# Patient Record
Sex: FEMALE | Race: WHITE | NOT HISPANIC OR LATINO | Employment: OTHER | URBAN - METROPOLITAN AREA
[De-identification: names, ages, dates, MRNs, and addresses within clinical notes are randomized per-mention and may not be internally consistent; named-entity substitution may affect disease eponyms.]

---

## 2013-06-05 LAB — CRC RECOMMENDATION EXT: NORMAL

## 2017-04-28 ENCOUNTER — HISTORICAL (OUTPATIENT)
Dept: ADMINISTRATIVE | Facility: HOSPITAL | Age: 62
End: 2017-04-28

## 2017-06-14 ENCOUNTER — HISTORICAL (OUTPATIENT)
Dept: RADIOLOGY | Facility: HOSPITAL | Age: 62
End: 2017-06-14

## 2017-07-26 ENCOUNTER — HISTORICAL (OUTPATIENT)
Dept: ADMINISTRATIVE | Facility: HOSPITAL | Age: 62
End: 2017-07-26

## 2017-08-10 ENCOUNTER — HISTORICAL (OUTPATIENT)
Dept: HEMATOLOGY/ONCOLOGY | Facility: CLINIC | Age: 62
End: 2017-08-10

## 2017-08-10 LAB
ABS NEUT (OLG): 4.77 X10(3)/MCL (ref 2.1–9.2)
BASOPHILS # BLD AUTO: 0 X10(3)/MCL (ref 0–0.2)
BASOPHILS NFR BLD AUTO: 0.4 %
CEA SERPL-MCNC: 1 NG/ML (ref 0–3)
EOSINOPHIL # BLD AUTO: 0.1 X10(3)/MCL (ref 0–0.9)
EOSINOPHIL NFR BLD AUTO: 1.9 %
ERYTHROCYTE [DISTWIDTH] IN BLOOD BY AUTOMATED COUNT: 13.4 % (ref 11.5–17)
HCT VFR BLD AUTO: 36.4 % (ref 37–47)
HGB BLD-MCNC: 11.7 GM/DL (ref 12–16)
LYMPHOCYTES # BLD AUTO: 1.7 X10(3)/MCL (ref 0.6–4.6)
LYMPHOCYTES NFR BLD AUTO: 23.5 %
MCH RBC QN AUTO: 30.4 PG (ref 27–31)
MCHC RBC AUTO-ENTMCNC: 32.1 GM/DL (ref 33–36)
MCV RBC AUTO: 94.5 FL (ref 80–94)
MONOCYTES # BLD AUTO: 0.7 X10(3)/MCL (ref 0.1–1.3)
MONOCYTES NFR BLD AUTO: 9.3 %
NEUTROPHILS # BLD AUTO: 4.8 X10(3)/MCL (ref 2.1–9.2)
NEUTROPHILS NFR BLD AUTO: 64.9 %
PLATELET # BLD AUTO: 269 X10(3)/MCL (ref 130–400)
PMV BLD AUTO: 9.5 FL (ref 9.4–12.4)
RBC # BLD AUTO: 3.85 X10(6)/MCL (ref 4.2–5.4)
WBC # SPEC AUTO: 7.4 X10(3)/MCL (ref 4.5–11.5)

## 2017-08-14 ENCOUNTER — HISTORICAL (OUTPATIENT)
Dept: ADMINISTRATIVE | Facility: HOSPITAL | Age: 62
End: 2017-08-14

## 2017-08-14 LAB — DEPRECATED CALCIDIOL+CALCIFEROL SERPL-MC: 72.2 NG/ML (ref 30–80)

## 2018-08-10 ENCOUNTER — HISTORICAL (OUTPATIENT)
Dept: HEMATOLOGY/ONCOLOGY | Facility: CLINIC | Age: 63
End: 2018-08-10

## 2018-08-10 LAB
ABS NEUT (OLG): 3.38 X10(3)/MCL (ref 2.1–9.2)
ALBUMIN SERPL-MCNC: 4.1 GM/DL (ref 3.4–5)
ALBUMIN/GLOB SERPL: 1.2 {RATIO}
ALP SERPL-CCNC: 55 UNIT/L (ref 38–126)
ALT SERPL-CCNC: 25 UNIT/L (ref 12–78)
AST SERPL-CCNC: 26 UNIT/L (ref 15–37)
BASOPHILS # BLD AUTO: 0 X10(3)/MCL (ref 0–0.2)
BASOPHILS NFR BLD AUTO: 0.5 %
BILIRUB SERPL-MCNC: 0.5 MG/DL (ref 0.2–1)
BILIRUBIN DIRECT+TOT PNL SERPL-MCNC: 0.1 MG/DL (ref 0–0.2)
BILIRUBIN DIRECT+TOT PNL SERPL-MCNC: 0.4 MG/DL (ref 0–0.8)
BUN SERPL-MCNC: 17 MG/DL (ref 7–18)
CALCIUM SERPL-MCNC: 9.3 MG/DL (ref 8.5–10.1)
CEA SERPL-MCNC: 0.9 NG/ML (ref 0–3)
CHLORIDE SERPL-SCNC: 103 MMOL/L (ref 98–107)
CO2 SERPL-SCNC: 29 MMOL/L (ref 21–32)
CREAT SERPL-MCNC: 0.7 MG/DL (ref 0.55–1.02)
DEPRECATED CALCIDIOL+CALCIFEROL SERPL-MC: 47 NG/ML (ref 30–80)
EOSINOPHIL # BLD AUTO: 0.1 X10(3)/MCL (ref 0–0.9)
EOSINOPHIL NFR BLD AUTO: 2.1 %
ERYTHROCYTE [DISTWIDTH] IN BLOOD BY AUTOMATED COUNT: 13.3 % (ref 11.5–17)
GLOBULIN SER-MCNC: 3.4 GM/DL (ref 2.4–3.5)
GLUCOSE SERPL-MCNC: 84 MG/DL (ref 74–106)
HCT VFR BLD AUTO: 38.8 % (ref 37–47)
HGB BLD-MCNC: 12.5 GM/DL (ref 12–16)
LYMPHOCYTES # BLD AUTO: 1.5 X10(3)/MCL (ref 0.6–4.6)
LYMPHOCYTES NFR BLD AUTO: 25.9 %
MCH RBC QN AUTO: 30.7 PG (ref 27–31)
MCHC RBC AUTO-ENTMCNC: 32.2 GM/DL (ref 33–36)
MCV RBC AUTO: 95.3 FL (ref 80–94)
MONOCYTES # BLD AUTO: 0.7 X10(3)/MCL (ref 0.1–1.3)
MONOCYTES NFR BLD AUTO: 12.4 %
NEUTROPHILS # BLD AUTO: 3.4 X10(3)/MCL (ref 2.1–9.2)
NEUTROPHILS NFR BLD AUTO: 59.1 %
PLATELET # BLD AUTO: 268 X10(3)/MCL (ref 130–400)
PMV BLD AUTO: 9.7 FL (ref 9.4–12.4)
POTASSIUM SERPL-SCNC: 4.2 MMOL/L (ref 3.5–5.1)
PROT SERPL-MCNC: 7.5 GM/DL (ref 6.4–8.2)
RBC # BLD AUTO: 4.07 X10(6)/MCL (ref 4.2–5.4)
SODIUM SERPL-SCNC: 139 MMOL/L (ref 136–145)
WBC # SPEC AUTO: 5.7 X10(3)/MCL (ref 4.5–11.5)

## 2018-10-08 ENCOUNTER — HISTORICAL (OUTPATIENT)
Dept: ADMINISTRATIVE | Facility: HOSPITAL | Age: 63
End: 2018-10-08

## 2018-10-08 LAB
ALBUMIN SERPL-MCNC: 4.4 GM/DL (ref 3.4–5)
ALBUMIN/GLOB SERPL: 1.69 {RATIO} (ref 1.5–2.5)
ALP SERPL-CCNC: 39 UNIT/L (ref 38–126)
ALT SERPL-CCNC: 15 UNIT/L (ref 7–52)
AST SERPL-CCNC: 24 UNIT/L (ref 15–37)
BILIRUB SERPL-MCNC: 0.4 MG/DL (ref 0.2–1)
BILIRUBIN DIRECT+TOT PNL SERPL-MCNC: 0 MG/DL (ref 0–0.5)
BILIRUBIN DIRECT+TOT PNL SERPL-MCNC: 0.4 MG/DL
BUN SERPL-MCNC: 16 MG/DL (ref 7–18)
CALCIUM SERPL-MCNC: 9.3 MG/DL (ref 8.5–10)
CHLORIDE SERPL-SCNC: 105 MMOL/L (ref 98–107)
CHOLEST SERPL-MCNC: 267 MG/DL (ref 0–200)
CHOLEST/HDLC SERPL: 3.9 {RATIO}
CO2 SERPL-SCNC: 30 MMOL/L (ref 21–32)
CREAT SERPL-MCNC: 0.71 MG/DL (ref 0.6–1.3)
GLOBULIN SER-MCNC: 2.6 GM/DL (ref 1.2–3)
GLUCOSE SERPL-MCNC: 94 MG/DL (ref 74–106)
HDLC SERPL-MCNC: 69 MG/DL (ref 35–60)
LDLC SERPL CALC-MCNC: 180 MG/DL (ref 0–129)
POTASSIUM SERPL-SCNC: 4.3 MMOL/L (ref 3.5–5.1)
PROT SERPL-MCNC: 7 GM/DL (ref 6.4–8.2)
SODIUM SERPL-SCNC: 141 MMOL/L (ref 136–145)
T3FREE SERPL-MCNC: 1.76 PG/ML (ref 1.45–3.48)
T4 FREE SERPL-MCNC: 0.86 NG/DL (ref 0.76–1.46)
TRIGL SERPL-MCNC: 60 MG/DL (ref 30–150)
TSH SERPL-ACNC: 1.11 MIU/ML (ref 0.35–4.94)
VLDLC SERPL CALC-MCNC: 12 MG/DL

## 2019-04-10 ENCOUNTER — HISTORICAL (OUTPATIENT)
Dept: ADMINISTRATIVE | Facility: HOSPITAL | Age: 64
End: 2019-04-10

## 2019-04-10 LAB
ABS NEUT (OLG): 3 X10(3)/MCL (ref 2.1–9.2)
ALBUMIN SERPL-MCNC: 4.4 GM/DL (ref 3.4–5)
ALBUMIN/GLOB SERPL: 1.76 {RATIO} (ref 1.5–2.5)
ALP SERPL-CCNC: 45 UNIT/L (ref 38–126)
ALT SERPL-CCNC: 18 UNIT/L (ref 7–52)
APPEARANCE, UA: CLEAR
AST SERPL-CCNC: 24 UNIT/L (ref 15–37)
BACTERIA #/AREA URNS AUTO: ABNORMAL /HPF
BILIRUB SERPL-MCNC: 0.4 MG/DL (ref 0.2–1)
BILIRUB UR QL STRIP: NEGATIVE MG/DL
BILIRUBIN DIRECT+TOT PNL SERPL-MCNC: 0.1 MG/DL (ref 0–0.5)
BILIRUBIN DIRECT+TOT PNL SERPL-MCNC: 0.3 MG/DL
BUN SERPL-MCNC: 11 MG/DL (ref 7–18)
CALCIUM SERPL-MCNC: 9.4 MG/DL (ref 8.5–10)
CHLORIDE SERPL-SCNC: 104 MMOL/L (ref 98–107)
CHOLEST SERPL-MCNC: 248 MG/DL (ref 0–200)
CHOLEST/HDLC SERPL: 3.6 {RATIO}
CO2 SERPL-SCNC: 31 MMOL/L (ref 21–32)
COLOR UR: YELLOW
CREAT SERPL-MCNC: 0.65 MG/DL (ref 0.6–1.3)
ERYTHROCYTE [DISTWIDTH] IN BLOOD BY AUTOMATED COUNT: 12.4 % (ref 11.5–17)
GLOBULIN SER-MCNC: 2.5 GM/DL (ref 1.2–3)
GLUCOSE (UA): NEGATIVE MG/DL
GLUCOSE SERPL-MCNC: 91 MG/DL (ref 74–106)
HCT VFR BLD AUTO: 37.4 % (ref 37–47)
HDLC SERPL-MCNC: 68 MG/DL (ref 35–60)
HGB BLD-MCNC: 12.5 GM/DL (ref 12–16)
HGB UR QL STRIP: ABNORMAL UNIT/L
KETONES UR QL STRIP: NEGATIVE MG/DL
LDLC SERPL CALC-MCNC: 149 MG/DL (ref 0–129)
LEUKOCYTE ESTERASE UR QL STRIP: NEGATIVE UNIT/L
LYMPHOCYTES # BLD AUTO: 1.5 X10(3)/MCL (ref 0.6–3.4)
LYMPHOCYTES NFR BLD AUTO: 27.5 % (ref 13–40)
MCH RBC QN AUTO: 30.8 PG (ref 27–31.2)
MCHC RBC AUTO-ENTMCNC: 33 GM/DL (ref 32–36)
MCV RBC AUTO: 92 FL (ref 80–94)
MONOCYTES # BLD AUTO: 0.9 X10(3)/MCL (ref 0.1–1.3)
MONOCYTES NFR BLD AUTO: 16 % (ref 0.1–24)
NEUTROPHILS NFR BLD AUTO: 56.5 % (ref 47–80)
NITRITE UR QL STRIP.AUTO: NEGATIVE
PH UR STRIP: 7 [PH]
PLATELET # BLD AUTO: 249 X10(3)/MCL (ref 130–400)
PMV BLD AUTO: 9.4 FL (ref 9.4–12.4)
POTASSIUM SERPL-SCNC: 4.3 MMOL/L (ref 3.5–5.1)
PROT SERPL-MCNC: 6.9 GM/DL (ref 6.4–8.2)
PROT UR QL STRIP: NEGATIVE MG/DL
RBC # BLD AUTO: 4.06 X10(6)/MCL (ref 4.2–5.4)
RBC #/AREA URNS HPF: ABNORMAL /HPF
SODIUM SERPL-SCNC: 140 MMOL/L (ref 136–145)
SP GR UR STRIP: 1.01
SQUAMOUS EPITHELIAL, UA: ABNORMAL /LPF
T3FREE SERPL-MCNC: 2.13 PG/ML (ref 1.45–3.48)
T4 FREE SERPL-MCNC: 0.83 NG/DL (ref 0.76–1.46)
TRIGL SERPL-MCNC: 96 MG/DL (ref 30–150)
TSH SERPL-ACNC: 0.48 MIU/ML (ref 0.35–4.94)
UROBILINOGEN UR STRIP-ACNC: 0.2 MG/DL
VLDLC SERPL CALC-MCNC: 19.2 MG/DL
WBC # SPEC AUTO: 5.4 X10(3)/MCL (ref 4.5–11.5)
WBC #/AREA URNS AUTO: ABNORMAL /[HPF]

## 2019-06-14 ENCOUNTER — HISTORICAL (OUTPATIENT)
Dept: ADMINISTRATIVE | Facility: HOSPITAL | Age: 64
End: 2019-06-14

## 2019-06-14 LAB
ABS NEUT (OLG): 2.8 X10(3)/MCL (ref 2.1–9.2)
ALBUMIN SERPL-MCNC: 4.3 GM/DL (ref 3.4–5)
ALBUMIN/GLOB SERPL: 1.72 {RATIO} (ref 1.5–2.5)
ALP SERPL-CCNC: 40 UNIT/L (ref 38–126)
ALT SERPL-CCNC: 15 UNIT/L (ref 7–52)
APPEARANCE, UA: ABNORMAL
AST SERPL-CCNC: 24 UNIT/L (ref 15–37)
BACTERIA #/AREA URNS AUTO: ABNORMAL /HPF
BILIRUB SERPL-MCNC: 0.3 MG/DL (ref 0.2–1)
BILIRUB UR QL STRIP: NEGATIVE MG/DL
BILIRUBIN DIRECT+TOT PNL SERPL-MCNC: 0.1 MG/DL (ref 0–0.5)
BILIRUBIN DIRECT+TOT PNL SERPL-MCNC: 0.2 MG/DL
BUN SERPL-MCNC: 17 MG/DL (ref 7–18)
CALCIUM SERPL-MCNC: 9.3 MG/DL (ref 8.5–10)
CHLORIDE SERPL-SCNC: 101 MMOL/L (ref 98–107)
CO2 SERPL-SCNC: 28 MMOL/L (ref 21–32)
COLOR UR: YELLOW
CREAT SERPL-MCNC: 0.74 MG/DL (ref 0.6–1.3)
ERYTHROCYTE [DISTWIDTH] IN BLOOD BY AUTOMATED COUNT: 12.5 % (ref 11.5–17)
GLOBULIN SER-MCNC: 2.5 GM/DL (ref 1.2–3)
GLUCOSE (UA): NEGATIVE MG/DL
GLUCOSE SERPL-MCNC: 98 MG/DL (ref 74–106)
HCT VFR BLD AUTO: 37.2 % (ref 37–47)
HGB BLD-MCNC: 12.4 GM/DL (ref 12–16)
HGB UR QL STRIP: ABNORMAL UNIT/L
KETONES UR QL STRIP: NEGATIVE MG/DL
LEUKOCYTE ESTERASE UR QL STRIP: ABNORMAL UNIT/L
LYMPHOCYTES # BLD AUTO: 2 X10(3)/MCL (ref 0.6–3.4)
LYMPHOCYTES NFR BLD AUTO: 40.4 % (ref 13–40)
MCH RBC QN AUTO: 30.9 PG (ref 27–31.2)
MCHC RBC AUTO-ENTMCNC: 33 GM/DL (ref 32–36)
MCV RBC AUTO: 93 FL (ref 80–94)
MONOCYTES # BLD AUTO: 0.2 X10(3)/MCL (ref 0.1–1.3)
MONOCYTES NFR BLD AUTO: 4.5 % (ref 0.1–24)
NEUTROPHILS NFR BLD AUTO: 55.1 % (ref 47–80)
NITRITE UR QL STRIP.AUTO: NEGATIVE
PH UR STRIP: 7 [PH]
PLATELET # BLD AUTO: 267 X10(3)/MCL (ref 130–400)
PMV BLD AUTO: 8.8 FL (ref 9.4–12.4)
POTASSIUM SERPL-SCNC: 4.2 MMOL/L (ref 3.5–5.1)
PROT SERPL-MCNC: 6.8 GM/DL (ref 6.4–8.2)
PROT UR QL STRIP: NEGATIVE MG/DL
RBC # BLD AUTO: 4.01 X10(6)/MCL (ref 4.2–5.4)
RBC #/AREA URNS HPF: ABNORMAL /HPF
SODIUM SERPL-SCNC: 139 MMOL/L (ref 136–145)
SP GR UR STRIP: 1.01
SQUAMOUS EPITHELIAL, UA: ABNORMAL /LPF
T3FREE SERPL-MCNC: 3.06 PG/ML (ref 1.45–3.48)
T4 FREE SERPL-MCNC: 0.85 NG/DL (ref 0.76–1.46)
TSH SERPL-ACNC: 0.51 MIU/ML (ref 0.35–4.94)
UROBILINOGEN UR STRIP-ACNC: 0.2 MG/DL
WBC # SPEC AUTO: 5 X10(3)/MCL (ref 4.5–11.5)
WBC #/AREA URNS AUTO: ABNORMAL /[HPF]

## 2019-08-09 ENCOUNTER — HISTORICAL (OUTPATIENT)
Dept: HEMATOLOGY/ONCOLOGY | Facility: CLINIC | Age: 64
End: 2019-08-09

## 2019-08-09 LAB
ABS NEUT (OLG): 2.46 X10(3)/MCL (ref 2.1–9.2)
ALBUMIN SERPL-MCNC: 4 GM/DL (ref 3.4–5)
ALBUMIN/GLOB SERPL: 1.3 RATIO (ref 1.1–2)
ALP SERPL-CCNC: 52 UNIT/L (ref 38–126)
ALT SERPL-CCNC: 23 UNIT/L (ref 12–78)
AST SERPL-CCNC: 22 UNIT/L (ref 15–37)
BASOPHILS # BLD AUTO: 0 X10(3)/MCL (ref 0–0.2)
BASOPHILS NFR BLD AUTO: 0.6 %
BASOPHILS NFR BLD MANUAL: 1 % (ref 0–2)
BILIRUB SERPL-MCNC: 0.2 MG/DL (ref 0.2–1)
BILIRUBIN DIRECT+TOT PNL SERPL-MCNC: 0.1 MG/DL (ref 0–0.5)
BILIRUBIN DIRECT+TOT PNL SERPL-MCNC: 0.1 MG/DL (ref 0–0.8)
BUN SERPL-MCNC: 14 MG/DL (ref 7–18)
CALCIUM SERPL-MCNC: 9.8 MG/DL (ref 8.5–10.1)
CEA SERPL-MCNC: 0.8 NG/ML (ref 0–3)
CHLORIDE SERPL-SCNC: 105 MMOL/L (ref 98–107)
CO2 SERPL-SCNC: 32 MMOL/L (ref 21–32)
CREAT SERPL-MCNC: 0.71 MG/DL (ref 0.55–1.02)
DEPRECATED CALCIDIOL+CALCIFEROL SERPL-MC: 84.21 NG/ML (ref 30–80)
EOSINOPHIL # BLD AUTO: 0.1 X10(3)/MCL (ref 0–0.9)
EOSINOPHIL NFR BLD AUTO: 2.1 %
EOSINOPHIL NFR BLD MANUAL: 1 % (ref 0–8)
ERYTHROCYTE [DISTWIDTH] IN BLOOD BY AUTOMATED COUNT: 12.5 % (ref 11.5–17)
GLOBULIN SER-MCNC: 3 GM/DL (ref 2.4–3.5)
GLUCOSE SERPL-MCNC: 89 MG/DL (ref 74–106)
HCT VFR BLD AUTO: 38.3 % (ref 37–47)
HGB BLD-MCNC: 12.3 GM/DL (ref 12–16)
LYMPHOCYTES # BLD AUTO: 1.8 X10(3)/MCL (ref 0.6–4.6)
LYMPHOCYTES NFR BLD AUTO: 34.6 %
LYMPHOCYTES NFR BLD MANUAL: 30 % (ref 13–40)
MCH RBC QN AUTO: 30.5 PG (ref 27–31)
MCHC RBC AUTO-ENTMCNC: 32.1 GM/DL (ref 33–36)
MCV RBC AUTO: 95 FL (ref 80–94)
MONOCYTES # BLD AUTO: 0.8 X10(3)/MCL (ref 0.1–1.3)
MONOCYTES NFR BLD AUTO: 14.6 %
MONOCYTES NFR BLD MANUAL: 10 % (ref 2–11)
NEUTROPHILS # BLD AUTO: 2.5 X10(3)/MCL (ref 2.1–9.2)
NEUTROPHILS NFR BLD AUTO: 47.7 %
NEUTROPHILS NFR BLD MANUAL: 59 % (ref 47–80)
PLATELET # BLD AUTO: 281 X10(3)/MCL (ref 130–400)
PLATELET # BLD EST: NORMAL 10*3/UL
PMV BLD AUTO: 8.8 FL (ref 9.4–12.4)
POTASSIUM SERPL-SCNC: 4.1 MMOL/L (ref 3.5–5.1)
PROT SERPL-MCNC: 7 GM/DL (ref 6.4–8.2)
RBC # BLD AUTO: 4.03 X10(6)/MCL (ref 4.2–5.4)
SODIUM SERPL-SCNC: 144 MMOL/L (ref 136–145)
WBC # SPEC AUTO: 5.2 X10(3)/MCL (ref 4.5–11.5)

## 2019-10-09 ENCOUNTER — HISTORICAL (OUTPATIENT)
Dept: ADMINISTRATIVE | Facility: HOSPITAL | Age: 64
End: 2019-10-09

## 2019-10-09 LAB
T3FREE SERPL-MCNC: 3.71 PG/ML (ref 1.45–3.48)
T4 FREE SERPL-MCNC: 0.77 NG/DL (ref 0.76–1.46)
TSH SERPL-ACNC: 0.3 MIU/ML (ref 0.35–4.94)

## 2019-10-21 LAB — RAPID GROUP A STREP (OHS): NEGATIVE

## 2019-10-30 ENCOUNTER — HISTORICAL (OUTPATIENT)
Dept: ADMINISTRATIVE | Facility: HOSPITAL | Age: 64
End: 2019-10-30

## 2020-05-05 ENCOUNTER — HISTORICAL (OUTPATIENT)
Dept: ADMINISTRATIVE | Facility: HOSPITAL | Age: 65
End: 2020-05-05

## 2020-05-05 LAB
ABS NEUT (OLG): 3 X10(3)/MCL (ref 2.1–9.2)
ALBUMIN SERPL-MCNC: 4.2 GM/DL (ref 3.4–4.8)
ALBUMIN/GLOB SERPL: 1.5 RATIO (ref 1.1–2)
ALP SERPL-CCNC: 52 UNIT/L (ref 40–150)
ALT SERPL-CCNC: 17 UNIT/L (ref 0–55)
AST SERPL-CCNC: 27 UNIT/L (ref 5–34)
BILIRUB SERPL-MCNC: 0.3 MG/DL
BILIRUBIN DIRECT+TOT PNL SERPL-MCNC: 0.1 MG/DL (ref 0–0.5)
BILIRUBIN DIRECT+TOT PNL SERPL-MCNC: 0.2 MG/DL (ref 0–0.8)
BUN SERPL-MCNC: 20 MG/DL (ref 9.8–20.1)
CALCIUM SERPL-MCNC: 9.4 MG/DL (ref 8.4–10.2)
CHLORIDE SERPL-SCNC: 103 MMOL/L (ref 98–107)
CO2 SERPL-SCNC: 30 MMOL/L (ref 23–31)
CREAT SERPL-MCNC: 0.78 MG/DL (ref 0.55–1.02)
DEPRECATED CALCIDIOL+CALCIFEROL SERPL-MC: 62.8 NG/ML (ref 30–80)
ERYTHROCYTE [DISTWIDTH] IN BLOOD BY AUTOMATED COUNT: 12.6 % (ref 11.5–17)
GLOBULIN SER-MCNC: 2.8 GM/DL (ref 2.4–3.5)
GLUCOSE SERPL-MCNC: 73 MG/DL (ref 82–115)
HCT VFR BLD AUTO: 34.5 % (ref 37–47)
HGB BLD-MCNC: 11.2 GM/DL (ref 12–16)
LYMPHOCYTES # BLD AUTO: 2.3 X10(3)/MCL (ref 0.6–3.4)
LYMPHOCYTES NFR BLD AUTO: 40.9 % (ref 13–40)
MCH RBC QN AUTO: 30.4 PG (ref 27–31.2)
MCHC RBC AUTO-ENTMCNC: 32 GM/DL (ref 32–36)
MCV RBC AUTO: 94 FL (ref 80–94)
MONOCYTES # BLD AUTO: 0.3 X10(3)/MCL (ref 0.1–1.3)
MONOCYTES NFR BLD AUTO: 4.5 % (ref 0.1–24)
NEUTROPHILS NFR BLD AUTO: 54.6 % (ref 47–80)
PLATELET # BLD AUTO: 269 X10(3)/MCL (ref 130–400)
PMV BLD AUTO: 9 FL (ref 9.4–12.4)
POTASSIUM SERPL-SCNC: 3.9 MMOL/L (ref 3.5–5.1)
PROT SERPL-MCNC: 7 GM/DL (ref 5.8–7.6)
RBC # BLD AUTO: 3.68 X10(6)/MCL (ref 4.2–5.4)
SODIUM SERPL-SCNC: 141 MMOL/L (ref 136–145)
T3FREE SERPL-MCNC: 2.44 PG/ML (ref 1.45–3.48)
T4 FREE SERPL-MCNC: 0.93 NG/DL (ref 0.76–1.46)
TSH SERPL-ACNC: 0.35 MIU/ML (ref 0.35–4.94)
WBC # SPEC AUTO: 5.6 X10(3)/MCL (ref 4.5–11.5)

## 2020-08-10 ENCOUNTER — HISTORICAL (OUTPATIENT)
Dept: HEMATOLOGY/ONCOLOGY | Facility: CLINIC | Age: 65
End: 2020-08-10

## 2020-08-10 LAB
ABS NEUT (OLG): 2.78 X10(3)/MCL (ref 2.1–9.2)
ALBUMIN SERPL-MCNC: 3.9 GM/DL (ref 3.4–5)
ALBUMIN/GLOB SERPL: 1.3 RATIO (ref 1.1–2)
ALP SERPL-CCNC: 57 UNIT/L (ref 40–150)
ALT SERPL-CCNC: 18 UNIT/L (ref 0–55)
AST SERPL-CCNC: 27 UNIT/L (ref 5–34)
BASOPHILS # BLD AUTO: 0 X10(3)/MCL (ref 0–0.2)
BASOPHILS NFR BLD AUTO: 0.6 %
BILIRUB SERPL-MCNC: 0.4 MG/DL
BILIRUBIN DIRECT+TOT PNL SERPL-MCNC: 0.2 MG/DL (ref 0–0.5)
BILIRUBIN DIRECT+TOT PNL SERPL-MCNC: 0.2 MG/DL (ref 0–0.8)
BUN SERPL-MCNC: 14.7 MG/DL (ref 9.8–20.1)
CALCIUM SERPL-MCNC: 9.2 MG/DL (ref 8.4–10.2)
CEA SERPL-MCNC: <1.73 MG/ML (ref 0–3)
CHLORIDE SERPL-SCNC: 105 MMOL/L (ref 98–107)
CO2 SERPL-SCNC: 31 MMOL/L (ref 23–31)
CREAT SERPL-MCNC: 0.74 MG/DL (ref 0.55–1.02)
EOSINOPHIL # BLD AUTO: 0.1 X10(3)/MCL (ref 0–0.9)
EOSINOPHIL NFR BLD AUTO: 2.5 %
ERYTHROCYTE [DISTWIDTH] IN BLOOD BY AUTOMATED COUNT: 12.6 % (ref 11.5–17)
GLOBULIN SER-MCNC: 2.9 GM/DL (ref 2.4–3.5)
GLUCOSE SERPL-MCNC: 95 MG/DL (ref 82–115)
HCT VFR BLD AUTO: 37.4 % (ref 37–47)
HGB BLD-MCNC: 11.8 GM/DL (ref 12–16)
LYMPHOCYTES # BLD AUTO: 1.6 X10(3)/MCL (ref 0.6–4.6)
LYMPHOCYTES NFR BLD AUTO: 31.5 %
MCH RBC QN AUTO: 30.3 PG (ref 27–31)
MCHC RBC AUTO-ENTMCNC: 31.6 GM/DL (ref 33–36)
MCV RBC AUTO: 95.9 FL (ref 80–94)
MONOCYTES # BLD AUTO: 0.6 X10(3)/MCL (ref 0.1–1.3)
MONOCYTES NFR BLD AUTO: 11.1 %
NEUTROPHILS # BLD AUTO: 2.8 X10(3)/MCL (ref 2.1–9.2)
NEUTROPHILS NFR BLD AUTO: 54.1 %
PLATELET # BLD AUTO: 270 X10(3)/MCL (ref 130–400)
PMV BLD AUTO: 9.4 FL (ref 9.4–12.4)
POTASSIUM SERPL-SCNC: 4.1 MMOL/L (ref 3.5–5.1)
PROT SERPL-MCNC: 6.8 GM/DL (ref 5.8–7.6)
RBC # BLD AUTO: 3.9 X10(6)/MCL (ref 4.2–5.4)
SODIUM SERPL-SCNC: 141 MMOL/L (ref 136–145)
WBC # SPEC AUTO: 5.1 X10(3)/MCL (ref 4.5–11.5)

## 2020-10-20 ENCOUNTER — HISTORICAL (OUTPATIENT)
Dept: ADMINISTRATIVE | Facility: HOSPITAL | Age: 65
End: 2020-10-20

## 2021-01-20 ENCOUNTER — HISTORICAL (OUTPATIENT)
Dept: ADMINISTRATIVE | Facility: HOSPITAL | Age: 66
End: 2021-01-20

## 2021-01-20 LAB
T3FREE SERPL-MCNC: 2.35 PG/ML (ref 1.45–3.48)
T4 FREE SERPL-MCNC: 0.85 NG/DL (ref 0.76–1.46)
TSH SERPL-ACNC: 0.53 MIU/ML (ref 0.35–4.94)

## 2021-05-11 ENCOUNTER — HISTORICAL (OUTPATIENT)
Dept: ADMINISTRATIVE | Facility: HOSPITAL | Age: 66
End: 2021-05-11

## 2021-05-11 LAB
ABS NEUT (OLG): 3.6 X10(3)/MCL (ref 2.1–9.2)
ALBUMIN SERPL-MCNC: 4.3 GM/DL (ref 3.4–5)
ALBUMIN/GLOB SERPL: 1.87 {RATIO} (ref 1.5–2.5)
ALP SERPL-CCNC: 45 UNIT/L (ref 38–126)
ALT SERPL-CCNC: 17 UNIT/L (ref 7–52)
APPEARANCE, UA: CLEAR
AST SERPL-CCNC: 27 UNIT/L (ref 15–37)
BACTERIA #/AREA URNS AUTO: ABNORMAL /HPF
BILIRUB SERPL-MCNC: 0.4 MG/DL (ref 0.2–1)
BILIRUB UR QL STRIP: NEGATIVE MG/DL
BILIRUBIN DIRECT+TOT PNL SERPL-MCNC: 0.1 MG/DL (ref 0–0.5)
BILIRUBIN DIRECT+TOT PNL SERPL-MCNC: 0.3 MG/DL
BUN SERPL-MCNC: 14 MG/DL (ref 7–18)
CALCIUM SERPL-MCNC: 9.8 MG/DL (ref 8.5–10)
CHLORIDE SERPL-SCNC: 101 MMOL/L (ref 98–107)
CHOLEST SERPL-MCNC: 237 MG/DL (ref 0–200)
CHOLEST/HDLC SERPL: 3 {RATIO}
CO2 SERPL-SCNC: 31 MMOL/L (ref 21–32)
COLOR UR: YELLOW
CREAT SERPL-MCNC: 0.69 MG/DL (ref 0.6–1.3)
DEPRECATED CALCIDIOL+CALCIFEROL SERPL-MC: 63.9 NG/ML (ref 30–80)
ERYTHROCYTE [DISTWIDTH] IN BLOOD BY AUTOMATED COUNT: 12.7 % (ref 11.5–17)
GLOBULIN SER-MCNC: 2.3 GM/DL (ref 1.2–3)
GLUCOSE (UA): NEGATIVE MG/DL
GLUCOSE SERPL-MCNC: 83 MG/DL (ref 74–106)
HCT VFR BLD AUTO: 36.7 % (ref 37–47)
HDLC SERPL-MCNC: 79 MG/DL (ref 35–60)
HGB BLD-MCNC: 12.1 GM/DL (ref 12–16)
HGB UR QL STRIP: ABNORMAL UNIT/L
KETONES UR QL STRIP: NEGATIVE MG/DL
LDLC SERPL CALC-MCNC: 152 MG/DL (ref 0–129)
LEUKOCYTE ESTERASE UR QL STRIP: NEGATIVE UNIT/L
LYMPHOCYTES # BLD AUTO: 1.7 X10(3)/MCL (ref 0.6–3.4)
LYMPHOCYTES NFR BLD AUTO: 28.1 % (ref 13–40)
MCH RBC QN AUTO: 30.9 PG (ref 27–31.2)
MCHC RBC AUTO-ENTMCNC: 33 GM/DL (ref 32–36)
MCV RBC AUTO: 94 FL (ref 80–94)
MONOCYTES # BLD AUTO: 0.9 X10(3)/MCL (ref 0.1–1.3)
MONOCYTES NFR BLD AUTO: 14 % (ref 0.1–24)
NEUTROPHILS NFR BLD AUTO: 57.9 % (ref 47–80)
NITRITE UR QL STRIP.AUTO: NEGATIVE
PH UR STRIP: 7 [PH]
PLATELET # BLD AUTO: 288 X10(3)/MCL (ref 130–400)
PMV BLD AUTO: 10.1 FL (ref 9.4–12.4)
POTASSIUM SERPL-SCNC: 4.1 MMOL/L (ref 3.5–5.1)
PROT SERPL-MCNC: 6.6 GM/DL (ref 6.4–8.2)
PROT UR QL STRIP: NEGATIVE MG/DL
RBC # BLD AUTO: 3.92 X10(6)/MCL (ref 4.2–5.4)
RBC #/AREA URNS HPF: ABNORMAL /HPF
SODIUM SERPL-SCNC: 139 MMOL/L (ref 136–145)
SP GR UR STRIP: 1.01
SQUAMOUS EPITHELIAL, UA: ABNORMAL /LPF
T3FREE SERPL-MCNC: 2.27 PG/ML (ref 1.45–3.48)
T4 FREE SERPL-MCNC: 0.82 NG/DL (ref 0.76–1.46)
TRIGL SERPL-MCNC: 99 MG/DL (ref 30–150)
TSH SERPL-ACNC: 0.94 MIU/ML (ref 0.35–4.94)
UROBILINOGEN UR STRIP-ACNC: 0.2 MG/DL
VLDLC SERPL CALC-MCNC: 19.8 MG/DL
WBC # SPEC AUTO: 6.2 X10(3)/MCL (ref 4.5–11.5)
WBC #/AREA URNS AUTO: ABNORMAL /[HPF]

## 2021-05-19 ENCOUNTER — HISTORICAL (OUTPATIENT)
Dept: ADMINISTRATIVE | Facility: HOSPITAL | Age: 66
End: 2021-05-19

## 2021-05-19 LAB — H PYLORI AB SER IA-ACNC: NEGATIVE

## 2021-08-13 ENCOUNTER — HISTORICAL (OUTPATIENT)
Dept: HEMATOLOGY/ONCOLOGY | Facility: CLINIC | Age: 66
End: 2021-08-13

## 2021-08-13 LAB
ABS NEUT (OLG): 2.86 X10(3)/MCL (ref 2.1–9.2)
ALBUMIN SERPL-MCNC: 4.2 GM/DL (ref 3.4–4.8)
ALBUMIN/GLOB SERPL: 1.6 RATIO (ref 1.1–2)
ALP SERPL-CCNC: 49 UNIT/L (ref 40–150)
ALT SERPL-CCNC: 14 UNIT/L (ref 0–55)
AST SERPL-CCNC: 27 UNIT/L (ref 5–34)
BASOPHILS # BLD AUTO: 0 X10(3)/MCL (ref 0–0.2)
BASOPHILS NFR BLD AUTO: 0.8 %
BILIRUB SERPL-MCNC: 0.4 MG/DL
BILIRUBIN DIRECT+TOT PNL SERPL-MCNC: 0.2 MG/DL (ref 0–0.5)
BILIRUBIN DIRECT+TOT PNL SERPL-MCNC: 0.2 MG/DL (ref 0–0.8)
BUN SERPL-MCNC: 16.8 MG/DL (ref 9.8–20.1)
CALCIUM SERPL-MCNC: 9.7 MG/DL (ref 8.4–10.2)
CEA SERPL-MCNC: 1.76 NG/ML (ref 0–3)
CHLORIDE SERPL-SCNC: 103 MMOL/L (ref 98–107)
CO2 SERPL-SCNC: 29 MMOL/L (ref 23–31)
CREAT SERPL-MCNC: 0.8 MG/DL (ref 0.55–1.02)
EOSINOPHIL # BLD AUTO: 0.1 X10(3)/MCL (ref 0–0.9)
EOSINOPHIL NFR BLD AUTO: 1.9 %
ERYTHROCYTE [DISTWIDTH] IN BLOOD BY AUTOMATED COUNT: 12.8 % (ref 11.5–17)
GLOBULIN SER-MCNC: 2.7 GM/DL (ref 2.4–3.5)
GLUCOSE SERPL-MCNC: 85 MG/DL (ref 82–115)
HCT VFR BLD AUTO: 37 % (ref 37–47)
HGB BLD-MCNC: 12 GM/DL (ref 12–16)
LYMPHOCYTES # BLD AUTO: 1.6 X10(3)/MCL (ref 0.6–4.6)
LYMPHOCYTES NFR BLD AUTO: 29.8 %
MCH RBC QN AUTO: 30.8 PG (ref 27–31)
MCHC RBC AUTO-ENTMCNC: 32.4 GM/DL (ref 33–36)
MCV RBC AUTO: 95.1 FL (ref 80–94)
MONOCYTES # BLD AUTO: 0.7 X10(3)/MCL (ref 0.1–1.3)
MONOCYTES NFR BLD AUTO: 13.4 %
NEUTROPHILS # BLD AUTO: 2.9 X10(3)/MCL (ref 2.1–9.2)
NEUTROPHILS NFR BLD AUTO: 53.9 %
PLATELET # BLD AUTO: 250 X10(3)/MCL (ref 130–400)
PMV BLD AUTO: 9.2 FL (ref 9.4–12.4)
POTASSIUM SERPL-SCNC: 4.3 MMOL/L (ref 3.5–5.1)
PROT SERPL-MCNC: 6.9 GM/DL (ref 5.8–7.6)
RBC # BLD AUTO: 3.89 X10(6)/MCL (ref 4.2–5.4)
SODIUM SERPL-SCNC: 141 MMOL/L (ref 136–145)
WBC # SPEC AUTO: 5.3 X10(3)/MCL (ref 4.5–11.5)

## 2021-09-07 ENCOUNTER — HISTORICAL (OUTPATIENT)
Dept: RADIOLOGY | Facility: HOSPITAL | Age: 66
End: 2021-09-07

## 2021-11-22 ENCOUNTER — HISTORICAL (OUTPATIENT)
Dept: ADMINISTRATIVE | Facility: HOSPITAL | Age: 66
End: 2021-11-22

## 2021-11-22 LAB
DEPRECATED CALCIDIOL+CALCIFEROL SERPL-MC: 73.7 NG/ML (ref 30–80)
T3FREE SERPL-MCNC: 3.46 PG/ML (ref 1.45–3.48)
T4 FREE SERPL-MCNC: 0.92 NG/DL (ref 0.76–1.46)
TSH SERPL-ACNC: 0.41 MIU/ML (ref 0.35–4.94)

## 2022-04-09 ENCOUNTER — HISTORICAL (OUTPATIENT)
Dept: ADMINISTRATIVE | Facility: HOSPITAL | Age: 67
End: 2022-04-09
Payer: MEDICARE

## 2022-04-29 VITALS
WEIGHT: 119 LBS | BODY MASS INDEX: 19.07 KG/M2 | SYSTOLIC BLOOD PRESSURE: 108 MMHG | SYSTOLIC BLOOD PRESSURE: 100 MMHG | HEIGHT: 62 IN | OXYGEN SATURATION: 100 % | WEIGHT: 105.81 LBS | WEIGHT: 101.63 LBS | SYSTOLIC BLOOD PRESSURE: 120 MMHG | HEIGHT: 62 IN | BODY MASS INDEX: 18.7 KG/M2 | SYSTOLIC BLOOD PRESSURE: 106 MMHG | WEIGHT: 103.63 LBS | HEIGHT: 62 IN | DIASTOLIC BLOOD PRESSURE: 74 MMHG | BODY MASS INDEX: 19.47 KG/M2 | BODY MASS INDEX: 21.9 KG/M2 | HEIGHT: 62 IN | BODY MASS INDEX: 19.6 KG/M2 | DIASTOLIC BLOOD PRESSURE: 48 MMHG | HEIGHT: 62 IN | DIASTOLIC BLOOD PRESSURE: 60 MMHG | DIASTOLIC BLOOD PRESSURE: 56 MMHG | WEIGHT: 106.5 LBS | SYSTOLIC BLOOD PRESSURE: 130 MMHG | DIASTOLIC BLOOD PRESSURE: 59 MMHG

## 2022-04-30 NOTE — PROGRESS NOTES
Patient:   Kimberlyn Selby            MRN: [id number]            FIN: [id number]               Age:   61 years     Sex:  Female     :  1955   Associated Diagnoses:   None   Author:   Riley MARTINEZ, Beronica TREVIÑO      Visit Information   Diagnosis: Stage I R breast cancer  (T1b N0 M0), 0.7 cm, grade 1, ER+/RI-, HER-2 negative        Stage IIA L breast cancer  (T1c N1 M0), 1.6 cm, grade III, 1 + node, ER/RI -, HER-2 negative        Stage IIIA R breast cancer  (T2 N2a M0), 1.6 cm, grade II, 4+ nodes, ER/RI +, HER-2 negative        Post menopausal s/p hysterectomy        Declined adjuvant XRT & hormonal therapy with initial breast cancer    Current Therapy: Observation  Previous Therapy: Adjuvant TAC x6 completed ; adjuvant TC x4 completed  --> XRT L breast    Clinical History: WF status post right lumpectomy  for invasive breast cancer with the above characteristics. She completed 6 cycles of adjuvant TAC but declined radiation. She was placed on Arimidex but discontinued treatment due to significant myalgias. She had similar side effects with Femara and also complained of bone aching due to Tamoxifen and stopped therapy . Due to her hormone receptor status, she was placed on Evista but again was unable to tolerate therapy. She had an abnormal surveillance mammogram  showing clustered microcalcifications in the upper outer quadrant of the left breast with an associated 1 cm mass by ultrasound. 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 Patient underwent left lumpectomy and axillary dissection 1/15/09. Final pathology as outlined above. Postop course was complicated by cellulitis. She completed 4 cycles of adjuvant TC . She eventually consented to adjuvant radiation therapy to the left breast. Surveillance CT PET scan  showed no abnormal hypermetabolic activity. Mammogram 11 showed a new suspicious finding in the right breast at  "the 4:00 position. Ultrasound was suspicious for malignancy. Ultrasound-guided core biopsies revealed invasive ductal carcinoma strongly ER positive at 85%, AL negative and HER-2 negative. CT PET scan 5/11 showed no abnormal hypermetabolic activity to indicate recurrent or metastatic disease. Although patient did have radiation therapy post lumpectomy on the left side, she never had radiation therapy to the right breast. She elected to undergo bilateral mastectomies and prophylactic right oophorectomy 8/16/11. Final pathology showed a 0.7 cm grade 1 infiltrating ductal carcinoma the right breast. Lamar dissection was not done to her previous surgery. She declined BRCA testing. Adjuvant hormonal therapy was recommended with Aromasin but she declined. She underwent a screening colonoscopy 6/5/13 which showed diverticula but no other abnormal findings; 10 year followup was recommended.       Chief Complaint   "Hair thinning, recently switched thyroid medication"      Interval History   Patient presents today by herself for an annual breast cancer surveillance appointment.  She is now over 12 years out from her initial right breast cancer, over 8 years out from her left breast cancer, and 6 years out from her 2nd right breast cancer.  She has done well with no evidence to suggest recurrence or new problems.  She denies any changes to her chest wall examination.  Colonoscopy is up-to-date.  She had full laboratory drawn with her family physician, Dr. Beyer, in April.  At that time she was mildly anemic, but iron level was normal.  Due to complaints of feeling fatigued and having hair thinning, her Eclectic Thyroid was changed to Nature-throid.  She is feeling somewhat better.  CEA level drawn for today is normal.  She has not had a vitamin D level checked recently.  She is currently being followed by orthopedics for arthritis involving the right knee and a Baker's cyst.      Review of Systems   Constitutional:  Fatigue, + " hair thinning, No fever, No sweats, No weakness.    Eye:  No icterus, No blurring, No double vision, No visual disturbances.    Ear/Nose/Mouth/Throat:  No decreased hearing, No nasal congestion, No sore throat.    Respiratory:  No shortness of breath, No cough, No sputum production, No hemoptysis, No wheezing.    Cardiovascular:  No chest pain, No palpitations, No tachycardia.    Breast:  Bilateral mastectomies - no changes on self examination.    Gastrointestinal:  No nausea, No vomiting, No diarrhea, No constipation, No abdominal pain.    Genitourinary:  No dysuria, No hematuria, No change in urine stream.    Hematology/Lymphatics:  No bruising tendency, No bleeding tendency, No swollen lymph glands.    Musculoskeletal:  Chronic neck and lower back pain - improved, Right knee pain, No joint pain.    Integumentary:  No rash, No pruritus, No petechiae.    Neurologic:  Alert and oriented X4, No abnormal balance, No headache.    Psychiatric:  No anxiety, No depression.       Histories   Past Medical History:    Resolved  Stage I right breast cancer ([id number]):  Resolved.  Comments:  2016 CDT 20:11 CDT - Drea Simpson  Declined adjuvant radiation and hormonal therapy with initial breast cancer  Stage IIA left breast cancer ([id number]):  Resolved.  Stage IIIA right breast cancer ([id number]):  Resolved.  Post menopausal S/P hysterectomy ([id number]):  Resolved.  Arthritis ([id number]):  Resolved.   Family History:    Renal cancer....  Father  Hypertension.  Father  Diabetes mellitus type 1.  Father  Liver cancer                                                                                                                                                                                                                            09-AUG-2016 15:28:46<$>  Grandfather (Maternal, )     Procedure history:    Mastectomy ([id number]) in the month of 2011 at 55 Years.  Comments:  2016 09:44 - Elle Carr  J  BILATERAL  JONI - Total abdominal hysterectomy ([id number]) in the month of 8/2011 at 55 Years.  Uterine suspension in 1990 at 35 Years.  Hysterectomy age 39.  Right breast lumpectomy 1/2005.  Left breast lumpectomy 1/2009.  Mediport catheter placement.  Bilateral mastectomies and prophylactic oophrectomy 8/16/2011.   Social History        Social & Psychosocial Habits    Alcohol  01/21/2014 Risk Assessment: Low Risk    06/02/2016  Use: Never    Employment/School  06/02/2016  Status: Unemployed    Exercise  06/02/2016  Times per week: 3-4 times/week    Exercise type: Walking, Yoga    Home/Environment  06/02/2016  Lives with: Spouse    Nutrition/Health  06/02/2016  Type of diet: LOW SODIUM    Substance Abuse  04/01/2015 Risk Assessment: Denies Substance Abuse    06/02/2016  Use: Never    Tobacco  12/07/2016  Use: Former smoker    Type: Cigarettes    Comment: QUIT AGE 31 - 12/07/2016 09:45 - Elle Carr        Physical Examination   Vital Signs   8/14/2017 13:27 CDT      Temperature Oral          36.7 DegC                             Peripheral Pulse Rate     66 bpm                             Systolic Blood Pressure   121 mmHg                             Diastolic Blood Pressure  73 mmHg     Measurements from flowsheet : Measurements   8/14/2017 13:27 CDT      Weight Dosing             54.7 kg                             Weight Measured           54.7 kg                             Weight Measured and Calculated in Lbs     120.59 lb                             Height/Length Dosing      158 cm                             Height/Length Measured    158 cm                             BSA Measured              1.55 m2                             Body Mass Index Measured  21.91 kg/m2     General:  Alert and oriented, Thin white female in no acute distress.    Eye:  Pupils are equal, round and reactive to light, Extraocular movements are intact, Normal conjunctiva, Sclera nonicteric.    HENT:  Normocephalic,  Oral mucosa is moist, No pharyngeal erythema, No oral lesions.    Neck:  Supple, Non-tender, No jugular venous distention, No lymphadenopathy, No thyromegaly.    Respiratory:  Lungs are clear to auscultation, Respirations are non-labored, Breath sounds are equal.    Cardiovascular:  Normal rate, Regular rhythm, No murmur.    Breast:  Bilateral well-healed mastectomy incisions. No masses or chest wall lesions. No axillary nodes bilaterally.    Gastrointestinal:  Soft, Non-tender, Non-distended, Normal bowel sounds, No organomegaly.    Lymphatics:  No lymphadenopathy neck, axilla, groin.    Musculoskeletal:  Normal range of motion, No tenderness, No lower extremity edema, Trace left upper extremity soft lymphedema.    Integumentary:  Warm, Dry, No rash.    Neurologic:  Alert, Oriented, Normal sensory, Normal motor function, No focal deficits, Cranial Nerves II-XII are grossly intact.    ECOG Performance Scale: 0 - Fully active; no performance restrictions.      Review / Management   Laboratory Results   Last 10 Days Lab Results : PowerNote Discrete Results   8/10/2017 13:09 CDT      WBC                       7.4 x10(3)/mcL                             RBC                       3.85 x10(6)/mcL  LOW                             Hgb                       11.7 gm/dL  LOW                             Hct                       36.4 %  LOW                             Platelet                  269 x10(3)/mcL                             MCV                       94.5 fL  HI                             MCH                       30.4 pg                             MCHC                      32.1 gm/dL  LOW                             RDW                       13.4 %                             MPV                       9.5 fL                             Abs Neut                  4.77 x10(3)/mcL                             NEUT%                     64.9 %  NA                             NEUT#                     4.8 x10(3)/mcL                              LYMPH%                    23.5 %  NA                             LYMPH#                    1.7 x10(3)/mcL                             MONO%                     9.3 %  NA                             MONO#                     0.7 x10(3)/mcL                             EOS%                      1.9 %  NA                             EOS#                      0.1 x10(3)/mcL                             BASO%                     0.4 %  NA                             BASO#                     0.0 x10(3)/mcL                             CEA                       1 ng/mL           Impression and Plan   IMPRESSION:  Stage I right breast cancer 8/11 - NEVAEH   Stage IIA left breast cancer 1/09 - NEVAEH   Stage IIIA right breast cancer 1/05 - NEVAEH    PLAN:  Patient is doing well with no evidence to suggest disease recurrence or new problems.  Additional laboratory will be collected today for a vitamin D level.  I will notify her by phone of the results and any recommendations.  Otherwise, she will return to clinic in 1 year for ongoing breast cancer surveillance.    JON DIXON, FNP-C    Other Physicians  Dr Darleen Mccurdy

## 2022-05-02 NOTE — HISTORICAL [facility] CERNER
This is a historical note converted from [facility]. Formatting and pictures may have been removed.  Please reference Domonique for original formatting and attached multimedia. Chief Complaint  6 mth  History of Present Illness  Patient here for six-month follow-up.?Nonfasting.? Denies any side effects to current medications.? Tolerating current medications and?feels that they are effective.? Denies change in social or family history.?  ?   Anxiety: She feels her anxiety is uncontrolled and is experiencing some depression.? She continues to have problems with her 34yo son who has mental health and addition problems.? He is currently living in her backyard.? He has not followed up with his appendiceal cancer as he should. She is also currently going through a major renovation of her master bath.? She is only occasionally using Valium at night. She would like to change or increase her sertraline.? She was previously in counseling but is not going at this time. She denies any suicidal or harmful ideations.  ?  Specialists:  GI: [facility], colonoscopy 2013, repeat in 10 years  ONC :?Atkins, breast CA  ORTHO : Etier - for knees and right hand  ENT: NAINA Rosenthal  ?   Vaccinations:  Tetanus?- declines  Influenza - declines  COVID - [facility] & Bruno 5/20/2021  Shingles - declines  Pneumonia?- declines  Review of Systems  General:?? Denies weight change.??Denies fever,chills, night sweats, or weakness.  Cardiovascular:?? Denies chest pain, palpitations, dyspnea on exertion, orthopnea.  Respiratory:?? No cough, wheezing, shortness of breath, or sputum.  GI:?? Denies nausea, emesis, constipation, diarrhea, melena, hematochezia or abdominal pain  Psych:?? Denies suicidal or homicidal ideations, or irritability  ?  Physical Exam  Vitals & Measurements  HR:?68(Peripheral)? BP:?130/74?  HT:?158.00?cm? HT:?158?cm? WT:?46.100?kg? WT:?46.1?kg? BMI:?18.47?  General:?? Well-developed and??nourished, no apparent distress, alert and  oriented??4.  Neck:?? Supple, no lymphadenopathy, no thyromegaly, no bruits, no jugular venous distention.  Cardiovascular:?? Regular rhythm and rate, no murmurs, radial and dorsal pedal pulses 2+ bilaterally.  Respiratory:?? Lungs clear to auscultation bilaterally, no wheezes, no crackles, no rhonchi.??Good air movement.  Abdomen:?? NABS, soft, nontender, no hepatosplenomegaly, no masses, no guarding or rebound.?  MS:?? No CCE.?_  Neuro:?? CN II-XII intact_  Psych: Normal affect, patient calm, good historian, patient answers questions?appropriately. Good hygiene.  Assessment/Plan  1.?Anxiety?F41.9  ?She was encouraged to look through [facility] for counseling services.  Increase sertraline to 100mg 1.5 tabs daily. She does not need a refill at this time. Side effects discussed which included the possibility of developing suicidal or harmful ideations. ?Patient?verbalized understanding and will?stop the medication and?seek ER care if?sheexperiences these problems.  Ordered:  Clinic Follow up, *Est. 05/22/22 3:00:00 CDT, CPX in 6 months, CPX in 6 months, Order for future visit, Anxiety  Hypothyroid  Vitamin D deficiency  Hyperlipidemia  Anemia  Depression, ink AFP  Clinic Follow up, *Est. 12/22/21 3:00:00 CST, Ok for Telemed OV, Order for future visit, Anxiety  Depression, HLink AFP  Office/Outpatient Visit Level 4 Established 44325 PC, Anxiety  Hypothyroid  Vitamin D deficiency  Hyperlipidemia  Anemia  Depression, [facility]LINK AMB - AFP, 11/22/21 14:37:00 CST  ?  2.?Depression?F32.A  ?See #1.  Ordered:  Clinic Follow up, *Est. 05/22/22 3:00:00 CDT, CPX in 6 months, CPX in 6 months, Order for future visit, Anxiety  Hypothyroid  Vitamin D deficiency  Hyperlipidemia  Anemia  Depression, HLink AFP  Clinic Follow up, *Est. 12/22/21 3:00:00 CST, Ok for Telemed OV, Order for future visit, Anxiety  Depression, HLink AFP  Office/Outpatient Visit Level 4 Established 72299 PC, Anxiety  Hypothyroid  Vitamin  D deficiency  Hyperlipidemia  Anemia  Depression, [facility]LINK AMB - AFP, 11/22/21 14:37:00 CST  ?  3.?Hypothyroid?E03.9  ?Lab today: TSH, FT3, FT4  Ordered:  Clinic Follow up, *Est. 05/22/22 3:00:00 CDT, CPX in 6 months, CPX in 6 months, Order for future visit, Anxiety  Hypothyroid  Vitamin D deficiency  Hyperlipidemia  Anemia  Depression, HLink AFP  Free T4, Routine collect, *Est. 11/22/21 3:00:00 CST, Blood, Order for future visit, *Est. Stop date 11/22/21 3:00:00 CST, Lab Collect, Hypothyroid, 11/22/21 14:10:00 CST  Free T4, Routine collect, 11/22/21 14:43:00 CST, Blood, Stop date 11/22/21 14:43:00 CST, Lab Collect, Hypothyroid, 11/22/21 14:43:00 CST  Office/Outpatient Visit Level 4 Established 25696 PC, Anxiety  Hypothyroid  Vitamin D deficiency  Hyperlipidemia  Anemia  Depression, [facility]LINK AMB - AFP, 11/22/21 14:37:00 CST  T3 Free, Routine collect, *Est. 11/22/21 3:00:00 CST, Blood, Order for future visit, *Est. Stop date 11/22/21 3:00:00 CST, Lab Collect, Hypothyroid, 11/22/21 14:10:00 CST  T3 Free, Routine collect, 11/22/21 14:43:00 CST, Blood, Stop date 11/22/21 14:43:00 CST, Lab Collect, Hypothyroid, 11/22/21 14:43:00 CST  Thyroid Stimulating Hormone, Routine collect, *Est. 11/22/21 3:00:00 CST, Blood, Order for future visit, *Est. Stop date 11/22/21 3:00:00 CST, Lab Collect, Hypothyroid, 11/22/21 14:10:00 CST  Thyroid Stimulating Hormone, Routine collect, 11/22/21 14:43:00 CST, Blood, Stop date 11/22/21 14:43:00 CST, Lab Collect, Hypothyroid, 11/22/21 14:43:00 CST  ?  4.?Vitamin D deficiency?E55.9  ?Lab today: Vit D  Ordered:  Clinic Follow up, *Est. 05/22/22 3:00:00 CDT, CPX in 6 months, CPX in 6 months, Order for future visit, Anxiety  Hypothyroid  Vitamin D deficiency  Hyperlipidemia  Anemia  Depression, HLink AFP  Office/Outpatient Visit Level 4 Established 70635 PC, Anxiety  Hypothyroid  Vitamin D deficiency  Hyperlipidemia  Anemia  Depression, [facility]LINK AMB - AFP, 11/22/21 14:37:00  CST  Vitamin D, 25-Hydroxy Level, Routine collect, *Est. 11/22/21 3:00:00 CST, Blood, Order for future visit, *Est. Stop date 11/22/21 3:00:00 CST, Lab Collect, Vitamin D deficiency, 11/22/21 14:10:00 CST  Vitamin D, 25-Hydroxy Level, Routine collect, 11/22/21 14:43:00 CST, Blood, Stop date 11/22/21 14:43:00 CST, Lab Collect, Vitamin D deficiency, 11/22/21 14:43:00 CST  ?  5.?Hyperlipidemia?E78.5  Patient declines lipid?panel today.  Ordered:  Clinic Follow up, *Est. 05/22/22 3:00:00 CDT, CPX in 6 months, CPX in 6 months, Order for future visit, Anxiety  Hypothyroid  Vitamin D deficiency  Hyperlipidemia  Anemia  Depression, HLink AFP  Lipid Panel, Routine collect, *Est. 11/22/21 3:00:00 CST, Blood, Order for future visit, *Est. Stop date 11/22/21 3:00:00 CST, Lab Collect, Hyperlipidemia, 11/22/21 14:10:00 CST  Office/Outpatient Visit Level 4 Established [id number] PC, Anxiety  Hypothyroid  Vitamin D deficiency  Hyperlipidemia  Anemia  Depression, HLINK AMB - AFP, 11/22/21 14:37:00 CST  ?  6.?Anemia?D64.9  ?Recent CBC in August stable. Patient asymptomatic.  Ordered:  Clinic Follow up, *Est. 05/22/22 3:00:00 CDT, CPX in 6 months, CPX in 6 months, Order for future visit, Anxiety  Hypothyroid  Vitamin D deficiency  Hyperlipidemia  Anemia  Depression, HLink AFP  Office/Outpatient Visit Level 4 Established [id number] PC, Anxiety  Hypothyroid  Vitamin D deficiency  Hyperlipidemia  Anemia  Depression, HLINK AMB - AFP, 11/22/21 14:37:00 CST  ?  Orders:  sertraline, 150 mg = 1.5 tab(s), Oral, Daily, # 135 tab(s), 0 Refill(s), other reason (Rx)  Referrals  Clinic Follow up, *Est. 12/22/21 3:00:00 CST, Ok for Telemed OV, Order for future visit, Anxiety  Depression, HLink AFP  Clinic Follow up, *Est. 05/22/22 3:00:00 CDT, CPX in 6 months, CPX in 6 months, Order for future visit, Anxiety  Hypothyroid  Vitamin D deficiency  Hyperlipidemia  Anemia  Depression, HLink AFP  Clinic Follow up, Order for future visit    Problem List/Past Medical History  Ongoing  Anemia  Anxiety  AR (allergic rhinitis)  Arthritis of carpometacarpal (CMC) joint of right thumb  Bakers cyst  Carcinoma of breast - upper, outer quadrant  Depression  Dyslipidemia(  Confirmed  )  Dysuria  Fatigue  H/O iron deficiency  Hyperlipidemia  Hypothyroid  Left knee pain  Multiple thyroid nodules(  Confirmed  )  Pes anserinus bursitis of right knee  Right knee pain  Vertigo  Vitamin D deficiency  Wellness examination  Historical  Arthritis  Post menopausal S/P hysterectomy  Stage I right breast cancer  Stage IIA left breast cancer  Stage IIIA right breast cancer  Procedure/Surgical History  Colonoscopy (06/05/2013)  Mastectomy (08.2011)  JONI - Total abdominal hysterectomy (08.2011)  Uterine suspension (1990)  Bilateral mastectomies and prophylactic oophrectomy 8/16/2011  Hysterectomy age 39  Left breast lumpectomy 1/2009  Mediport catheter placement  Right breast lumpectomy 1/2005   Medications  Acidophilus Probiotic Blend oral capsule  [place] Thyroid 60 mg oral tablet, See Instructions  B-Complex  Calcium and Magnesium oral tablet  DIAZepam 5 mg oral tablet, See Instructions, PRN  Flonase 50 mcg/inh nasal spray, 1 spray(s), Nasal, BID  meclizine 25 mg oral tablet, 12.5 mg= 0.5 tab(s), Oral, BID  Melatonin  Omega-3 oral capsule  omeprazole 40 mg oral DR capsule, 40 mg= 1 cap(s), Oral, Daily, 1 refills  Pennsaid 2% topical solution, 2 pump, TOP, BID  sertraline 100 mg oral tablet, 150 mg= 1.5 tab(s), Oral, Daily  Vitamin C 250 mg oral tablet  Vitamin D, 2000 IntUnit, Oral  Zinc-220 oral capsule  Allergies  No Known Medication Allergies  Social History  Abuse/Neglect  No, 11/22/2021  No, 08/24/2021  No, 05/19/2021  Alcohol - Low Risk, 01/21/2014  Never, 06/02/2016  Employment/School  Unemployed, 06/02/2016  Exercise  Exercise frequency: 3-4 times/week. Exercise type: Walking, Yoga., 06/02/2016  Home/Environment  Lives with Spouse.,  06/02/2016  Nutrition/Health  LOW SODIUM, 06/02/2016  Substance Use - Denies Substance Abuse, 04/01/2015  Never, 06/02/2016  Tobacco  Never (less than 100 in lifetime), No, 11/22/2021  Never (less than 100 in lifetime), No, 08/24/2021  Never (less than 100 in lifetime), No, 05/19/2021  Family History  Appendiceal cancer: Son.  Dementia: Mother (Dx at 63).  Diabetes mellitus type 2: Father (Dx at 80).  Hypertension.: Father.  Liver cancer.......: Grandfather.  Renal cancer....: Father (Dx at 55).  Tobacco use: Mother.  Brother: History is negative  Brother: History is negative  Immunizations  Vaccine Date Status   COVID-19, vector nr, [id number] - [facility] 05/20/2021 Given   Health Maintenance  Health Maintenance  ???Pending?(in the next year)  ??? ??OverDue  ??? ? ? ?Tetanus Vaccine due??06/07/18??and every 10??year(s)  ??? ? ? ?Alcohol Misuse Screening due??01/02/21??and every 1??year(s)  ??? ? ? ?Cognitive Screening due??01/02/21??and every 1??year(s)  ??? ? ? ?Functional Assessment due??01/02/21??and every 1??year(s)  ??? ? ? ?ADL Screening due??08/17/21??and every 1??year(s)  ??? ??Due?  ??? ? ? ?Aspirin Therapy for CVD Prevention due??11/22/21??and every 1??year(s)  ??? ? ? ?Pneumococcal Vaccine due??11/22/21??Unknown Frequency  ??? ? ? ?Zoster Vaccine due??11/22/21??Unknown Frequency  ??? ??Due In Future?  ??? ? ? ?Obesity Screening not due until??01/01/22??and every 1??year(s)  ??? ? ? ?Advance Directive not due until??01/02/22??and every 1??year(s)  ??? ? ? ?Fall Risk Assessment not due until??01/02/22??and every 1??year(s)  ??? ? ? ?Medicare Annual Wellness Exam not due until??05/19/22??and every 1??year(s)  ??? ? ? ?Depression Screening not due until??08/24/22??and every 1??year(s)  ???Satisfied?(in the past 1 year)  ??? ??Satisfied?  ??? ? ? ?Advance Directive on??08/24/21.??Satisfied by Franchesca Avina  ??? ? ? ?Blood Pressure Screening on??11/22/21.??Satisfied by Ivis Ray  ??? ? ? ?Body Mass Index Check  on??11/22/21.??Satisfied by Ivis Ray  ??? ? ? ?Bone Density Screening on??09/08/21.??Satisfied by Mendy Fields  ??? ? ? ?Depression Screening on??08/24/21.??Satisfied by Franchesca Avina  ??? ? ? ?Diabetes Screening on??08/13/21.??Satisfied by Michelle Mary  ??? ? ? ?Fall Risk Assessment on??08/24/21.??Satisfied by Franchesca Avina  ??? ? ? ?Influenza Vaccine on??11/22/21.??Satisfied by Ivis Ray  ??? ? ? ?Lipid Screening on??05/11/21.??Satisfied by Bridger Alberto  ??? ? ? ?Medicare Annual Wellness Exam on??05/19/21.??Satisfied by Renny MARTINEZ, Mary GARCIA  ??? ? ? ?Obesity Screening on??11/22/21.??Satisfied by Ivis Ray  ??? ??Refused?  ??? ? ? ?Colorectal Screening on??08/24/21.??Recorded by Franchesca Avina  ?

## 2022-05-02 NOTE — HISTORICAL [facility] CERNER
This is a historical note converted from Domonique. Formatting and pictures may have been removed.  Please reference Cermattie for original formatting and attached multimedia. Chief Complaint  CPX FASTING  History of Present Illness  Patient is here for ?her?annual wellness -labs not done, fasting. Denies any side effects to current medications.? Tolerating current meds and?feels that they are effective.? Denies change in social or family history.?  ?   Weight loss of 11#s over the past year. Patient unsure as to why she has lost weight. She stopped eating sugar except in fruit approximately 6 months ago. She also eats a limited amount of carbs. She remains active and has an exercise routine. She denies any fever, chills, or night sweats.  ?   Social Hx: Water:? 5 bottles/day,? Caff: 2-3c/day,? ETOH: none, ?Tob: none,? Exercise: Pilates 2x/week, walking, ?Occupation: homemaker, ?Marital Status:?, ?Children: 2 adopted sons doing well  ?   Family Hx:  Updated.  ?   Surgical Hx:  Updated.  ?   Specialists:  Last Colonoscopy: Dr. Pugh - 2013?, next in 10 years  Onc: Dr. Atkins, hx of breast cancer  Last DEXA: 10/2018  ?  Review of Systems  General:?? Patient reports energy level is ?good. Denies weight change.??Denies fever,chills, night sweats, or weakness. ?Denies fatigue.  Integument:?? Denies any nevus changes, rashes, urticaria,??or sores.??Also denies itching or areas of numbness.  HEENT:?? Denies vision changes or eye pain.??No sore throat, ear pain, sinus pressure or discharge.  Cardiovascular:?? Denies chest pain, palpitations, dyspnea on exertion, orthopnea.  Respiratory:?? No cough, wheezing, shortness of breath, or sputum.  GI:?? Denies nausea, emesis, constipation, diarrhea, melena, hematochezia or abdominal pain  :?? No frequency, urgency, hematuria, discharge, or incontinence  MS:?? Denies myalgias, arthralgias, joint effusion, edema, or weakness  Neuro:?? No headaches, numbness in extremities,  tingling, dizziness, or weakness  Psych:?? Denies anxiety, depression, suicidal or homicidal ideations, or irritability  Endo:?? Denies polyuria, polydipsia, polyphagia  Heme:?? No abnormal bleeding or bruising. No lymph node enlargement or pain.  ?  Physical Exam  Vitals & Measurements  HR:?68(Peripheral)? BP:?124/62?  HT:?158?cm? WT:?49.1?kg? BMI:?19.67?  General:?? Well-developed and??nourished, no apparent distress, alert and oriented??4.  Integument:?? Skin is intact with no erythema.??No pustules or vesicles.??No rash or scale. No Lymphadenopathy.??No urticaria.??No abnormal nevi.  HEENT:?? PERRLA, EOMI ; TMs and EACs clear, normal turbinates with no erythema, normal mucosa, no sinus tenderness; no erythema or exudate of mouth or pharynx.  Neck:?? Supple, no lymphadenopathy, no thyromegaly, no bruits, no jugular venous distention.  Cardiovascular:?? Regular rhythm and rate, no murmurs, radial and dorsal pedal pulses 2+ bilaterally.  Respiratory:?? Lungs clear to auscultation bilaterally, no wheezes, no crackles, no rhonchi.??Good air movement.  Abdomen:?? NABS, soft, nontender, no hepatosplenomegaly, no masses, no guarding or rebound.?  MS:?? No muscle tenderness, joints WN L, FROM, negative SLR, no?CCE.  Neuro:?? CN II-XII intact, reflexes 2+ throughout, no motor sensory deficits, negative cerebellar??tests.  Psych: Normal affect, patient calm, good historian, patient answers questions?appropriately. Good hygiene.  Heme:? No bruising or petechiae.  ?  Assessment/Plan  1.?Wellness examination  ?Age appropriate wellness topics discussed.  Ordered:  [facility] Care New 40-64 years [id number] , Wellness examination  Adult onset hypothyroidism  Hyperlipidemia  Malignant neoplasm of upper-outer quadrant of right female breast  Weight loss, [facility], 04/10/19 10:16:00 CDT  ?  2.?Adult onset hypothyroidism  TSH, FT3, FT4 today  Ordered:  Clinic Follow up, *Est. 10/09/19 9:15:00 CDT, Order for future  visit, Weight loss, [facility] New 40-64 years [id number] PC, Wellness examination  Adult onset hypothyroidism  Hyperlipidemia  Malignant neoplasm of upper-outer quadrant of right female breast  Weight loss, [facility], 04/10/19 10:16:00 CDT  ?  3.?Hyperlipidemia  Lipid panel  Ordered:  Clinic Follow up, *Est. 10/09/19 9:15:00 CDT, Order for future visit, Weight loss, [facility] New 40-64 years [id number] PC, Wellness examination  Adult onset hypothyroidism  Hyperlipidemia  Malignant neoplasm of upper-outer quadrant of right female breast  Weight loss, [facility], 04/10/19 10:16:00 CDT  ?  4.?Malignant neoplasm of upper-outer quadrant of right female breast  Continue to follow up with Dr. Atkins.  Ordered:  [facility] New 40-64 years [id number] PC, Wellness examination  Adult onset hypothyroidism  Hyperlipidemia  Malignant neoplasm of upper-outer quadrant of right female breast  Weight loss, [facility], 04/10/19 10:16:00 CDT  ?  5.?Weight loss  Most likely due to?healthier eating. She was encouraged to?make sure?she has a good source of protein with each meal as well as good fats. Will continue to monitor.  Ordered:  Clinic Follow up, *Est. 10/09/19 9:15:00 CDT, Order for future visit, Weight loss, [facility] New 40-64 years [id number] PC, Wellness examination  Adult onset hypothyroidism  Hyperlipidemia  Malignant neoplasm of upper-outer quadrant of right female breast  Weight loss, [facility], 04/10/19 10:16:00 CDT  ?   Problem List/Past Medical History  Ongoing  Adult onset hypothyroidism  Anxiety  AR (allergic rhinitis)  Bakers cyst  Carcinoma of breast - upper, outer quadrant  Dyslipidemia(  Confirmed  )  Fatigue  H/O iron deficiency  Hyperlipidemia  Hypovitaminosis D(  Confirmed  )  Multiple thyroid nodules(  Confirmed  )  Pes anserinus bursitis of right knee  Vertigo  Historical  Arthritis  Post  menopausal S/P hysterectomy  Stage I right breast cancer  Stage IIA left breast cancer  Stage IIIA right breast cancer  Procedure/Surgical History  Mastectomy (08.2011)  JONI - Total abdominal hysterectomy (08.2011)  Uterine suspension (1990)  Bilateral mastectomies and prophylactic oophrectomy 8/16/2011  Hysterectomy age 39  Left breast lumpectomy 1/2009  Mediport catheter placement  Right breast lumpectomy 1/2005   Medications  Acidophilus Probiotic Blend oral capsule  [place] Thyroid 60 mg oral tablet, 60 mg= 1 tab(s), Oral, Daily, 3 refills  B-Complex  Calcium and Magnesium oral tablet  Melatonin  Omega-3 oral capsule  Pennsaid 2% topical solution, 2 pump, TOP, BID, 1 refills  sertraline 50 mg oral tablet, 75 mg= 1.5 tab(s), Oral, Daily, 1 refills  Vitamin C 250 mg oral tablet  Vitamin D, 2000 IntUnit, Oral  Zinc-220 oral capsule  Allergies  No Known Medication Allergies  Social History  Alcohol - Low Risk, 01/21/2014  Never, 06/02/2016  Employment/School  Unemployed, 06/02/2016  Exercise  Exercise frequency: 3-4 times/week. Exercise type: Walking, Yoga., 06/02/2016  Home/Environment  Lives with Spouse., 06/02/2016  Nutrition/Health  LOW SODIUM, 06/02/2016  Substance Abuse - Denies Substance Abuse, 04/01/2015  Never, 06/02/2016  Tobacco  Former smoker, quit more than 30 days ago, N/A, 04/10/2019  Family History  Dementia: Mother (Dx at 63).  Diabetes mellitus type 2: Father (Dx at 80).  Hypertension.: Father.  Liver cancer 09-AUG-2016 15:28:46<$>: Grandfather.  Renal cancer....: Father (Dx at 55).  Tobacco use: Mother.  Brother: History is negative  Brother: History is negative  Health Maintenance  Health Maintenance  ???Pending?(in the next year)  ??? ??OverDue  ??? ? ? ?Diabetes Screening due??and every?  ??? ? ? ?Tetanus Vaccine due??06/07/18??and every 10??year(s)  ??? ? ? ?Zoster Vaccine due??06/07/18??and every 100??year(s)  ??? ??Due?  ??? ? ? ?ADL Screening due??04/10/19??and every 1??year(s)  ??? ? ?  ?Aspirin Therapy for CVD Prevention due??04/10/19??and every 1??year(s)  ??? ? ? ?Colorectal Screening due??04/10/19??and every?  ??? ??Due In Future?  ??? ? ? ?Depression Screening not due until??08/15/19??and every 1??year(s)  ??? ? ? ?Blood Pressure Screening not due until??04/09/20??and every 1??year(s)  ??? ? ? ?Body Mass Index Check not due until??04/09/20??and every 1??year(s)  ???Satisfied?(in the past 1 year)  ??? ??Satisfied?  ??? ? ? ?Blood Pressure Screening on??04/10/19.??Satisfied by Shelbi Saeed LPN  ??? ? ? ?Body Mass Index Check on??04/10/19.??Satisfied by Shelbi Saeed LPN  ??? ? ? ?Depression Screening on??08/15/18.??Satisfied by Stephy Lynn LPN.  ??? ? ? ?Diabetes Screening on??04/10/19.??Satisfied by Bridger Alberto  ??? ? ? ?Influenza Vaccine on??03/14/19.??Satisfied by Shelbi Saeed LPN  ??? ? ? ?Lipid Screening on??04/10/19.??Satisfied by Bridger Alberto  ??? ? ? ?Obesity Screening on??04/10/19.??Satisfied by Shelbi Saeed LPN  ?  ?

## 2022-05-02 NOTE — HISTORICAL [facility] CERNER
This is a historical note converted from Domonique. Formatting and pictures may have been removed.  Please reference Domonique for original formatting and attached multimedia. Chief Complaint  right knee pain-bakers cyst  History of Present Illness  This 61-year-old is referred by Dr. Darleen Beyer for right knee pain.? The patient has had problems with her knees periodically.? She developed a Bakers cyst and had calf pain. ?She did have an ultrasound.? The patient has just finished a major house remodel and moved furniture back into position.? She states that?this may have contributed to her symptoms.? However she is generally an avid walker.? She states that she has not been able to walk secondary to pain.  Review of Systems  Constitutional: No fever, weakness, or fatigue.  Ear/Nose/Mouth/Throat: No nasal congestion or sore throat.  Respiratory: No shortness of breath or cough.  Cardiovascular: No chest pain, palpitations, or peripheral edema.  Gastrointestinal: No nausea, vomiting, or abdominal pain.  Genitourinary: No dysuria.  Musculoskeletal: See current complaints; lymphedema left arm secondary to cancer  Integumentary: Negative.  Physical Exam  Vitals & Measurements  HR:?64?(Peripheral)? BP:?100/48? HT:?158?cm? HT:?158?cm? WT:?53.97?kg? WT:?53.97?kg? BMI:?21.62?  Physical examination shows a pleasant lady who looks younger than her stated age.? She walks without an antalgic gait. ?She uses no assistive device.? Examination of her right knee shows that she has a small Bakers cyst. ?It is moderately tender.? She has pain with medial Aleksandra testing but no true pop is elicited. ?She does have some mild tenderness on the pes anserine bursa.? Range of motion is 0-120?. ?She has good patella tracking and no evidence of ligamentous laxity.  ?  Standing AP, lateral, and sunrise view of the right knee shows that the patient has mild medial?and patellofemoral arthritic changes.  Assessment/Plan  1.?Pain in right  knee  The findings were reviewed with the patient and she expressed understanding.? She will be placed on a short course of oral corticosteroids.? I believe she primarily has a semimembranosus?bursitis with subsequent?pes anserine bursitis.? This is led to the development of a Bakers cyst.? I will see her back in 1 week for recheck.? The patient is instructed to call if she has any problems prior to her next appointment.? The side effects of prednisone were discussed with the patient in detail.  Ordered:  predniSONE, 10 mg = 1 tab(s), Oral, Daily, X 7 day(s), # 7 tab(s), 0 Refill(s), Pharmacy: [id number]  Clinic Follow up, *Est. 08/02/17 3:00:00 CDT, Order for future visit, Pain in right knee  Synovial cyst of popliteal space [Wylie], right knee, [facility] [place]  Office/Outpatient Visit Level 3 New [id number] PC, Pain in right knee  Synovial cyst of popliteal space [Wylie], right knee, [facility] [place], 07/26/17 15:10:00 CDT  ?  2.?Synovial cyst of popliteal space [Wylie], right knee  Ordered:  predniSONE, 10 mg = 1 tab(s), Oral, Daily, X 7 day(s), # 7 tab(s), 0 Refill(s), Pharmacy: [id number]  Clinic Follow up, *Est. 08/02/17 3:00:00 CDT, Order for future visit, Pain in right knee  Synovial cyst of popliteal space [Wylie], right knee, [facility] [place]  Office/Outpatient Visit Level 3 New [id number] PC, Pain in right knee  Synovial cyst of popliteal space [Wylie], right knee, [facility] [place], 07/26/17 15:10:00 CDT  ?   Problem List/Past Medical History  Adult onset hypothyroidism  Bakers cyst  Carcinoma of breast - upper, outer quadrant  Dyslipidemia(  Confirmed  )  Fatigue  H/O iron deficiency  Hypovitaminosis D(  Confirmed  )  Multiple thyroid nodules(  Confirmed  )  Historical  Arthritis  Post menopausal S/P hysterectomy  Stage I right breast cancer  Stage IIA left breast cancer  Stage IIIA right breast cancer  Procedure/Surgical History  Mastectomy (08/2011), JONI - Total abdominal  hysterectomy (08/2011), Uterine suspension (1990), Bilateral mastectomies and prophylactic oophrectomy 8/16/2011, Hysterectomy age 39, Left breast lumpectomy 1/2009, Mediport catheter placement, Right breast lumpectomy 1/2005.  Medications  Acidophilus Probiotic Blend oral capsule  B-Complex  Bactroban 2% Ointment (22.5 gmTube), 1 mariana, TOP, BID  Calcium and Magnesium oral tablet  meclizine 25 mg oral tablet, See Instructions  Melatonin  Misc Prescription  Nature-Throid 65 mg oral tablet, 65 mg, 1 tab(s), Oral, Daily, 3 refills  Olive leaf extract  prednisONE 10 mg oral tablet, 10 mg, 1 tab(s), Oral, Daily  sertraline 50 mg oral tablet, See Instructions, 5 refills  Vital-D oral tablet, 1 tab(s), Oral, Daily  Vitamin C 250 mg oral tablet  Zinc-220 oral capsule  Zyrtec 10 mg oral tablet, 10 mg, 1 tab(s), Oral, Daily  Allergies  No Known Medication Allergies  Social History  Alcohol - Low Risk  Never  Employment/School  Unemployed  Exercise  Exercise frequency: 3-4 times/week. Exercise type: Walking, Yoga.  Home/Environment  Lives with Spouse.  Nutrition/Health  LOW SODIUM  Substance Abuse - Denies Substance Abuse  Never  Tobacco  Former smoker, Cigarettes  Family History  Diabetes mellitus type 1.: Father.  Hypertension.: Father.  Liver cancer 09-AUG-2016 15:28:46<$>: Grandfather.  Renal cancer....: Father.

## 2022-05-02 NOTE — HISTORICAL [facility] CERNER
This is a historical note converted from Domonique. Formatting and pictures may have been removed.  Please reference Domonique for original formatting and attached multimedia. Chief Complaint  6M RE CHECK  History of Present Illness  Patient here for six-month follow-up.? Denies any side effects to current medications.? Tolerating current meds and?feels that they are effective.? Denies change in social or family history.? No new complaints.  ?   Fatigue has improved with the increase of her Carpenter Thyroid to 75 mg.  ?   She is aware that her weight does continue to decrease.? She is eating?very well?and avoids?foods considered unhealthy.? She makes an effort to avoid sugar.? However, she does have?her times where she will enjoy sweets.  ?   Anxiety controlled.  Review of Systems  General:?? Patient reports energy level is ?good. Denies weight change.??Denies fever,chills, night sweats, or weakness.?  Integument:?? Denies any nevus changes, rashes, urticaria,??or sores.??Also denies itching or areas of numbness.  HEENT:?? Denies vision changes or eye pain.??No sore throat, ear pain, sinus pressure or discharge.  Cardiovascular:?? Denies chest pain, palpitations, dyspnea on exertion, orthopnea.  Respiratory:?? No cough, wheezing, shortness of breath, or sputum.  GI:?? Denies nausea, emesis, constipation, diarrhea, melena, hematochezia or abdominal pain  :?? No frequency, urgency, hematuria, discharge, or incontinence  MS:?? Denies myalgias, arthralgias, joint effusion, edema, or weakness  Neuro:?? No headaches, numbness in extremities, tingling, dizziness, or weakness  Psych:?? Denies anxiety, depression, suicidal or homicidal ideations, or irritability  Endo:?? Denies polyuria, polydipsia, polyphagia  Heme:?? No abnormal bleeding or bruising. No lymph node enlargement or pain.  ?  Physical Exam  Vitals & Measurements  HR:?62(Peripheral)? BP:?120/72?  HT:?158?cm? WT:?47.7?kg? BMI:?19.11?  General:?? Well-developed  and??nourished, no apparent distress, alert and oriented??4.  Neck:?? Supple, no lymphadenopathy, no thyromegaly, no bruits, no jugular venous distention.  Cardiovascular:?? Regular rhythm and rate, no murmurs, radial and dorsal pedal pulses 2+ bilaterally.  Respiratory:?? Lungs clear to auscultation bilaterally, no wheezes, no crackles, no rhonchi.??Good air movement.  Abdomen:?? NABS, soft, nontender, no hepatosplenomegaly, no masses, no guarding or rebound.?  Psych: Normal affect, patient calm, good historian, patient answers questions?appropriately. Good hygiene.  Heme:? No bruising or petechiae.  ?  Assessment/Plan  1.?Hypothyroid?E03.9  ?Labs today: TSH, free T3, free T4  Ordered:  Clinic Follow up, *Est. 04/13/20 9:15:00 CDT, Order for future visit, Weight loss, [facility]  Free T4, Routine collect, 10/09/19 9:52:00 CDT, Blood, Stop date 10/09/19 9:52:00 CDT, Lab Collect, Hypothyroid, 10/09/19 9:52:00 CDT  Office/Outpatient Visit Level 3 Established [id number] PC, Hypothyroid  Hyperlipidemia  AR (allergic rhinitis)  Weight loss, [facility], 10/09/19 9:50:00 CDT  T3 Free, Routine collect, 10/09/19 9:52:00 CDT, Blood, Stop date 10/09/19 9:52:00 CDT, Lab Collect, Hypothyroid, 10/09/19 9:52:00 CDT  Thyroid Stimulating Hormone, Routine collect, 10/09/19 9:52:00 CDT, Blood, Stop date 10/09/19 9:52:00 CDT, Lab Collect, Hypothyroid, 10/09/19 9:52:00 CDT  ?  2.?Weight loss?R63.4  ?We discussed in detail her diet. ?She will increase her calorie intake as well as proteins?in her diet.? She will monitor her weight at home. ?If it continues to drop, she will notify us.  Ordered:  Clinic Follow up, *Est. 04/13/20 9:15:00 CDT, Order for future visit, Weight loss, [facility]  Office/Outpatient Visit Level 3 Established [id number] PC, Hypothyroid  Hyperlipidemia  AR (allergic rhinitis)  Weight loss, [facility], 10/09/19 9:50:00 CDT  ?  3.?Hyperlipidemia?E78.5  ?Patient wishes to hold on lipid panel  today.  Ordered:  Clinic Follow up, *Est. 04/13/20 9:15:00 CDT, Order for future visit, Weight loss, [facility]  Office/Outpatient Visit Level 3 Established [id number] PC, Hypothyroid  Hyperlipidemia  AR (allergic rhinitis)  Weight loss, [facility], 10/09/19 9:50:00 CDT  ?  4.?AR (allergic rhinitis)?J30.9  ?Patient will continue to see her ENT, Dr. Rosenthal as needed.  Ordered:  Clinic Follow up, *Est. 04/13/20 9:15:00 CDT, Order for future visit, Weight loss, [facility]  Office/Outpatient Visit Level 3 Established [id number] PC, Hypothyroid  Hyperlipidemia  AR (allergic rhinitis)  Weight loss, [facility], 10/09/19 9:50:00 CDT  ?  Orders:  Lab Collection Request, 10/09/19 9:58:00 CDT, [facility], 10/09/19 9:58:00 CDT  Referrals  Clinic Follow up, *Est. 04/13/20 9:15:00 CDT, Order for future visit, Weight loss, [facility]   Problem List/Past Medical History  Ongoing  Anxiety  AR (allergic rhinitis)  Bakers cyst  Carcinoma of breast - upper, outer quadrant  Dyslipidemia(  Confirmed  )  Fatigue  H/O iron deficiency  Hyperlipidemia  Hypothyroid  Hypovitaminosis D(  Confirmed  )  Multiple thyroid nodules(  Confirmed  )  Pes anserinus bursitis of right knee  Vertigo  Historical  Arthritis  Post menopausal S/P hysterectomy  Stage I right breast cancer  Stage IIA left breast cancer  Stage IIIA right breast cancer  Procedure/Surgical History  Mastectomy (08.2011)  JONI - Total abdominal hysterectomy (08.2011)  Uterine suspension (1990)  Bilateral mastectomies and prophylactic oophrectomy 8/16/2011  Hysterectomy age 39  Left breast lumpectomy 1/2009  Mediport catheter placement  Right breast lumpectomy 1/2005   Medications  Acidophilus Probiotic Blend oral capsule  [place] Thyroid 15 mg oral tablet, 15 mg= 1 tab(s), Oral, Daily, 1 refills  Almo Thyroid 60 mg oral tablet, 60 mg= 1 tab(s), Oral, Daily, 1 refills  B-Complex  Calcium and Magnesium oral tablet  Flonase 50 mcg/inh nasal spray, 1 spray(s), Nasal,  BID  meclizine 25 mg oral tablet, 12.5 mg= 0.5 tab(s), Oral, BID  Melatonin  Omega-3 oral capsule  Pennsaid 2% topical solution, 2 pump, TOP, BID, 1 refills  sertraline 50 mg oral tablet, 75 mg= 1.5 tab(s), Oral, Daily, 1 refills  Vitamin C 250 mg oral tablet  Vitamin D, 2000 IntUnit, Oral  Zinc-220 oral capsule  Allergies  No Known Medication Allergies  Social History  Abuse/Neglect  No, 10/09/2019  No, 06/14/2019  Alcohol - Low Risk, 01/21/2014  Never, 06/02/2016  Employment/School  Unemployed, 06/02/2016  Exercise  Exercise frequency: 3-4 times/week. Exercise type: Walking, Yoga., 06/02/2016  Home/Environment  Lives with Spouse., 06/02/2016  Nutrition/Health  LOW SODIUM, 06/02/2016  Substance Use - Denies Substance Abuse, 04/01/2015  Never, 06/02/2016  Tobacco  Never (less than 100 in lifetime), N/A, 10/09/2019  Former smoker, quit more than 30 days ago, N/A, 04/10/2019  Family History  Dementia: Mother (Dx at 63).  Diabetes mellitus type 2: Father (Dx at 80).  Hypertension.: Father.  Liver cancer 09-AUG-2016 15:28:46<$>: Grandfather.  Renal cancer....: Father (Dx at 55).  Tobacco use: Mother.  Brother: History is negative  Brother: History is negative  Health Maintenance  Health Maintenance  ???Pending?(in the next year)  ??? ??OverDue  ??? ? ? ?Diabetes Screening due??and every?  ??? ? ? ?Tetanus Vaccine due??06/07/18??and every 10??year(s)  ??? ? ? ?Zoster Vaccine due??06/07/18??and every 100??year(s)  ??? ? ? ?Alcohol Misuse Screening due??01/01/19??and every 1??year(s)  ??? ??Due?  ??? ? ? ?Depression Screening due??08/15/19??and every 1??year(s)  ??? ? ? ?ADL Screening due??10/09/19??and every 1??year(s)  ??? ? ? ?Aspirin Therapy for CVD Prevention due??10/09/19??and every 1??year(s)  ??? ? ? ?Colorectal Screening due??10/09/19??and every?  ??? ? ? ?Influenza Vaccine due??10/09/19??and every?  ??? ??Due In Future?  ??? ? ? ?Obesity Screening not due until??01/01/20??and every 1??year(s)  ??? ? ? ?Blood  Pressure Screening not due until??10/08/20??and every 1??year(s)  ??? ? ? ?Body Mass Index Check not due until??10/08/20??and every 1??year(s)  ???Satisfied?(in the past 1 year)  ??? ??Satisfied?  ??? ? ? ?Blood Pressure Screening on??10/09/19.??Satisfied by Gaviota Lackey LPN  ??? ? ? ?Body Mass Index Check on??10/09/19.??Satisfied by Gaviota Lackey LPN  ??? ? ? ?Diabetes Screening on??08/09/19.??Satisfied by Aury Bonner  ??? ? ? ?Influenza Vaccine on??10/09/19.??Satisfied by Gaviota Lackey LPN  ??? ? ? ?Lipid Screening on??04/10/19.??Satisfied by Bridger Alberto  ??? ? ? ?Obesity Screening on??10/09/19.??Satisfied by Gaviota Lackey LPN  ?      Patient condition discussed?in detail with nurse practitioner.??Agree with plan of care?and follow-up.  ?

## 2022-05-02 NOTE — HISTORICAL [facility] CERNER
This is a historical note converted from Domonique. Formatting and pictures may have been removed.  Please reference [place] for original formatting and attached multimedia. Chief Complaint  PT HERE TODAY FOR B/L KNEE PAIN , PT ALSO NEEDS A REFILL ON HER [facility] TOPICAL....X-RAY TODAY...CV  History of Present Illness  This 63-year-old comes in for her knees. ?She is complaining of bilateral knee pain right greater than left.? She states that it is not severe.? Is still quite active. ?She has lost weight because of the thyroid condition?but still?works out?at least 3 times weekly.? The patient states that her right knee is worse than her left knee and it is in the same spot?as before?when I saw her in 2017.? She denies inciting event.  Review of Systems  Constitutional: No fever, weakness, or fatigue.  Ear/Nose/Mouth/Throat: No nasal congestion or sore throat.  Respiratory: No shortness of breath or cough.  Cardiovascular: No chest pain, palpitations, or peripheral edema.  Gastrointestinal: No nausea, vomiting, or abdominal pain.  Genitourinary: No dysuria.  Musculoskeletal: See current complaints  Integumentary: Negative.  Physical Exam  Vitals & Measurements  T:?36.6? ?C (Oral)? HR:?64(Peripheral)? BP:?106/59?  HT:?158?cm? WT:?48?kg? BMI:?19.23?  Physical examination shows that the patient walks without an antalgic gait. ?She uses no assistive device.? She has no true knee effusions. ?The patient is noted to have?has anserine bursitis on the right.? On the left-hand side this is not present. ?She has very mild tenderness on the medial joint line bilaterally. ?She has no evidence of ligamentous or meniscal pathology.? Range of motion is 0-130 degrees bilaterally. ?She has good patella tracking.? She is motor and sensory intact.  ?  Standing AP, lateral, and sunrise views with tunnel views of both knees shows that the patient has?medial sclerosis?on the tibial plateau bilaterally. ?She has very mild narrowing of the  medial joint space. ?Otherwise her x-rays are normal.  Assessment/Plan  1.?Pes anserinus bursitis of right knee?M70.51  ?The findings were reviewed with the patient and she expressed understanding.? The patient would like another prescription for topical anti-inflammatory.? Otherwise she states that she is doing well and will continue with self-directed?exercising. ?The patient would like to come back as needed.? Instructed to call if she has any problems.  Ordered:  Office/Outpatient Visit Level 3 Established [id number] PC, Pes anserinus bursitis of right knee, [facility], 10/30/19 10:33:00 CDT  ?  Orders:  Clinic Follow-up PRN, 10/30/19 10:33:00 CDT, Future Order, [facility]  Referrals  Clinic Follow-up PRN, 10/30/19 10:33:00 CDT, Future Order, [facility]   Problem List/Past Medical History  Ongoing  Anxiety  AR (allergic rhinitis)  Bakers cyst  Carcinoma of breast - upper, outer quadrant  Dyslipidemia(  Confirmed  )  Fatigue  H/O iron deficiency  Hyperlipidemia  Hypothyroid  Hypovitaminosis D(  Confirmed  )  Left knee pain  Multiple thyroid nodules(  Confirmed  )  Pes anserinus bursitis of right knee  Right knee pain  Vertigo  Historical  Arthritis  Post menopausal S/P hysterectomy  Stage I right breast cancer  Stage IIA left breast cancer  Stage IIIA right breast cancer  Procedure/Surgical History  Mastectomy (08.2011)  JONI - Total abdominal hysterectomy (08.2011)  Uterine suspension (1990)  Bilateral mastectomies and prophylactic oophrectomy 8/16/2011  Hysterectomy age 39  Left breast lumpectomy 1/2009  Mediport catheter placement  Right breast lumpectomy 1/2005   Medications  Acidophilus Probiotic Blend oral capsule  [place] Thyroid 15 mg oral tablet, 15 mg= 1 tab(s), Oral, Daily, 1 refills  [place] Thyroid 60 mg oral tablet, 60 mg= 1 tab(s), Oral, Daily, 1 refills  B-Complex  Calcium and Magnesium oral tablet  Flonase 50 mcg/inh nasal spray, 1 spray(s), Nasal, BID  meclizine 25 mg oral  tablet, 12.5 mg= 0.5 tab(s), Oral, BID  Melatonin  Omega-3 oral capsule  Pennsaid 2% topical solution, 2 pump, TOP, BID, 1 refills  sertraline 50 mg oral tablet, 75 mg= 1.5 tab(s), Oral, Daily, 1 refills  Vitamin C 250 mg oral tablet  Vitamin D, 2000 IntUnit, Oral  Zinc-220 oral capsule  Allergies  No Known Medication Allergies  Social History  Abuse/Neglect  No, 10/30/2019  Alcohol - Low Risk, 01/21/2014  Never, 06/02/2016  Employment/School  Unemployed, 06/02/2016  Exercise  Exercise frequency: 3-4 times/week. Exercise type: Walking, Yoga., 06/02/2016  Home/Environment  Lives with Spouse., 06/02/2016  Nutrition/Health  LOW SODIUM, 06/02/2016  Substance Use - Denies Substance Abuse, 04/01/2015  Never, 06/02/2016  Tobacco  Never (less than 100 in lifetime), N/A, 10/30/2019  Family History  Dementia: Mother (Dx at 63).  Diabetes mellitus type 2: Father (Dx at 80).  Hypertension.: Father.  Liver cancer 09-AUG-2016 15:28:46<$>: Grandfather.  Renal cancer....: Father (Dx at 55).  Tobacco use: Mother.  Brother: History is negative  Brother: History is negative  Health Maintenance  Health Maintenance  ???Pending?(in the next year)  ??? ??OverDue  ??? ? ? ?Diabetes Screening due??and every?  ??? ? ? ?Tetanus Vaccine due??06/07/18??and every 10??year(s)  ??? ? ? ?Zoster Vaccine due??06/07/18??and every 100??year(s)  ??? ? ? ?Depression Screening due??08/15/19??and every 1??year(s)  ??? ??Due?  ??? ? ? ?ADL Screening due??10/30/19??and every 1??year(s)  ??? ? ? ?Aspirin Therapy for CVD Prevention due??10/30/19??and every 1??year(s)  ??? ? ? ?Colorectal Screening due??10/30/19??and every?  ??? ? ? ?Influenza Vaccine due??10/30/19??and every?  ??? ??Due In Future?  ??? ? ? ?Alcohol Misuse Screening not due until??01/01/20??and every 1??year(s)  ??? ? ? ?Obesity Screening not due until??01/01/20??and every 1??year(s)  ??? ? ? ?Blood Pressure Screening not due until??10/29/20??and every 1??year(s)  ??? ? ? ?Body Mass Index Check  not due until??10/29/20??and every 1??year(s)  ???Satisfied?(in the past 1 year)  ??? ??Satisfied?  ??? ? ? ?Alcohol Misuse Screening on??10/30/19.??Satisfied by Annie Alston LPN  ??? ? ? ?Blood Pressure Screening on??10/30/19.??Satisfied by Annie Alston LPN  ??? ? ? ?Body Mass Index Check on??10/30/19.??Satisfied by Annie Alston LPN  ??? ? ? ?Diabetes Screening on??08/09/19.??Satisfied by Aury Bonner  ??? ? ? ?Influenza Vaccine on??10/21/19.??Satisfied by Atiya Smalls  ??? ? ? ?Lipid Screening on??04/10/19.??Satisfied by Bridger Alberto  ??? ? ? ?Obesity Screening on??10/30/19.??Satisfied by Annie Alston LPN  ?

## 2022-05-02 NOTE — HISTORICAL [facility] CERN[facility]
This is a historical note converted from Domonique. Formatting and pictures may have been removed.  Please reference Domonique for original formatting and attached multimedia. Chief Complaint  Thryroid issues  History of Present Illness  Patient here because she has been extremely exhausted last 2 weeks. Says she is not depressed. Decreased appetite. Takes MV and B complex.  Chronic sinus congestion.  Saw Dr Rosenthal in January, never went through with an allergy panel.  Review of Systems  General:??????????????? See HPI  HEENT:?????????????????? Denies vision changes or eye pain.??See HPI  Cardiovascular:??? Denies chest pain, palpitations, dyspnea on exertion, orthopnea.  Respiratory:???????? No cough, wheezing, shortness of breath, or sputum.  GI:????????????????????????? Denies nausea, emesis, constipation, diarrhea, melena, hematochezia or abdominal pain  :??????????????????????? No frequency, urgency, hematuria, discharge, or incontinence  MS:?????????????????????? Denies myalgias, arthralgias, joint effusion, edema, or weakness  Neuro:????????????????? No headaches, numbness in extremities, tingling, dizziness, or weakness  Psych:?????????????????? Denies anxiety, depression, suicidal or homicidal ideations, or irritability  Endo:??????????????????? Denies polyuria, polydipsia, polyphagia  Heme:????????????????? No abnormal bleeding or bruising. No lymph node enlargement or pain.  Physical Exam  Vitals & Measurements  HR:?68(Peripheral)? BP:?120/78?  HT:?158?cm? WT:?49.4?kg? BMI:?19.79?  General :?????Well-developed and? nourished, no apparent distress, alert and oriented ?4  HEENT:?Normal conjunctiva,?EOMI,?pale edematous turbinates with clear discharge,?OP: No erythema or exudate  TMs and EACs within normal limits bilaterally.  Neck :?????Supple, no lymphadenopathy, no thyromegaly?appreciated, no bruits, no jugular venous distention  Cardiovascular :?????? Regular rhythm and rate, no murmurs, radial and dorsal  pedal pulses 2+ bilaterally  Respiratory :??????Lungs clear to auscultation bilaterally, no wheezes, no crackles, no rhonchi.? Good air movement  Abdomen :?????? ?NABS, soft, nontender, no hepatosplenomegaly, no masses, no guarding or rebound????????????????????????  MS :??????? No muscle tenderness, joints WNL, FROM, negative SLR, no?CCE  Neuro :? ?Grossly intact  Heme :?????? No bruising or petechiae?  Back:??????? no CVAT  Assessment/Plan  1.?Hypothyroid?E03.9  2.?Multiple thyroid nodules(  Confirmed  )?E04.2  ?We will check a TSH, FT3, and FT4.  3.?Fatigue?R53.83  ?We will check a CBC?and CMP.  4.?Hematuria?R31.9  5.?AR (allergic rhinitis)?J30.9  ?Uses Flonase and Neomed. Can take Xyzal daily. Will add Singulair.  6.?Acute UTI?N39.0  Will prescribe 7 days of Macrobid and Pyridium.  Referrals  Clinic Follow-up PRN, 06/14/19 11:22:00 CDT, [facility] - [facility], Future Order   Problem List/Past Medical History  Ongoing  Anxiety  AR (allergic rhinitis)  Bakers cyst  Carcinoma of breast - upper, outer quadrant  Dyslipidemia(  Confirmed  )  Fatigue  H/O iron deficiency  Hyperlipidemia  Hypothyroid  Hypovitaminosis D(  Confirmed  )  Multiple thyroid nodules(  Confirmed  )  Pes anserinus bursitis of right knee  Vertigo  Historical  Arthritis  Post menopausal S/P hysterectomy  Stage I right breast cancer  Stage IIA left breast cancer  Stage IIIA right breast cancer  Procedure/Surgical History  Mastectomy (08.2011)  JONI - Total abdominal hysterectomy (08.2011)  Uterine suspension (1990)  Bilateral mastectomies and prophylactic oophrectomy 8/16/2011  Hysterectomy age 39  Left breast lumpectomy 1/2009  Mediport catheter placement  Right breast lumpectomy 1/2005   Medications  Acidophilus Probiotic Blend oral capsule  [place] Thyroid 60 mg oral tablet, 60 mg= 1 tab(s), Oral, Daily, 3 refills  B-Complex  Calcium and Magnesium oral tablet  Macrobid 100 mg oral capsule, 100 mg= 1 cap(s), Oral, BID  Melatonin  montelukast 10 mg  oral TABLET, 10 mg= 1 tab(s), Oral, Daily, 5 refills  Omega-3 oral capsule  Pennsaid 2% topical solution, 2 pump, TOP, BID, 1 refills  phenazopyridine 200 mg oral tablet, 200 mg= 1 tab(s), Oral, TID  sertraline 50 mg oral tablet, 75 mg= 1.5 tab(s), Oral, Daily, 1 refills  Vitamin C 250 mg oral tablet  Vitamin D, 2000 IntUnit, Oral  Zinc-220 oral capsule  Allergies  No Known Medication Allergies  Social History  Abuse/Neglect  No, 06/14/2019  Alcohol - Low Risk, 01/21/2014  Never, 06/02/2016  Employment/School  Unemployed, 06/02/2016  Exercise  Exercise frequency: 3-4 times/week. Exercise type: Walking, Yoga., 06/02/2016  Home/Environment  Lives with Spouse., 06/02/2016  Nutrition/Health  LOW SODIUM, 06/02/2016  Substance Use - Denies Substance Abuse, 04/01/2015  Never, 06/02/2016  Tobacco  Former smoker, quit more than 30 days ago, N/A, 06/14/2019  Former smoker, quit more than 30 days ago, N/A, 04/10/2019  Family History  Dementia: Mother (Dx at 63).  Diabetes mellitus type 2: Father (Dx at 80).  Hypertension.: Father.  Liver cancer 09-AUG-2016 15:28:46<$>: Grandfather.  Renal cancer....: Father (Dx at 55).  Tobacco use: Mother.  Brother: History is negative  Brother: History is negative  Health Maintenance  Health Maintenance  ???Pending?(in the next year)  ??? ??OverDue  ??? ? ? ?Diabetes Screening due??and every?  ??? ? ? ?Tetanus Vaccine due??06/07/18??and every 10??year(s)  ??? ? ? ?Zoster Vaccine due??06/07/18??and every 100??year(s)  ??? ? ? ?Alcohol Misuse Screening due??01/01/19??and every 1??year(s)  ??? ??Due?  ??? ? ? ?ADL Screening due??06/21/19??and every 1??year(s)  ??? ? ? ?Aspirin Therapy for CVD Prevention due??06/21/19??and every 1??year(s)  ??? ? ? ?Colorectal Screening due??06/21/19??and every?  ??? ??Due In Future?  ??? ? ? ?Depression Screening not due until??08/15/19??and every 1??year(s)  ??? ? ? ?Obesity Screening not due until??01/01/20??and every 1??year(s)  ??? ? ? ?Blood Pressure  Screening not due until??06/13/20??and every 1??year(s)  ??? ? ? ?Body Mass Index Check not due until??06/13/20??and every 1??year(s)  ???Satisfied?(in the past 1 year)  ??? ??Satisfied?  ??? ? ? ?Blood Pressure Screening on??06/14/19.??Satisfied by Carmen Lance CMA  ??? ? ? ?Body Mass Index Check on??06/14/19.??Satisfied by Carmen Lance CMA  ??? ? ? ?Depression Screening on??08/15/18.??Satisfied by Stephy Lynn LPN.  ??? ? ? ?Diabetes Screening on??06/14/19.??Satisfied by Stephy Lawson  ??? ? ? ?Influenza Vaccine on??03/14/19.??Satisfied by Shelbi Saeed LPN  ??? ? ? ?Lipid Screening on??04/10/19.??Satisfied by Bridger Alberto  ??? ? ? ?Obesity Screening on??06/14/19.??Satisfied by Carmen Lance CMA  ?  ?  Lab Results  Test Name Test Result Date/Time   WBC 5.0 x10(3)/mcL 06/14/2019 10:50 CDT   Hgb 12.4 gm/dL 06/14/2019 10:50 CDT   Hct 37.2 % 06/14/2019 10:50 CDT   MCV 93 fL 06/14/2019 10:50 CDT   UA Appear Hazy 06/14/2019 10:50 CDT   UA Blood 2+ (Abnormal) 06/14/2019 10:50 CDT   UA Leuk Est Trace UA 06/14/2019 10:50 CDT   UA WBC cnt 0-2 06/14/2019 10:50 CDT   UA RBC 4-5 (Abnormal) 06/14/2019 10:50 CDT   UA Bact None Seen 06/14/2019 10:50 CDT   UA Squam Epithelial Rare 06/14/2019 10:50 CDT

## 2022-05-02 NOTE — HISTORICAL [facility]
This is a historical note converted from Domonique. Formatting and pictures may have been removed.  Please reference Domonique for original formatting and attached multimedia. Chief Complaint  CPX  History of Present Illness  Patient presents for a CPX.? Last one done here was 4/10/2019 with Mary.  Non-fasting. Elevated Cholesterol in past but she did not want it treated.?  ?  , 2 adopted sons.??Homemaker.? No nicotine?or EtOH.? Exercise: walking and bike  ?  Father 87?: HTN, DM at 80, Renal CA  Mother  at 63 : Alzheimers?Dementia  2 brothers 61,58 : both healthy  ?  GI: [facility], colonoscopy   ONC :?Atkins, breast CA  Mastectomy?2011,?JONI?2011  ORTHO : [facility]  Review of Systems  General:??????????????? Patient reports energy level is good. Denies weight change. ?Denies fever,chills, night sweats, fatigue or weakness.  Integument:???????? Denies any nevus changes, rashes, urticaria, ?or sores.? Also denies itching or areas of numbness.  HEENT:?????????????????? Denies vision changes or eye pain.? No sore throat, ear pain, sinus pressure or discharge.  Cardiovascular:??? Denies chest pain, palpitations, dyspnea on exertion, orthopnea.  Respiratory:???????? No cough, wheezing, shortness of breath, or sputum.  GI:????????????????????????? Denies nausea, emesis, constipation, diarrhea, melena, hematochezia or abdominal pain  :??????????????????????? No frequency, urgency, hematuria, discharge, or incontinence  MS:?????????????????????? Denies myalgias, arthralgias, joint effusion, edema, or weakness  Neuro:???????????????Vertigo when legustrums are blooming, responds to OTC meclizine. ?? No headaches, numbness in extremities, tingling, dizziness, or weakness  Psych:?????????????????? Denies anxiety, depression, suicidal or homicidal ideations, or irritability  Endo:??????????????????? Denies polyuria, polydipsia, polyphagia  Heme:????????????????? No abnormal bleeding or bruising. No lymph  node enlargement or pain.  Physical Exam  Vitals & Measurements  HR:?72(Peripheral)? BP:?108/60?  HT:?158?cm? WT:?48.3?kg? BMI:?19.35?  General :?????Well-developed and? nourished, no apparent distress, alert and oriented ?4  Integument :????? Skin is intact with no erythema.? No pustules or vesicles.? No rash or scale. No Lymphadenopathy.? No urticaria.? No abnormal nevi  HEENT :?????AMANDEEP, EOMI ; TMs and EACs clear, normal turbinates with no erythema, normal mucosa, no sinus tenderness; no erythema or exudate of mouth or pharynx  Neck :?????Supple, no lymphadenopathy, no thyromegaly, no bruits, no jugular venous distention  Cardiovascular :?????? Regular rhythm and rate, no murmurs, radial and dorsal pedal pulses 2+ bilaterally  Respiratory :??????Lungs clear to auscultation bilaterally, no wheezes, no crackles, no rhonchi.? Good air movement  Abdomen :?????? ?NABS, soft, nontender, no hepatosplenomegaly, no masses, no guarding or rebound??????????????????????????  MS :??????? No muscle tenderness, joints WNL, FROM, negative SLR, no?CCE  Neuro :? ?????? CN II-XII intact, reflexes 2+ throughout, no motor sensory deficits, negative?cerebellar? tests  Psych :??????Normal affect, patient calm, good historian, patient answers questions??? appropriately.????Good hygiene? ??  Heme :?????? No bruising or petechiae?  Assessment/Plan  1.?Wellness examination?Z00.00  ?Age-appropriate wellness topics discussed.? Labs to be done today, will exclude lipids?due to her choice.? Check a TSH and CMP.  2.?Carcinoma of breast - upper, outer quadrant?C50.411  ?Treated by Dr. Atkins. ?No recurrence.  3.?Hypothyroid?E03.9  ?[place] has worked better than levothyroxine for her. She understands insurance doesnt cover and she will pay out of pocket.? Takes 60mg, she will call when she runs out.? Will check TSH, FT3, and FT4.  4.?Hypovitaminosis D(  Confirmed  )?E55.9  ?We will check vitamin D level today.  5.?Anxiety?F41.9  ?Stable.? She  does not feel that she needs sertraline?at this time. ?She will contact us?if she changes her mind.   Problem List/Past Medical History  Ongoing  Anxiety  AR (allergic rhinitis)  Bakers cyst  Carcinoma of breast - upper, outer quadrant  Dyslipidemia(  Confirmed  )  Dysuria  Fatigue  H/O iron deficiency  Hyperlipidemia  Hypothyroid  Hypovitaminosis D(  Confirmed  )  Left knee pain  Multiple thyroid nodules(  Confirmed  )  Pes anserinus bursitis of right knee  Right knee pain  Vertigo  Historical  Arthritis  Post menopausal S/P hysterectomy  Stage I right breast cancer  Stage IIA left breast cancer  Stage IIIA right breast cancer  Procedure/Surgical History  Mastectomy (08.2011)  JONI - Total abdominal hysterectomy (08.2011)  Uterine suspension (1990)  Bilateral mastectomies and prophylactic oophrectomy 8/16/2011  Hysterectomy age 39  Left breast lumpectomy 1/2009  Mediport catheter placement  Right breast lumpectomy 1/2005   Medications  Acidophilus Probiotic Blend oral capsule  [place] Thyroid 60 mg oral tablet, See Instructions  B-Complex  Calcium and Magnesium oral tablet  Flonase 50 mcg/inh nasal spray, 1 spray(s), Nasal, BID  meclizine 25 mg oral tablet, 12.5 mg= 0.5 tab(s), Oral, BID  Melatonin  Omega-3 oral capsule  Pennsaid 2% topical solution, 2 pump, TOP, BID, 1 refills  Pennsaid 2% topical solution, 2 pump, TOP, BID  sertraline 50 mg oral tablet, See Instructions  Vitamin C 250 mg oral tablet  Vitamin D, 2000 IntUnit, Oral  Zinc-220 oral capsule  Allergies  No Known Medication Allergies  Social History  Abuse/Neglect  No, 05/05/2020  No, 10/30/2019  Alcohol - Low Risk, 01/21/2014  Never, 06/02/2016  Employment/School  Unemployed, 06/02/2016  Exercise  Exercise frequency: 3-4 times/week. Exercise type: Walking, Yoga., 06/02/2016  Home/Environment  Lives with Spouse., 06/02/2016  Nutrition/Health  LOW SODIUM, 06/02/2016  Substance Use - Denies Substance Abuse, 04/01/2015  Never,  06/02/2016  Tobacco  Never (less than 100 in lifetime), No, 05/05/2020  Never (less than 100 in lifetime), N/A, 10/30/2019  Family History  Dementia: Mother (Dx at 63).  Diabetes mellitus type 2: Father (Dx at 80).  Hypertension.: Father.  Liver cancer 09-AUG-2016 15:28:46<$>: Grandfather.  Renal cancer....: Father (Dx at 55).  Tobacco use: Mother.  Brother: History is negative  Brother: History is negative  Health Maintenance  Health Maintenance  ???Pending?(in the next year)  ??? ??OverDue  ??? ? ? ?Bone Density Screening due??12/06/08??and every 2??year(s)  ??? ? ? ?Tetanus Vaccine due??06/07/18??and every 10??year(s)  ??? ? ? ?Zoster Vaccine due??06/07/18??and every 100??year(s)  ??? ? ? ?Advance Directive due??01/01/20??and every 1??year(s)  ??? ? ? ?Alcohol Misuse Screening due??01/01/20??and every 1??year(s)  ??? ? ? ?Geriatric Depression Screening due??01/01/20??and every 1??year(s)  ??? ??Due?  ??? ? ? ?ADL Screening due??05/09/20??and every 1??year(s)  ??? ? ? ?Aspirin Therapy for CVD Prevention due??05/09/20??and every 1??year(s)  ??? ? ? ?Colorectal Screening ([facility]) due??05/09/20??and every?  ??? ? ? ?Pneumococcal Vaccine due??05/09/20??Variable frequency  ??? ? ? ?Pneumococcal Vaccine due??05/09/20??and every?  ??? ??Due In Future?  ??? ? ? ?Cognitive Screening not due until??01/01/21??and every 1??year(s)  ??? ? ? ?Fall Risk Assessment not due until??01/01/21??and every 1??year(s)  ??? ? ? ?Functional Assessment not due until??01/01/21??and every 1??year(s)  ??? ? ? ?Obesity Screening not due until??01/01/21??and every 1??year(s)  ??? ? ? ?Medicare Annual Wellness Exam not due until??05/05/21??and every 1??year(s)  ???Satisfied?(in the past 1 year)  ??? ??Satisfied?  ??? ? ? ?Alcohol Misuse Screening on??10/30/19.??Satisfied by Annie Alston LPN  ??? ? ? ?Blood Pressure Screening on??05/05/20.??Satisfied by Brijesh Humphrey  ??? ? ? ?Body Mass Index Check on??05/05/20.??Satisfied by  Brijesh Humphrey  ??? ? ? ?Diabetes Screening on??05/05/20.??Satisfied by Mohini Vidal MT  ??? ? ? ?Influenza Vaccine on??10/21/19.??Satisfied by Atiya Smalls  ??? ? ? ?Medicare Annual Wellness Exam on??05/05/20.??Satisfied by Billy Joseph ?SKYE HERNANDEZ  ??? ? ? ?Obesity Screening on??05/05/20.??Satisfied by Brijesh Humphrey  ?

## 2022-05-02 NOTE — HISTORICAL [facility]
This is a historical note converted from Domon[facility]. Formatting and pictures may have been removed.  Please reference Domonique for original formatting and attached multimedia. Chief Complaint  patient complains of right hand pain  History of Present Illness  This 64-year-old comes in for her right hand.? She has noted progressive?pain in her right hand with activity.? She is right-hand dominant.? She denies any recent inciting trauma.  Review of Systems  Constitutional: No fever, weakness, or fatigue.  Ear/Nose/Mouth/Throat: No nasal congestion or sore throat.  Respiratory: No shortness of breath or cough.  Cardiovascular: No chest pain, palpitations, or peripheral edema.  Gastrointestinal: No nausea, vomiting, or abdominal pain.  Genitourinary: No dysuria.  Musculoskeletal: See current complaints  Integumentary: Negative.  Physical Exam  Vitals & Measurements  T:?96.7? ?F (Oral)? HR:?60(Peripheral)? BP:?120/56?  HT:?158.00?cm? WT:?47.000?kg? BMI:?18.83?  Physical examination shows that the patient has obvious subluxation of her CMC joint of her thumb.? She appears to be motor and sensory intact. ?She has some thickening of the A1 pulley to the thumb but no true triggering.? There are no other trigger fingers.? She has pain with range of motion of her?CMC joint. ?She has no evidence of tenosynovitis.? She has no evidence of?nerve?entrapment or compression.? She does have some pain along her?radial sensory nerve?with deep compression but otherwise?appears to be neurovascularly intact.  ?  AP lateral and oblique of the?right hand shows that the patient has end-stage CMC arthritis of the right thumb with a cyst in her?metacarpal and subluxation of the joint as well.? She has mild arthritis in the DIP joints of her other fingers.  Assessment/Plan  1.?Osteoarthritis of carpometacarpal joint of right thumb?M18.11  ?The findings were reviewed with the patient and she expressed understanding.? The patient?and I discussed options  for treatment.? She will use an over-the-counter thumb wrap?for daily activities as needed.? However she is interested in seeing Dr. Doug Higgins for CMC arthroplasty.? I will see her back as needed. ?Appointment to see him will be scheduled.? She is instructed to call if she has any problems prior to seeing him.  ?  Components of this note were documented using voice recognition systems and are subject to errors not corrected at proof reading. ?Please contact the author for any clarifications.  Ordered:  Office/Outpatient Visit Level 3 Established [id number] PC, Osteoarthritis of carpometacarpal joint of right thumb, [facility], 10/20/20 9:04:00 CDT  ?  Orders:  Clinic Follow-up PRN, 10/20/20 9:04:00 CDT, Future Order, [facility]  XR Hand Right Minimum 3 Views, Routine, 10/20/20 8:44:00 CDT, None, Ambulatory, Rad Type, Pain, Not Scheduled, 10/20/20 8:44:00 CDT  Referrals  Clinic Follow-up PRN, 10/20/20 9:04:00 CDT, Future Order, [facility]   Problem List/Past Medical History  Ongoing  Anxiety  AR (allergic rhinitis)  Bakers cyst  Carcinoma of breast - upper, outer quadrant  Dyslipidemia(  Confirmed  )  Dysuria  Fatigue  H/O iron deficiency  Hyperlipidemia  Hypothyroid  Hypovitaminosis D(  Confirmed  )  Left knee pain  Multiple thyroid nodules(  Confirmed  )  Osteoarthritis of carpometacarpal joint of right thumb  Pes anserinus bursitis of right knee  Right knee pain  Vertigo  Historical  Arthritis  Post menopausal S/P hysterectomy  Stage I right breast cancer  Stage IIA left breast cancer  Stage IIIA right breast cancer  Procedure/Surgical History  Mastectomy (08.2011)  JONI - Total abdominal hysterectomy (08.2011)  Uterine suspension (1990)  Bilateral mastectomies and prophylactic oophrectomy 8/16/2011  Hysterectomy age 39  Left breast lumpectomy 1/2009  Mediport catheter placement  Right breast lumpectomy 1/2005   Medications  Acidophilus Probiotic Blend oral capsule  Aspen Thyroid 60 mg oral  tablet, See Instructions  B-Complex  Calcium and Magnesium oral tablet  Flonase 50 mcg/inh nasal spray, 1 spray(s), Nasal, BID  meclizine 25 mg oral tablet, 12.5 mg= 0.5 tab(s), Oral, BID  Melatonin  Omega-3 oral capsule  Pennsaid 2% topical solution, 2 pump, TOP, BID  sertraline 50 mg oral tablet, See Instructions  Vitamin C 250 mg oral tablet  Vitamin D, 2000 IntUnit, Oral  Zinc-220 oral capsule  Allergies  No Known Medication Allergies  Social History  Abuse/Neglect  No, 10/20/2020  No, 08/17/2020  Alcohol - Low Risk, 01/21/2014  Never, 06/02/2016  Employment/School  Unemployed, 06/02/2016  Exercise  Exercise frequency: 3-4 times/week. Exercise type: Walking, Yoga., 06/02/2016  Home/Environment  Lives with Spouse., 06/02/2016  Nutrition/Health  LOW SODIUM, 06/02/2016  Substance Use - Denies Substance Abuse, 04/01/2015  Never, 06/02/2016  Tobacco  Never (less than 100 in lifetime), N/A, 10/20/2020  Never (less than 100 in lifetime), No, 08/17/2020  Family History  Appendiceal cancer: Son.  Dementia: Mother (Dx at 63).  Diabetes mellitus type 2: Father (Dx at 80).  Hypertension.: Father.  Liver cancer.......: Grandfather.  Renal cancer....: Father (Dx at 55).  Tobacco use: Mother.  Brother: History is negative  Brother: History is negative  Health Maintenance  Health Maintenance  ???Pending?(in the next year)  ??? ??OverDue  ??? ? ? ?Tetanus Vaccine due??06/07/18??and every 10??year(s)  ??? ? ? ?Influenza Vaccine due??10/01/19??and every 1??day(s)  ??? ? ? ?Alcohol Misuse Screening due??01/02/20??and every 1??year(s)  ??? ? ? ?Cognitive Screening due??01/02/20??and every 1??year(s)  ??? ? ? ?Functional Assessment due??01/02/20??and every 1??year(s)  ??? ??Due?  ??? ? ? ?Aspirin Therapy for CVD Prevention due??10/20/20??and every 1??year(s)  ??? ? ? ?IPPE due??10/20/20??One-time only  ??? ? ? ?Pneumococcal Vaccine due??10/20/20??and every?  ??? ? ? ?Zoster Vaccine due??10/20/20??and every?  ??? ??Due In Future?  ???  ? ? ?Obesity Screening not due until??01/01/21??and every 1??year(s)  ??? ? ? ?Advance Directive not due until??01/02/21??and every 1??year(s)  ??? ? ? ?Fall Risk Assessment not due until??01/02/21??and every 1??year(s)  ??? ? ? ?Medicare Annual Wellness Exam not due until??05/05/21??and every 1??year(s)  ??? ? ? ?Blood Pressure Screening not due until??08/17/21??and every 1??year(s)  ??? ? ? ?Body Mass Index Check not due until??08/17/21??and every 1??year(s)  ??? ? ? ?Depression Screening not due until??08/17/21??and every 1??year(s)  ??? ? ? ?ADL Screening not due until??08/17/21??and every 1??year(s)  ???Satisfied?(in the past 1 year)  ??? ??Satisfied?  ??? ? ? ?ADL Screening on??08/17/20.??Satisfied by Stephy Lynn LPN.  ??? ? ? ?Advance Directive on??08/17/20.??Satisfied by Stephy Lynn LPN.  ??? ? ? ?Alcohol Misuse Screening on??10/30/19.??Satisfied by Annie Alston LPN  ??? ? ? ?Blood Pressure Screening on??10/20/20.??Satisfied by Debbie Zamudio LPN  ??? ? ? ?Body Mass Index Check on??10/20/20.??Satisfied by Debbie Zamudio LPN  ??? ? ? ?Depression Screening on??08/17/20.??Satisfied by Murali Hamilton  ??? ? ? ?Diabetes Screening on??08/10/20.??Satisfied by Mohini Vidal MT  ??? ? ? ?Fall Risk Assessment on??08/17/20.??Satisfied by Murali Hamilton  ??? ? ? ?Medicare Annual Wellness Exam on??05/05/20.??Satisfied by Billy Joseph ?SKYE HERNANDEZ  ??? ? ? ?Obesity Screening on??10/20/20.??Satisfied by Debbie Zamudio LPN  ??? ??Refused?  ??? ? ? ?Colorectal Screening on??08/17/20.??Recorded by Stephy Lynn LPN??Reason: Patient Refuses  ?

## 2022-05-23 PROBLEM — R42 VERTIGO: Status: ACTIVE | Noted: 2022-05-23

## 2022-05-23 PROBLEM — Z86.39 HISTORY OF IRON DEFICIENCY: Status: ACTIVE | Noted: 2022-05-23

## 2022-05-23 PROBLEM — M18.11 ARTHRITIS OF CARPOMETACARPAL (CMC) JOINT OF RIGHT THUMB: Status: ACTIVE | Noted: 2022-05-23

## 2022-05-23 PROBLEM — M25.569 KNEE PAIN: Status: ACTIVE | Noted: 2022-05-23

## 2022-05-23 PROBLEM — E55.9 VITAMIN D DEFICIENCY: Status: ACTIVE | Noted: 2022-05-23

## 2022-05-23 PROBLEM — M70.50 PES ANSERINUS BURSITIS: Status: ACTIVE | Noted: 2022-05-23

## 2022-05-23 PROBLEM — J30.9 ALLERGIC RHINITIS: Status: ACTIVE | Noted: 2022-05-23

## 2022-05-23 PROBLEM — F32.A DEPRESSIVE DISORDER: Status: ACTIVE | Noted: 2022-05-23

## 2022-05-23 PROBLEM — E04.2 MULTINODULAR GOITER: Status: ACTIVE | Noted: 2022-05-23

## 2022-05-23 PROBLEM — D64.9 ANEMIA: Status: ACTIVE | Noted: 2022-05-23

## 2022-05-23 PROBLEM — F41.9 ANXIETY: Status: ACTIVE | Noted: 2022-05-23

## 2022-05-23 PROBLEM — M71.20 SYNOVIAL CYST OF POPLITEAL SPACE: Status: ACTIVE | Noted: 2022-05-23

## 2022-05-23 PROBLEM — E03.9 HYPOTHYROIDISM: Status: ACTIVE | Noted: 2022-05-23

## 2022-05-23 PROBLEM — C50.419 MALIGNANT NEOPLASM OF UPPER-OUTER QUADRANT OF FEMALE BREAST: Status: ACTIVE | Noted: 2022-05-23

## 2022-05-23 PROBLEM — R53.83 FATIGUE: Status: ACTIVE | Noted: 2022-05-23

## 2022-05-23 PROBLEM — E78.5 HYPERLIPIDEMIA: Status: ACTIVE | Noted: 2022-05-23

## 2022-05-23 PROBLEM — E78.5 DYSLIPIDEMIA: Status: ACTIVE | Noted: 2022-05-23

## 2022-07-18 ENCOUNTER — OFFICE VISIT (OUTPATIENT)
Dept: ORTHOPEDICS | Facility: CLINIC | Age: 67
End: 2022-07-18
Payer: MEDICARE

## 2022-07-18 ENCOUNTER — HOSPITAL ENCOUNTER (OUTPATIENT)
Dept: RADIOLOGY | Facility: CLINIC | Age: 67
Discharge: HOME | End: 2022-07-18
Attending: ORTHOPAEDIC SURGERY
Payer: MEDICARE

## 2022-07-18 VITALS
HEIGHT: 62 IN | HEART RATE: 76 BPM | SYSTOLIC BLOOD PRESSURE: 117 MMHG | DIASTOLIC BLOOD PRESSURE: 76 MMHG | BODY MASS INDEX: 18.58 KG/M2 | WEIGHT: 101 LBS

## 2022-07-18 DIAGNOSIS — S89.92XA LEFT KNEE INJURY, INITIAL ENCOUNTER: Primary | ICD-10-CM

## 2022-07-18 DIAGNOSIS — M25.562 ACUTE PAIN OF LEFT KNEE: ICD-10-CM

## 2022-07-18 PROCEDURE — 99213 PR OFFICE/OUTPT VISIT, EST, LEVL III, 20-29 MIN: ICD-10-PCS | Mod: ,,, | Performed by: ORTHOPAEDIC SURGERY

## 2022-07-18 PROCEDURE — 73564 XR KNEE COMP 4 OR MORE VIEWS LEFT: ICD-10-PCS | Mod: LT,,, | Performed by: ORTHOPAEDIC SURGERY

## 2022-07-18 PROCEDURE — 99213 OFFICE O/P EST LOW 20 MIN: CPT | Mod: ,,, | Performed by: ORTHOPAEDIC SURGERY

## 2022-07-18 PROCEDURE — 73564 X-RAY EXAM KNEE 4 OR MORE: CPT | Mod: LT,,, | Performed by: ORTHOPAEDIC SURGERY

## 2022-07-18 RX ORDER — MELOXICAM 7.5 MG/1
7.5 TABLET ORAL 2 TIMES DAILY PRN
Qty: 40 TABLET | Refills: 0 | Status: SHIPPED | OUTPATIENT
Start: 2022-07-18 | End: 2023-05-24

## 2022-07-18 NOTE — PROGRESS NOTES
[facility]edic Clinic  Orthopedic Clinic Note      Chief Complaint:   Chief Complaint   Patient presents with    Left Knee - Pain    Pain     Patient thinks she might have bumped her knee a few months ago but her pain is mostly the outside of her knee but nothing specific sees a chiropractor a few times a week does some PT from home seems to be helping just here to check     Referring Physician: No ref. provider found      History of Present Illness:    This is a 66 y.o. year old female presenting with complaints of left knee pain.  She reports that a few months ago she may have bumped her knee, and since that time she has noticed some pain over the lateral aspect of her left knee.  Pain is localized to the lateral aspect of the left knee and under her kneecap.  She is still able to perform her normal exercise activities without difficulty.  She is treated by a chiropractor and does perform a home exercise program.  She reports improvement in her symptoms since initially noticing them, but just wanted to make sure she was not ignoring something.      History reviewed. No pertinent past medical history.    Past Surgical History:   Procedure Laterality Date    BREAST LUMPECTOMY Left 01/2009    BREAST LUMPECTOMY Right 01/2005    LAPAROSCOPIC TOTAL HYSTERECTOMY  08/2011    MASTECTOMY Bilateral 08/2011    MEDIPORT INSERTION, SINGLE      MEDIPORT REMOVAL      UTERINE SUSPENSION  1990       Current Outpatient Medications   Medication Sig    busPIRone (BUSPAR) 5 MG Tab Take 1 tablet (5 mg total) by mouth 3 (three) times daily as needed (anxiety).    sertraline (ZOLOFT) 100 MG tablet Take 1 tablet (100 mg total) by mouth once daily.    thyroid, pork, (ARMOUR THYROID) 15 mg Tab Take 1 tablet (15 mg total) by mouth before breakfast. To be taken with [place] 30mg for a total of 45mg qAM    thyroid, pork, (ARMOUR THYROID) 30 mg Tab Take 1 tablet (30 mg total) by mouth before breakfast. To be taken with [place] 15mg  "for a total of 45mg qAM    ALPRAZolam (XANAX) 0.25 MG tablet Take 1 tablet (0.25 mg total) by mouth 2 (two) times daily as needed for Anxiety (**use sparingly**).    meloxicam (MOBIC) 7.5 MG tablet Take 1 tablet (7.5 mg total) by mouth 2 (two) times daily as needed for Pain. Take with food     No current facility-administered medications for this visit.       Review of patient's allergies indicates:  No Known Allergies    Family History   Problem Relation Age of Onset    Alzheimer's disease Mother     Hypertension Father     Diabetes Father 80    Kidney cancer Father     Other Father         Placed in NH    Anxiety disorder Brother     No Known Problems Brother        Social History     Socioeconomic History    Marital status:     Number of children: 2   Occupational History    Occupation: Homemaker   Tobacco Use    Smoking status: Never Smoker    Smokeless tobacco: Never Used   Substance and Sexual Activity    Alcohol use: Not Currently   Social History Narrative    2 adopted sons.           Review of Systems:  All review of systems negative except for those stated in the HPI.    Examination:    Vital Signs:    Vitals:    07/18/22 1340   BP: 117/76   Pulse: 76   Weight: 45.8 kg (101 lb)   Height: 5' 2" (1.575 m)       Body mass index is 18.47 kg/m².    Physical Examination:  General: Well-developed, well-nourished.  Neuro: Alert and oriented x 3.  Psych: Normal mood and affect.  Left Knee Exam:  No obvious deformity. Range of motion from 0-130 degrees. Negative patella grind and equal subluxation of knee cap medial and lateral < 1cm. Negative patella tendon tenderness. Negative Lachman and anterior drawer test. Negative posterior drawer test. Negative varus and valgus stress test. Negative medial joint line tenderness. Negative lateral joint line tenderness. 5/5 strength and normal skin appearance. Sensibility normal.      Imaging: X-rays ordered and images interpreted today personally by me " of four views of the left knee demonstrate early degenerative changes with joint space narrowing of the medial aspect of the tibial femoral compartment, as well as the patellofemoral compartment.      Assessment: Left knee injury, initial encounter  -     meloxicam (MOBIC) 7.5 MG tablet; Take 1 tablet (7.5 mg total) by mouth 2 (two) times daily as needed for Pain. Take with food  Dispense: 40 tablet; Refill: 0    Acute pain of left knee  -     X-Ray Knee Complete 4 or More Views Left; Future; Expected date: 07/18/2022        Plan:  X-rays were reviewed with the patient.  Given that she has had some improvement in her symptoms, I do think it is reasonable to continue to monitor her left knee for now.  She will continue her home exercise program.  Prescription routed for meloxicam to be taken twice daily as needed for left knee pain.  She will return to clinic as needed for any additional issues or concerns, or if her symptoms should worsen or fail to continue to improve.  She verbalized understanding plan of care with no further questions.         Follow up if symptoms worsen or fail to improve.      DISCLAIMER: This note may have been dictated using voice recognition software and may contain grammatical errors.     NOTE: Consult report sent to referring provider via [employer].

## 2022-07-28 ENCOUNTER — OFFICE VISIT (OUTPATIENT)
Dept: ORTHOPEDICS | Facility: CLINIC | Age: 67
End: 2022-07-28
Payer: MEDICARE

## 2022-07-28 VITALS
WEIGHT: 101 LBS | HEIGHT: 62 IN | BODY MASS INDEX: 18.58 KG/M2 | SYSTOLIC BLOOD PRESSURE: 117 MMHG | HEART RATE: 76 BPM | DIASTOLIC BLOOD PRESSURE: 76 MMHG

## 2022-07-28 DIAGNOSIS — S52.552A OTHER CLOSED EXTRA-ARTICULAR FRACTURE OF DISTAL END OF LEFT RADIUS, INITIAL ENCOUNTER: Primary | ICD-10-CM

## 2022-07-28 PROCEDURE — 99214 PR OFFICE/OUTPT VISIT, EST, LEVL IV, 30-39 MIN: ICD-10-PCS | Mod: ,,, | Performed by: ORTHOPAEDIC SURGERY

## 2022-07-28 PROCEDURE — 99214 OFFICE O/P EST MOD 30 MIN: CPT | Mod: ,,, | Performed by: ORTHOPAEDIC SURGERY

## 2022-07-28 NOTE — PROGRESS NOTES
History of present illness:    This is a 66 y.o. year old female that presents today after a fall onto her outstretched left hand.  She sustained a left buckle fracture of the distal radius.  This happened approximately 2 days ago.  She was placed in a splint.  She states her pain is worsened by movement of the wrist.    History reviewed. No pertinent past medical history.    Past Surgical History:   Procedure Laterality Date    BREAST LUMPECTOMY Left 01/2009    BREAST LUMPECTOMY Right 01/2005    LAPAROSCOPIC TOTAL HYSTERECTOMY  08/2011    MASTECTOMY Bilateral 08/2011    MEDIPORT INSERTION, SINGLE      MEDIPORT REMOVAL      UTERINE SUSPENSION  1990       Current Outpatient Medications   Medication Sig    busPIRone (BUSPAR) 5 MG Tab Take 1 tablet (5 mg total) by mouth 3 (three) times daily as needed (anxiety).    meloxicam (MOBIC) 7.5 MG tablet Take 1 tablet (7.5 mg total) by mouth 2 (two) times daily as needed for Pain. Take with food    sertraline (ZOLOFT) 100 MG tablet Take 1 tablet (100 mg total) by mouth once daily.    thyroid, pork, (ARMOUR THYROID) 15 mg Tab Take 1 tablet (15 mg total) by mouth before breakfast. To be taken with [place] 30mg for a total of 45mg qAM    thyroid, pork, (ARMOUR THYROID) 30 mg Tab Take 1 tablet (30 mg total) by mouth before breakfast. To be taken with [place] 15mg for a total of 45mg qAM    ALPRAZolam (XANAX) 0.25 MG tablet Take 1 tablet (0.25 mg total) by mouth 2 (two) times daily as needed for Anxiety (**use sparingly**).     No current facility-administered medications for this visit.       Review of patient's allergies indicates:  No Known Allergies    Family History   Problem Relation Age of Onset    Alzheimer's disease Mother     Hypertension Father     Diabetes Father 80    Kidney cancer Father     Other Father         Placed in NH    Anxiety disorder Brother     No Known Problems Brother        Social History     Socioeconomic History    Marital status:  "    Number of children: 2   Occupational History    Occupation: Homemaker   Tobacco Use    Smoking status: Never Smoker    Smokeless tobacco: Never Used   Substance and Sexual Activity    Alcohol use: Not Currently   Social History Narrative    2 adopted sons.       Chief Complaint:   Chief Complaint   Patient presents with    Left Wrist - Pain, Injury    Wrist Injury     DOI: 7/26/22, patient went to go get the mail was walking around her house with some socks on and fell with her hands down went into a walk in clinic had XR done        Consulting Physician: No ref. provider found      Review of Systems:    All review of systems negative except for those stated in the HPI    Examination:    Vital Signs:    Vitals:    07/28/22 1113   BP: 117/76   Pulse: 76   Weight: 45.8 kg (101 lb)   Height: 5' 2" (1.575 m)       Body mass index is 18.47 kg/m².    Physical Exam:   General: Well-developed, well-nourished.  Neuro: Alert and oriented x 3.  Psych: Normal mood and affect.  Wrist Exam:  No obvious deformity.  Positive tenderness over distal radius.  Cannot perform range of motion of provocative exams secondary to pain.  2/5 strength, normal skin appearance and palpable pulses and CR<2.    Imaging: X-rays ordered and images interpreted today personally by me of wrist from an outside facility demonstrate a buckle fracture of the distal radius and also some severe carpometacarpal arthrosis.         Assessment: Other closed extra-articular fracture of distal end of left radius, initial encounter        Plan:    This patient will be placed in a fracture brace today.  I will see her back in 3 weeks with repeat x-rays.  She is nonweightbearing to that left upper extremity.            DISCLAIMER: This note may have been dictated using voice recognition software and may contain grammatical errors.     NOTE: Consult report sent to referring provider via [employer] EMR.  "

## 2022-08-18 ENCOUNTER — OFFICE VISIT (OUTPATIENT)
Dept: ORTHOPEDICS | Facility: CLINIC | Age: 67
End: 2022-08-18
Payer: MEDICARE

## 2022-08-18 ENCOUNTER — HOSPITAL ENCOUNTER (OUTPATIENT)
Dept: RADIOLOGY | Facility: CLINIC | Age: 67
Discharge: HOME | End: 2022-08-18
Attending: ORTHOPAEDIC SURGERY
Payer: MEDICARE

## 2022-08-18 VITALS
WEIGHT: 101 LBS | DIASTOLIC BLOOD PRESSURE: 76 MMHG | HEART RATE: 76 BPM | BODY MASS INDEX: 18.58 KG/M2 | HEIGHT: 62 IN | SYSTOLIC BLOOD PRESSURE: 117 MMHG

## 2022-08-18 DIAGNOSIS — S89.92XA LEFT KNEE INJURY, INITIAL ENCOUNTER: ICD-10-CM

## 2022-08-18 DIAGNOSIS — S52.552D OTHER CLOSED EXTRA-ARTICULAR FRACTURE OF DISTAL END OF LEFT RADIUS WITH ROUTINE HEALING, SUBSEQUENT ENCOUNTER: Primary | ICD-10-CM

## 2022-08-18 DIAGNOSIS — M25.532 LEFT WRIST PAIN: ICD-10-CM

## 2022-08-18 PROCEDURE — 73110 X-RAY EXAM OF WRIST: CPT | Mod: LT,,, | Performed by: ORTHOPAEDIC SURGERY

## 2022-08-18 PROCEDURE — 73110 XR WRIST COMPLETE 3 VIEWS LEFT: ICD-10-PCS | Mod: LT,,, | Performed by: ORTHOPAEDIC SURGERY

## 2022-08-18 PROCEDURE — 99213 PR OFFICE/OUTPT VISIT, EST, LEVL III, 20-29 MIN: ICD-10-PCS | Mod: ,,, | Performed by: ORTHOPAEDIC SURGERY

## 2022-08-18 PROCEDURE — 99213 OFFICE O/P EST LOW 20 MIN: CPT | Mod: ,,, | Performed by: ORTHOPAEDIC SURGERY

## 2022-08-18 NOTE — PROGRESS NOTES
Or[facility]opaedic Clinic  Orthopedic Clinic Note      Chief Complaint:   Chief Complaint   Patient presents with    Left Wrist - Pain    Wrist Pain     3wk F/U extra articular fx of distal end of Left radius DOI: 7/26/22, patient states she feels fine would like to not wear the brace because it stinks, appears to be swollen still     Referring Physician: No ref. provider found      History of present illness:    This is a 66 y.o. year old female that presents today after a fall onto her outstretched left hand.  She sustained a left buckle fracture of the distal radius.  This happened approximately 2 days ago.  She was placed in a splint.  She states her pain is worsened by movement of the wrist.  08/18/2022 patient presents for follow-up on left distal radius fracture.  She reports great improvement in her symptoms since her prior visit.  She has been compliant with her EXOS brace.    History reviewed. No pertinent past medical history.    Past Surgical History:   Procedure Laterality Date    BREAST LUMPECTOMY Left 01/2009    BREAST LUMPECTOMY Right 01/2005    LAPAROSCOPIC TOTAL HYSTERECTOMY  08/2011    MASTECTOMY Bilateral 08/2011    MEDIPORT INSERTION, SINGLE      MEDIPORT REMOVAL      UTERINE SUSPENSION  1990       Current Outpatient Medications   Medication Sig    busPIRone (BUSPAR) 5 MG Tab Take 1 tablet (5 mg total) by mouth 3 (three) times daily as needed (anxiety).    meloxicam (MOBIC) 7.5 MG tablet Take 1 tablet (7.5 mg total) by mouth 2 (two) times daily as needed for Pain. Take with food    sertraline (ZOLOFT) 100 MG tablet Take 1 tablet (100 mg total) by mouth once daily.    thyroid, pork, (ARMOUR THYROID) 15 mg Tab Take 1 tablet (15 mg total) by mouth before breakfast. To be taken with [place] 30mg for a total of 45mg qAM    thyroid, pork, (ARMOUR THYROID) 30 mg Tab Take 1 tablet (30 mg total) by mouth before breakfast. To be taken with [place] 15mg for a total of 45mg qAM    ALPRAZolam  "(XANAX) 0.25 MG tablet Take 1 tablet (0.25 mg total) by mouth 2 (two) times daily as needed for Anxiety (**use sparingly**).     No current facility-administered medications for this visit.       Review of patient's allergies indicates:  No Known Allergies    Family History   Problem Relation Age of Onset    Alzheimer's disease Mother     Hypertension Father     Diabetes Father 80    Kidney cancer Father     Other Father         Placed in NH    Anxiety disorder Brother     No Known Problems Brother        Social History     Socioeconomic History    Marital status:     Number of children: 2   Occupational History    Occupation: Homemaker   Tobacco Use    Smoking status: Never Smoker    Smokeless tobacco: Never Used   Substance and Sexual Activity    Alcohol use: Not Currently   Social History Narrative    2 adopted sons.       Chief Complaint:   Chief Complaint   Patient presents with    Left Wrist - Pain    Wrist Pain     3wk F/U extra articular fx of distal end of Left radius DOI: 7/26/22, patient states she feels fine would like to not wear the brace because it stinks, appears to be swollen still       Consulting Physician: No ref. provider found      Review of Systems:    All review of systems negative except for those stated in the HPI    Examination:    Vital Signs:    Vitals:    08/18/22 1013   BP: 117/76   Pulse: 76   Weight: 45.8 kg (101 lb)   Height: 5' 2" (1.575 m)       Body mass index is 18.47 kg/m².    Physical Exam:   General: Well-developed, well-nourished.  Neuro: Alert and oriented x 3.  Psych: Normal mood and affect.  Left Wrist Exam:  No obvious deformity. Negative tenderness over distal radius.  Functional range of motion intact with minimal stiffness. Negative flexion-compression test and Phanel´s test. Negative Finkelstein´s test. 4/5 strength, normal skin appearance and palpable pulses and CR<2.      Imaging: X-rays ordered and images interpreted today personally by me of " 3 views of the left wrist demonstrate a healing buckle fracture of the distal radius and also some severe carpometacarpal arthrosis.  Maintained alignment when compared to prior imaging.       Assessment: Other closed extra-articular fracture of distal end of left radius with routine healing, subsequent encounter    Left knee injury, initial encounter    Left wrist pain  -     X-Ray Wrist Complete Left; Future; Expected date: 08/18/2022        Plan:  X-rays were reviewed with the patient.  She can discontinue the use of her brace today.  She can return to her normal activities as able.  She will refrain from any strenuous activities for the next month.  She will return to clinic as needed for any additional issues or concerns, for symptoms should worsen or fail to continue to improve.  She verbalized understanding the plan of care with no further questions.    Follow up if symptoms worsen or fail to improve.          DISCLAIMER: This note may have been dictated using voice recognition software and may contain grammatical errors.     NOTE: Consult report sent to referring provider via [employer].

## 2022-08-23 ENCOUNTER — PATIENT OUTREACH (OUTPATIENT)
Dept: ADMINISTRATIVE | Facility: HOSPITAL | Age: 67
End: 2022-08-23
Payer: MEDICARE

## 2022-08-23 NOTE — PROGRESS NOTES
[facility] CMS chart audits COLON CANCER SCREENING. Chart review completed for  test overdue (mammograms, Colonoscopies, pap smears, DM labs, and/or EYE EXAMs)      Care Everywhere and media, updates requested and reviewed.    Requested colonoscopy report.

## 2022-08-23 NOTE — LETTER
AUTHORIZATION FOR RELEASE OF   CONFIDENTIAL INFORMATION    Dear Murali Causey MD,     We are seeing Kimberlyn Selby, date of birth 1955, in the clinic at [facility]. SKYE Cervantes Jr, MD is the patient's PCP. Kimberlyn Selby has an outstanding lab/procedure at the time we reviewed her chart. In order to help keep her health information updated, she has authorized us to request the following medical record(s):        (  )  MAMMOGRAM                                      ( X)  COLONOSCOPY      (  )  PAP SMEAR                                          (  )  OUTSIDE LAB RESULTS     (  )  DEXA SCAN                                          (  )  EYE EXAM            (  )  FOOT EXAM                                          (  )  ENTIRE RECORD     (  )  OUTSIDE IMMUNIZATIONS                 (  )  _______________         Please fax records to [facility], N Paul Landreneau Jr, MD, [phone].     If you have any questions, please contact:    Victorina Michelle LPN Care Coordinator  [facility]  Phone: [phone]  FAX: [phone]          Patient Name: Kimberlyn Selby  : 1955  Patient Phone #: [phone]      <<-----Click here for Discharge Medication Review

## 2022-09-16 ENCOUNTER — HISTORICAL (OUTPATIENT)
Dept: ADMINISTRATIVE | Facility: HOSPITAL | Age: 67
End: 2022-09-16
Payer: MEDICARE

## 2022-11-30 ENCOUNTER — HOSPITAL ENCOUNTER (OUTPATIENT)
Dept: RADIOLOGY | Facility: CLINIC | Age: 67
Discharge: HOME | End: 2022-11-30
Attending: ORTHOPAEDIC SURGERY
Payer: MEDICARE

## 2022-11-30 ENCOUNTER — OFFICE VISIT (OUTPATIENT)
Dept: ORTHOPEDICS | Facility: CLINIC | Age: 67
End: 2022-11-30
Payer: MEDICARE

## 2022-11-30 VITALS — HEIGHT: 62 IN | BODY MASS INDEX: 18.4 KG/M2 | WEIGHT: 100 LBS

## 2022-11-30 DIAGNOSIS — S52.552D OTHER CLOSED EXTRA-ARTICULAR FRACTURE OF DISTAL END OF LEFT RADIUS WITH ROUTINE HEALING, SUBSEQUENT ENCOUNTER: ICD-10-CM

## 2022-11-30 DIAGNOSIS — M25.532 LEFT WRIST PAIN: ICD-10-CM

## 2022-11-30 DIAGNOSIS — M65.20 CALCIFIC TENDINITIS: Primary | ICD-10-CM

## 2022-11-30 DIAGNOSIS — M25.511 ACUTE PAIN OF RIGHT SHOULDER: ICD-10-CM

## 2022-11-30 PROCEDURE — 99213 PR OFFICE/OUTPT VISIT, EST, LEVL III, 20-29 MIN: ICD-10-PCS | Mod: ,,, | Performed by: ORTHOPAEDIC SURGERY

## 2022-11-30 PROCEDURE — 73030 X-RAY EXAM OF SHOULDER: CPT | Mod: RT,,, | Performed by: ORTHOPAEDIC SURGERY

## 2022-11-30 PROCEDURE — 73030 XR SHOULDER COMPLETE 2 OR MORE VIEWS RIGHT: ICD-10-PCS | Mod: RT,,, | Performed by: ORTHOPAEDIC SURGERY

## 2022-11-30 PROCEDURE — 73110 XR WRIST COMPLETE 3 VIEWS LEFT: ICD-10-PCS | Mod: LT,,, | Performed by: ORTHOPAEDIC SURGERY

## 2022-11-30 PROCEDURE — 99213 OFFICE O/P EST LOW 20 MIN: CPT | Mod: ,,, | Performed by: ORTHOPAEDIC SURGERY

## 2022-11-30 PROCEDURE — 73110 X-RAY EXAM OF WRIST: CPT | Mod: LT,,, | Performed by: ORTHOPAEDIC SURGERY

## 2022-11-30 NOTE — PROGRESS NOTES
Chief Complaint:   Chief Complaint   Patient presents with    Left Wrist - Pain    Right Shoulder - Pain    Shoulder Pain     R bicep pain, radiating to shoulder, stabbing/soreness, no PT/meds/ice/brace, occasionally heat    Wrist Pain     L wrist pain, just wants to make sure it's healed       Consulting Physician: No ref. provider found    History of present illness:    She is a pleasant 67-year-old whose had right shoulder pain since February 2022.  The pains located laterally on the shoulder.  She notes it worse with activity and somewhat better with rest.  She thinks she may have aggravated while working out.  She is tried some anti-inflammatory medicines and a home exercise program for greater than 6 weeks without relief.  She denies any numbness or tingling.  Tried an injection April of 2022 which did help.  The pain is not terribly bothersome for but it does bother her with activities and working out.    She is also had a history of left distal radius fracture.  It underwent healing uneventfully but she is noticed some weakness when lifting weights.    History reviewed. No pertinent past medical history.    Past Surgical History:   Procedure Laterality Date    BREAST LUMPECTOMY Left 01/2009    BREAST LUMPECTOMY Right 01/2005    COLONOSCOPY  06/05/2013    Murali Causey MD    LAPAROSCOPIC TOTAL HYSTERECTOMY  08/2011    MASTECTOMY Bilateral 08/2011    MEDIPORT INSERTION, SINGLE      MEDIPORT REMOVAL      UTERINE SUSPENSION  1990       Current Outpatient Medications   Medication Sig    sertraline (ZOLOFT) 100 MG tablet Take 1 tablet (100 mg total) by mouth once daily.    thyroid, pork, (ARMOUR THYROID) 15 mg Tab Take 1 tablet (15 mg total) by mouth before breakfast. Will also take a 30 mg for 45 mg total daily dose.    thyroid, pork, (ARMOUR THYROID) 30 mg Tab Take 1 tablet (30 mg total) by mouth before breakfast. Will also take a 15 mg for 45 mg total daily dose.    ALPRAZolam (XANAX) 0.25 MG tablet Take 1  "tablet (0.25 mg total) by mouth 2 (two) times daily as needed for Anxiety (**use sparingly**).    meloxicam (MOBIC) 7.5 MG tablet Take 1 tablet (7.5 mg total) by mouth 2 (two) times daily as needed for Pain. Take with food    thyroid, pork, (ARMOUR THYROID) 15 mg Tab Take 3 tablets (45 mg total) by mouth before breakfast.     No current facility-administered medications for this visit.       Review of patient's allergies indicates:  No Known Allergies    Family History   Problem Relation Age of Onset    Alzheimer's disease Mother     Hypertension Father     Diabetes Father 80    Kidney cancer Father     Other Father         Placed in NH    Anxiety disorder Brother     No Known Problems Brother        Social History     Socioeconomic History    Marital status:     Number of children: 2   Occupational History    Occupation: Homemaker   Tobacco Use    Smoking status: Never    Smokeless tobacco: Never   Substance and Sexual Activity    Alcohol use: Never    Drug use: Never    Sexual activity: Yes     Partners: Male   Social History Narrative    2 adopted sons.       Review of Systems:    Constitution:   Denies chills, fever, and sweats.  HENT:   Denies headaches or blurry vision.  Cardiovascular:  Denies chest pain or irregular heart beat.  Respiratory:   Denies cough or shortness of breath.  Gastrointestinal:  Denies abdominal pain, nausea, or vomiting.  Musculoskeletal:   Denies muscle cramps.  Neurological:   Denies dizziness or focal weakness.  Psychiatric/Behavior: Normal mental status.  Hematology/Lymph:  Denies bleeding problem or easy bruising/bleeding.  Skin:    Denies rash or suspicious lesions.    Examination:    Vital Signs:    Vitals:    11/30/22 1403 11/30/22 1404   Weight: 45.4 kg (100 lb)    Height: 5' 2" (1.575 m)    PainSc:    6       Body mass index is 18.29 kg/m².    Constitution:   Well-developed, well nourished patient in no acute distress.  Neurological:   Alert and oriented x 3 and " cooperative to examination.     Psychiatric/Behavior: Normal mental status.  Respiratory:   No shortness of breath.  Eyes:    Extraoccular muscles intact  Skin:    No scars, rash or suspicious lesions.    MSK:   Shoulder Exam:                   Right        Left  Skin:                                   Normal     Normal  AC joint tenderness:           None         None  Forward Flexion:                170            180  Abduction:                          100           180  External Rotation:               80              80  Internal Rotation:                80             80  Supraspinatus stress test:    +        Neg  Hawkin's Impingement:        +           Neg  Neer Impingement:            Neg           Neg  Apprehension:                   Neg           Neg  Fayette's:                             +           Neg  Speed's test:                     Neg            Neg  Strength:  External Rotation:           5/5                5/5  Lift Off/belly press:          5/5                5/5    N-V status:                   Intact             Intact    C-spine: Normal ROM, NT      Imaging: X-rays ordered and images interpreted today personally by me of four views of the right shoulder show persistent calcific bursitis/tendinitis and three views of the left wrist show healed fracture.       Assessment: Calcific tendinitis  -     X-ray Shoulder 2 or More Views Right; Future; Expected date: 11/30/2022    Other closed extra-articular fracture of distal end of left radius with routine healing, subsequent encounter  -     X-Ray Wrist Complete Left; Future; Expected date: 11/30/2022        Plan:  We discussed treatment options including advanced imaging with MRI and repeat injections.  She would like to continue home exercise program.  I will see her back if she has any issues

## 2023-02-22 ENCOUNTER — OFFICE VISIT (OUTPATIENT)
Dept: ORTHOPEDICS | Facility: CLINIC | Age: 68
End: 2023-02-22
Payer: MEDICARE

## 2023-02-22 VITALS
HEART RATE: 58 BPM | DIASTOLIC BLOOD PRESSURE: 71 MMHG | BODY MASS INDEX: 18.4 KG/M2 | HEIGHT: 62 IN | SYSTOLIC BLOOD PRESSURE: 119 MMHG | WEIGHT: 100 LBS

## 2023-02-22 DIAGNOSIS — G89.29 CHRONIC RIGHT SHOULDER PAIN: Primary | ICD-10-CM

## 2023-02-22 DIAGNOSIS — M25.511 CHRONIC RIGHT SHOULDER PAIN: Primary | ICD-10-CM

## 2023-02-22 DIAGNOSIS — M65.20 CALCIFIC TENDINITIS: ICD-10-CM

## 2023-02-22 PROCEDURE — 99213 PR OFFICE/OUTPT VISIT, EST, LEVL III, 20-29 MIN: ICD-10-PCS | Mod: ,,, | Performed by: ORTHOPAEDIC SURGERY

## 2023-02-22 PROCEDURE — 99213 OFFICE O/P EST LOW 20 MIN: CPT | Mod: ,,, | Performed by: ORTHOPAEDIC SURGERY

## 2023-02-22 NOTE — PROGRESS NOTES
Chief Complaint:   Chief Complaint   Patient presents with    Left Shoulder - Pain    Right Wrist - Pain     F/U for left shoulder and wrist. Shoulder is getting worse and hurts all the time. Pt wants to discuss what to do next.     Wrist is doing fine and not having any issues with it.        Consulting Physician: No ref. provider found    History of present illness:    She is a pleasant 67-year-old whose had right shoulder pain since February 2022.  The pains located laterally on the shoulder.  She notes it worse with activity and somewhat better with rest.  She thinks she may have aggravated while working out.  She is tried some anti-inflammatory medicines and a home exercise program for greater than 6 weeks without relief.  She denies any numbness or tingling.  Tried an injection April of 2022 which did help.  The pain is not terribly bothersome for but it does bother her with activities and working out.    She is also had a history of left distal radius fracture.  It underwent healing uneventfully but she is noticed some weakness when lifting weights.    2/22/23: She returns today. She has continued pain in her right shoulder that has worsened. Her pain is lateral in the shoulder and radiates down the arm. It is worse with any activity. It is somewhat better with rest. She has tried a home exercise program for greater than 6 weeks without relief.     History reviewed. No pertinent past medical history.    Past Surgical History:   Procedure Laterality Date    BREAST LUMPECTOMY Left 01/2009    BREAST LUMPECTOMY Right 01/2005    COLONOSCOPY  06/05/2013    Murali Cauesy MD    LAPAROSCOPIC TOTAL HYSTERECTOMY  08/2011    MASTECTOMY Bilateral 08/2011    MEDIPORT INSERTION, SINGLE      MEDIPORT REMOVAL      UTERINE SUSPENSION  1990       Current Outpatient Medications   Medication Sig    ALPRAZolam (XANAX) 0.25 MG tablet Take 1 tablet (0.25 mg total) by mouth 2 (two) times daily as needed for Anxiety (**use  sparingly**).    meloxicam (MOBIC) 7.5 MG tablet Take 1 tablet (7.5 mg total) by mouth 2 (two) times daily as needed for Pain. Take with food    sertraline (ZOLOFT) 100 MG tablet Take 1 tablet (100 mg total) by mouth once daily.    thyroid, pork, (ARMOUR THYROID) 15 mg Tab Take 3 tablets (45 mg total) by mouth before breakfast.    thyroid, pork, (ARMOUR THYROID) 15 mg Tab Take 1 tablet (15 mg total) by mouth before breakfast. Will also take a 30 mg for 45 mg total daily dose.    thyroid, pork, (ARMOUR THYROID) 30 mg Tab Take 1 tablet (30 mg total) by mouth before breakfast. Will also take a 15 mg for 45 mg total daily dose.     No current facility-administered medications for this visit.       Review of patient's allergies indicates:  No Known Allergies    Family History   Problem Relation Age of Onset    Alzheimer's disease Mother     Hypertension Father     Diabetes Father 80    Kidney cancer Father     Other Father         Placed in NH    Anxiety disorder Brother     No Known Problems Brother        Social History     Socioeconomic History    Marital status:     Number of children: 2   Occupational History    Occupation: Homemaker   Tobacco Use    Smoking status: Never    Smokeless tobacco: Never   Substance and Sexual Activity    Alcohol use: Never    Drug use: Never    Sexual activity: Yes     Partners: Male   Social History Narrative    2 adopted sons.       Review of Systems:    Constitution:   Denies chills, fever, and sweats.  HENT:   Denies headaches or blurry vision.  Cardiovascular:  Denies chest pain or irregular heart beat.  Respiratory:   Denies cough or shortness of breath.  Gastrointestinal:  Denies abdominal pain, nausea, or vomiting.  Musculoskeletal:   Denies muscle cramps.  Neurological:   Denies dizziness or focal weakness.  Psychiatric/Behavior: Normal mental status.  Hematology/Lymph:  Denies bleeding problem or easy bruising/bleeding.  Skin:    Denies rash or suspicious  "lesions.    Examination:    Vital Signs:    Vitals:    02/22/23 1011   BP: 119/71   Pulse: (!) 58   Weight: 45.4 kg (100 lb)   Height: 5' 2" (1.575 m)       Body mass index is 18.29 kg/m².    Constitution:   Well-developed, well nourished patient in no acute distress.  Neurological:   Alert and oriented x 3 and cooperative to examination.     Psychiatric/Behavior: Normal mental status.  Respiratory:   No shortness of breath.  Eyes:    Extraoccular muscles intact  Skin:    No scars, rash or suspicious lesions.    MSK:   Shoulder Exam:                   Right        Left  Skin:                                   Normal     Normal  AC joint tenderness:           None         None  Forward Flexion:                170            180  Abduction:                          100           180  External Rotation:               80              80  Internal Rotation:                80             80  Supraspinatus stress test:    +        Neg  Hawkin's Impingement:        +           Neg  Neer Impingement:            Neg           Neg  Apprehension:                   Neg           Neg  Alleghany's:                             +           Neg  Speed's test:                     Neg            Neg  Strength:  External Rotation:           5/5                5/5  Lift Off/belly press:          5/5                5/5    N-V status:                   Intact             Intact    C-spine: Normal ROM, NT         Assessment: Chronic right shoulder pain  -     MRI Shoulder Without Contrast Right; Future; Expected date: 02/22/2023    Calcific tendinitis        Plan: She has failed conservative measures. We will get an MRI to evaluate and see her back after for results.     I have seen the patient, reviewed the NP who acted as a scribe. I have personally interviewed and examined the patient and agree with the plan as documented in the note.                   "

## 2023-02-27 ENCOUNTER — OFFICE VISIT (OUTPATIENT)
Dept: ORTHOPEDICS | Facility: CLINIC | Age: 68
End: 2023-02-27
Payer: MEDICARE

## 2023-02-27 VITALS
HEART RATE: 61 BPM | BODY MASS INDEX: 18.4 KG/M2 | DIASTOLIC BLOOD PRESSURE: 64 MMHG | WEIGHT: 100 LBS | SYSTOLIC BLOOD PRESSURE: 116 MMHG | HEIGHT: 62 IN

## 2023-02-27 DIAGNOSIS — M75.30 CALCIFIC BURSITIS OF SHOULDER: Primary | ICD-10-CM

## 2023-02-27 PROCEDURE — 99213 OFFICE O/P EST LOW 20 MIN: CPT | Mod: ,,, | Performed by: ORTHOPAEDIC SURGERY

## 2023-02-27 PROCEDURE — 99213 PR OFFICE/OUTPT VISIT, EST, LEVL III, 20-29 MIN: ICD-10-PCS | Mod: ,,, | Performed by: ORTHOPAEDIC SURGERY

## 2023-02-27 RX ORDER — SODIUM CHLORIDE 9 MG/ML
INJECTION, SOLUTION INTRAVENOUS CONTINUOUS
Status: CANCELLED | OUTPATIENT
Start: 2023-02-27

## 2023-02-27 NOTE — PROGRESS NOTES
Chief Complaint:   Chief Complaint   Patient presents with    Right Shoulder - Results     MRI results for right shoulder.       Consulting Physician: No ref. provider found    History of present illness:    She is a pleasant 67-year-old whose had right shoulder pain since February 2022.  The pains located laterally on the shoulder.  She notes it worse with activity and somewhat better with rest.  She thinks she may have aggravated while working out.  She is tried some anti-inflammatory medicines and a home exercise program for greater than 6 weeks without relief.  She denies any numbness or tingling.  Tried an injection April of 2022 which did help.  The pain is not terribly bothersome for but it does bother her with activities and working out.    She is also had a history of left distal radius fracture.  It underwent healing uneventfully but she is noticed some weakness when lifting weights.    She returns today. She has continued pain in her right shoulder that has worsened. Her pain is lateral in the shoulder and radiates down the arm. It is worse with any activity. It is somewhat better with rest. She has tried a home exercise program for greater than 8 weeks without relief.  She is status post MRI    History reviewed. No pertinent past medical history.    Past Surgical History:   Procedure Laterality Date    BREAST LUMPECTOMY Left 01/2009    BREAST LUMPECTOMY Right 01/2005    COLONOSCOPY  06/05/2013    Murali Causey MD    LAPAROSCOPIC TOTAL HYSTERECTOMY  08/2011    MASTECTOMY Bilateral 08/2011    MEDIPORT INSERTION, SINGLE      MEDIPORT REMOVAL      UTERINE SUSPENSION  1990       Current Outpatient Medications   Medication Sig    ALPRAZolam (XANAX) 0.25 MG tablet Take 1 tablet (0.25 mg total) by mouth 2 (two) times daily as needed for Anxiety (**use sparingly**).    meloxicam (MOBIC) 7.5 MG tablet Take 1 tablet (7.5 mg total) by mouth 2 (two) times daily as needed for Pain. Take with food    sertraline  "(ZOLOFT) 100 MG tablet Take 1 tablet (100 mg total) by mouth once daily.    thyroid, pork, (ARMOUR THYROID) 15 mg Tab Take 3 tablets (45 mg total) by mouth before breakfast.    thyroid, pork, (ARMOUR THYROID) 15 mg Tab Take 1 tablet (15 mg total) by mouth before breakfast. Will also take a 30 mg for 45 mg total daily dose.    thyroid, pork, (ARMOUR THYROID) 30 mg Tab Take 1 tablet (30 mg total) by mouth before breakfast. Will also take a 15 mg for 45 mg total daily dose.     No current facility-administered medications for this visit.       Review of patient's allergies indicates:  No Known Allergies    Family History   Problem Relation Age of Onset    Alzheimer's disease Mother     Hypertension Father     Diabetes Father 80    Kidney cancer Father     Other Father         Placed in NH    Anxiety disorder Brother     No Known Problems Brother        Social History     Socioeconomic History    Marital status:     Number of children: 2   Occupational History    Occupation: Homemaker   Tobacco Use    Smoking status: Never    Smokeless tobacco: Never   Substance and Sexual Activity    Alcohol use: Never    Drug use: Never    Sexual activity: Yes     Partners: Male   Social History Narrative    2 adopted sons.       Review of Systems:    Constitution:   Denies chills, fever, and sweats.  HENT:   Denies headaches or blurry vision.  Cardiovascular:  Denies chest pain or irregular heart beat.  Respiratory:   Denies cough or shortness of breath.  Gastrointestinal:  Denies abdominal pain, nausea, or vomiting.  Musculoskeletal:   Denies muscle cramps.  Neurological:   Denies dizziness or focal weakness.  Psychiatric/Behavior: Normal mental status.  Hematology/Lymph:  Denies bleeding problem or easy bruising/bleeding.  Skin:    Denies rash or suspicious lesions.    Examination:    Vital Signs:    Vitals:    02/27/23 0932   BP: 116/64   Pulse: 61   Weight: 45.4 kg (100 lb)   Height: 5' 2" (1.575 m)       Body mass index " is 18.29 kg/m².    Constitution:   Well-developed, well nourished patient in no acute distress.  Neurological:   Alert and oriented x 3 and cooperative to examination.     Psychiatric/Behavior: Normal mental status.  Respiratory:   No shortness of breath.  Eyes:    Extraoccular muscles intact  Skin:    No scars, rash or suspicious lesions.    MSK:   Shoulder Exam:                   Right        Left  Skin:                                   Normal     Normal  AC joint tenderness:           None         None  Forward Flexion:                170            180  Abduction:                          100           180  External Rotation:               80              80  Internal Rotation:                80             80  Supraspinatus stress test:    +        Neg  Hawkin's Impingement:        +           Neg  Neer Impingement:            Neg           Neg  Apprehension:                   Neg           Neg  Ridge's:                             +           Neg  Speed's test:                     Neg            Neg  Strength:  External Rotation:           5/5                5/5  Lift Off/belly press:          5/5                5/5    N-V status:                   Intact             Intact    C-spine: Normal ROM, NT         Assessment: Calcific bursitis of shoulder  -     Full code; Standing  -     Place in Outpatient; Standing  -     Case Request Operating Room: DEBRIDEMENT, SHOULDER, ARTHROSCOPIC  -     Vital signs; Standing  -     Cleanse with Chlorhexidine (CHG); Standing  -     Diet NPO; Standing  -     Chlorohexidine Gluconate Bath; Standing  -     EKG 12-lead; Standing    Other orders  -     0.9%  NaCl infusion  -     IP VTE LOW RISK PATIENT; Standing  -     ceFAZolin (ANCEF) 2 g in dextrose 5 % (D5W) 50 mL IVPB        Plan:  I recommend surgical intervention:  Right shoulder arthroscopic calcific bursitis/tendinitis debridement, possible rotator cuff repair.  We discussed the details of the procedure and expected  postoperative course.  We discussed the benefits of surgery which be to decrease her pain and increase her function.  We discussed the risks of surgery which is small but could be significant she has continued pain, stiffness, or infection.  After discussion she would like to proceed.  Plans for surgery March 16

## 2023-02-28 ENCOUNTER — PATIENT MESSAGE (OUTPATIENT)
Dept: SURGERY | Facility: HOSPITAL | Age: 68
End: 2023-02-28
Payer: MEDICARE

## 2023-03-10 ENCOUNTER — OFFICE VISIT (OUTPATIENT)
Dept: ORTHOPEDICS | Facility: CLINIC | Age: 68
End: 2023-03-10
Payer: MEDICARE

## 2023-03-10 VITALS
HEIGHT: 62 IN | DIASTOLIC BLOOD PRESSURE: 67 MMHG | TEMPERATURE: 98 F | WEIGHT: 100.38 LBS | BODY MASS INDEX: 18.47 KG/M2 | HEART RATE: 62 BPM | SYSTOLIC BLOOD PRESSURE: 114 MMHG | OXYGEN SATURATION: 96 %

## 2023-03-10 DIAGNOSIS — M75.30 CALCIFIC BURSITIS OF SHOULDER: Primary | ICD-10-CM

## 2023-03-10 PROCEDURE — 20610 LARGE JOINT ASPIRATION/INJECTION: R KNEE: ICD-10-PCS | Mod: RT,,, | Performed by: NURSE PRACTITIONER

## 2023-03-10 PROCEDURE — 20610 DRAIN/INJ JOINT/BURSA W/O US: CPT | Mod: RT,,, | Performed by: NURSE PRACTITIONER

## 2023-03-10 PROCEDURE — 99213 OFFICE O/P EST LOW 20 MIN: CPT | Mod: 25,,, | Performed by: NURSE PRACTITIONER

## 2023-03-10 PROCEDURE — 99213 PR OFFICE/OUTPT VISIT, EST, LEVL III, 20-29 MIN: ICD-10-PCS | Mod: 25,,, | Performed by: NURSE PRACTITIONER

## 2023-03-10 RX ORDER — BETAMETHASONE SODIUM PHOSPHATE AND BETAMETHASONE ACETATE 3; 3 MG/ML; MG/ML
6 INJECTION, SUSPENSION INTRA-ARTICULAR; INTRALESIONAL; INTRAMUSCULAR; SOFT TISSUE
Status: DISCONTINUED | OUTPATIENT
Start: 2023-03-10 | End: 2023-03-10 | Stop reason: HOSPADM

## 2023-03-10 RX ORDER — LIDOCAINE HYDROCHLORIDE 20 MG/ML
3 INJECTION, SOLUTION EPIDURAL; INFILTRATION; INTRACAUDAL; PERINEURAL
Status: DISCONTINUED | OUTPATIENT
Start: 2023-03-10 | End: 2023-03-10 | Stop reason: HOSPADM

## 2023-03-10 RX ADMIN — LIDOCAINE HYDROCHLORIDE 3 ML: 20 INJECTION, SOLUTION EPIDURAL; INFILTRATION; INTRACAUDAL; PERINEURAL at 10:03

## 2023-03-10 RX ADMIN — BETAMETHASONE SODIUM PHOSPHATE AND BETAMETHASONE ACETATE 6 MG: 3; 3 INJECTION, SUSPENSION INTRA-ARTICULAR; INTRALESIONAL; INTRAMUSCULAR; SOFT TISSUE at 10:03

## 2023-03-10 NOTE — PROCEDURES
Large Joint Aspiration/Injection: R knee    Date/Time: 3/10/2023 10:00 AM  Performed by: NATHALIE Jj  Authorized by: NATHALIE Jj     Consent Done?:  Yes (Verbal)  Indications:  Pain  Site marked: the procedure site was marked    Timeout: prior to procedure the correct patient, procedure, and site was verified    Prep: patient was prepped and draped in usual sterile fashion      Local anesthesia used?: Yes    Local anesthetic:  Topical anesthetic    Details:  Needle Size:  21 G  Ultrasonic Guidance for needle placement?: No    Approach:  Lateral  Location:  Knee  Site:  R knee  Medications:  3 mL LIDOcaine (PF) 20 mg/mL (2%) 20 mg/mL (2 %); 6 mg betamethasone acetate-betamethasone sodium phosphate 6 mg/mL  Patient tolerance:  Patient tolerated the procedure well with no immediate complications

## 2023-03-10 NOTE — PROGRESS NOTES
Chief Complaint:   Chief Complaint   Patient presents with    Right Shoulder - Pain    Follow-up     states she would like to discuss PT and injections to the left shoulder. pain is a 8/10.        Consulting Physician: No ref. provider found    History of present illness:    She is a pleasant 67-year-old whose had right shoulder pain since February 2022.  The pains located laterally on the shoulder.  She notes it worse with activity and somewhat better with rest.  She thinks she may have aggravated while working out.  She is tried some anti-inflammatory medicines and a home exercise program for greater than 6 weeks without relief.  She denies any numbness or tingling.  Tried an injection April of 2022 which did help.  The pain is not terribly bothersome for but it does bother her with activities and working out.    She is also had a history of left distal radius fracture.  It underwent healing uneventfully but she is noticed some weakness when lifting weights.    She returns today. She has continued pain in her right shoulder that has worsened. Her pain is lateral in the shoulder and radiates down the arm. It is worse with any activity. It is somewhat better with rest. She has tried a home exercise program for greater than 8 weeks without relief.  She is status post MRI.    She returns today. She cancelled her surgery for now and would like to try an injection and formal therapy.     History reviewed. No pertinent past medical history.    Past Surgical History:   Procedure Laterality Date    BREAST LUMPECTOMY Left 01/2009    BREAST LUMPECTOMY Right 01/2005    COLONOSCOPY  06/05/2013    Murali Causey MD    LAPAROSCOPIC TOTAL HYSTERECTOMY  08/2011    MASTECTOMY Bilateral 08/2011    MEDIPORT INSERTION, SINGLE      MEDIPORT REMOVAL      UTERINE SUSPENSION  1990       Current Outpatient Medications   Medication Sig    meloxicam (MOBIC) 7.5 MG tablet Take 1 tablet (7.5 mg total) by mouth 2 (two) times daily as needed  for Pain. Take with food    sertraline (ZOLOFT) 100 MG tablet Take 1 tablet (100 mg total) by mouth once daily.    thyroid, pork, (ARMOUR THYROID) 15 mg Tab Take 3 tablets (45 mg total) by mouth before breakfast.    thyroid, pork, (ARMOUR THYROID) 15 mg Tab Take 1 tablet (15 mg total) by mouth before breakfast. Will also take a 30 mg for 45 mg total daily dose.    thyroid, pork, (ARMOUR THYROID) 30 mg Tab Take 1 tablet (30 mg total) by mouth before breakfast. Will also take a 15 mg for 45 mg total daily dose.    ALPRAZolam (XANAX) 0.25 MG tablet Take 1 tablet (0.25 mg total) by mouth 2 (two) times daily as needed for Anxiety (**use sparingly**).     No current facility-administered medications for this visit.       Review of patient's allergies indicates:  No Known Allergies    Family History   Problem Relation Age of Onset    Alzheimer's disease Mother     Hypertension Father     Diabetes Father 80    Kidney cancer Father     Other Father         Placed in NH    Anxiety disorder Brother     No Known Problems Brother        Social History     Socioeconomic History    Marital status:     Number of children: 2   Occupational History    Occupation: Homemaker   Tobacco Use    Smoking status: Never    Smokeless tobacco: Never   Substance and Sexual Activity    Alcohol use: Never    Drug use: Never    Sexual activity: Yes     Partners: Male   Social History Narrative    2 adopted sons.       Review of Systems:    Constitution:   Denies chills, fever, and sweats.  HENT:   Denies headaches or blurry vision.  Cardiovascular:  Denies chest pain or irregular heart beat.  Respiratory:   Denies cough or shortness of breath.  Gastrointestinal:  Denies abdominal pain, nausea, or vomiting.  Musculoskeletal:   Denies muscle cramps.  Neurological:   Denies dizziness or focal weakness.  Psychiatric/Behavior: Normal mental status.  Hematology/Lymph:  Denies bleeding problem or easy bruising/bleeding.  Skin:    Denies rash or  "suspicious lesions.    Examination:    Vital Signs:    Vitals:    03/10/23 1015 03/10/23 1021   BP: 114/67    Pulse: 62    Temp: 97.6 °F (36.4 °C)    SpO2: 96%    Weight: 45.5 kg (100 lb 6.4 oz)    Height: 5' 2" (1.575 m)    PainSc:    8       Body mass index is 18.36 kg/m².    Constitution:   Well-developed, well nourished patient in no acute distress.  Neurological:   Alert and oriented x 3 and cooperative to examination.     Psychiatric/Behavior: Normal mental status.  Respiratory:   No shortness of breath.  Eyes:    Extraoccular muscles intact  Skin:    No scars, rash or suspicious lesions.    MSK:   Shoulder Exam:                   Right        Left  Skin:                                   Normal     Normal  AC joint tenderness:           None         None  Forward Flexion:                170            180  Abduction:                          100           180  External Rotation:               80              80  Internal Rotation:                80             80  Supraspinatus stress test:    +        Neg  Hawkin's Impingement:        +           Neg  Neer Impingement:            Neg           Neg  Apprehension:                   Neg           Neg  Swift's:                             +           Neg  Speed's test:                     Neg            Neg  Strength:  External Rotation:           5/5                5/5  Lift Off/belly press:          5/5                5/5    N-V status:                   Intact             Intact    C-spine: Normal ROM, NT         Assessment: Calcific bursitis of shoulder          Plan:  We will try an injection today and send her to formal therapy. She can follow up as needed.             "

## 2023-04-19 ENCOUNTER — OFFICE VISIT (OUTPATIENT)
Dept: ORTHOPEDICS | Facility: CLINIC | Age: 68
End: 2023-04-19
Payer: MEDICARE

## 2023-04-19 ENCOUNTER — HOSPITAL ENCOUNTER (OUTPATIENT)
Dept: RADIOLOGY | Facility: CLINIC | Age: 68
Discharge: HOME | End: 2023-04-19
Attending: STUDENT IN AN ORGANIZED HEALTH CARE EDUCATION/TRAINING PROGRAM
Payer: MEDICARE

## 2023-04-19 VITALS
HEART RATE: 61 BPM | BODY MASS INDEX: 18.4 KG/M2 | SYSTOLIC BLOOD PRESSURE: 113 MMHG | DIASTOLIC BLOOD PRESSURE: 71 MMHG | HEIGHT: 62 IN | WEIGHT: 100 LBS

## 2023-04-19 DIAGNOSIS — M79.642 LEFT HAND PAIN: ICD-10-CM

## 2023-04-19 DIAGNOSIS — M79.642 LEFT HAND PAIN: Primary | ICD-10-CM

## 2023-04-19 PROCEDURE — 73130 X-RAY EXAM OF HAND: CPT | Mod: LT,,, | Performed by: STUDENT IN AN ORGANIZED HEALTH CARE EDUCATION/TRAINING PROGRAM

## 2023-04-19 PROCEDURE — 99203 OFFICE O/P NEW LOW 30 MIN: CPT | Mod: ,,, | Performed by: STUDENT IN AN ORGANIZED HEALTH CARE EDUCATION/TRAINING PROGRAM

## 2023-04-19 PROCEDURE — 99203 PR OFFICE/OUTPT VISIT, NEW, LEVL III, 30-44 MIN: ICD-10-PCS | Mod: ,,, | Performed by: STUDENT IN AN ORGANIZED HEALTH CARE EDUCATION/TRAINING PROGRAM

## 2023-04-19 PROCEDURE — 73130 XR HAND COMPLETE 3 VIEW LEFT: ICD-10-PCS | Mod: LT,,, | Performed by: STUDENT IN AN ORGANIZED HEALTH CARE EDUCATION/TRAINING PROGRAM

## 2023-04-25 NOTE — PROGRESS NOTES
Chief Complaint:  Left wrist pain    Consulting Physician: No ref. provider found    History of present illness:    This is a 67-year-old female who presents for initial evaluation of her left wrist pain.  She fractured her left wrist July treated non operatively.  The overall healed uneventfully.  She still has some mild achy pain her wrist.  She is right-hand dominant.  He also has left thumb CMC osteoarthritis she overall does not bother her much.  No numbness or tingling into the fingertips.    Past Medical History:   Diagnosis Date    Other calcification of muscle, right shoulder        Past Surgical History:   Procedure Laterality Date    BREAST LUMPECTOMY Left 01/2009    BREAST LUMPECTOMY Right 01/2005    COLONOSCOPY  06/05/2013    Murali Causey MD    LAPAROSCOPIC TOTAL HYSTERECTOMY  08/2011    MASTECTOMY Bilateral 08/2011    MEDIPORT INSERTION, SINGLE      MEDIPORT REMOVAL      UTERINE SUSPENSION  1990       Current Outpatient Medications   Medication Sig    meloxicam (MOBIC) 7.5 MG tablet Take 1 tablet (7.5 mg total) by mouth 2 (two) times daily as needed for Pain. Take with food    sertraline (ZOLOFT) 100 MG tablet Take 1 tablet (100 mg total) by mouth once daily.    thyroid, pork, (ARMOUR THYROID) 15 mg Tab Take 3 tablets (45 mg total) by mouth before breakfast.    thyroid, pork, (ARMOUR THYROID) 15 mg Tab Take 1 tablet (15 mg total) by mouth before breakfast. Will also take a 30 mg for 45 mg total daily dose.    thyroid, pork, (ARMOUR THYROID) 30 mg Tab Take 1 tablet (30 mg total) by mouth before breakfast. Will also take a 15 mg for 45 mg total daily dose.    ALPRAZolam (XANAX) 0.25 MG tablet Take 1 tablet (0.25 mg total) by mouth 2 (two) times daily as needed for Anxiety (**use sparingly**).     No current facility-administered medications for this visit.       Review of patient's allergies indicates:  No Known Allergies    Family History   Problem Relation Age of Onset    Alzheimer's disease Mother   "   Hypertension Father     Diabetes Father 80    Kidney cancer Father     Other Father         Placed in NH    Anxiety disorder Brother     No Known Problems Brother        Social History     Socioeconomic History    Marital status:     Number of children: 2   Occupational History    Occupation: Homemaker   Tobacco Use    Smoking status: Never    Smokeless tobacco: Never   Substance and Sexual Activity    Alcohol use: Never    Drug use: Never    Sexual activity: Yes     Partners: Male   Social History Narrative    2 adopted sons.       Review of Systems:    Constitution:   Denies chills, fever, and sweats.  HENT:   Denies headaches or blurry vision.  Cardiovascular:  Denies chest pain or irregular heart beat.  Respiratory:   Denies cough or shortness of breath.  Gastrointestinal:  Denies abdominal pain, nausea, or vomiting.  Musculoskeletal:   Denies muscle cramps.  Neurological:   Denies dizziness or focal weakness.  Psychiatric/Behavior: Normal mental status.  Hematology/Lymph:  Denies bleeding problem or easy bruising/bleeding.  Skin:    Denies rash or suspicious lesions.    Examination:    Vital Signs:    Vitals:    04/19/23 1415 04/19/23 1435   BP: 113/71    Pulse: 61    Weight: 45.4 kg (100 lb)    Height: 5' 2" (1.575 m)    PainSc:   3 0-No pain       Body mass index is 18.29 kg/m².    Constitution:   Well-developed, well nourished patient in no acute distress.  Neurological:   Alert and oriented x 3 and cooperative to examination.     Psychiatric/Behavior: Normal mental status.  Respiratory:   No shortness of breath.  Eyes:    Extraoccular muscles intact  Skin:    No scars, rash or suspicious lesions.    MSK:   Left wrist: No open wounds or rashes.  No thenar hypothenar atrophy.  She can make a fist and extend her digits fully.  Wrist motion is 65 degrees of extension, 65 degrees of flexion full pronation supination.  There is minimal tenderness around the wrist.  Extension and ulnar deviation does not " cause any pain.  She has no pain on the ulnar side of the wrist or distal radioulnar joint.  Distal radioulnar joint is stable.  Sensation light touch intact in median ulnar radial distribution.  There is no tenderness to palpation at the thumb CMC joint.  Grind test is positive.  The thumb is abducted and the thumb MP joint is lax and hyperextends.  Radial pulse 2 +hand is warm well perfused    Imaging:   X-ray of the Left wrist shows previous distal radius fracture that is healed.  Thumb CMC osteoarthritis.  No evidence of ulnocarpal abutment radiographically     Assessment:  Left wrist pain, Left CMC osteoarthritis    Plan:  Think both these can continue to be treated conservatively.  She can wear a Velcro wrist brace as needed when she has flare-ups otherwise she may continue all activities as tolerated.  For the right CMC osteoarthritis she may also wear brace as needed.  Despite these severe arthritis radiographically this does not affect her clinically and so she may continue activities as tolerated.  If she has issues in the future I am a re-evaluate this and she could be a good candidate for an operation.    Follow Up:  As needed  Xray at next visit: none

## 2023-06-05 ENCOUNTER — APPOINTMENT (OUTPATIENT)
Dept: LAB | Facility: HOSPITAL | Age: 68
End: 2023-06-05
Attending: PHYSICIAN ASSISTANT
Payer: MEDICARE

## 2023-06-05 DIAGNOSIS — R93.5 ABNORMAL ABDOMINAL ULTRASOUND: ICD-10-CM

## 2023-06-05 DIAGNOSIS — R19.00 ABDOMINAL MASS, UNSPECIFIED ABDOMINAL LOCATION: Primary | ICD-10-CM

## 2023-06-05 LAB
BASOPHILS # BLD AUTO: 0.06 X10(3)/MCL
BASOPHILS NFR BLD AUTO: 0.8 %
EOSINOPHIL # BLD AUTO: 0.11 X10(3)/MCL (ref 0–0.9)
EOSINOPHIL NFR BLD AUTO: 1.5 %
ERYTHROCYTE [DISTWIDTH] IN BLOOD BY AUTOMATED COUNT: 13.3 % (ref 11.5–17)
HCT VFR BLD AUTO: 34.9 % (ref 37–47)
HEMATOLOGIST REVIEW: NORMAL
HGB BLD-MCNC: 11.3 G/DL (ref 12–16)
IMM GRANULOCYTES # BLD AUTO: 0.02 X10(3)/MCL (ref 0–0.04)
IMM GRANULOCYTES NFR BLD AUTO: 0.3 %
LYMPHOCYTES # BLD AUTO: 1.21 X10(3)/MCL (ref 0.6–4.6)
LYMPHOCYTES NFR BLD AUTO: 17 %
MCH RBC QN AUTO: 31 PG (ref 27–31)
MCHC RBC AUTO-ENTMCNC: 32.4 G/DL (ref 33–36)
MCV RBC AUTO: 95.6 FL (ref 80–94)
MONOCYTES # BLD AUTO: 0.71 X10(3)/MCL (ref 0.1–1.3)
MONOCYTES NFR BLD AUTO: 10 %
NEUTROPHILS # BLD AUTO: 5.02 X10(3)/MCL (ref 2.1–9.2)
NEUTROPHILS NFR BLD AUTO: 70.4 %
NRBC BLD AUTO-RTO: 0 %
PLATELET # BLD AUTO: 245 X10(3)/MCL (ref 130–400)
PMV BLD AUTO: 10.2 FL (ref 7.4–10.4)
RBC # BLD AUTO: 3.65 X10(6)/MCL (ref 4.2–5.4)
WBC # SPEC AUTO: 7.13 X10(3)/MCL (ref 4.5–11.5)

## 2023-06-05 PROCEDURE — 36415 COLL VENOUS BLD VENIPUNCTURE: CPT

## 2023-06-05 PROCEDURE — 85060 BLOOD SMEAR INTERPRETATION: CPT

## 2023-06-05 PROCEDURE — 85025 COMPLETE CBC W/AUTO DIFF WBC: CPT

## 2023-06-06 ENCOUNTER — LAB VISIT (OUTPATIENT)
Dept: LAB | Facility: HOSPITAL | Age: 68
End: 2023-06-06
Attending: PHYSICIAN ASSISTANT
Payer: MEDICARE

## 2023-06-06 DIAGNOSIS — R19.00 ABDOMINAL MASS, UNSPECIFIED ABDOMINAL LOCATION: ICD-10-CM

## 2023-06-06 DIAGNOSIS — R93.5 ABNORMAL ABDOMINAL ULTRASOUND: Primary | ICD-10-CM

## 2023-06-06 LAB
ALBUMIN SERPL-MCNC: 4.2 G/DL (ref 3.4–4.8)
ALBUMIN/GLOB SERPL: 1.6 RATIO (ref 1.1–2)
ALP SERPL-CCNC: 46 UNIT/L (ref 40–150)
ALT SERPL-CCNC: 15 UNIT/L (ref 0–55)
AMYLASE SERPL-CCNC: 105 UNIT/L (ref 25–125)
AST SERPL-CCNC: 27 UNIT/L (ref 5–34)
BILIRUBIN DIRECT+TOT PNL SERPL-MCNC: 0.4 MG/DL
BUN SERPL-MCNC: 16 MG/DL (ref 9.8–20.1)
CALCIUM SERPL-MCNC: 10 MG/DL (ref 8.4–10.2)
CHLORIDE SERPL-SCNC: 105 MMOL/L (ref 98–107)
CO2 SERPL-SCNC: 30 MMOL/L (ref 23–31)
CREAT SERPL-MCNC: 0.83 MG/DL (ref 0.55–1.02)
CRP SERPL HS-MCNC: 0.33 MG/L
ERYTHROCYTE [SEDIMENTATION RATE] IN BLOOD: 2 MM/HR (ref 0–20)
GFR SERPLBLD CREATININE-BSD FMLA CKD-EPI: >60 MLS/MIN/1.73/M2
GLOBULIN SER-MCNC: 2.7 GM/DL (ref 2.4–3.5)
GLUCOSE SERPL-MCNC: 98 MG/DL (ref 82–115)
LIPASE SERPL-CCNC: 31 U/L
POTASSIUM SERPL-SCNC: 4.4 MMOL/L (ref 3.5–5.1)
PROT SERPL-MCNC: 6.9 GM/DL (ref 5.8–7.6)
SODIUM SERPL-SCNC: 142 MMOL/L (ref 136–145)
T3FREE SERPL-MCNC: 3.07 PG/ML (ref 1.57–3.91)
T4 SERPL-MCNC: 5.74 UG/DL (ref 4.87–11.72)
TSH SERPL-ACNC: 0.77 UIU/ML (ref 0.35–4.94)

## 2023-06-06 PROCEDURE — 84436 ASSAY OF TOTAL THYROXINE: CPT

## 2023-06-06 PROCEDURE — 85652 RBC SED RATE AUTOMATED: CPT

## 2023-06-06 PROCEDURE — 84481 FREE ASSAY (FT-3): CPT

## 2023-06-06 PROCEDURE — 86141 C-REACTIVE PROTEIN HS: CPT

## 2023-06-06 PROCEDURE — 84443 ASSAY THYROID STIM HORMONE: CPT

## 2023-06-06 PROCEDURE — 83690 ASSAY OF LIPASE: CPT

## 2023-06-06 PROCEDURE — 36415 COLL VENOUS BLD VENIPUNCTURE: CPT

## 2023-06-06 PROCEDURE — 80053 COMPREHEN METABOLIC PANEL: CPT

## 2023-06-06 PROCEDURE — 82150 ASSAY OF AMYLASE: CPT

## 2023-06-09 DIAGNOSIS — R19.00 ABDOMINAL MASS: Primary | ICD-10-CM

## 2023-06-15 NOTE — PROGRESS NOTES
Subjective:       Patient ID: Kimberlyn Selby is a 67 y.o. female.    Chief Complaint:  Abdominal mass    Diagnosis:  Stage I R breast cancer 8/11 (T1b N0 M0), 0.7 cm, GI, ER+/WY-, HER-2 negative                     Stage IIA L breast cancer 1/09 (T1c N1 M0), 1.6 cm, GIII, 1+ LN, ER/[place] -, HER-2 negative                     Stage IIIA R breast cancer 1/05 (T2 N2a M0), 1.6 cm, GII, 4+ LN's, ER/[place] +, HER-2 negative                     Post menopausal s/p hysterectomy                     Declined adjuvant XRT & hormonal therapy with initial breast cancer                     + COVID vaccinated     Treatment History  Adjuvant TAC x 6 completed 6/05  Adjuvant TC x 4 completed 5/09 --> XRT L breast    Current Therapy:  Lost to follow-up     Clinical History: WF status post right lumpectomy 1/05 for invasive breast cancer with the above characteristics. She completed 6 cycles of adjuvant TAC but declined radiation. She was placed on Arimidex but discontinued treatment due to significant myalgias. She had similar side effects with Femara and also complained of bone aching due to Tamoxifen and stopped therapy 9/05. Due to her hormone receptor status, she was placed on Evista but again was unable to tolerate therapy. She had an abnormal surveillance mammogram 11/08 showing clustered microcalcifications in the upper outer quadrant of the left breast with an associated 1 cm mass by ultrasound. 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 Patient underwent left lumpectomy and axillary dissection 1/15/09. Final pathology as outlined above. Postop course was complicated by cellulitis. She completed 4 cycles of adjuvant TC 5/09. She eventually consented to adjuvant radiation therapy to the left breast. Surveillance CT PET scan 12/09 showed no abnormal hypermetabolic activity. Mammogram 6/22/11 showed a new suspicious finding in the right breast at the 4:00 position. Ultrasound was suspicious for  malignancy. Ultrasound-guided core biopsies revealed invasive ductal carcinoma strongly ER positive at 85%, LA negative and HER-2 negative. CT PET scan 5/11 showed no abnormal hypermetabolic activity to indicate recurrent or metastatic disease. Although patient did have radiation therapy post lumpectomy on the left side, she never had radiation therapy to the right breast. She elected to undergo bilateral mastectomies and prophylactic right oophorectomy 8/16/11. Final pathology showed a 0.7 cm grade 1 infiltrating ductal carcinoma the right breast. Lamar dissection was not done to her previous surgery. She declined BRCA testing. Adjuvant hormonal therapy was recommended with Aromasin but she declined. She underwent a screening colonoscopy 6/5/13 which showed diverticula but no other abnormal findings; 10 year followup was recommended.      She develops symptoms of GE reflux and constipation that she treated with OTC medications without significant improvement.  She denies significant weight loss.  She was referred for a CT of the abdomen and pelvis by GI 6/2/23 ([facility]) that showed a diffuse abnormal enhancing soft tissue density in the retroperitoneum and mesenteric root with nodular intense enhancement.  Findings suspicious for an infiltrative lymphoproliferative disorder.  Mass encased the aorta without stenosis.  IVC appeared mildly narrow without focal internal filling defect.  There were no additional abnormal findings.  Spleen was unremarkable.  She underwent colonoscopy 6/13/23 showing a single polyp by her report which was removed.  EGD showed mild chronic reflux changes and gastritis.    Interval History  She presents today for a medical oncology evaluation accompanied by her .  She was last seen in my office 8/24/21 for ongoing breast cancer surveillance.  She did not return for her annual visit in 2022.  She denies any fever, chills, night sweats or significant weight loss.  She has  "vague abdominal discomfort slightly to the right of midline and chronic constipation.  She has intermittent GE reflux.    Review of Systems   Constitutional:  Positive for unexpected weight change (Mild weight loss). Negative for appetite change, fatigue and fever.   HENT:  Negative for mouth sores, sore throat and trouble swallowing.    Eyes: Negative.  Negative for visual disturbance.   Respiratory:  Negative for cough and shortness of breath.    Cardiovascular:  Negative for chest pain, palpitations and leg swelling.   Gastrointestinal:  Positive for abdominal pain (mild mid abdominal discomfort) and constipation. Negative for abdominal distention, blood in stool, diarrhea, nausea and vomiting.   Genitourinary:  Negative for dysuria, frequency and urgency.   Musculoskeletal:  Negative for arthralgias and back pain.   Integumentary:  Negative for pallor and rash.   Neurological:  Negative for dizziness, weakness, numbness and headaches.   Hematological:  Negative for adenopathy. Does not bruise/bleed easily.   Psychiatric/Behavioral: Negative.         PMHx:  Bilateral breast cancer, arthritis, hypothyroidism, GERD  PSHx:  Bilateral breast lumpectomies, bilateral mastectomies, hysterectomy/BSO, MediPort insertion and removal, uterine suspension  SH:  Lifetime nonsmoker, no significant alcohol use.  Lives in [place] with her .  FH:  Her father had kidney cancer, son had appendiceal cancer and MGF had liver cancer.    Objective:        /64   Pulse 64   Temp 98.3 °F (36.8 °C)   Resp 15   Ht 5' 2" (1.575 m)   Wt 44.7 kg (98 lb 8 oz)   SpO2 98%   BMI 18.02 kg/m²    Physical Exam  Constitutional:       Comments: Thin white female in NAD   HENT:      Head: Normocephalic.      Mouth/Throat:      Mouth: Mucous membranes are moist.      Pharynx: Oropharynx is clear. No posterior oropharyngeal erythema.   Eyes:      Extraocular Movements: Extraocular movements intact.      Conjunctiva/sclera: " Conjunctivae normal.      Pupils: Pupils are equal, round, and reactive to light.   Cardiovascular:      Rate and Rhythm: Normal rate and regular rhythm.      Heart sounds: No murmur heard.  Pulmonary:      Comments: Lungs clear to auscultation throughout  Chest:      Comments: Well-healed bilateral mastectomy incisions.  No suspicious masses, skin changes or axillary nodes bilaterally.  Abdominal:      General: Bowel sounds are normal. There is no distension.      Palpations: Abdomen is soft.      Tenderness: There is no abdominal tenderness.      Comments: Palpable fullness mid abdomen without definable mass   Musculoskeletal:         General: No swelling or tenderness. Normal range of motion.      Cervical back: Neck supple. No tenderness.   Lymphadenopathy:      Comments: 1 cm posterior cervical node on the left.  No other palpable peripheral lymphadenopathy.   Skin:     General: Skin is warm and dry.      Findings: No rash.   Neurological:      General: No focal deficit present.      Mental Status: She is alert and oriented to person, place, and time.      Cranial Nerves: No cranial nerve deficit.      Motor: No weakness.     ECOG SCORE    0 - Fully active-able to carry on all pre-disease performance without restriction          LABORATORY  Recent Results (from the past 168 hour(s))    COLONOSCOPY    Collection Time: 06/15/23  3:53 PM   Result Value Ref Range    CRC Recommendation External Repeat colonoscopy in 1 year             Assessment:   Soft tissue retroperitoneal mass suspicious for lymphoma  History of bilateral breast cancer s/p bilateral mastectomies      Plan:   CT images were reviewed with the patient and her .  Imaging suspicious for lymphoma versus less likely soft tissue sarcoma or other malignancy.  Consult Surgical Oncology for a diagnostic laparoscopy and biopsy.  Baseline laboratory drawn today including CMP, CBC, LDH and hepatitis panel.  If lymphoma confirmed by biopsy, she will  be referred for a staging CT-PET scan.  She will return for a follow-up visit and treatment planning once a definitive diagnosis has been established.  Treatment plan was reviewed and discussed with the patient and her .  All questions were answered.      JOEL DAVIDSON MD    Other Physicians  Dr. Darrell Causey

## 2023-06-20 RX ORDER — PANTOPRAZOLE SODIUM 40 MG/1
1 TABLET, DELAYED RELEASE ORAL DAILY
COMMUNITY
Start: 2023-06-02 | End: 2023-11-27

## 2023-06-21 ENCOUNTER — OFFICE VISIT (OUTPATIENT)
Dept: HEMATOLOGY/ONCOLOGY | Facility: CLINIC | Age: 68
End: 2023-06-21
Payer: MEDICARE

## 2023-06-21 VITALS
BODY MASS INDEX: 18.13 KG/M2 | TEMPERATURE: 98 F | HEIGHT: 62 IN | SYSTOLIC BLOOD PRESSURE: 111 MMHG | RESPIRATION RATE: 15 BRPM | WEIGHT: 98.5 LBS | HEART RATE: 64 BPM | OXYGEN SATURATION: 98 % | DIASTOLIC BLOOD PRESSURE: 64 MMHG

## 2023-06-21 DIAGNOSIS — Z85.3 PERSONAL HISTORY OF BREAST CANCER: Primary | ICD-10-CM

## 2023-06-21 DIAGNOSIS — R19.00 ABDOMINAL MASS: ICD-10-CM

## 2023-06-21 LAB
ALBUMIN SERPL-MCNC: 4 G/DL (ref 3.4–4.8)
ALBUMIN/GLOB SERPL: 1.5 RATIO (ref 1.1–2)
ALP SERPL-CCNC: 42 UNIT/L (ref 40–150)
ALT SERPL-CCNC: 15 UNIT/L (ref 0–55)
AST SERPL-CCNC: 25 UNIT/L (ref 5–34)
BASOPHILS # BLD AUTO: 0.04 X10(3)/MCL
BASOPHILS NFR BLD AUTO: 0.7 %
BILIRUBIN DIRECT+TOT PNL SERPL-MCNC: 0.3 MG/DL
BUN SERPL-MCNC: 15.2 MG/DL (ref 9.8–20.1)
CALCIUM SERPL-MCNC: 9.9 MG/DL (ref 8.4–10.2)
CHLORIDE SERPL-SCNC: 104 MMOL/L (ref 98–107)
CO2 SERPL-SCNC: 30 MMOL/L (ref 23–31)
CREAT SERPL-MCNC: 0.8 MG/DL (ref 0.55–1.02)
EOSINOPHIL # BLD AUTO: 0.09 X10(3)/MCL (ref 0–0.9)
EOSINOPHIL NFR BLD AUTO: 1.7 %
ERYTHROCYTE [DISTWIDTH] IN BLOOD BY AUTOMATED COUNT: 13 % (ref 11.5–17)
GFR SERPLBLD CREATININE-BSD FMLA CKD-EPI: >60 MLS/MIN/1.73/M2
GLOBULIN SER-MCNC: 2.7 GM/DL (ref 2.4–3.5)
GLUCOSE SERPL-MCNC: 89 MG/DL (ref 82–115)
HCT VFR BLD AUTO: 32 % (ref 37–47)
HGB BLD-MCNC: 10.1 G/DL (ref 12–16)
IMM GRANULOCYTES # BLD AUTO: 0.01 X10(3)/MCL (ref 0–0.04)
IMM GRANULOCYTES NFR BLD AUTO: 0.2 %
LDH SERPL-CCNC: 204 U/L (ref 125–220)
LYMPHOCYTES # BLD AUTO: 1.01 X10(3)/MCL (ref 0.6–4.6)
LYMPHOCYTES NFR BLD AUTO: 18.7 %
MCH RBC QN AUTO: 30.5 PG (ref 27–31)
MCHC RBC AUTO-ENTMCNC: 31.6 G/DL (ref 33–36)
MCV RBC AUTO: 96.7 FL (ref 80–94)
MONOCYTES # BLD AUTO: 0.7 X10(3)/MCL (ref 0.1–1.3)
MONOCYTES NFR BLD AUTO: 13 %
NEUTROPHILS # BLD AUTO: 3.54 X10(3)/MCL (ref 2.1–9.2)
NEUTROPHILS NFR BLD AUTO: 65.7 %
PLATELET # BLD AUTO: 222 X10(3)/MCL (ref 130–400)
PMV BLD AUTO: 8.8 FL (ref 7.4–10.4)
POTASSIUM SERPL-SCNC: 4 MMOL/L (ref 3.5–5.1)
PROT SERPL-MCNC: 6.7 GM/DL (ref 5.8–7.6)
RBC # BLD AUTO: 3.31 X10(6)/MCL (ref 4.2–5.4)
SODIUM SERPL-SCNC: 141 MMOL/L (ref 136–145)
WBC # SPEC AUTO: 5.39 X10(3)/MCL (ref 4.5–11.5)

## 2023-06-21 PROCEDURE — 86704 HEP B CORE ANTIBODY TOTAL: CPT | Performed by: INTERNAL MEDICINE

## 2023-06-21 PROCEDURE — 99215 PR OFFICE/OUTPT VISIT, EST, LEVL V, 40-54 MIN: ICD-10-PCS | Mod: S$PBB,,, | Performed by: INTERNAL MEDICINE

## 2023-06-21 PROCEDURE — 87340 HEPATITIS B SURFACE AG IA: CPT | Performed by: INTERNAL MEDICINE

## 2023-06-21 PROCEDURE — 36415 COLL VENOUS BLD VENIPUNCTURE: CPT | Performed by: INTERNAL MEDICINE

## 2023-06-21 PROCEDURE — 99215 OFFICE O/P EST HI 40 MIN: CPT | Mod: S$PBB,,, | Performed by: INTERNAL MEDICINE

## 2023-06-21 PROCEDURE — 83615 LACTATE (LD) (LDH) ENZYME: CPT | Performed by: INTERNAL MEDICINE

## 2023-06-21 PROCEDURE — 85025 COMPLETE CBC W/AUTO DIFF WBC: CPT | Performed by: INTERNAL MEDICINE

## 2023-06-21 PROCEDURE — 99999 PR PBB SHADOW E&M-EST. PATIENT-LVL V: CPT | Mod: PBBFAC,,, | Performed by: INTERNAL MEDICINE

## 2023-06-21 PROCEDURE — 99215 OFFICE O/P EST HI 40 MIN: CPT | Mod: PBBFAC | Performed by: INTERNAL MEDICINE

## 2023-06-21 PROCEDURE — 99999 PR PBB SHADOW E&M-EST. PATIENT-LVL V: ICD-10-PCS | Mod: PBBFAC,,, | Performed by: INTERNAL MEDICINE

## 2023-06-21 PROCEDURE — 80053 COMPREHEN METABOLIC PANEL: CPT | Performed by: INTERNAL MEDICINE

## 2023-06-21 RX ORDER — ECHINACEA 380 MG
CAPSULE ORAL
COMMUNITY
End: 2024-01-03

## 2023-06-21 RX ORDER — MULTIVITAMIN
1 TABLET ORAL DAILY
COMMUNITY

## 2023-06-21 RX ORDER — AMOXICILLIN 500 MG
CAPSULE ORAL DAILY
COMMUNITY

## 2023-06-21 RX ORDER — NICOTINE POLACRILEX 2 MG
GUM BUCCAL
COMMUNITY

## 2023-06-22 LAB
HBV CORE AB SERPL QL IA: NONREACTIVE
HBV SURFACE AG SERPL QL IA: NONREACTIVE

## 2023-06-26 NOTE — H&P (VIEW-ONLY)
History & Physical    CHIEF COMPLAINT:  RETROPERITONEAL MASS     History of Present Illness:  67 year-old-female referred by Dr. Atkins.  She is followed by him for a history of breast cancer.  Patient presented to Dr. Causey with complaints of periumbilical abdominal pain, unintentional weight loss x 6 months.  CT abd/pel showed diffuse abnormal enhancing soft tissue density within the retroperitoneum and mesenteric root having nodular intense enhancement, diffuse infiltrative lymphoproliferative disorder would be of primary differential consideration; there is encasement of the aorta without stenosis; IVC appears mildly narrowed.  The patient underwent colonoscopy which revealed a polypoid lesion with final pathology revealing poorly differentiated carcinoma favoring breast carcinoma.  I discussed the case with her medical oncologist and we agree that additional tissue sampling is indicated given the unusual nature of the imaging in her case.      Review of patient's allergies indicates:  No Known Allergies    Current Outpatient Medications   Medication Sig Dispense Refill    aloe vera 25 mg Cap Take by mouth.      ALPRAZolam (XANAX) 0.25 MG tablet Take 1 tablet (0.25 mg total) by mouth 2 (two) times daily as needed for Anxiety (**use sparingly**). 30 tablet 1    APPLE CIDER VINEGAR ORAL Take by mouth.      ascorbic acid, vitamin C, (VITAMIN C) 100 MG tablet Take 100 mg by mouth once daily.      biotin 1 mg Cap Take by mouth.      enzymes,digestive (DIGESTIVE ENZYMES ORAL) Take by mouth.      ergocalciferol, vitamin D2, (VITAMIN D ORAL) Take by mouth.      GINGER OIL ORAL Take by mouth.      Lactobac no.41/Bifidobact no.7 (PROBIOTIC-10 ORAL) Take by mouth.      multivitamin (THERAGRAN) per tablet Take 1 tablet by mouth once daily.      omega-3 fatty acids/fish oil (FISH OIL-OMEGA-3 FATTY ACIDS) 300-1,000 mg capsule Take by mouth once daily.      oxybutynin (DITROPAN-XL) 5 MG TR24 Take 1 tablet (5 mg total) by  mouth once daily. 30 tablet 5    pantoprazole (PROTONIX) 40 MG tablet Take 1 tablet by mouth once daily.      sertraline (ZOLOFT) 100 MG tablet Take 1 tablet (100 mg total) by mouth once daily. 90 tablet 2    thyroid, pork, (ARMOUR THYROID) 30 mg Tab Take 1 tablet (30 mg total) by mouth before breakfast. Will also take a 15 mg for 45 mg total daily dose. 90 tablet 2     No current facility-administered medications for this visit.       Past Medical History:   Diagnosis Date    Breast cancer     Other calcification of muscle, right shoulder     Thyroid disease      Past Surgical History:   Procedure Laterality Date    BREAST LUMPECTOMY Left 01/2009    BREAST LUMPECTOMY Right 01/2005    COLONOSCOPY  06/05/2013    Murali Causey MD    HYSTERECTOMY  6/1996    LAPAROSCOPIC TOTAL HYSTERECTOMY  08/2011    MASTECTOMY Bilateral 08/2011    MEDIPORT INSERTION, SINGLE      MEDIPORT REMOVAL      UTERINE SUSPENSION  1990     Family History   Problem Relation Age of Onset    Alzheimer's disease Mother     Hypertension Father     Diabetes Father     Kidney cancer Father     Other Father         Placed in NH    Cancer Father         Kidney cancer    Anxiety disorder Brother     No Known Problems Brother     Cancer Son         Appendix     Social History     Tobacco Use    Smoking status: Never    Smokeless tobacco: Never   Substance Use Topics    Alcohol use: Never    Drug use: Never        Review of Systems:  Review of Systems   Constitutional:  Negative for appetite change, chills, diaphoresis and fever.   HENT:  Negative for congestion, drooling, ear discharge, ear pain and hearing loss.    Eyes:  Negative for discharge.   Respiratory:  Negative for apnea, cough, choking, chest tightness, shortness of breath and stridor.    Cardiovascular:  Negative for chest pain, palpitations and leg swelling.   Endocrine: Negative for cold intolerance and heat intolerance.   Genitourinary:  Negative for difficulty urinating, dyspareunia,  dysuria and hematuria.   Musculoskeletal:  Negative for arthralgias, gait problem and joint swelling.   Skin:  Negative for color change and rash.   Neurological:  Negative for dizziness, tremors, seizures, syncope, facial asymmetry, speech difficulty, light-headedness, numbness and headaches.   Psychiatric/Behavioral:  Negative for agitation and confusion.      OBJECTIVE:     Vital Signs (Most Recent)              Physical Exam:  Physical Exam  Constitutional:       General: She is not in acute distress.     Appearance: Normal appearance. She is not toxic-appearing.   HENT:      Head: Normocephalic and atraumatic.      Right Ear: External ear normal.      Left Ear: External ear normal.      Mouth/Throat:      Mouth: Mucous membranes are moist.   Eyes:      General: No scleral icterus.     Conjunctiva/sclera: Conjunctivae normal.      Pupils: Pupils are equal, round, and reactive to light.   Cardiovascular:      Rate and Rhythm: Normal rate and regular rhythm.      Pulses: Normal pulses.   Pulmonary:      Effort: Pulmonary effort is normal. No respiratory distress.      Breath sounds: Normal breath sounds. No stridor. No wheezing or rhonchi.   Abdominal:      General: Abdomen is flat. There is no distension.      Palpations: Abdomen is soft. There is no mass.      Tenderness: There is no abdominal tenderness. There is no guarding or rebound.      Hernia: No hernia is present.   Musculoskeletal:         General: No swelling or tenderness. Normal range of motion.      Cervical back: Normal range of motion and neck supple.      Right lower leg: No edema.      Left lower leg: No edema.   Skin:     General: Skin is warm.      Capillary Refill: Capillary refill takes less than 2 seconds.      Coloration: Skin is not jaundiced or pale.      Findings: No erythema or rash.   Neurological:      General: No focal deficit present.      Mental Status: She is alert and oriented to person, place, and time.      Gait: Gait normal.    Psychiatric:         Mood and Affect: Mood normal.         Behavior: Behavior normal.         Judgment: Judgment normal.         ASSESSMENT/PLAN:     Retroperitoneal mass/lymphadenopathy suspicious for lymphoproliferative process.  Recent colonoscopy with biopsy of polypoid lesion returning poorly differentiated carcinoma.  Due to diagnostic uncertainty we recommend further tissue sampling.    Diagnostic laparoscopy with biopsy of retroperitoneal mass    The risks and benefits of the procedure were explained in detail using layman terms, including the pros and cons of any implant that may be used, all questions were addressed, the patient gives consent to proceed

## 2023-06-28 ENCOUNTER — OFFICE VISIT (OUTPATIENT)
Dept: SURGICAL ONCOLOGY | Facility: CLINIC | Age: 68
End: 2023-06-28
Payer: MEDICARE

## 2023-06-28 VITALS
HEIGHT: 62 IN | SYSTOLIC BLOOD PRESSURE: 117 MMHG | HEART RATE: 63 BPM | WEIGHT: 98.19 LBS | DIASTOLIC BLOOD PRESSURE: 63 MMHG | BODY MASS INDEX: 18.07 KG/M2

## 2023-06-28 DIAGNOSIS — R19.00 ABDOMINAL MASS: ICD-10-CM

## 2023-06-28 PROCEDURE — 99214 OFFICE O/P EST MOD 30 MIN: CPT | Mod: PBBFAC | Performed by: SURGERY

## 2023-06-28 PROCEDURE — 99999 PR PBB SHADOW E&M-EST. PATIENT-LVL IV: CPT | Mod: PBBFAC,,, | Performed by: SURGERY

## 2023-06-28 PROCEDURE — 99999 PR PBB SHADOW E&M-EST. PATIENT-LVL IV: ICD-10-PCS | Mod: PBBFAC,,, | Performed by: SURGERY

## 2023-06-28 PROCEDURE — 99203 OFFICE O/P NEW LOW 30 MIN: CPT | Mod: S$PBB,,, | Performed by: SURGERY

## 2023-06-28 PROCEDURE — 99203 PR OFFICE/OUTPT VISIT, NEW, LEVL III, 30-44 MIN: ICD-10-PCS | Mod: S$PBB,,, | Performed by: SURGERY

## 2023-06-29 DIAGNOSIS — Z01.818 PREOP TESTING: ICD-10-CM

## 2023-06-29 DIAGNOSIS — R19.00 RETROPERITONEAL MASS: Primary | ICD-10-CM

## 2023-06-29 DIAGNOSIS — R59.1 LYMPHADENOPATHY: ICD-10-CM

## 2023-06-29 DIAGNOSIS — R19.00 ABDOMINAL MASS, UNSPECIFIED ABDOMINAL LOCATION: Primary | ICD-10-CM

## 2023-06-29 RX ORDER — ENOXAPARIN SODIUM 300 MG/3ML
30 INJECTION INTRAVENOUS; SUBCUTANEOUS EVERY 24 HOURS
Status: CANCELLED | OUTPATIENT
Start: 2023-06-29

## 2023-07-05 NOTE — DISCHARGE INSTRUCTIONS
Patient Education    Laparoscopy     What care is needed at home?   Some medications may cause you to have a hard time going to the bathroom. Drink 6 to 8 glasses of water each day. Eat food with a lot of fiber to help keep your stools soft.  Be sure to wash your hands before and after touching your wound or dressing.  You can change your dressings in 24 hours.  You may take a shower in 24 hours.  Be careful lifting anything over 10 pounds.  Ask your doctor when you may go back to your normal activities like work, driving, or sex  Take daily walks unless your doctor tells you not to. This will help you feel better, get rid of gas, and prevent bad side effects from surgery.  You may have pain in your shoulder if you had gas put into your belly area. This should go away in a day or two. Heat on your shoulder may help with the pain. Put a heating pad on your shoulder for no more than 20 minutes at a time. Never go to sleep with a heating pad on as this can cause burns. Tell your doctor if the pain is very bad.  Your belly area may feel bloated. This should go away in a few days.  What follow-up care is needed?   Your doctor may ask you to make visits to the office to check on your progress. Be sure to keep these visits.  If you have stitches or staples, you will need to have them taken out. Your doctor will often want to do this in 1 to 2 weeks.  What lifestyle changes are needed?   You may need to rest for a while. You should not do physical activity that makes your health problem worse. If you run, work out, or play sports, you may not be able to do those things until your health problem gets better.  What problems could happen?   Bleeding  Infection  Damage or injury to other organs, tissues  Doctor has to finish procedure by making a large cut  When do I need to call the doctor?   Signs of infection. These include a fever of 100.4°F (38°C) or higher, chills, cough, more sputum or change in color of sputum, pain  with passing urine, wound that will not heal.  Signs of wound infection. These include swelling, redness, warmth around the wound; too much pain when touched; yellowish, greenish, or bloody discharge; foul smell coming from the cut site; cut site opens up.  Pain:  That does not go away with the drugs you are taking  When having a bowel movement or blood in your stool  In your belly or very bad shoulder pain  In your lower legs  A large amount or worsening of swelling in your belly  A new bulge from the site of a cut  Trouble breathing or coughing up blood  Dizziness

## 2023-07-07 ENCOUNTER — HOSPITAL ENCOUNTER (OUTPATIENT)
Dept: RADIOLOGY | Facility: HOSPITAL | Age: 68
Discharge: HOME | End: 2023-07-07
Attending: SURGERY
Payer: MEDICARE

## 2023-07-07 DIAGNOSIS — Z01.818 PREOP TESTING: ICD-10-CM

## 2023-07-07 DIAGNOSIS — R19.00 RETROPERITONEAL MASS: ICD-10-CM

## 2023-07-07 PROCEDURE — 71045 X-RAY EXAM CHEST 1 VIEW: CPT | Mod: TC

## 2023-07-11 ENCOUNTER — ANESTHESIA EVENT (OUTPATIENT)
Dept: SURGERY | Facility: HOSPITAL | Age: 68
End: 2023-07-11
Payer: MEDICARE

## 2023-07-11 RX ORDER — GABAPENTIN 300 MG/1
600 CAPSULE ORAL
Status: CANCELLED | OUTPATIENT
Start: 2023-07-11 | End: 2023-07-11

## 2023-07-11 RX ORDER — ACETAMINOPHEN 500 MG
1000 TABLET ORAL
Status: CANCELLED | OUTPATIENT
Start: 2023-07-11 | End: 2023-07-11

## 2023-07-11 NOTE — ANESTHESIA PREPROCEDURE EVALUATION
07/11/2023  Kimberlyn Selby is a 67 y.o., female , who presents for the following:    Procedures:        LAPAROSCOPY, DIAGNOSTIC       BIOPSY, RETROPERITONEAL LYMPH NODE, LAPAROSCOPIC (Abdomen)   Anesthesia type: General   Diagnosis:        Retroperitoneal mass [R19.00]       Lymphadenopathy [R59.1]   Pre-op diagnosis:        Retroperitoneal mass [R19.00]       Lymphadenopathy [R59.1]   Location: [facility] OR  / [facility] OR   Surgeons: Get Swan MD     LAB:   06/05 1432 06/06 1052 06/21 1405   HGB 11.3 Low      --     10.1 Low        WBC 7.13     --     5.39       Platelets 245     --     222       NA --     142     141       K+ --     4.4     4.0       Creatinine --     0.83     0.80       Glucose --     98     89          **    Retroperitoneal mass/lymphadenopathy suspicious for lymphoproliferative process.  Recent colonoscopy with biopsy of polypoid lesion returning poorly differentiated carcinoma.  Due to diagnostic uncertainty we recommend further tissue sampling.     Diagnostic laparoscopy with biopsy of retroperitoneal mass    Pre-op Assessment    I have reviewed the Patient Summary Reports.     I have reviewed the Nursing Notes. I have reviewed the NPO Status.   I have reviewed the Medications.     Review of Systems  Anesthesia Hx:  No problems with previous Anesthesia  Denies Family Hx of Anesthesia complications.   Denies Personal Hx of Anesthesia complications.   Social:  Non-Smoker    Hematology/Oncology:        Oncology Comments: Breast CA   Pulmonary:  Pulmonary Normal    Hepatic/GI:   GERD    Endocrine:   Hypothyroidism    Psych:   depression          Physical Exam  General: Alert and Oriented    Airway:  Mallampati: I   Mouth Opening: Normal  TM Distance: Normal  Tongue: Normal  Neck ROM: Normal ROM    Dental:  Intact    Chest/Lungs:  Normal Respiratory Rate    Heart:  Rate: Normal  Rhythm:  Regular Rhythm        Anesthesia Plan  Type of Anesthesia, risks & benefits discussed:    Anesthesia Type: Gen ETT  Intra-op Monitoring Plan: Standard ASA Monitors  Post Op Pain Control Plan: IV/PO Opioids PRN and multimodal analgesia  Induction:  IV  Airway Plan: Direct, Post-Induction  Informed Consent: Informed consent signed with the Patient and all parties understand the risks and agree with anesthesia plan.  All questions answered. Patient consented to blood products? Yes  ASA Score: 2  Day of Surgery Review of History & Physical: H&P Update referred to the surgeon/provider.  Anesthesia Plan Notes: ERAS / Multi-antiemetics    Ready For Surgery From Anesthesia Perspective.     .

## 2023-07-12 ENCOUNTER — ANESTHESIA (OUTPATIENT)
Dept: SURGERY | Facility: HOSPITAL | Age: 68
End: 2023-07-12
Payer: MEDICARE

## 2023-07-12 ENCOUNTER — HOSPITAL ENCOUNTER (OUTPATIENT)
Facility: HOSPITAL | Age: 68
Discharge: HOME | End: 2023-07-12
Attending: SURGERY | Admitting: SURGERY
Payer: MEDICARE

## 2023-07-12 DIAGNOSIS — R19.00 RETROPERITONEAL MASS: ICD-10-CM

## 2023-07-12 DIAGNOSIS — R59.1 LYMPHADENOPATHY: ICD-10-CM

## 2023-07-12 PROCEDURE — D9220A PRA ANESTHESIA: ICD-10-PCS | Mod: ANES,,, | Performed by: ANESTHESIOLOGY

## 2023-07-12 PROCEDURE — 71000033 HC RECOVERY, INTIAL HOUR: Performed by: SURGERY

## 2023-07-12 PROCEDURE — D9220A PRA ANESTHESIA: Mod: CRNA,,, | Performed by: NURSE ANESTHETIST, CERTIFIED REGISTERED

## 2023-07-12 PROCEDURE — 63600175 PHARM REV CODE 636 W HCPCS: Performed by: NURSE ANESTHETIST, CERTIFIED REGISTERED

## 2023-07-12 PROCEDURE — 63600175 PHARM REV CODE 636 W HCPCS

## 2023-07-12 PROCEDURE — 27201423 OPTIME MED/SURG SUP & DEVICES STERILE SUPPLY: Performed by: SURGERY

## 2023-07-12 PROCEDURE — 25000003 PHARM REV CODE 250: Performed by: ANESTHESIOLOGY

## 2023-07-12 PROCEDURE — 36000709 HC OR TIME LEV III EA ADD 15 MIN: Performed by: SURGERY

## 2023-07-12 PROCEDURE — 88361 TUMOR IMMUNOHISTOCHEM/COMPUT: CPT | Mod: 59

## 2023-07-12 PROCEDURE — 36000708 HC OR TIME LEV III 1ST 15 MIN: Performed by: SURGERY

## 2023-07-12 PROCEDURE — 63600175 PHARM REV CODE 636 W HCPCS: Performed by: SURGERY

## 2023-07-12 PROCEDURE — 88342 IMHCHEM/IMCYTCHM 1ST ANTB: CPT

## 2023-07-12 PROCEDURE — 88381 MICRODISSECTION MANUAL: CPT

## 2023-07-12 PROCEDURE — D9220A PRA ANESTHESIA: ICD-10-PCS | Mod: CRNA,,, | Performed by: NURSE ANESTHETIST, CERTIFIED REGISTERED

## 2023-07-12 PROCEDURE — 37000008 HC ANESTHESIA 1ST 15 MINUTES: Performed by: SURGERY

## 2023-07-12 PROCEDURE — 37000009 HC ANESTHESIA EA ADD 15 MINS: Performed by: SURGERY

## 2023-07-12 PROCEDURE — 81455 SO/HL 51/>GSAP DNA/DNA&RNA: CPT

## 2023-07-12 PROCEDURE — 49321 PR LAP,DX SURGICAL ABD W/BIOPSY: ICD-10-PCS | Mod: AS,,, | Performed by: NURSE PRACTITIONER

## 2023-07-12 PROCEDURE — 71000015 HC POSTOP RECOV 1ST HR: Performed by: SURGERY

## 2023-07-12 PROCEDURE — 25000003 PHARM REV CODE 250: Performed by: NURSE ANESTHETIST, CERTIFIED REGISTERED

## 2023-07-12 PROCEDURE — D9220A PRA ANESTHESIA: Mod: ANES,,, | Performed by: ANESTHESIOLOGY

## 2023-07-12 PROCEDURE — 88341 IMHCHEM/IMCYTCHM EA ADD ANTB: CPT | Mod: 59

## 2023-07-12 PROCEDURE — 63600175 PHARM REV CODE 636 W HCPCS: Performed by: ANESTHESIOLOGY

## 2023-07-12 PROCEDURE — 71000016 HC POSTOP RECOV ADDL HR: Performed by: SURGERY

## 2023-07-12 PROCEDURE — 88305 TISSUE EXAM BY PATHOLOGIST: CPT | Performed by: SURGERY

## 2023-07-12 PROCEDURE — 49321 LAPAROSCOPY BIOPSY: CPT | Mod: ,,, | Performed by: SURGERY

## 2023-07-12 PROCEDURE — 88360 TUMOR IMMUNOHISTOCHEM/MANUAL: CPT | Mod: 59

## 2023-07-12 PROCEDURE — 49321 PR LAP,DX SURGICAL ABD W/BIOPSY: ICD-10-PCS | Mod: ,,, | Performed by: SURGERY

## 2023-07-12 PROCEDURE — 49321 LAPAROSCOPY BIOPSY: CPT | Mod: AS,,, | Performed by: NURSE PRACTITIONER

## 2023-07-12 PROCEDURE — 25000003 PHARM REV CODE 250

## 2023-07-12 RX ORDER — MIDAZOLAM HYDROCHLORIDE 1 MG/ML
2 INJECTION INTRAMUSCULAR; INTRAVENOUS ONCE AS NEEDED
Status: COMPLETED | OUTPATIENT
Start: 2023-07-12 | End: 2023-07-12

## 2023-07-12 RX ORDER — SODIUM CHLORIDE, SODIUM LACTATE, POTASSIUM CHLORIDE, CALCIUM CHLORIDE 600; 310; 30; 20 MG/100ML; MG/100ML; MG/100ML; MG/100ML
INJECTION, SOLUTION INTRAVENOUS CONTINUOUS
Status: DISCONTINUED | OUTPATIENT
Start: 2023-07-12 | End: 2023-07-12 | Stop reason: HOSPADM

## 2023-07-12 RX ORDER — LIDOCAINE HYDROCHLORIDE 10 MG/ML
1 INJECTION, SOLUTION EPIDURAL; INFILTRATION; INTRACAUDAL; PERINEURAL ONCE
Status: DISCONTINUED | OUTPATIENT
Start: 2023-07-12 | End: 2023-07-12 | Stop reason: HOSPADM

## 2023-07-12 RX ORDER — LIDOCAINE HYDROCHLORIDE 10 MG/ML
INJECTION, SOLUTION EPIDURAL; INFILTRATION; INTRACAUDAL; PERINEURAL
Status: DISCONTINUED | OUTPATIENT
Start: 2023-07-12 | End: 2023-07-12

## 2023-07-12 RX ORDER — CEFAZOLIN SODIUM 1 G/3ML
1 INJECTION, POWDER, FOR SOLUTION INTRAMUSCULAR; INTRAVENOUS
Status: DISCONTINUED | OUTPATIENT
Start: 2023-07-12 | End: 2023-07-12 | Stop reason: HOSPADM

## 2023-07-12 RX ORDER — CELECOXIB 200 MG/1
200 CAPSULE ORAL
Status: COMPLETED | OUTPATIENT
Start: 2023-07-12 | End: 2023-07-12

## 2023-07-12 RX ORDER — METHOCARBAMOL 100 MG/ML
INJECTION, SOLUTION INTRAMUSCULAR; INTRAVENOUS
Status: DISCONTINUED
Start: 2023-07-12 | End: 2023-07-12 | Stop reason: HOSPADM

## 2023-07-12 RX ORDER — ACETAMINOPHEN 500 MG
TABLET ORAL
Status: COMPLETED
Start: 2023-07-12 | End: 2023-07-12

## 2023-07-12 RX ORDER — ENOXAPARIN SODIUM 100 MG/ML
30 INJECTION SUBCUTANEOUS EVERY 24 HOURS
Status: DISCONTINUED | OUTPATIENT
Start: 2023-07-12 | End: 2023-07-12 | Stop reason: HOSPADM

## 2023-07-12 RX ORDER — GABAPENTIN 300 MG/1
CAPSULE ORAL
Status: COMPLETED
Start: 2023-07-12 | End: 2023-07-12

## 2023-07-12 RX ORDER — CEFAZOLIN SODIUM 1 G/3ML
INJECTION, POWDER, FOR SOLUTION INTRAMUSCULAR; INTRAVENOUS
Status: DISCONTINUED
Start: 2023-07-12 | End: 2023-07-12 | Stop reason: HOSPADM

## 2023-07-12 RX ORDER — ACETAMINOPHEN 325 MG/1
TABLET ORAL
Status: COMPLETED
Start: 2023-07-12 | End: 2023-07-12

## 2023-07-12 RX ORDER — GABAPENTIN 300 MG/1
300 CAPSULE ORAL ONCE
Status: DISCONTINUED | OUTPATIENT
Start: 2023-07-12 | End: 2023-07-12 | Stop reason: HOSPADM

## 2023-07-12 RX ORDER — EPHEDRINE SULFATE 50 MG/ML
INJECTION, SOLUTION INTRAVENOUS
Status: DISCONTINUED | OUTPATIENT
Start: 2023-07-12 | End: 2023-07-12

## 2023-07-12 RX ORDER — BUPIVACAINE HYDROCHLORIDE 2.5 MG/ML
INJECTION, SOLUTION EPIDURAL; INFILTRATION; INTRACAUDAL
Status: DISCONTINUED
Start: 2023-07-12 | End: 2023-07-12 | Stop reason: HOSPADM

## 2023-07-12 RX ORDER — ONDANSETRON 2 MG/ML
4 INJECTION INTRAMUSCULAR; INTRAVENOUS ONCE AS NEEDED
Status: DISCONTINUED | OUTPATIENT
Start: 2023-07-12 | End: 2023-07-12 | Stop reason: HOSPADM

## 2023-07-12 RX ORDER — ONDANSETRON 4 MG/1
4 TABLET, ORALLY DISINTEGRATING ORAL ONCE
COMMUNITY
End: 2024-01-03

## 2023-07-12 RX ORDER — FENTANYL CITRATE 50 UG/ML
INJECTION, SOLUTION INTRAMUSCULAR; INTRAVENOUS
Status: DISCONTINUED | OUTPATIENT
Start: 2023-07-12 | End: 2023-07-12

## 2023-07-12 RX ORDER — METHOCARBAMOL 100 MG/ML
750 INJECTION, SOLUTION INTRAMUSCULAR; INTRAVENOUS ONCE
Status: COMPLETED | OUTPATIENT
Start: 2023-07-12 | End: 2023-07-12

## 2023-07-12 RX ORDER — HYDROMORPHONE HYDROCHLORIDE 2 MG/ML
0.4 INJECTION, SOLUTION INTRAMUSCULAR; INTRAVENOUS; SUBCUTANEOUS EVERY 5 MIN PRN
Status: DISCONTINUED | OUTPATIENT
Start: 2023-07-12 | End: 2023-07-12 | Stop reason: HOSPADM

## 2023-07-12 RX ORDER — ONDANSETRON 2 MG/ML
4 INJECTION INTRAMUSCULAR; INTRAVENOUS
Status: COMPLETED | OUTPATIENT
Start: 2023-07-12 | End: 2023-07-12

## 2023-07-12 RX ORDER — ACETAMINOPHEN 500 MG
500 TABLET ORAL ONCE
Status: DISCONTINUED | OUTPATIENT
Start: 2023-07-12 | End: 2023-07-12 | Stop reason: HOSPADM

## 2023-07-12 RX ORDER — ENOXAPARIN SODIUM 100 MG/ML
INJECTION SUBCUTANEOUS
Status: COMPLETED
Start: 2023-07-12 | End: 2023-07-12

## 2023-07-12 RX ORDER — ACETAMINOPHEN 325 MG/1
325 TABLET ORAL ONCE
Status: COMPLETED | OUTPATIENT
Start: 2023-07-12 | End: 2023-07-12

## 2023-07-12 RX ORDER — BUPIVACAINE HYDROCHLORIDE 2.5 MG/ML
INJECTION, SOLUTION EPIDURAL; INFILTRATION; INTRACAUDAL
Status: DISCONTINUED | OUTPATIENT
Start: 2023-07-12 | End: 2023-07-12 | Stop reason: HOSPADM

## 2023-07-12 RX ORDER — PROPOFOL 10 MG/ML
VIAL (ML) INTRAVENOUS
Status: DISCONTINUED | OUTPATIENT
Start: 2023-07-12 | End: 2023-07-12

## 2023-07-12 RX ORDER — ROCURONIUM BROMIDE 10 MG/ML
INJECTION, SOLUTION INTRAVENOUS
Status: DISCONTINUED | OUTPATIENT
Start: 2023-07-12 | End: 2023-07-12

## 2023-07-12 RX ADMIN — ACETAMINOPHEN 500 MG: 500 TABLET, FILM COATED ORAL at 06:07

## 2023-07-12 RX ADMIN — SODIUM CHLORIDE, POTASSIUM CHLORIDE, SODIUM LACTATE AND CALCIUM CHLORIDE: 600; 310; 30; 20 INJECTION, SOLUTION INTRAVENOUS at 08:07

## 2023-07-12 RX ADMIN — ACETAMINOPHEN 325 MG: 325 TABLET ORAL at 06:07

## 2023-07-12 RX ADMIN — MIDAZOLAM HYDROCHLORIDE 2 MG: 1 INJECTION, SOLUTION INTRAMUSCULAR; INTRAVENOUS at 06:07

## 2023-07-12 RX ADMIN — EPHEDRINE SULFATE 10 MG: 50 INJECTION INTRAVENOUS at 07:07

## 2023-07-12 RX ADMIN — CELECOXIB 200 MG: 200 CAPSULE ORAL at 06:07

## 2023-07-12 RX ADMIN — METHOCARBAMOL 750 MG: 100 INJECTION INTRAMUSCULAR; INTRAVENOUS at 08:07

## 2023-07-12 RX ADMIN — ACETAMINOPHEN 325MG 325 MG: 325 TABLET ORAL at 06:07

## 2023-07-12 RX ADMIN — PROPOFOL 90 MG: 10 INJECTION, EMULSION INTRAVENOUS at 07:07

## 2023-07-12 RX ADMIN — FENTANYL CITRATE 50 MCG: 50 INJECTION, SOLUTION INTRAMUSCULAR; INTRAVENOUS at 07:07

## 2023-07-12 RX ADMIN — SODIUM CHLORIDE, POTASSIUM CHLORIDE, SODIUM LACTATE AND CALCIUM CHLORIDE: 600; 310; 30; 20 INJECTION, SOLUTION INTRAVENOUS at 06:07

## 2023-07-12 RX ADMIN — GABAPENTIN 300 MG: 300 CAPSULE ORAL at 06:07

## 2023-07-12 RX ADMIN — ENOXAPARIN SODIUM 30 MG: 100 INJECTION SUBCUTANEOUS at 06:07

## 2023-07-12 RX ADMIN — ONDANSETRON 4 MG: 2 INJECTION INTRAMUSCULAR; INTRAVENOUS at 06:07

## 2023-07-12 RX ADMIN — ROCURONIUM BROMIDE 40 MG: 50 INJECTION INTRAVENOUS at 07:07

## 2023-07-12 RX ADMIN — SUGAMMADEX 180 MG: 100 INJECTION, SOLUTION INTRAVENOUS at 07:07

## 2023-07-12 RX ADMIN — LIDOCAINE HYDROCHLORIDE 50 MG: 10 INJECTION, SOLUTION EPIDURAL; INFILTRATION; INTRACAUDAL; PERINEURAL at 07:07

## 2023-07-12 RX ADMIN — ENOXAPARIN SODIUM 30 MG: 30 INJECTION SUBCUTANEOUS at 06:07

## 2023-07-12 NOTE — CARE UPDATE
Patient awakened,gale,rejected oral airway, oriented/reassured her, she denied c/o, exchanging well.

## 2023-07-12 NOTE — ANESTHESIA POSTPROCEDURE EVALUATION
Anesthesia Post Evaluation    Patient: Kimberlyn Selby    Procedure(s) Performed: Procedure(s) (LRB):  LAPAROSCOPY, DIAGNOSTIC (N/A)  BIOPSY, RETROPERITONEAL LYMPH NODE, LAPAROSCOPIC (N/A)    Final Anesthesia Type: general      Patient location during evaluation: OPS  Patient participation: Yes- Able to Participate  Level of consciousness: awake and oriented  Post-procedure vital signs: reviewed and stable  Pain management: adequate  Airway patency: patent    PONV status at discharge: nausea (controlled) and vomiting (controlled)  Anesthetic complications: no      Cardiovascular status: stable and hemodynamically stable  Respiratory status: unassisted and spontaneous ventilation  Hydration status: euvolemic  Follow-up not needed.          Vitals Value Taken Time   /64 07/12/23 0940   Temp 36.6 °C (97.9 °F) 07/12/23 0835   Pulse 57 07/12/23 0940   Resp 18 07/12/23 0835   SpO2 97 % 07/12/23 0940         Event Time   Out of Recovery 08:38:00         Pain/Ashley Score: Pain Rating Prior to Med Admin: 0 (7/12/2023  6:33 AM)  Ashley Score: 10 (7/12/2023  8:40 AM)

## 2023-07-12 NOTE — TRANSFER OF CARE
"Anesthesia Transfer of Care Note    Patient: Kimberlyn Selby    Procedure(s) Performed: Procedure(s) (LRB):  LAPAROSCOPY, DIAGNOSTIC (N/A)  BIOPSY, RETROPERITONEAL LYMPH NODE, LAPAROSCOPIC (N/A)    Patient location: PACU    Anesthesia Type: general    Transport from OR: Transported from OR on room air with adequate spontaneous ventilation    Post pain: adequate analgesia    Post assessment: no apparent anesthetic complications    Post vital signs: stable    Level of consciousness: responds to stimulation    Nausea/Vomiting: no nausea/vomiting    Complications: none    Transfer of care protocol was followed      Last vitals:   Visit Vitals  /61 (BP Location: Right arm, Patient Position: Lying)   Pulse (!) 57   Temp 36.6 °C (97.9 °F) (Oral)   Resp 18   Ht 5' 2" (1.575 m)   Wt 44.1 kg (97 lb 3.6 oz)   SpO2 96%   Breastfeeding No   BMI 17.78 kg/m²     "

## 2023-07-12 NOTE — CARE UPDATE
Received patient from the OR, she is asleep, oral airway in place-chin lift maintained, respirations full-regular-deep-clear,hob up 30 degrees.

## 2023-07-12 NOTE — CARE UPDATE
Dr. Rosa rounding and updated on her status, v/s, med given. No new orders given, approved her transfer to post-op room at any time.

## 2023-07-12 NOTE — ANESTHESIA PROCEDURE NOTES
Intubation    Date/Time: 7/12/2023 7:08 AM  Performed by: Jose Manuel Mcgee CRNA  Authorized by: Jesus Chandra MD     Intubation:     Induction:  Intravenous    Intubated:  Postinduction    Mask Ventilation:  Easy mask    Attempts:  1    Attempted By:  CRNA    Method of Intubation:  Direct    Blade:  Victor M 3    Laryngeal View Grade: Grade I - full view of cords      Difficult Airway Encountered?: No      Complications:  None    Airway Device:  Oral endotracheal tube    Airway Device Size:  7.0    Style/Cuff Inflation:  Cuffed (inflated to minimal occlusive pressure)    Tube secured:  22    Secured at:  The lips    Placement Verified By:  Capnometry    Complicating Factors:  None    Findings Post-Intubation:  BS equal bilateral and atraumatic/condition of teeth unchanged

## 2023-07-12 NOTE — OP NOTE
Date of Surgery:  7/12/23    Surgeon:  Get Swan MD    Assistant:  NATHALIE Tilley                                        Pre-op Diagnosis:  History of breast cancer, retroperitoneal mass    Post-op Diagnosis:  Carcinomatosis    Procedure:  Diagnostic laparoscopy with peritoneal biopsy    Anesthesia:  GETA    EBL:  20cc    Specimens:  Peritoneal biopsy    Findings:  Small amount of cloudy ascites and numerous small peritoneal nodules consistent with peritoneal carcinomatosis, representative biopsies obtained.     Procedure in detail:  After informed consent the patient was brought to the operating room and placed in the supine position. General anesthesia was administered. The abdomen was prepped and draped in sterile fashion. After infiltration with local anesthesia a small incision was made in the right abdomen and an optical trocar was used to enter the peritoneal cavity under direct vision. Additional port placed under direct vision. The abdomen was explored and cloudy ascites was identified along with numerous peritoneal nodules consistent with carcinomatosis. Multiple representative biopsies were obtained. Meticulous hemostasis was achieved. Incisions were closed with absorbable suture and sterile dressings were applied. The patient tolerated the procedure well.     Significant surgical tasks conducted by the assistant:  The skilled assistance of the nurse practitioner NATHALIE Tilley was necessary for the successful completion of this case.  She was essential for the proper positioning of the patient, manipulation of instruments, proper exposure, manipulation of tissue, and wound closure        Get Swan MD  Surgical Oncology  Complex General, Gastrointestinal and Hepatobiliary Surgery

## 2023-07-13 VITALS
HEIGHT: 62 IN | HEART RATE: 57 BPM | RESPIRATION RATE: 18 BRPM | OXYGEN SATURATION: 97 % | TEMPERATURE: 98 F | DIASTOLIC BLOOD PRESSURE: 64 MMHG | WEIGHT: 97.25 LBS | BODY MASS INDEX: 17.9 KG/M2 | SYSTOLIC BLOOD PRESSURE: 129 MMHG

## 2023-07-17 ENCOUNTER — TELEPHONE (OUTPATIENT)
Dept: HEMATOLOGY/ONCOLOGY | Facility: CLINIC | Age: 68
End: 2023-07-17
Payer: MEDICARE

## 2023-07-17 NOTE — TELEPHONE ENCOUNTER
Patient called asking what are the next steps after her surgery with Dr. Swan. I advised Dr. Atkins who look over patient path report from the surgery and was advised to schedule patient this thurs AM with him to go over results. Patient advised and scheduled.

## 2023-07-18 ENCOUNTER — OFFICE VISIT (OUTPATIENT)
Dept: SURGICAL ONCOLOGY | Facility: CLINIC | Age: 68
End: 2023-07-18
Payer: MEDICARE

## 2023-07-18 DIAGNOSIS — C50.919 MALIGNANT NEOPLASM OF FEMALE BREAST, UNSPECIFIED ESTROGEN RECEPTOR STATUS, UNSPECIFIED LATERALITY, UNSPECIFIED SITE OF BREAST: Primary | ICD-10-CM

## 2023-07-18 PROCEDURE — 99024 POSTOP FOLLOW-UP VISIT: CPT | Mod: POP,,, | Performed by: NURSE PRACTITIONER

## 2023-07-18 PROCEDURE — 99024 PR POST-OP FOLLOW-UP VISIT: ICD-10-PCS | Mod: POP,,, | Performed by: NURSE PRACTITIONER

## 2023-07-18 NOTE — PROGRESS NOTES
POST OP DIAG LAPAROSCOPY, PERITONEAL BX    Hx BREAST CANCER    PT APPEARS TO BE DOING WELL OVERALL  PATH RETURNED METASTATIC BREAST CA, DR AKHTAR CALLED AND SPOKE WITH PT THIS AM  PT REPORTS FEELS BLOATED, HASN'T HAD A BM IN OVER ONE WEEK  SHE REPORTS TAKING MILD LAXATIVES    ABD SOFT  NO GROSS DISTENTION  INCISIONS HEALING WELL    PT HAS APPT WITH [facility] DR DAVIDSON THIS WEEK    REC TO PT TO USE SUPP AND FLEETS AS NEEDED  SHE WILL CALL WITH ANY PROBLEMS      RTC AS NEEDED

## 2023-07-18 NOTE — PROGRESS NOTES
Subjective:       Patient ID: Kimberlyn Selby is a 67 y.o. female.    Chief Complaint:  Intermittent abdominal bloating and constipation    Diagnosis:  Recurrent metastatic breast cancer with peritoneal carcinomatosis                     Stage IA R breast cancer 8/11 (T1b N0 M0), 0.7 cm, GI, ER+/[place]-, HER-2 negative                     Stage IIA L breast cancer 1/09 (T1c N1 M0), 1.6 cm, GIII, 1+ LN, ER/[place] -, HER-2 negative                     Stage IIIA R breast cancer 1/05 (T2 N2a M0), 1.6 cm, GII, 4+ LN's, ER/[place] +, HER-2 negative                     S/p bilateral mastectomies                     Post menopausal s/p hysterectomy                     Declined adjuvant XRT & hormonal therapy with initial breast cancer                     + COVID vaccinated     Treatment History  Adjuvant TAC x 6 completed 6/05  Adjuvant TC x 4 completed 5/09 --> XRT L breast    Current Therapy:  Treatment planning     Clinical History: WF status post right lumpectomy 1/05 for invasive breast cancer with the above characteristics. She completed 6 cycles of adjuvant TAC but declined radiation. She was placed on Arimidex but discontinued treatment due to significant myalgias. She had similar side effects with Femara and also complained of bone aching due to Tamoxifen and stopped therapy 9/05. Due to her hormone receptor status, she was placed on Evista but again was unable to tolerate therapy. She had an abnormal surveillance mammogram 11/08 showing clustered microcalcifications in the upper outer quadrant of the left breast with an associated 1 cm mass by ultrasound. 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 Patient underwent left lumpectomy and axillary dissection 1/15/09. Final pathology as outlined above. Postop course was complicated by cellulitis. She completed 4 cycles of adjuvant TC 5/09. She eventually consented to adjuvant radiation therapy to the left breast. Surveillance CT PET scan 12/09  showed no abnormal hypermetabolic activity. Mammogram 6/22/11 showed a new suspicious finding in the right breast at the 4:00 position. Ultrasound was suspicious for malignancy. Ultrasound-guided core biopsies revealed invasive ductal carcinoma strongly ER positive at 85%, [place] negative and HER-2 negative. CT PET scan 5/11 showed no abnormal hypermetabolic activity to indicate recurrent or metastatic disease. Although patient did have radiation therapy post lumpectomy on the left side, she never had radiation therapy to the right breast. She elected to undergo bilateral mastectomies and prophylactic right oophorectomy 8/16/11. Final pathology showed a 0.7 cm grade 1 infiltrating ductal carcinoma the right breast. Lamar dissection was not done to her previous surgery. She declined BRCA testing. Adjuvant hormonal therapy was recommended with Aromasin but she declined. She underwent a screening colonoscopy 6/5/13 which showed diverticula but no other abnormal findings; 10 year followup was recommended.      She was seen for a surveillance appointment 8/24/21 and did not return for her annual visit in 2022.    She developed symptoms of GE reflux and constipation that she treated with OTC medications without significant improvement.  She denied significant weight loss.  She was referred for a CT of the abdomen and pelvis by GI 6/2/23 ([facility]) that showed a diffuse abnormal enhancing soft tissue density in the retroperitoneum and mesenteric root with nodular intense enhancement.  Findings suspicious for an infiltrative lymphoproliferative disorder.  Mass encased the aorta without stenosis.  IVC appeared mildly narrowed without focal internal filling defect.  There were no additional abnormal findings.  Spleen was unremarkable.   EGD 6/13/23 showed mild chronic reflux changes and gastritis.  Colonoscopy showed a single polyp which was removed.  Pathology was consistent with metastatic breast cancer.  She underwent  a diagnostic laparoscopy 7/12/23 revealing peritoneal carcinomatosis.  Biopsies were positive for metastatic poorly differentiated adenocarcinoma with signet ring morphology consistent with breast primary (LIZ-3 and CK7 positive; CK20 and CDX2 negative).  ER/[place] and HER2 pending.      Interval History  She returns to the office today for a follow-up visit accompanied by her .  She did well following the diagnostic laparoscopy.  Pathologic findings were reviewed and discussed.  She continues to have intermittent abdominal bloating and constipation.  She did not have a good result with MiraLax and stool softeners.  She is maintaining her weight.       Review of Systems   Constitutional:  Negative for appetite change, fatigue, fever and unexpected weight change.   HENT:  Negative for mouth sores, sore throat and trouble swallowing.    Eyes: Negative.    Respiratory:  Negative for cough and shortness of breath.    Cardiovascular:  Negative for chest pain, palpitations and leg swelling.   Gastrointestinal:  Positive for abdominal pain (mild mid abdominal discomfort) and constipation. Negative for abdominal distention, blood in stool, diarrhea, nausea and vomiting.   Genitourinary:  Negative for dysuria, frequency and urgency.   Musculoskeletal:  Negative for arthralgias and back pain.   Integumentary:  Negative for pallor and rash.   Neurological:  Negative for dizziness, weakness, numbness and headaches.   Hematological:  Negative for adenopathy. Does not bruise/bleed easily.   Psychiatric/Behavioral: Negative.         PMHx:  Bilateral breast cancer, arthritis, hypothyroidism, GERD  PSHx:  Bilateral breast lumpectomies, bilateral mastectomies, hysterectomy/BSO, MediPort insertion and removal, uterine suspension, diagnostic laparoscopy  SH:  Lifetime nonsmoker, no significant alcohol use.  Lives in [place] with her .  FH:  Her father had kidney cancer, son had appendiceal cancer and MGF had liver  "cancer.    Objective:        /64   Pulse 60   Temp 98.1 °F (36.7 °C)   Resp 16   Ht 5' 2" (1.575 m)   Wt 45.7 kg (100 lb 12.8 oz)   SpO2 98%   BMI 18.44 kg/m²    Physical Exam  Constitutional:       Comments: Thin white female in NAD   HENT:      Head: Normocephalic.      Mouth/Throat:      Mouth: Mucous membranes are moist.      Pharynx: Oropharynx is clear. No posterior oropharyngeal erythema.   Eyes:      Extraocular Movements: Extraocular movements intact.      Conjunctiva/sclera: Conjunctivae normal.      Pupils: Pupils are equal, round, and reactive to light.   Cardiovascular:      Rate and Rhythm: Normal rate and regular rhythm.      Heart sounds: No murmur heard.  Pulmonary:      Comments: Lungs clear to auscultation  Chest:      Comments: Well-healed bilateral mastectomy incisions.  No suspicious masses, skin changes or axillary nodes bilaterally.  Abdominal:      General: Bowel sounds are normal. There is no distension.      Palpations: Abdomen is soft.      Tenderness: There is no abdominal tenderness.      Comments: Palpable fullness mid abdomen without definable mass.  Healing laparoscopic incisions.   Musculoskeletal:         General: No swelling or tenderness. Normal range of motion.      Cervical back: Neck supple. No tenderness.   Lymphadenopathy:      Comments: 1 cm posterior cervical node on the left.  No other palpable peripheral lymphadenopathy.   Skin:     General: Skin is warm and dry.      Findings: No rash.   Neurological:      General: No focal deficit present.      Mental Status: She is alert and oriented to person, place, and time.      Cranial Nerves: No cranial nerve deficit.      Motor: No weakness.     ECOG SCORE    1 - Restricted in strenuous activity-ambulatory and able to carry out work of a light nature          LABORATORY  Recent Results (from the past 168 hour(s))   CEA    Collection Time: 07/20/23  9:46 AM   Result Value Ref Range    Carcinoembryonic Antigen 58.87 " (H) 0.00 - 3.00 ng/mL   Creatinine, serum    Collection Time: 07/20/23  9:46 AM   Result Value Ref Range    Creatinine 0.86 0.55 - 1.02 mg/dL    eGFR >60 mls/min/1.73/m2        Assessment:   Recurrent metastatic breast cancer with peritoneal carcinomatosis - ER/[place], HER2 pending  History of bilateral breast cancer s/p bilateral mastectomies      Plan:   Pathology from diagnostic laparoscopy confirmed metastatic breast cancer.  ER/[place] and HER2 status pending to help guide treatment recommendations.  She will be referred for a CT-PET scan and MRI of the brain for complete staging.  CEA level is elevated.  CA 27-29 level pending.  RTC in 2 weeks for follow-up and additional treatment planning.      JOEL DAVIDSON MD    Other Physicians  Dr. Darrell Swan

## 2023-07-19 NOTE — DISCHARGE SUMMARY
[facility] Surgical - Periop Services  Discharge Note  Short Stay    Procedure(s) (LRB):  LAPAROSCOPY, DIAGNOSTIC (N/A)  BIOPSY, RETROPERITONEAL LYMPH NODE, LAPAROSCOPIC (N/A)      OUTCOME: Patient tolerated treatment/procedure well without complication and is now ready for discharge.    DISPOSITION: Home or Self Care    FINAL DIAGNOSIS:  <principal problem not specified>    FOLLOWUP: In clinic    DISCHARGE INSTRUCTIONS:  No discharge procedures on file.     TIME SPENT ON DISCHARGE:  minutes

## 2023-07-20 ENCOUNTER — OFFICE VISIT (OUTPATIENT)
Dept: HEMATOLOGY/ONCOLOGY | Facility: CLINIC | Age: 68
End: 2023-07-20
Payer: MEDICARE

## 2023-07-20 VITALS
BODY MASS INDEX: 18.55 KG/M2 | OXYGEN SATURATION: 98 % | WEIGHT: 100.81 LBS | RESPIRATION RATE: 16 BRPM | DIASTOLIC BLOOD PRESSURE: 64 MMHG | SYSTOLIC BLOOD PRESSURE: 104 MMHG | HEIGHT: 62 IN | HEART RATE: 60 BPM | TEMPERATURE: 98 F

## 2023-07-20 DIAGNOSIS — C78.6 SECONDARY MALIGNANT NEOPLASM OF PERITONEUM: ICD-10-CM

## 2023-07-20 DIAGNOSIS — K59.00 CONSTIPATION, UNSPECIFIED CONSTIPATION TYPE: ICD-10-CM

## 2023-07-20 DIAGNOSIS — Z17.0 MALIGNANT NEOPLASM OF LOWER-INNER QUADRANT OF RIGHT BREAST OF FEMALE, ESTROGEN RECEPTOR POSITIVE: Primary | ICD-10-CM

## 2023-07-20 DIAGNOSIS — C50.311 MALIGNANT NEOPLASM OF LOWER-INNER QUADRANT OF RIGHT BREAST OF FEMALE, ESTROGEN RECEPTOR POSITIVE: Primary | ICD-10-CM

## 2023-07-20 LAB
CEA SERPL-MCNC: 58.87 NG/ML (ref 0–3)
CREAT SERPL-MCNC: 0.86 MG/DL (ref 0.55–1.02)
GFR SERPLBLD CREATININE-BSD FMLA CKD-EPI: >60 MLS/MIN/1.73/M2

## 2023-07-20 PROCEDURE — 99999 PR PBB SHADOW E&M-EST. PATIENT-LVL III: CPT | Mod: PBBFAC,,, | Performed by: INTERNAL MEDICINE

## 2023-07-20 PROCEDURE — 99215 PR OFFICE/OUTPT VISIT, EST, LEVL V, 40-54 MIN: ICD-10-PCS | Mod: S$PBB,,, | Performed by: INTERNAL MEDICINE

## 2023-07-20 PROCEDURE — 99213 OFFICE O/P EST LOW 20 MIN: CPT | Mod: PBBFAC | Performed by: INTERNAL MEDICINE

## 2023-07-20 PROCEDURE — 99999 PR PBB SHADOW E&M-EST. PATIENT-LVL III: ICD-10-PCS | Mod: PBBFAC,,, | Performed by: INTERNAL MEDICINE

## 2023-07-20 PROCEDURE — 82565 ASSAY OF CREATININE: CPT | Performed by: INTERNAL MEDICINE

## 2023-07-20 PROCEDURE — 82378 CARCINOEMBRYONIC ANTIGEN: CPT | Performed by: INTERNAL MEDICINE

## 2023-07-20 PROCEDURE — 86300 IMMUNOASSAY TUMOR CA 15-3: CPT | Performed by: INTERNAL MEDICINE

## 2023-07-20 PROCEDURE — 36415 COLL VENOUS BLD VENIPUNCTURE: CPT | Performed by: INTERNAL MEDICINE

## 2023-07-20 PROCEDURE — 99215 OFFICE O/P EST HI 40 MIN: CPT | Mod: S$PBB,,, | Performed by: INTERNAL MEDICINE

## 2023-07-20 RX ORDER — LACTULOSE 10 G/15ML
20 SOLUTION ORAL DAILY
Qty: 900 ML | Refills: 0 | Status: SHIPPED | OUTPATIENT
Start: 2023-07-20 | End: 2023-08-19

## 2023-07-25 LAB — CANCER AG27-29 SERPL-ACNC: 60.3 U/ML

## 2023-07-28 ENCOUNTER — HOSPITAL ENCOUNTER (OUTPATIENT)
Dept: RADIOLOGY | Facility: HOSPITAL | Age: 68
Discharge: HOME | End: 2023-07-28
Attending: INTERNAL MEDICINE
Payer: MEDICARE

## 2023-07-28 DIAGNOSIS — C78.6 SECONDARY MALIGNANT NEOPLASM OF PERITONEUM: ICD-10-CM

## 2023-07-28 DIAGNOSIS — Z17.0 MALIGNANT NEOPLASM OF LOWER-INNER QUADRANT OF RIGHT BREAST OF FEMALE, ESTROGEN RECEPTOR POSITIVE: ICD-10-CM

## 2023-07-28 DIAGNOSIS — C50.311 MALIGNANT NEOPLASM OF LOWER-INNER QUADRANT OF RIGHT BREAST OF FEMALE, ESTROGEN RECEPTOR POSITIVE: ICD-10-CM

## 2023-07-28 PROCEDURE — A9552 F18 FDG: HCPCS

## 2023-08-02 ENCOUNTER — APPOINTMENT (OUTPATIENT)
Dept: RADIOLOGY | Facility: HOSPITAL | Age: 68
End: 2023-08-02
Attending: INTERNAL MEDICINE
Payer: MEDICARE

## 2023-08-02 DIAGNOSIS — C50.311 MALIGNANT NEOPLASM OF LOWER-INNER QUADRANT OF RIGHT BREAST OF FEMALE, ESTROGEN RECEPTOR POSITIVE: ICD-10-CM

## 2023-08-02 DIAGNOSIS — Z17.0 MALIGNANT NEOPLASM OF LOWER-INNER QUADRANT OF RIGHT BREAST OF FEMALE, ESTROGEN RECEPTOR POSITIVE: ICD-10-CM

## 2023-08-02 DIAGNOSIS — C78.6 SECONDARY MALIGNANT NEOPLASM OF PERITONEUM: ICD-10-CM

## 2023-08-02 PROCEDURE — 25500020 PHARM REV CODE 255: Performed by: INTERNAL MEDICINE

## 2023-08-02 PROCEDURE — A9577 INJ MULTIHANCE: HCPCS | Performed by: INTERNAL MEDICINE

## 2023-08-02 PROCEDURE — 70553 MRI BRAIN STEM W/O & W/DYE: CPT | Mod: TC

## 2023-08-02 RX ADMIN — GADOBENATE DIMEGLUMINE 10 ML: 529 INJECTION, SOLUTION INTRAVENOUS at 04:08

## 2023-08-02 NOTE — PROGRESS NOTES
Subjective:       Patient ID: Kimberlyn Selby is a 67 y.o. female.    Chief Complaint:  Constipation    Diagnosis:  Recurrent metastatic breast cancer with peritoneal carcinomatosis -ER+ 4%, CT 0, HER-2 neg (IHC 0)                     Stage IA R breast cancer 8/11 (T1b N0 M0), 0.7 cm, GI, ER+/[place]-, HER-2 negative                     Stage IIA L breast cancer 1/09 (T1c N1 M0), 1.6 cm, GIII, 1+ LN, ER/CT -, HER-2 negative                     Stage IIIA R breast cancer 1/05 (T2 N2a M0), 1.6 cm, GII, 4+ LN's, ER/CT +, HER-2 negative                     S/p bilateral mastectomies                     Post menopausal s/p hysterectomy                     Declined adjuvant XRT & hormonal therapy with initial breast cancer                     + COVID vaccinated     Treatment History  Adjuvant TAC x 6 completed 6/05  Adjuvant TC x 4 completed 5/09 --> XRT L breast    Current Therapy:  Treatment planning     Clinical History: WF status post right lumpectomy 1/05 for invasive breast cancer with the above characteristics. She completed 6 cycles of adjuvant TAC but declined radiation. She was placed on Arimidex but discontinued treatment due to significant myalgias. She had similar side effects with Femara and also complained of bone aching due to Tamoxifen and stopped therapy 9/05. Due to her hormone receptor status, she was placed on Evista but again was unable to tolerate therapy. She had an abnormal surveillance mammogram 11/08 showing clustered microcalcifications in the upper outer quadrant of the left breast with an associated 1 cm mass by ultrasound. 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 Patient underwent left lumpectomy and axillary dissection 1/15/09. Final pathology as outlined above. Postop course was complicated by cellulitis. She completed 4 cycles of adjuvant TC 5/09. She eventually consented to adjuvant radiation therapy to the left breast. Surveillance CT PET scan 12/09  showed no abnormal hypermetabolic activity. Mammogram 6/22/11 showed a new suspicious finding in the right breast at the 4:00 position. Ultrasound was suspicious for malignancy. Ultrasound-guided core biopsies revealed invasive ductal carcinoma strongly ER positive at 85%, [place] negative and HER-2 negative. CT PET scan 5/11 showed no abnormal hypermetabolic activity to indicate recurrent or metastatic disease. Although patient did have radiation therapy post lumpectomy on the left side, she never had radiation therapy to the right breast. She elected to undergo bilateral mastectomies and prophylactic right oophorectomy 8/16/11. Final pathology showed a 0.7 cm grade 1 infiltrating ductal carcinoma the right breast. Lamar dissection was not done to her previous surgery. She declined BRCA testing. Adjuvant hormonal therapy was recommended with Aromasin but she declined. She underwent a screening colonoscopy 6/5/13 which showed diverticula but no other abnormal findings; 10 year followup was recommended.      She was seen for a surveillance appointment 8/24/21 and did not return for her annual visit in 2022.    She developed symptoms of GE reflux and constipation that she treated with OTC medications without significant improvement.  She denied significant weight loss.  She was referred for a CT of the abdomen and pelvis by GI 6/2/23 ([facility]) that showed a diffuse abnormal enhancing soft tissue density in the retroperitoneum and mesenteric root with nodular intense enhancement.  Findings suspicious for an infiltrative lymphoproliferative disorder.  Mass encased the aorta without stenosis.  IVC appeared mildly narrowed without focal internal filling defect.  There were no additional abnormal findings.  Spleen was unremarkable.   EGD 6/13/23 showed mild chronic reflux changes and gastritis.  Colonoscopy showed a single polyp which was removed.  Pathology was consistent with metastatic breast cancer.  She underwent  a diagnostic laparoscopy 7/12/23 revealing peritoneal carcinomatosis.  Biopsies were positive for metastatic poorly differentiated adenocarcinoma with signet ring morphology consistent with breast primary (LIZ-3 and CK7 positive; CK20 and CDX2 negative).  ER was low positive 4%, [place] negative and HER2 negative (IHC 0).    CT-PET 7/28/23:  Poorly delineated bulky abdominal lymphadenopathy with low-grade FDG uptake, SUV 2.4.  Small volume ascites.  No definite metastatic disease outside of the abdomen.  MRI Brain 8/2/23:  No evidence of metastatic disease.  Mild chronic microvascular ischemia and atrophy.      Interval History  She returns to clinic today for a follow-up visit accompanied by her .  She has ongoing intermittent abdominal bloating and constipation.  She is taking lactulose and MiraLax every day.  She used an enema on 1 occasion.  She has intermittent nausea and rare episodes of emesis.  She has not had significant weight loss.  Her imaging studies were reviewed and discussed.      Review of Systems   Constitutional:  Negative for appetite change, fatigue, fever and unexpected weight change.   HENT:  Negative for mouth sores, sore throat and trouble swallowing.    Eyes: Negative.    Respiratory:  Negative for cough and shortness of breath.    Cardiovascular:  Negative for chest pain, palpitations and leg swelling.   Gastrointestinal:  Positive for abdominal pain (mild mid abdominal discomfort) and constipation. Negative for abdominal distention, blood in stool, diarrhea, nausea and vomiting.   Genitourinary:  Negative for dysuria, frequency and urgency.   Musculoskeletal:  Negative for arthralgias and back pain.   Integumentary:  Negative for pallor and rash.   Neurological:  Negative for dizziness, weakness, numbness and headaches.   Hematological:  Negative for adenopathy. Does not bruise/bleed easily.   Psychiatric/Behavioral: Negative.         PMHx:  Bilateral breast cancer, arthritis,  "hypothyroidism, GERD  PSHx:  Bilateral breast lumpectomies, bilateral mastectomies, hysterectomy/BSO, MediPort insertion and removal, uterine suspension, diagnostic laparoscopy  SH:  Lifetime nonsmoker, no significant alcohol use.  Lives in [place] with her .  FH:  Her father had kidney cancer, son had appendiceal cancer and MGF had liver cancer.    Objective:        /68   Pulse 62   Temp 99.2 °F (37.3 °C)   Resp 15   Ht 5' 2" (1.575 m)   Wt 45.1 kg (99 lb 8 oz)   SpO2 98%   BMI 18.20 kg/m²    Physical Exam  Constitutional:       Comments: Thin white female in NAD   HENT:      Head: Normocephalic.      Mouth/Throat:      Mouth: Mucous membranes are moist.      Pharynx: Oropharynx is clear. No posterior oropharyngeal erythema.   Eyes:      Extraocular Movements: Extraocular movements intact.      Conjunctiva/sclera: Conjunctivae normal.      Pupils: Pupils are equal, round, and reactive to light.   Cardiovascular:      Rate and Rhythm: Normal rate and regular rhythm.      Heart sounds: No murmur heard.  Pulmonary:      Comments: Lungs clear to auscultation  Chest:      Comments: Well-healed bilateral mastectomy incisions.  No suspicious masses, skin changes or axillary nodes bilaterally.  Abdominal:      General: Bowel sounds are normal. There is no distension.      Palpations: Abdomen is soft.      Tenderness: There is no abdominal tenderness.      Comments: Palpable fullness mid abdomen without definable mass.  Well-healed laparoscopic incisions.   Musculoskeletal:         General: No swelling or tenderness. Normal range of motion.      Cervical back: Neck supple. No tenderness.   Lymphadenopathy:      Comments: 1 cm posterior cervical node on the left.  No other palpable peripheral lymphadenopathy.   Skin:     General: Skin is warm and dry.      Findings: No rash.   Neurological:      General: No focal deficit present.      Mental Status: She is alert and oriented to person, place, and time. "      Cranial Nerves: No cranial nerve deficit.      Motor: No weakness.       ECOG SCORE    1 - Restricted in strenuous activity-ambulatory and able to carry out work of a light nature          LABORATORY  No results found for this or any previous visit (from the past 168 hour(s)).       Assessment:   Recurrent metastatic breast cancer with peritoneal carcinomatosis - ER+ 4%, [place] neg, Her-2 neg (IHC 0)  History of bilateral breast cancer s/p bilateral mastectomies      Plan:   CT-PET scan and MRI of the brain showed no other sites of measurable metastatic disease.  SUV uptake on PET was very low suggesting slow growing, gradually progressive disease.  This is also supported by her extremely long disease-free interval.  ER expression was only 4%.  Therefore, I do not feel she is a good candidate for hormonal therapy with an AI + CDK4,6 inhibitor.  Treatment was recommended with oral capecitabine 1000 mg b.i.d. on days 1 through 14 every 21 days.  Benefits, risks, toxicities and side effects of capecitabine were discussed and reviewed in detail. All questions were answered. Literature regarding the treatment plan and specific drug information was also provided.  She will start treatment 8/8/23.  Her pathology will be submitted for full next generation sequencing and PDL1 analysis to help guide future treatment decisions.  RTC in 3 weeks for a follow-up visit and clinical exam with repeat laboratory.      JOEL DAVIDSON MD    Other Physicians  Dr. Darrell Swan

## 2023-08-07 ENCOUNTER — OFFICE VISIT (OUTPATIENT)
Dept: HEMATOLOGY/ONCOLOGY | Facility: CLINIC | Age: 68
End: 2023-08-07
Payer: MEDICARE

## 2023-08-07 ENCOUNTER — TELEPHONE (OUTPATIENT)
Dept: PHARMACY | Facility: CLINIC | Age: 68
End: 2023-08-07
Payer: MEDICARE

## 2023-08-07 VITALS
DIASTOLIC BLOOD PRESSURE: 68 MMHG | RESPIRATION RATE: 15 BRPM | WEIGHT: 99.5 LBS | SYSTOLIC BLOOD PRESSURE: 120 MMHG | HEIGHT: 62 IN | BODY MASS INDEX: 18.31 KG/M2 | OXYGEN SATURATION: 98 % | HEART RATE: 62 BPM | TEMPERATURE: 99 F

## 2023-08-07 DIAGNOSIS — C50.311 MALIGNANT NEOPLASM OF LOWER-INNER QUADRANT OF RIGHT BREAST OF FEMALE, ESTROGEN RECEPTOR POSITIVE: Primary | ICD-10-CM

## 2023-08-07 DIAGNOSIS — Z17.0 MALIGNANT NEOPLASM OF LOWER-INNER QUADRANT OF RIGHT BREAST OF FEMALE, ESTROGEN RECEPTOR POSITIVE: Primary | ICD-10-CM

## 2023-08-07 DIAGNOSIS — C78.6 SECONDARY MALIGNANT NEOPLASM OF PERITONEUM: ICD-10-CM

## 2023-08-07 PROCEDURE — 99999 PR PBB SHADOW E&M-EST. PATIENT-LVL IV: CPT | Mod: PBBFAC,,, | Performed by: INTERNAL MEDICINE

## 2023-08-07 PROCEDURE — 99215 PR OFFICE/OUTPT VISIT, EST, LEVL V, 40-54 MIN: ICD-10-PCS | Mod: S$PBB,,, | Performed by: INTERNAL MEDICINE

## 2023-08-07 PROCEDURE — 99215 OFFICE O/P EST HI 40 MIN: CPT | Mod: S$PBB,,, | Performed by: INTERNAL MEDICINE

## 2023-08-07 PROCEDURE — 99999 PR PBB SHADOW E&M-EST. PATIENT-LVL IV: ICD-10-PCS | Mod: PBBFAC,,, | Performed by: INTERNAL MEDICINE

## 2023-08-07 PROCEDURE — 99214 OFFICE O/P EST MOD 30 MIN: CPT | Mod: PBBFAC | Performed by: INTERNAL MEDICINE

## 2023-08-07 RX ORDER — CAPECITABINE 500 MG/1
1500 TABLET, FILM COATED ORAL 2 TIMES DAILY
Qty: 84 TABLET | Refills: 5 | Status: ACTIVE | OUTPATIENT
Start: 2023-08-07 | End: 2023-09-14 | Stop reason: SDUPTHER

## 2023-08-07 NOTE — TELEPHONE ENCOUNTER
Cailin, this is Atiya Dong, clinical pharmacist with [facility] that is part of your care team.  We have begun working on your prescription that your doctor has sent us. Our next steps include:     Working with your insurance company to obtain approval for your medication  Working with you to ensure your medication is affordable     We will be calling you along the way with updates on your medication but if you have any concerns or receive information that you would like to discuss please reach us at [phone].    Welcome call outcome: Patient/caregiver reached

## 2023-08-08 DIAGNOSIS — Z17.0 MALIGNANT NEOPLASM OF LOWER-INNER QUADRANT OF RIGHT BREAST OF FEMALE, ESTROGEN RECEPTOR POSITIVE: Primary | ICD-10-CM

## 2023-08-08 DIAGNOSIS — K59.00 CONSTIPATION, UNSPECIFIED CONSTIPATION TYPE: ICD-10-CM

## 2023-08-08 DIAGNOSIS — C50.311 MALIGNANT NEOPLASM OF LOWER-INNER QUADRANT OF RIGHT BREAST OF FEMALE, ESTROGEN RECEPTOR POSITIVE: Primary | ICD-10-CM

## 2023-08-08 DIAGNOSIS — C78.6 SECONDARY MALIGNANT NEOPLASM OF PERITONEUM: ICD-10-CM

## 2023-08-09 ENCOUNTER — SPECIALTY PHARMACY (OUTPATIENT)
Dept: PHARMACY | Facility: CLINIC | Age: 68
End: 2023-08-09
Payer: MEDICARE

## 2023-08-09 DIAGNOSIS — C50.419 MALIGNANT NEOPLASM OF UPPER-OUTER QUADRANT OF FEMALE BREAST, UNSPECIFIED ESTROGEN RECEPTOR STATUS, UNSPECIFIED LATERALITY: Primary | ICD-10-CM

## 2023-08-09 NOTE — TELEPHONE ENCOUNTER
Specialty Pharmacy - Initial Clinical Assessment    Specialty Medication Orders Linked to Encounter      Flowsheet Row Most Recent Value   Medication #1 capecitabine (XELODA) 500 MG Tab (Order#[id number], Rx#[id number])          Patient Diagnosis   C50.419 - Malignant neoplasm of upper-outer quadrant of female breast    Subjective    Kimberlyn Selby is a 67 y.o. female, who is followed by the specialty pharmacy service for management and education.    Recent Encounters       Date Type Provider Description    08/09/2023 Specialty Pharmacy Atiya Dong PharmD Initial Clinical Assessment    08/09/2023 Specialty Pharmacy Atiya Dong, Piotr Referral Authorization            Current Outpatient Medications   Medication Sig    aloe vera 25 mg Cap Take by mouth.    ALPRAZolam (XANAX) 0.25 MG tablet Take 1 tablet (0.25 mg total) by mouth 2 (two) times daily as needed for Anxiety (**use sparingly**).    APPLE CIDER VINEGAR ORAL Take by mouth.    ascorbic acid, vitamin C, (VITAMIN C) 100 MG tablet Take 100 mg by mouth once daily.    biotin 1 mg Cap Take by mouth.    capecitabine (XELODA) 500 MG Tab Take 3 tablets (1,500 mg total) by mouth 2 (two) times daily for 14 days every 21 days. Take within 30 minutes after a meal.    enzymes,digestive (DIGESTIVE ENZYMES ORAL) Take by mouth.    ergocalciferol, vitamin D2, (VITAMIN D ORAL) Take by mouth.    GINGER OIL ORAL Take by mouth.    Lactobac no.41/Bifidobact no.7 (PROBIOTIC-10 ORAL) Take by mouth.    lactulose (CHRONULAC) 20 gram/30 mL Soln Take 30 mLs (20 g total) by mouth once daily.    multivitamin (THERAGRAN) per tablet Take 1 tablet by mouth once daily.    omega-3 fatty acids/fish oil (FISH OIL-OMEGA-3 FATTY ACIDS) 300-1,000 mg capsule Take by mouth once daily.    ondansetron (ZOFRAN-ODT) 4 MG TbDL Take 4 mg by mouth once.    oxybutynin (DITROPAN-XL) 5 MG TR24 Take 1 tablet (5 mg total) by mouth once daily.    pantoprazole (PROTONIX) 40 MG tablet Take 1 tablet by mouth  once daily.    sertraline (ZOLOFT) 100 MG tablet Take 1 tablet (100 mg total) by mouth once daily.    thyroid, pork, (ARMOUR THYROID) 30 mg Tab Take 1 tablet (30 mg total) by mouth before breakfast. Will also take a 15 mg for 45 mg total daily dose.   Last reviewed on 8/9/2023 10:37 AM by Atiya Dong, Piotr    Review of patient's allergies indicates:  No Known AllergiesLast reviewed on  8/9/2023 10:37 AM by Atiya Dong    Drug Interactions    Clinically relevant drug interactions identified: yes   Interactions list: Cat C: capecitabine and pantoprazole - Inhibitors of the Proton Pump (PPIs and PCABs) may diminish the therapeutic effect of Capecitabine     Drug management plan: Cat C: capecitabine and pantoprazole - Consider the need for a proton pump inhibitor (PPI) or potassium-competitive acid blocker (PCAB) in patients receiving capecitabine. If combined, monitor closely for any evidence of reduced capecitabine effectiveness with use of this combination   Provided the patient with educational material regarding drug interactions: yes               Adverse Effects          Appetite change: Pos  Abdominal pain: Pos  Constipation: Pos  Nausea: Pos  Vomiting: Pos  Skin: System reviewed and is negative  Eyes: System reviewed and is negative  Endocrine and Metabolic: System reviewed and is negative  Genitourinary: System reviewed and is negative  Cardiovascular: System reviewed and is negative  Hematologic: System reviewed and is negative  Musculoskeletal: System reviewed and is negative  HENT: System reviewed and is negative  Neurological: System reviewed and is negative  Psychiatric: System reviewed and is negative  Respiratory: System reviewed and is negative       Assessment Questions - Documented Responses      Flowsheet Row Most Recent Value   Assessment    Medication Reconciliation completed for patient Yes   During the past 4 weeks, has patient missed any activities due to condition or medication? No    During the past 4 weeks, did patient have any of the following urgent care visits? None   Goals of Therapy Status Discussed (new start)   Status of the patients ability to self-administer: Is Able   All education points have been covered with patient? Yes, supplemental printed education provided   Welcome packet contents reviewed and discussed with patient? Yes   Assesment completed? Yes   Plan Therapy being initiated   Do you need to open a clinical intervention (i-vent)? No   Do you want to schedule first shipment? Yes   Medication #1 Assessment Info    Patient status New medication, New to [facility]   Is this medication appropriate for the patient? Yes   Is this medication effective? Not yet started          Refill Questions - Documented Responses      Flowsheet Row Most Recent Value   Patient Availability and HIPAA Verification    Does patient want to proceed with activity? Yes   HIPAA/medical authority confirmed? Yes   Relationship to patient of person spoken to? Self   Refill Screening Questions    When does the patient need to receive the medication? 08/10/23   Refill Delivery Questions    How will the patient receive the medication? [facility]   When does the patient need to receive the medication? 08/10/23   Shipping Address Home   Address in [facility] confirmed and updated if neccessary? Yes   Expected Copay ($) 55.75   Is the patient able to afford the medication copay? Yes   Payment Method CC on file   Days supply of Refill 21   Supplies needed? No supplies needed  [Eucerin]   Refill activity completed? Yes   Refill activity plan Refill scheduled   Shipment/Pickup Date: 08/09/23            Objective    She has a past medical history of Breast cancer, Constipation, Gastric reflux, Other calcification of muscle, right shoulder, and Thyroid disease.    Tried/failed medications: Tazotere, Adriamycin, Cyclophosphamide    BP Readings from Last 4 Encounters:   08/07/23 120/68   07/20/23 104/64   07/12/23 129/64  "  06/28/23 117/63     Ht Readings from Last 4 Encounters:   08/07/23 5' 2" (1.575 m)   07/20/23 5' 2" (1.575 m)   07/05/23 5' 2" (1.575 m)   06/28/23 5' 2" (1.575 m)     Wt Readings from Last 4 Encounters:   08/07/23 45.1 kg (99 lb 8 oz)   07/20/23 45.7 kg (100 lb 12.8 oz)   07/12/23 44.1 kg (97 lb 3.6 oz)   06/28/23 44.5 kg (98 lb 3.2 oz)     Recent Labs   Lab Result Units 07/20/23  0946 06/21/23  1405 06/06/23  1052 06/05/23  1432   RBC x10(6)/mcL  --  3.31 L  --  3.65 L   Hgb g/dL  --  10.1 L  --  11.3 L   Hct %  --  32.0 L  --  34.9 L   WBC x10(3)/mcL  --  5.39  --  7.13   Platelet x10(3)/mcL  --  222  --  245   Sodium Level mmol/L  --  141 142  --    Potassium Level mmol/L  --  4.0 4.4  --    Blood Urea Nitrogen mg/dL  --  15.2 16.0  --    Creatinine mg/dL 0.86 0.80 0.83  --    Calcium Level Total mg/dL  --  9.9 10.0  --    Albumin Level g/dL  --  4.0 4.2  --    Bilirubin Total mg/dL  --  0.3 0.4  --    Alkaline Phosphatase unit/L  --  42 46  --    Aspartate Aminotransferase unit/L  --  25 27  --    Alanine Aminotransferase unit/L  --  15 15  --      The goals of cancer treatment include:  Achieving remission of cancer, if possible  Reducing tumor size and spread of cancer, if remission is not possible  Minimizing pain and symptoms of the cancer  Preventing infection and other complications of treatment  Promoting adequate nutrition  Encouraging proper hydration  Improving or maintaining quality of life  Maintaining optimal therapy adherence  Minimizing and managing side effects    Goals of Therapy Status: Discussed (new start)    Assessment/Plan  Patient plans to start therapy on 08/10/23      Indication, dosage, appropriateness, effectiveness, safety and convenience of her specialty medication(s) were reviewed today.     Patient Education   Patient received education on the following:   Expectations and possible outcomes of therapy  Proper use, timely administration, and missed dose management  Duration of " therapy  Side effects, including prevention, minimization, and management  Contraindications and safety precautions  New or changed medications, including prescribe and over the counter medications and supplements  Reviews recommended vaccinations, as appropriate  Storage, safe handling, and disposal    Patient agrees to pay $55.75 copay    Tasks added this encounter   No tasks added.   Tasks due within next 3 months   8/9/2023 - Initial Clinical Assessment/Patient Education (180 Day Reassessment)  8/9/2023 - Set up Initial Fill     Atiya Dong, PharmD  [facility]  [address] [place] [id number]  Phone: [phone]  Fax: [phone]

## 2023-08-23 ENCOUNTER — TELEPHONE (OUTPATIENT)
Dept: HEMATOLOGY/ONCOLOGY | Facility: CLINIC | Age: 68
End: 2023-08-23
Payer: MEDICARE

## 2023-08-23 NOTE — TELEPHONE ENCOUNTER
Patient states she just started week off of xeloda and noticed a blister on the bottom of one of her feet. States she is unable to bear weight on that side. Hands are fine. This was C1 of treatment. She takes 2 po bid. Next office visit is 8/28/23. Any recommendations?

## 2023-08-28 ENCOUNTER — TELEPHONE (OUTPATIENT)
Dept: HEMATOLOGY/ONCOLOGY | Facility: CLINIC | Age: 68
End: 2023-08-28
Payer: MEDICARE

## 2023-08-28 ENCOUNTER — OFFICE VISIT (OUTPATIENT)
Dept: HEMATOLOGY/ONCOLOGY | Facility: CLINIC | Age: 68
End: 2023-08-28
Payer: MEDICARE

## 2023-08-28 ENCOUNTER — PATIENT MESSAGE (OUTPATIENT)
Dept: HEMATOLOGY/ONCOLOGY | Facility: CLINIC | Age: 68
End: 2023-08-28

## 2023-08-28 DIAGNOSIS — C78.6 SECONDARY MALIGNANT NEOPLASM OF PERITONEUM: ICD-10-CM

## 2023-08-28 DIAGNOSIS — C50.311 MALIGNANT NEOPLASM OF LOWER-INNER QUADRANT OF RIGHT BREAST OF FEMALE, ESTROGEN RECEPTOR POSITIVE: ICD-10-CM

## 2023-08-28 DIAGNOSIS — Z79.899 DRUG-INDUCED IMMUNODEFICIENCY: Primary | ICD-10-CM

## 2023-08-28 DIAGNOSIS — Z17.0 MALIGNANT NEOPLASM OF LOWER-INNER QUADRANT OF RIGHT BREAST OF FEMALE, ESTROGEN RECEPTOR POSITIVE: ICD-10-CM

## 2023-08-28 DIAGNOSIS — D84.821 DRUG-INDUCED IMMUNODEFICIENCY: Primary | ICD-10-CM

## 2023-08-28 PROCEDURE — 99214 OFFICE O/P EST MOD 30 MIN: CPT | Mod: 95,,, | Performed by: NURSE PRACTITIONER

## 2023-08-28 PROCEDURE — 99214 PR OFFICE/OUTPT VISIT, EST, LEVL IV, 30-39 MIN: ICD-10-PCS | Mod: 95,,, | Performed by: NURSE PRACTITIONER

## 2023-08-28 NOTE — TELEPHONE ENCOUNTER
Oncology Nutrition Assessment for Medical Nutrition Therapy      Kimberlyn Selby   1955    Oncology Provider:   Daren Atkins MD    Reason for Visit:  Nutrition Screen    Oncology/Hematology Diagnosis:   Recurrent metastatic breast cancer with peritoneal carcinomatosis     Treatment Plan:  Xeloda    Nutrition Recommendations:  1. Continue small frequent meals; avoid foods that trigger acid reflux  2. Protein powder smoothies daily  3. RD to continue monitoring    Nutrition Assessment    8/28/23: This is a 67 y.o.female with a medical diagnosis of stg IV breast CA (peritoneal carcinomatosis). She reports fair appetite and po intake. Notes that she has been dealing with constipation for years as well as GERD. She intentionally lost ~20# a few years ago and was stable at ~100# for quite a while. Recently has lost 2-5# in the past couple months due to worsening in constipation. She was started on Xeloda and tolerated well (no diarrhea) but did have blisters develop on feet, this was addressed with Beronica NP today via telemed since her  has COVID and she could not come in to the office. She does drink a protein smoothie but not daily. She does not take pantoprazole regularly because she states it makes her constipation worse. Her wt today at home was 95#.    Nutrition Factors Affecting Intake  constipation and reflux    PMHx: EDITH breast CA s/p mastectomies, JONI/BSO, GERD    Allergies: Patient has no known allergies.    Current Medications:    Current Outpatient Medications:     aloe vera 25 mg Cap, Take by mouth., Disp: , Rfl:     ALPRAZolam (XANAX) 0.25 MG tablet, Take 1 tablet (0.25 mg total) by mouth 2 (two) times daily as needed for Anxiety (**use sparingly**)., Disp: 30 tablet, Rfl: 1    APPLE CIDER VINEGAR ORAL, Take by mouth., Disp: , Rfl:     ascorbic acid, vitamin C, (VITAMIN C) 100 MG tablet, Take 100 mg by mouth once daily., Disp: , Rfl:     biotin 1 mg Cap, Take by mouth., Disp: , Rfl:      "capecitabine (XELODA) 500 MG Tab, Take 3 tablets (1,500 mg total) by mouth 2 (two) times daily for 14 days every 21 days. Take within 30 minutes after a meal., Disp: 84 tablet, Rfl: 5    enzymes,digestive (DIGESTIVE ENZYMES ORAL), Take by mouth., Disp: , Rfl:     ergocalciferol, vitamin D2, (VITAMIN D ORAL), Take by mouth., Disp: , Rfl:     AMBER OIL ORAL, Take by mouth., Disp: , Rfl:     Lactobac no.41/Bifidobact no.7 (PROBIOTIC-10 ORAL), Take by mouth., Disp: , Rfl:     multivitamin (THERAGRAN) per tablet, Take 1 tablet by mouth once daily., Disp: , Rfl:     omega-3 fatty acids/fish oil (FISH OIL-OMEGA-3 FATTY ACIDS) 300-1,000 mg capsule, Take by mouth once daily., Disp: , Rfl:     ondansetron (ZOFRAN-ODT) 4 MG TbDL, Take 4 mg by mouth once., Disp: , Rfl:     oxybutynin (DITROPAN-XL) 5 MG TR24, Take 1 tablet (5 mg total) by mouth once daily., Disp: 30 tablet, Rfl: 5    pantoprazole (PROTONIX) 40 MG tablet, Take 1 tablet by mouth once daily., Disp: , Rfl:     sertraline (ZOLOFT) 100 MG tablet, Take 1 tablet (100 mg total) by mouth once daily., Disp: 90 tablet, Rfl: 2    thyroid, pork, (ARMOUR THYROID) 30 mg Tab, Take 1 tablet (30 mg total) by mouth before breakfast. Will also take a 15 mg for 45 mg total daily dose., Disp: 90 tablet, Rfl: 2    Labs: no new    Anthropometrics    Height:   Ht Readings from Last 1 Encounters:   08/07/23 5' 2" (1.575 m)      Weight:   Wt Readings from Last 5 Encounters:   08/07/23 45.1 kg (99 lb 8 oz)   07/20/23 45.7 kg (100 lb 12.8 oz)   07/12/23 44.1 kg (97 lb 3.6 oz)   06/28/23 44.5 kg (98 lb 3.2 oz)   06/21/23 44.7 kg (98 lb 8 oz)    Pt reported wt 8/28/23 43.1 kg (95 lb)    Usual Body Weight: 45.7 kg (100 lb)  % Weight Change: -5% past month to lowest wt 95 lb    BMI: 17.3 (underweight)    Ideal Weight: 50 kg (110 lb)  % Ideal Weight: 86%    Malnutrition in the context of acute illness or injury  Degree of Malnutrition: non-severe (moderate) malnutrition  Energy Intake: < 75% of " estimated energy requirement for > 7 days  Interpretation of Weight Loss: 5% In 1 month  Body Fat:unable to obtain  Area of Body Fat Loss: unable to obtain  Muscle Mass Loss: unable to obtain  Area of Muscle Mass Loss: unable to obtain  Fluid Accumulation: does not meet criteria  Edema: no edema present   Reduced  Strength: unable to obtain  A minimum of two characteristics is recommended for diagnosis of either severe or non-severe malnutrition.    Estimated Needs (using CBW 43.1 kg)  1509 kcal/day  [place] St. Jeor x 1.1 activity factor x 1.5 stress factor  65 g protein/day 1.5 g/kg CBW  1509 mL fluid/day 1 mL/kcal    Nutrition Diagnosis    PES: Malnutrition related to chronic illness as evidenced by <75% intake est needs >7 days, 5% wt loss 1 month. (new)     Nutrition Risk  moderate    Nutrition Intervention    Interventions(treatment strategy):  modified composition of meals/snacks, commercial beverage, and collaboration with other providers      Nutrition Monitoring and Evaluation    Monitor: food and beverage intake, weight change, and electrolyte/renal panel    Follow up at next provider visit.        Demetria Jacobs, MS, RD, , LDN

## 2023-08-28 NOTE — PROGRESS NOTES
Subjective:       Patient ID: Kimberlyn Selby is a 67 y.o. female.    Chief Complaint:  Painful blister on foot    Diagnosis:  Recurrent metastatic breast cancer with peritoneal carcinomatosis -ER+ 4%, NE 0, HER-2 neg (IHC 0)                     Stage IA R breast cancer 8/11 (T1b N0 M0), 0.7 cm, GI, ER+/NE-, HER-2 negative                     Stage IIA L breast cancer 1/09 (T1c N1 M0), 1.6 cm, GIII, 1+ LN, ER/NE -, HER-2 negative                     Stage IIIA R breast cancer 1/05 (T2 N2a M0), 1.6 cm, GII, 4+ LN's, ER/NE +, HER-2 negative                     S/p bilateral mastectomies                     Post menopausal s/p hysterectomy                     Declined adjuvant XRT & hormonal therapy with initial breast cancer                     + COVID vaccinated     Treatment History  Adjuvant TAC x 6 completed 6/05  Adjuvant TC x 4 completed 5/09 --> XRT L breast    Current Therapy:  Xeloda 1000 mg BID s/p 1 cycle     Clinical History: WF status post right lumpectomy 1/05 for invasive breast cancer with the above characteristics. She completed 6 cycles of adjuvant TAC but declined radiation. She was placed on Arimidex but discontinued treatment due to significant myalgias. She had similar side effects with Femara and also complained of bone aching due to Tamoxifen and stopped therapy 9/05. Due to her hormone receptor status, she was placed on Evista but again was unable to tolerate therapy. She had an abnormal surveillance mammogram 11/08 showing clustered microcalcifications in the upper outer quadrant of the left breast with an associated 1 cm mass by ultrasound. 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 Patient underwent left lumpectomy and axillary dissection 1/15/09. Final pathology as outlined above. Postop course was complicated by cellulitis. She completed 4 cycles of adjuvant TC 5/09. She eventually consented to adjuvant radiation therapy to the left breast. Surveillance  CT PET scan 12/09 showed no abnormal hypermetabolic activity. Mammogram 6/22/11 showed a new suspicious finding in the right breast at the 4:00 position. Ultrasound was suspicious for malignancy. Ultrasound-guided core biopsies revealed invasive ductal carcinoma strongly ER positive at 85%, WA negative and HER-2 negative. CT PET scan 5/11 showed no abnormal hypermetabolic activity to indicate recurrent or metastatic disease. Although patient did have radiation therapy post lumpectomy on the left side, she never had radiation therapy to the right breast. She elected to undergo bilateral mastectomies and prophylactic right oophorectomy 8/16/11. Final pathology showed a 0.7 cm grade 1 infiltrating ductal carcinoma the right breast. Lamar dissection was not done to her previous surgery. She declined BRCA testing. Adjuvant hormonal therapy was recommended with Aromasin but she declined. She underwent a screening colonoscopy 6/5/13 which showed diverticula but no other abnormal findings; 10 year followup was recommended.      She was seen for a surveillance appointment 8/24/21 and did not return for her annual visit in 2022.    She developed symptoms of GE reflux and constipation that she treated with OTC medications without significant improvement.  She denied significant weight loss.  She was referred for a CT of the abdomen and pelvis by GI 6/2/23 ([facility]) that showed a diffuse abnormal enhancing soft tissue density in the retroperitoneum and mesenteric root with nodular intense enhancement.  Findings suspicious for an infiltrative lymphoproliferative disorder.  Mass encased the aorta without stenosis.  IVC appeared mildly narrowed without focal internal filling defect.  There were no additional abnormal findings.  Spleen was unremarkable.   EGD 6/13/23 showed mild chronic reflux changes and gastritis.  Colonoscopy showed a single polyp which was removed.  Pathology was consistent with metastatic breast  cancer.  She underwent a diagnostic laparoscopy 7/12/23 revealing peritoneal carcinomatosis.  Biopsies were positive for metastatic poorly differentiated adenocarcinoma with signet ring morphology consistent with breast primary (LIZ-3 and CK7 positive; CK20 and CDX2 negative).  ER was low positive 4%, OK negative and HER2 negative (IHC 0).    CT-PET 7/28/23:  Poorly delineated bulky abdominal lymphadenopathy with low-grade FDG uptake, SUV 2.4.  Small volume ascites.  No definite metastatic disease outside of the abdomen.  MRI Brain 8/2/23:  No evidence of metastatic disease.  Mild chronic microvascular ischemia and atrophy.      Interval History  A telemedicine visit was conducted today as Mrs. Selby's three week on treatment visit.  Her  tested positive for COVID yesterday.  She is currently asymptomatic and trying to distance her self.  She completed her first cycle of oral Xeloda on 8/21/23 at a dose of 1000 mg BID.  She developed a painful blister between her first two toes on the right foot on day 15.  She is currently soaking the foot and applying lotion.  Her nausea, abdominal bloating and appetite have improved following her first cycle of treatment.    She has not lost weight.    Review of Systems   Constitutional:  Negative for appetite change, fatigue, fever and unexpected weight change.   HENT:  Negative for mouth sores, sore throat and trouble swallowing.    Eyes: Negative.    Respiratory:  Negative for cough and shortness of breath.    Cardiovascular:  Negative for chest pain, palpitations and leg swelling.   Gastrointestinal:  Positive for abdominal pain (mild mid abdominal discomfort, improved) and constipation. Negative for abdominal distention, blood in stool, diarrhea, nausea and vomiting.   Genitourinary:  Negative for dysuria, frequency and urgency.   Musculoskeletal:  Negative for arthralgias and back pain.   Integumentary:  Positive for wound. Negative for pallor and rash.    Neurological:  Negative for dizziness, weakness, numbness and headaches.   Hematological:  Negative for adenopathy. Does not bruise/bleed easily.   Psychiatric/Behavioral: Negative.  The patient is not nervous/anxious.        PMHx:  Bilateral breast cancer, arthritis, hypothyroidism, GERD  PSHx:  Bilateral breast lumpectomies, bilateral mastectomies, hysterectomy/BSO, MediPort insertion and removal, uterine suspension, diagnostic laparoscopy  SH:  Lifetime nonsmoker, no significant alcohol use.  Lives in [place] with her .  FH:  Her father had kidney cancer, son had appendiceal cancer and MGF had liver cancer.    Objective:        There were no vitals taken for this visit.   Physical Exam  Constitutional:       Appearance: Normal appearance.      Comments: Thin white female in NAD   HENT:      Head: Normocephalic.   Eyes:      Conjunctiva/sclera: Conjunctivae normal.   Pulmonary:      Effort: Pulmonary effort is normal.   Chest:      Comments: Well-healed bilateral mastectomy incisions.  No suspicious masses, skin changes or axillary nodes bilaterally.  Musculoskeletal:         General: Normal range of motion.   Skin:     Findings: No rash (Blister between first two toes, right foot).   Neurological:      General: No focal deficit present.      Mental Status: She is alert and oriented to person, place, and time.       ECOG SCORE    1 - Restricted in strenuous activity-ambulatory and able to carry out work of a light nature          LABORATORY  No results found for this or any previous visit (from the past 168 hour(s)).       Assessment:   Recurrent metastatic breast cancer with peritoneal carcinomatosis - ER+ 4%, SC neg, Her-2 neg (IHC 0)  History of bilateral breast cancer s/p bilateral mastectomies  Grade 2 hand and foot syndrome      Plan:   Patient has treatment induced hand and foot syndrome.  She was instructed to hold her Xeloda until her blister has healed completely.  Then, resume at 1000 mg in  the morning and 500 mg in the evening.  She will report any worsening or recurrent HFS once the course starts.  Her abdominal symptoms do appear to have improved following her course of treatment.  She is encouraged.  She will come in later this week to check her lab as long as she has no s/s of COVID.  Otherwise, RTC 3 weeks for follow-up.  Length of this consultation was 21 minutes and greater than 50% of the encounter was spent counseling/coordinating care.    This is a telehealth note.  Synchronous telemedicine services rendered via real-time interactive audio and video telecommunications system according to [facility] protocols.  Beronica RUIZ, conducted the visit from location identified below.  The patient participated in the visit at a non-[facility] location selected by the patient, identified below.  I am licensed in the state where the patient stated they are located.  The patient stated that they understood and accepted the privacy and security risks to their information at their location.      Patient was located at patient's home.  I, Beronica Lin, was located at [facility].      JON Schroeder, FNP-C     Other Physicians  Dr. Darrell Swan

## 2023-08-31 LAB — PSYCHE PATHOLOGY RESULT: NORMAL

## 2023-09-01 ENCOUNTER — LAB VISIT (OUTPATIENT)
Dept: LAB | Facility: HOSPITAL | Age: 68
End: 2023-09-01
Attending: FAMILY MEDICINE
Payer: MEDICARE

## 2023-09-01 DIAGNOSIS — C50.311 MALIGNANT NEOPLASM OF LOWER-INNER QUADRANT OF RIGHT BREAST OF FEMALE, ESTROGEN RECEPTOR POSITIVE: ICD-10-CM

## 2023-09-01 DIAGNOSIS — C78.6 SECONDARY MALIGNANT NEOPLASM OF PERITONEUM: ICD-10-CM

## 2023-09-01 DIAGNOSIS — Z17.0 MALIGNANT NEOPLASM OF LOWER-INNER QUADRANT OF RIGHT BREAST OF FEMALE, ESTROGEN RECEPTOR POSITIVE: ICD-10-CM

## 2023-09-01 LAB
ALBUMIN SERPL-MCNC: 4.4 G/DL (ref 3.4–4.8)
ALBUMIN/GLOB SERPL: 1.4 RATIO (ref 1.1–2)
ALP SERPL-CCNC: 38 UNIT/L (ref 40–150)
ALT SERPL-CCNC: 15 UNIT/L (ref 0–55)
AST SERPL-CCNC: 27 UNIT/L (ref 5–34)
BASOPHILS # BLD AUTO: 0.06 X10(3)/MCL
BASOPHILS NFR BLD AUTO: 1.4 %
BILIRUB SERPL-MCNC: 0.5 MG/DL
BUN SERPL-MCNC: 22.9 MG/DL (ref 9.8–20.1)
CALCIUM SERPL-MCNC: 9.9 MG/DL (ref 8.4–10.2)
CHLORIDE SERPL-SCNC: 102 MMOL/L (ref 98–107)
CO2 SERPL-SCNC: 29 MMOL/L (ref 23–31)
CREAT SERPL-MCNC: 0.92 MG/DL (ref 0.55–1.02)
EOSINOPHIL # BLD AUTO: 0.22 X10(3)/MCL (ref 0–0.9)
EOSINOPHIL NFR BLD AUTO: 5.1 %
ERYTHROCYTE [DISTWIDTH] IN BLOOD BY AUTOMATED COUNT: 15.1 % (ref 11.5–17)
GFR SERPLBLD CREATININE-BSD FMLA CKD-EPI: >60 MLS/MIN/1.73/M2
GLOBULIN SER-MCNC: 3.1 GM/DL (ref 2.4–3.5)
GLUCOSE SERPL-MCNC: 94 MG/DL (ref 82–115)
HCT VFR BLD AUTO: 36.4 % (ref 37–47)
HGB BLD-MCNC: 11.4 G/DL (ref 12–16)
IMM GRANULOCYTES # BLD AUTO: 0 X10(3)/MCL (ref 0–0.04)
IMM GRANULOCYTES NFR BLD AUTO: 0 %
LYMPHOCYTES # BLD AUTO: 1.18 X10(3)/MCL (ref 0.6–4.6)
LYMPHOCYTES NFR BLD AUTO: 27.3 %
MCH RBC QN AUTO: 30.8 PG (ref 27–31)
MCHC RBC AUTO-ENTMCNC: 31.3 G/DL (ref 33–36)
MCV RBC AUTO: 98.4 FL (ref 80–94)
MONOCYTES # BLD AUTO: 0.55 X10(3)/MCL (ref 0.1–1.3)
MONOCYTES NFR BLD AUTO: 12.7 %
NEUTROPHILS # BLD AUTO: 2.32 X10(3)/MCL (ref 2.1–9.2)
NEUTROPHILS NFR BLD AUTO: 53.5 %
PLATELET # BLD AUTO: 264 X10(3)/MCL (ref 130–400)
PMV BLD AUTO: 8.7 FL (ref 7.4–10.4)
POTASSIUM SERPL-SCNC: 4 MMOL/L (ref 3.5–5.1)
PROT SERPL-MCNC: 7.5 GM/DL (ref 5.8–7.6)
RBC # BLD AUTO: 3.7 X10(6)/MCL (ref 4.2–5.4)
SODIUM SERPL-SCNC: 139 MMOL/L (ref 136–145)
WBC # SPEC AUTO: 4.33 X10(3)/MCL (ref 4.5–11.5)

## 2023-09-01 PROCEDURE — 80053 COMPREHEN METABOLIC PANEL: CPT

## 2023-09-01 PROCEDURE — 85025 COMPLETE CBC W/AUTO DIFF WBC: CPT

## 2023-09-01 PROCEDURE — 36415 COLL VENOUS BLD VENIPUNCTURE: CPT

## 2023-09-14 DIAGNOSIS — C50.311 MALIGNANT NEOPLASM OF LOWER-INNER QUADRANT OF RIGHT BREAST OF FEMALE, ESTROGEN RECEPTOR POSITIVE: Primary | ICD-10-CM

## 2023-09-14 DIAGNOSIS — C78.6 SECONDARY MALIGNANT NEOPLASM OF PERITONEUM: ICD-10-CM

## 2023-09-14 DIAGNOSIS — Z17.0 MALIGNANT NEOPLASM OF LOWER-INNER QUADRANT OF RIGHT BREAST OF FEMALE, ESTROGEN RECEPTOR POSITIVE: Primary | ICD-10-CM

## 2023-09-14 RX ORDER — CAPECITABINE 500 MG/1
TABLET, FILM COATED ORAL
Qty: 42 TABLET | Refills: 3 | Status: ACTIVE | OUTPATIENT
Start: 2023-09-14 | End: 2023-09-22 | Stop reason: SDUPTHER

## 2023-09-19 DIAGNOSIS — C78.6 SECONDARY MALIGNANT NEOPLASM OF PERITONEUM: ICD-10-CM

## 2023-09-19 DIAGNOSIS — C50.311 MALIGNANT NEOPLASM OF LOWER-INNER QUADRANT OF RIGHT BREAST OF FEMALE, ESTROGEN RECEPTOR POSITIVE: Primary | ICD-10-CM

## 2023-09-19 DIAGNOSIS — Z17.0 MALIGNANT NEOPLASM OF LOWER-INNER QUADRANT OF RIGHT BREAST OF FEMALE, ESTROGEN RECEPTOR POSITIVE: Primary | ICD-10-CM

## 2023-09-20 ENCOUNTER — CLINICAL SUPPORT (OUTPATIENT)
Dept: HEMATOLOGY/ONCOLOGY | Facility: CLINIC | Age: 68
End: 2023-09-20
Payer: MEDICARE

## 2023-09-20 DIAGNOSIS — C50.311 MALIGNANT NEOPLASM OF LOWER-INNER QUADRANT OF RIGHT BREAST OF FEMALE, ESTROGEN RECEPTOR POSITIVE: ICD-10-CM

## 2023-09-20 DIAGNOSIS — K59.00 CONSTIPATION, UNSPECIFIED CONSTIPATION TYPE: ICD-10-CM

## 2023-09-20 DIAGNOSIS — C78.6 SECONDARY MALIGNANT NEOPLASM OF PERITONEUM: ICD-10-CM

## 2023-09-20 DIAGNOSIS — Z17.0 MALIGNANT NEOPLASM OF LOWER-INNER QUADRANT OF RIGHT BREAST OF FEMALE, ESTROGEN RECEPTOR POSITIVE: ICD-10-CM

## 2023-09-20 PROCEDURE — 99999 PR PBB SHADOW E&M-EST. PATIENT-LVL I: ICD-10-PCS | Mod: PBBFAC,,,

## 2023-09-20 PROCEDURE — 99999 PR PBB SHADOW E&M-EST. PATIENT-LVL I: CPT | Mod: PBBFAC,,,

## 2023-09-20 PROCEDURE — 99211 OFF/OP EST MAY X REQ PHY/QHP: CPT | Mod: PBBFAC

## 2023-09-20 NOTE — PROGRESS NOTES
"Oncology Nutrition Assessment for Medical Nutrition Therapy      Kimberlyn Selby   1955    Oncology Provider:   Daren Atkins MD     Reason for Visit:  Nutrition f/u    Oncology/Hematology Diagnosis:   Recurrent metastatic breast cancer with peritoneal carcinomatosis     Treatment Plan:  Xeloda     Nutrition Recommendations:  1. Continue small frequent meals; avoid foods that trigger acid reflux  2. Protein powder smoothies daily  3. Consider MCT oil, 1 tbsp daily in smoothie for additional 120 kcal. Can increase to 2-3 Tbsp/day based on tolerance  4. RD to continue monitoring    Nutrition Assessment    8/28/23: This is a 67 y.o.female with a medical diagnosis of stg IV breast CA (peritoneal carcinomatosis). She reports fair appetite and po intake. Notes that she has been dealing with constipation for years as well as GERD. She intentionally lost ~20# a few years ago and was stable at ~100# for quite a while. Recently has lost 2-5# in the past couple months due to worsening in constipation. She was started on Xeloda and tolerated well (no diarrhea) but did have blisters develop on feet, this was addressed with Beroniac MARTINEZ today via telemed since her  has COVID and she could not come in to the office. She does drink a protein smoothie but not daily. She does not take pantoprazole regularly because she states it makes her constipation worse. Her wt today at home was 95#.    9/20/23: Pt here today for lab. She reports continued constipation and reflux/intolerance to certain foods such as tomato products, eggs, several spices, fruits, etc. She has had a few episodes n/v with intake of certain things that didn't "agree" with her. She reports ok appetite in the morning but by evening she has severe early satiety to the point where she cannot eat without fear of vomiting. She continues with a daily protein smoothie that has at least 20 g protein. She adds avocado and fruit to this as well. She notes her wt at " home today was 88#, 7# less than a month ago. We discussed a trial of MCT oil in her smoothies for calorie increase.     Nutrition Factors Affecting Intake  constipation, decreased appetite, early satiety, nausea, and vomiting    PMHx: EDITH breast CA s/p mastectomies, JONI/BSO, GERD    Allergies: Patient has no known allergies.    Current Medications:    Current Outpatient Medications:     aloe vera 25 mg Cap, Take by mouth., Disp: , Rfl:     ALPRAZolam (XANAX) 0.25 MG tablet, Take 1 tablet (0.25 mg total) by mouth 2 (two) times daily as needed for Anxiety (**use sparingly**)., Disp: 30 tablet, Rfl: 1    APPLE CIDER VINEGAR ORAL, Take by mouth., Disp: , Rfl:     ascorbic acid, vitamin C, (VITAMIN C) 100 MG tablet, Take 100 mg by mouth once daily., Disp: , Rfl:     biotin 1 mg Cap, Take by mouth., Disp: , Rfl:     capecitabine (XELODA) 500 MG Tab, Take 2 tablets (1,000 mg total) by mouth every morning and 1 tablet (500 mg total) by mouth every evening on days 1-14 of each 21 day cycle. Take within 30 minutes after a meal., Disp: 42 tablet, Rfl: 3    enzymes,digestive (DIGESTIVE ENZYMES ORAL), Take by mouth., Disp: , Rfl:     ergocalciferol, vitamin D2, (VITAMIN D ORAL), Take by mouth., Disp: , Rfl:     GINGER OIL ORAL, Take by mouth., Disp: , Rfl:     Lactobac no.41/Bifidobact no.7 (PROBIOTIC-10 ORAL), Take by mouth., Disp: , Rfl:     multivitamin (THERAGRAN) per tablet, Take 1 tablet by mouth once daily., Disp: , Rfl:     omega-3 fatty acids/fish oil (FISH OIL-OMEGA-3 FATTY ACIDS) 300-1,000 mg capsule, Take by mouth once daily., Disp: , Rfl:     ondansetron (ZOFRAN-ODT) 4 MG TbDL, Take 4 mg by mouth once., Disp: , Rfl:     oxybutynin (DITROPAN-XL) 5 MG TR24, Take 1 tablet (5 mg total) by mouth once daily., Disp: 30 tablet, Rfl: 5    pantoprazole (PROTONIX) 40 MG tablet, Take 1 tablet by mouth once daily., Disp: , Rfl:     sertraline (ZOLOFT) 100 MG tablet, Take 1 tablet (100 mg total) by mouth once daily., Disp: 90  "tablet, Rfl: 2    thyroid, pork, (ARMOUR THYROID) 30 mg Tab, Take 1 tablet (30 mg total) by mouth before breakfast. Will also take a 15 mg for 45 mg total daily dose., Disp: 90 tablet, Rfl: 2    Labs: 9/20/23 BUN 25.3 (H)    Anthropometrics    Height:   Ht Readings from Last 1 Encounters:   08/07/23 5' 2" (1.575 m)      Weight:   Wt Readings from Last 5 Encounters:   08/07/23 45.1 kg (99 lb 8 oz)   07/20/23 45.7 kg (100 lb 12.8 oz)   07/12/23 44.1 kg (97 lb 3.6 oz)   06/28/23 44.5 kg (98 lb 3.2 oz)   06/21/23 44.7 kg (98 lb 8 oz)   Pt reported wt 9/20/23: 40 kg (88 lb)     Usual Body Weight: 45.7 kg (100 lb)  % Weight Change: -12% past 2 months to lowest wt 88 lb    BMI: 16.1 (underweight)    Ideal Weight: 50 kg (110 lb)  % Ideal Weight: 80%    Malnutrition in the context of acute illness or injury  Degree of Malnutrition: severe malnutrition  Energy Intake: </= 50% of estimated energy requirement for >/= 5 days  Interpretation of Weight Loss: >5% in 1 month  Body Fat:moderate depletion  Area of Body Fat Loss: orbital region , upper arm region - triceps / biceps, and thoracic and lumbar region - ribs, lower back, midaxillary line  Muscle Mass Loss: moderate depletion  Area of Muscle Mass Loss: temple region - temporalis muscle, clavicle bone region - pectoralis major, deltoid, trapezius muscles, clavicle and acromion bone region - deltoid muscle, scapular bone region - trapezius, supraspinus, infraspinus muscles, dorsal hand - interosseous musle, patellar region - quadricep muscle, anterior thigh region - quadriceps muscles, and posterior calf region - gastrocnemius muscle  Fluid Accumulation: does not meet criteria  Edema: no edema present   Reduced  Strength: unable to obtain  A minimum of two characteristics is recommended for diagnosis of either severe or non-severe malnutrition.    Estimated Needs (using CBW 45.1 kg)  1542 kcal/day  [place] St. Jeor x 1.1 activity factor x 1.5 stress factor  68 g " protein/day 1.5 g/kg CBW  1542 mL fluid/day 1 mL/kcal    Nutrition Diagnosis    PES: Malnutrition related to acute illness as evidenced by </=75% intake est needs >/=7 days, >5% wt loss 1 month, moderate fat/muscle depletion. (worsening)     Nutrition Risk  High    Nutrition Intervention    Interventions(treatment strategy):  modified composition of meals/snacks, commercial beverage, and collaboration with other providers      Nutrition Monitoring and Evaluation    Monitor: food and beverage intake, weight change, and electrolyte/renal panel    Follow up in 2-4 weeks. Pt sees NP at the end of this week.         Demetria Jacobs, MS, RD, , LDN

## 2023-09-22 ENCOUNTER — OFFICE VISIT (OUTPATIENT)
Dept: HEMATOLOGY/ONCOLOGY | Facility: CLINIC | Age: 68
End: 2023-09-22
Payer: MEDICARE

## 2023-09-22 VITALS
BODY MASS INDEX: 16.38 KG/M2 | SYSTOLIC BLOOD PRESSURE: 115 MMHG | WEIGHT: 89 LBS | HEIGHT: 62 IN | RESPIRATION RATE: 16 BRPM | TEMPERATURE: 97 F | DIASTOLIC BLOOD PRESSURE: 70 MMHG | OXYGEN SATURATION: 99 % | HEART RATE: 60 BPM

## 2023-09-22 DIAGNOSIS — Z17.0 MALIGNANT NEOPLASM OF LOWER-INNER QUADRANT OF RIGHT BREAST OF FEMALE, ESTROGEN RECEPTOR POSITIVE: Primary | ICD-10-CM

## 2023-09-22 DIAGNOSIS — C78.6 SECONDARY MALIGNANT NEOPLASM OF PERITONEUM: ICD-10-CM

## 2023-09-22 DIAGNOSIS — C50.311 MALIGNANT NEOPLASM OF LOWER-INNER QUADRANT OF RIGHT BREAST OF FEMALE, ESTROGEN RECEPTOR POSITIVE: Primary | ICD-10-CM

## 2023-09-22 DIAGNOSIS — R11.0 NAUSEA: ICD-10-CM

## 2023-09-22 DIAGNOSIS — L27.0 DRUG RASH: ICD-10-CM

## 2023-09-22 PROCEDURE — 99999 PR PBB SHADOW E&M-EST. PATIENT-LVL IV: CPT | Mod: PBBFAC,,, | Performed by: NURSE PRACTITIONER

## 2023-09-22 PROCEDURE — 99999 PR PBB SHADOW E&M-EST. PATIENT-LVL IV: ICD-10-PCS | Mod: PBBFAC,,, | Performed by: NURSE PRACTITIONER

## 2023-09-22 PROCEDURE — 99215 PR OFFICE/OUTPT VISIT, EST, LEVL V, 40-54 MIN: ICD-10-PCS | Mod: S$PBB,,, | Performed by: NURSE PRACTITIONER

## 2023-09-22 PROCEDURE — 99214 OFFICE O/P EST MOD 30 MIN: CPT | Mod: PBBFAC | Performed by: NURSE PRACTITIONER

## 2023-09-22 PROCEDURE — 99215 OFFICE O/P EST HI 40 MIN: CPT | Mod: S$PBB,,, | Performed by: NURSE PRACTITIONER

## 2023-09-22 RX ORDER — METOCLOPRAMIDE 5 MG/1
5 TABLET ORAL 3 TIMES DAILY
Qty: 90 TABLET | Refills: 11 | Status: SHIPPED | OUTPATIENT
Start: 2023-09-22 | End: 2023-09-29

## 2023-09-22 RX ORDER — CAPECITABINE 500 MG/1
500 TABLET, FILM COATED ORAL 2 TIMES DAILY
Qty: 28 TABLET | Refills: 5 | Status: ACTIVE | OUTPATIENT
Start: 2023-09-22 | End: 2023-10-24 | Stop reason: SDUPTHER

## 2023-09-22 NOTE — PATIENT INSTRUCTIONS
Continue Xeloda at 500 mg twice daily for 14 days, followed by a 7 day break.  Hold/stop for worsening skin symptoms or pain.

## 2023-09-22 NOTE — PROGRESS NOTES
Subjective:       Patient ID: Kimberlyn Selby is a 67 y.o. female.    Chief Complaint:  Weight loss, nausea    Diagnosis:  Recurrent metastatic breast cancer with peritoneal carcinomatosis -ER+ 4%, [place] 0, HER-2 neg (IHC 0)                     Stage IA R breast cancer 8/11 (T1b N0 M0), 0.7 cm, GI, ER+/[place]-, HER-2 negative                     Stage IIA L breast cancer 1/09 (T1c N1 M0), 1.6 cm, GIII, 1+ LN, ER/[place] -, HER-2 negative                     Stage IIIA R breast cancer 1/05 (T2 N2a M0), 1.6 cm, GII, 4+ LN's, ER/[place] +, HER-2 negative                     S/p bilateral mastectomies                     Post menopausal s/p hysterectomy                     Declined adjuvant XRT & hormonal therapy with initial breast cancer                     + COVID vaccinated     Treatment History  Adjuvant TAC x 6 completed 6/05  Adjuvant TC x 4 completed 5/09 --> XRT L breast    Current Therapy:  Xeloda cycle 2 day 10     Clinical History: WF status post right lumpectomy 1/05 for invasive breast cancer with the above characteristics. She completed 6 cycles of adjuvant TAC but declined radiation. She was placed on Arimidex but discontinued treatment due to significant myalgias. She had similar side effects with Femara and also complained of bone aching due to Tamoxifen and stopped therapy 9/05. Due to her hormone receptor status, she was placed on Evista but again was unable to tolerate therapy. She had an abnormal surveillance mammogram 11/08 showing clustered microcalcifications in the upper outer quadrant of the left breast with an associated 1 cm mass by ultrasound. 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 Patient underwent left lumpectomy and axillary dissection 1/15/09. Final pathology as outlined above. Postop course was complicated by cellulitis. She completed 4 cycles of adjuvant TC 5/09. She eventually consented to adjuvant radiation therapy to the left breast. Surveillance CT PET scan  12/09 showed no abnormal hypermetabolic activity. Mammogram 6/22/11 showed a new suspicious finding in the right breast at the 4:00 position. Ultrasound was suspicious for malignancy. Ultrasound-guided core biopsies revealed invasive ductal carcinoma strongly ER positive at 85%, MA negative and HER-2 negative. CT PET scan 5/11 showed no abnormal hypermetabolic activity to indicate recurrent or metastatic disease. Although patient did have radiation therapy post lumpectomy on the left side, she never had radiation therapy to the right breast. She elected to undergo bilateral mastectomies and prophylactic right oophorectomy 8/16/11. Final pathology showed a 0.7 cm grade 1 infiltrating ductal carcinoma the right breast. Lamar dissection was not done to her previous surgery. She declined BRCA testing. Adjuvant hormonal therapy was recommended with Aromasin but she declined. She underwent a screening colonoscopy 6/5/13 which showed diverticula but no other abnormal findings; 10 year followup was recommended.      She was seen for a surveillance appointment 8/24/21 and did not return for her annual visit in 2022.    She developed symptoms of GE reflux and constipation that she treated with OTC medications without significant improvement.  She denied significant weight loss.  She was referred for a CT of the abdomen and pelvis by GI 6/2/23 ([facility]) that showed a diffuse abnormal enhancing soft tissue density in the retroperitoneum and mesenteric root with nodular intense enhancement.  Findings suspicious for an infiltrative lymphoproliferative disorder.  Mass encased the aorta without stenosis.  IVC appeared mildly narrowed without focal internal filling defect.  There were no additional abnormal findings.  Spleen was unremarkable.   EGD 6/13/23 showed mild chronic reflux changes and gastritis.  Colonoscopy showed a single polyp which was removed.  Pathology was consistent with metastatic breast cancer.  She  underwent a diagnostic laparoscopy 7/12/23 revealing peritoneal carcinomatosis.  Biopsies were positive for metastatic poorly differentiated adenocarcinoma with signet ring morphology consistent with breast primary (LIZ-3 and CK7 positive; CK20 and CDX2 negative).  ER was low positive 4%, UT negative and HER2 negative (IHC 0).    CT-PET 7/28/23:  Poorly delineated bulky abdominal lymphadenopathy with low-grade FDG uptake, SUV 2.4.  Small volume ascites.  No definite metastatic disease outside of the abdomen.  MRI Brain 8/2/23:  No evidence of metastatic disease.  Mild chronic microvascular ischemia and atrophy.    Interval History  Mrs. Selby is here today in follow-up accompanied by a close friend.  She is cycle 2 day 10 Xeloda.  Her dose was decreased to 1000 mg in AM and 500 mg in PM, then 500 mg BID due to hand and foot syndrome.  Those symptoms have stabilized.  She is experiencing ongoing abdominal fullness, nausea, and constipation, with associated anorexia and weight loss.  She has no distention/ascites on exam.  She is very discouraged with her symptoms and the negative impact on her quality of life.  Laboratory shows stable, mild anemia.  CEA shows interval improvement.  Results discussed with patient.    Review of Systems   Constitutional:  Positive for activity change, appetite change and unexpected weight change. Negative for fatigue and fever.   HENT:  Negative for mouth sores, sore throat and trouble swallowing.    Eyes: Negative.    Respiratory:  Negative for cough and shortness of breath.    Cardiovascular:  Negative for chest pain, palpitations and leg swelling.   Gastrointestinal:  Positive for abdominal pain (mild mid abdominal discomfort, improved) and constipation. Negative for abdominal distention, blood in stool, diarrhea, nausea and vomiting.   Genitourinary:  Negative for dysuria, frequency and urgency.   Musculoskeletal:  Negative for arthralgias and back pain.   Integumentary:  Positive  "for rash. Negative for pallor and wound.   Neurological:  Negative for dizziness, weakness, numbness and headaches.   Hematological:  Negative for adenopathy. Does not bruise/bleed easily.   Psychiatric/Behavioral: Negative.  The patient is not nervous/anxious.        PMHx:  Bilateral breast cancer, arthritis, hypothyroidism, GERD  PSHx:  Bilateral breast lumpectomies, bilateral mastectomies, hysterectomy/BSO, MediPort insertion and removal, uterine suspension, diagnostic laparoscopy  SH:  Lifetime nonsmoker, no significant alcohol use.  Lives in [place] with her .  FH:  Her father had kidney cancer, son had appendiceal cancer and MGF had liver cancer.    Objective:        /70   Pulse 60   Temp 97.3 °F (36.3 °C)   Resp 16   Ht 5' 2" (1.575 m)   Wt 40.4 kg (89 lb)   SpO2 99%   BMI 16.28 kg/m²    Physical Exam  Constitutional:       Appearance: Normal appearance. She is ill-appearing.      Comments: Thin white female in NAD   HENT:      Head: Normocephalic.      Nose: Nose normal.      Mouth/Throat:      Mouth: Mucous membranes are moist.      Pharynx: No oropharyngeal exudate.   Eyes:      General: No scleral icterus.     Conjunctiva/sclera: Conjunctivae normal.   Cardiovascular:      Rate and Rhythm: Normal rate and regular rhythm.   Pulmonary:      Effort: Pulmonary effort is normal.   Chest:      Comments: Well-healed bilateral mastectomy incisions.  No suspicious masses, skin changes or axillary nodes bilaterally.  Abdominal:      General: Abdomen is flat. Bowel sounds are normal.      Tenderness: There is abdominal tenderness.   Musculoskeletal:         General: Normal range of motion.      Cervical back: Normal range of motion.   Lymphadenopathy:      Cervical: No cervical adenopathy.   Skin:     General: Skin is warm and dry.      Findings: Rash: Blister between first two toes, right foot.      Comments: Erythema of fingertips.  Resolving dry desquamation of feet, bilaterally "   Neurological:      General: No focal deficit present.      Mental Status: She is alert and oriented to person, place, and time.   Psychiatric:         Mood and Affect: Mood normal.         Behavior: Behavior normal.       ECOG SCORE    1 - Restricted in strenuous activity-ambulatory and able to carry out work of a light nature          LABORATORY  Recent Results (from the past 168 hour(s))   Comprehensive Metabolic Panel    Collection Time: 09/20/23 10:03 AM   Result Value Ref Range    Sodium Level 140 136 - 145 mmol/L    Potassium Level 4.0 3.5 - 5.1 mmol/L    Chloride 105 98 - 107 mmol/L    Carbon Dioxide 25 23 - 31 mmol/L    Glucose Level 98 82 - 115 mg/dL    Blood Urea Nitrogen 25.3 (H) 9.8 - 20.1 mg/dL    Creatinine 0.85 0.55 - 1.02 mg/dL    Calcium Level Total 9.5 8.4 - 10.2 mg/dL    Protein Total 7.0 5.8 - 7.6 gm/dL    Albumin Level 4.1 3.4 - 4.8 g/dL    Globulin 2.9 2.4 - 3.5 gm/dL    Albumin/Globulin Ratio 1.4 1.1 - 2.0 ratio    Bilirubin Total 0.4 <=1.5 mg/dL    Alkaline Phosphatase 35 (L) 40 - 150 unit/L    Alanine Aminotransferase 13 0 - 55 unit/L    Aspartate Aminotransferase 23 5 - 34 unit/L    eGFR >60 mls/min/1.73/m2   CEA    Collection Time: 09/20/23 10:03 AM   Result Value Ref Range    Carcinoembryonic Antigen 50.95 (H) 0.00 - 3.00 ng/mL   CBC with Differential    Collection Time: 09/20/23 10:03 AM   Result Value Ref Range    WBC 4.66 4.50 - 11.50 x10(3)/mcL    RBC 3.37 (L) 4.20 - 5.40 x10(6)/mcL    Hgb 10.8 (L) 12.0 - 16.0 g/dL    Hct 33.6 (L) 37.0 - 47.0 %    MCV 99.7 (H) 80.0 - 94.0 fL    MCH 32.0 (H) 27.0 - 31.0 pg    MCHC 32.1 (L) 33.0 - 36.0 g/dL    RDW 16.4 11.5 - 17.0 %    Platelet 252 130 - 400 x10(3)/mcL    MPV 9.3 7.4 - 10.4 fL    Neut % 63.1 %    Lymph % 20.6 %    Mono % 12.7 %    Eos % 2.8 %    Basophil % 0.6 %    Lymph # 0.96 0.6 - 4.6 x10(3)/mcL    Neut # 2.94 2.1 - 9.2 x10(3)/mcL    Mono # 0.59 0.1 - 1.3 x10(3)/mcL    Eos # 0.13 0 - 0.9 x10(3)/mcL    Baso # 0.03 <=0.2 x10(3)/mcL     IG# 0.01 0 - 0.04 x10(3)/mcL    IG% 0.2 %          Assessment:   Recurrent metastatic breast cancer with peritoneal carcinomatosis - ER+ 4%, [place] neg, Her-2 neg (IHC 0)  History of bilateral breast cancer s/p bilateral mastectomies  Grade 1 hand and foot syndrome  Nausea and constipation    Plan:   Patient has stable grade 1 hand and foot syndrome.  She will continue Xeloda at 500 mg in the morning and 500 mg in the evening.  She will report any worsening HFS.  We discussed in detail today that her progressive abdominal symptoms, anorexia and weight loss are consistent with disease induced gastroparesis.  We also discussed that her disease is incurable, and goals of therapy are disease control and symptom management.  We also discussed her individual goals.  Trial of Reglan 5 mg TID for delayed gastric emptying, nausea, and constipation.  May titrate or taper if needed.  Short term follow-up in 1 week with virtual visit.  Patient in agreement with the plan of care.  All questions answered.  Total face to face time was 55 minutes, with the majority of the encounter spent educating patient on her disease process/goals of therapy/symptom management, and counseling/coordinating care.    Beronica Lin, JON, FNP-C     Other Physicians  Dr. Darrell Swan

## 2023-09-28 RX ORDER — LACTULOSE 10 G/15ML
30 SOLUTION ORAL; RECTAL DAILY
COMMUNITY
Start: 2023-07-20 | End: 2023-12-14 | Stop reason: SDUPTHER

## 2023-09-28 NOTE — PROGRESS NOTES
Subjective:       Patient ID: Kimberlyn Selby is a 67 y.o. female.    Chief Complaint:  Improved appetite, more frequent bowel movements    Diagnosis:  Recurrent metastatic breast cancer with peritoneal carcinomatosis -ER+ 4%, HI 0, HER-2 neg (IHC 0)                     Stage IA R breast cancer 8/11 (T1b N0 M0), 0.7 cm, GI, ER+/HI-, HER-2 negative                     Stage IIA L breast cancer 1/09 (T1c N1 M0), 1.6 cm, GIII, 1+ LN, ER/HI -, HER-2 negative                     Stage IIIA R breast cancer 1/05 (T2 N2a M0), 1.6 cm, GII, 4+ LN's, ER/HI +, HER-2 negative                     S/p bilateral mastectomies                     Post menopausal s/p hysterectomy                     Declined adjuvant XRT & hormonal therapy with initial breast cancer                     + COVID vaccinated     Treatment History  Adjuvant TAC x 6 completed 6/05  Adjuvant TC x 4 completed 5/09 --> XRT L breast    Current Therapy:  Xeloda s/p 2 cycles     Clinical History: WF status post right lumpectomy 1/05 for invasive breast cancer with the above characteristics. She completed 6 cycles of adjuvant TAC but declined radiation. She was placed on Arimidex but discontinued treatment due to significant myalgias. She had similar side effects with Femara and also complained of bone aching due to Tamoxifen and stopped therapy 9/05. Due to her hormone receptor status, she was placed on Evista but again was unable to tolerate therapy. She had an abnormal surveillance mammogram 11/08 showing clustered microcalcifications in the upper outer quadrant of the left breast with an associated 1 cm mass by ultrasound. 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 Patient underwent left lumpectomy and axillary dissection 1/15/09. Final pathology as outlined above. Postop course was complicated by cellulitis. She completed 4 cycles of adjuvant TC 5/09. She eventually consented to adjuvant radiation therapy to the left breast.  Surveillance CT PET scan 12/09 showed no abnormal hypermetabolic activity. Mammogram 6/22/11 showed a new suspicious finding in the right breast at the 4:00 position. Ultrasound was suspicious for malignancy. Ultrasound-guided core biopsies revealed invasive ductal carcinoma strongly ER positive at 85%, AK negative and HER-2 negative. CT PET scan 5/11 showed no abnormal hypermetabolic activity to indicate recurrent or metastatic disease. Although patient did have radiation therapy post lumpectomy on the left side, she never had radiation therapy to the right breast. She elected to undergo bilateral mastectomies and prophylactic right oophorectomy 8/16/11. Final pathology showed a 0.7 cm grade 1 infiltrating ductal carcinoma the right breast. Lamar dissection was not done to her previous surgery. She declined BRCA testing. Adjuvant hormonal therapy was recommended with Aromasin but she declined. She underwent a screening colonoscopy 6/5/13 which showed diverticula but no other abnormal findings; 10 year followup was recommended.      She was seen for a surveillance appointment 8/24/21 and did not return for her annual visit in 2022.    She developed symptoms of GE reflux and constipation that she treated with OTC medications without significant improvement.  She denied significant weight loss.  She was referred for a CT of the abdomen and pelvis by GI 6/2/23 ([facility]) that showed a diffuse abnormal enhancing soft tissue density in the retroperitoneum and mesenteric root with nodular intense enhancement.  Findings suspicious for an infiltrative lymphoproliferative disorder.  Mass encased the aorta without stenosis.  IVC appeared mildly narrowed without focal internal filling defect.  There were no additional abnormal findings.  Spleen was unremarkable.   EGD 6/13/23 showed mild chronic reflux changes and gastritis.  Colonoscopy showed a single polyp which was removed.  Pathology was consistent with metastatic  breast cancer.  She underwent a diagnostic laparoscopy 7/12/23 revealing peritoneal carcinomatosis.  Biopsies were positive for metastatic poorly differentiated adenocarcinoma with signet ring morphology consistent with breast primary (LIZ-3 and CK7 positive; CK20 and CDX2 negative).  ER was low positive 4%, KY negative and HER2 negative (IHC 0).    CT-PET 7/28/23:  Poorly delineated bulky abdominal lymphadenopathy with low-grade FDG uptake, SUV 2.4.  Small volume ascites.  No definite metastatic disease outside of the abdomen.  MRI Brain 8/2/23:  No evidence of metastatic disease.  Mild chronic microvascular ischemia and atrophy.    Interval History  Mrs. Selby today via telemedicine as a short-term follow-up visit she was not attended by anyone.  She consented to the virtual encounter.  At her on treatment visit last week, she complained of abdominal fullness, nausea and constipation with associated anorexia and weight loss.  She was prescribed Reglan, which she decided against after reviewing the side effect profile.  She completed her 2nd cycle of oral Xeloda 09/27/2023.  Over the last week, she reports improvement in her abdominal symptoms, specifically improved appetite and resolution of her constipation.  He ate a hamburger yesterday.  She she is traveling to [place] to visit her son next week and she is looking forward to that.    Review of Systems   Constitutional:  Positive for appetite change (improved) and unexpected weight change. Negative for activity change, fatigue and fever.   HENT:  Negative for mouth sores, sore throat and trouble swallowing.    Eyes: Negative.  Negative for visual disturbance.   Respiratory:  Negative for cough and shortness of breath.    Cardiovascular:  Negative for chest pain, palpitations and leg swelling.   Gastrointestinal:  Positive for abdominal pain (mild mid abdominal discomfort, improved). Negative for abdominal distention, blood in stool, constipation, diarrhea, nausea  and vomiting.   Genitourinary:  Negative for dysuria, frequency and urgency.   Musculoskeletal:  Negative for arthralgias and back pain.   Integumentary:  Positive for rash. Negative for pallor and wound.   Neurological:  Negative for dizziness, weakness, numbness and headaches.   Hematological:  Negative for adenopathy. Does not bruise/bleed easily.   Psychiatric/Behavioral: Negative.  The patient is not nervous/anxious.        PMHx:  Bilateral breast cancer, arthritis, hypothyroidism, GERD  PSHx:  Bilateral breast lumpectomies, bilateral mastectomies, hysterectomy/BSO, MediPort insertion and removal, uterine suspension, diagnostic laparoscopy  SH:  Lifetime nonsmoker, no significant alcohol use.  Lives in [place] with her .  FH:  Her father had kidney cancer, son had appendiceal cancer and MGF had liver cancer.    Objective:        There were no vitals taken for this visit.   Physical Exam  Constitutional:       Appearance: She is not ill-appearing.      Comments: Thin white female in NAD   HENT:      Head: Normocephalic.      Nose: Nose normal.   Eyes:      Conjunctiva/sclera: Conjunctivae normal.   Pulmonary:      Effort: Pulmonary effort is normal.   Chest:      Comments: Well-healed bilateral mastectomy incisions.  No suspicious masses, skin changes or axillary nodes bilaterally.  Abdominal:      Tenderness: There is no abdominal tenderness.   Musculoskeletal:         General: Normal range of motion.      Cervical back: Normal range of motion.   Skin:     Findings: Rash: Blister between first two toes, right foot.      Comments: Erythema of fingertips.  Resolving dry desquamation of feet, bilaterally   Neurological:      General: No focal deficit present.      Mental Status: She is alert and oriented to person, place, and time.   Psychiatric:         Mood and Affect: Mood normal.         Behavior: Behavior normal.       ECOG SCORE    0 - Fully active-able to carry on all pre-disease performance  without restriction          LABORATORY       Latest Reference Range & Units 09/20/23 10:03   WBC 4.50 - 11.50 x10(3)/mcL 4.66   RBC 4.20 - 5.40 x10(6)/mcL 3.37 (L)   Hemoglobin 12.0 - 16.0 g/dL 10.8 (L)   Hematocrit 37.0 - 47.0 % 33.6 (L)   MCV 80.0 - 94.0 fL 99.7 (H)   MCH 27.0 - 31.0 pg 32.0 (H)   MCHC 33.0 - 36.0 g/dL 32.1 (L)   RDW 11.5 - 17.0 % 16.4   Platelets 130 - 400 x10(3)/mcL 252   MPV 7.4 - 10.4 fL 9.3   Neut % % 63.1   LYMPH % % 20.6   Mono % % 12.7   Eosinophil % % 2.8   Basophil % % 0.6   Immature Granulocytes % 0.2   Neut # 2.1 - 9.2 x10(3)/mcL 2.94   Lymph # 0.6 - 4.6 x10(3)/mcL 0.96   Mono # 0.1 - 1.3 x10(3)/mcL 0.59   Eos # 0 - 0.9 x10(3)/mcL 0.13   Baso # <=0.2 x10(3)/mcL 0.03   Immature Grans (Abs) 0 - 0.04 x10(3)/mcL 0.01   Sodium 136 - 145 mmol/L 140   Potassium 3.5 - 5.1 mmol/L 4.0   Chloride 98 - 107 mmol/L 105   CO2 23 - 31 mmol/L 25   BUN 9.8 - 20.1 mg/dL 25.3 (H)   Creatinine 0.55 - 1.02 mg/dL 0.85   eGFR mls/min/1.73/m2 >60   Glucose 82 - 115 mg/dL 98   Calcium 8.4 - 10.2 mg/dL 9.5   Alkaline Phosphatase 40 - 150 unit/L 35 (L)   PROTEIN TOTAL 5.8 - 7.6 gm/dL 7.0   Albumin 3.4 - 4.8 g/dL 4.1   Albumin/Globulin Ratio 1.1 - 2.0 ratio 1.4   BILIRUBIN TOTAL <=1.5 mg/dL 0.4   AST 5 - 34 unit/L 23   ALT 0 - 55 unit/L 13   Globulin, Total 2.4 - 3.5 gm/dL 2.9   CEA 0.00 - 3.00 ng/mL 50.95 (H)   Breast Carcinoma Assoc Ag(CA 27.29) <=38.0 U/mL 43.7 (H)   (L): Data is abnormally low  (H): Data is abnormally high     Assessment:   Recurrent metastatic breast cancer with peritoneal carcinomatosis - ER+ 4%, KY neg, Her-2 neg (IHC 0)  History of bilateral breast cancer s/p bilateral mastectomies  Grade 1 hand and foot syndrome  Nausea and constipation- improved    Plan:   Patient completed her 2nd cycle of oral Xeloda earlier this week.    She has -foot syndrome.    She will begin her 3rd cycle of Xeloda beginning 10/04/2023 at 500 mg b.i.d., 14 days on and 7 days off.  She has been  instructed to decrease to 7 days on 7 days off if she develops recurrent/progressive skin symptoms.    She has elected to hold off on the Reglan for now as her GI symptoms have improved.    She will maintain an aggressive bowel regimen and continue to avoid supplements/vitamins at this time.  In office follow-up just prior to her 4th cycle of Xeloda.  CBC, CMP, CEA and CA 27-29 with that visit.    All questions answered to the satisfaction of the patient.    Length of this consultation was 13 minutes and greater than 50% of the encounter was spent counseling/coordinating care.    This is a telehealth note.  Synchronous telemedicine services rendered via real-time interactive audio and video telecommunications system according to [facility] protocols.  I, Beronica Lin, conducted the visit from location identified below.  The patient participated in the visit at a non-[facility] location selected by the patient, identified below.  I am licensed in the state where the patient stated they are located.  The patient stated that they understood and accepted the privacy and security risks to their information at their location.      Beronica Lin, JON, FNP-C     Other Physicians  Dr. Darrell Swan

## 2023-09-29 ENCOUNTER — OFFICE VISIT (OUTPATIENT)
Dept: HEMATOLOGY/ONCOLOGY | Facility: CLINIC | Age: 68
End: 2023-09-29
Payer: MEDICARE

## 2023-09-29 DIAGNOSIS — C78.6 SECONDARY MALIGNANT NEOPLASM OF PERITONEUM: ICD-10-CM

## 2023-09-29 DIAGNOSIS — C50.311 MALIGNANT NEOPLASM OF LOWER-INNER QUADRANT OF RIGHT BREAST OF FEMALE, ESTROGEN RECEPTOR POSITIVE: Primary | ICD-10-CM

## 2023-09-29 DIAGNOSIS — Z17.0 MALIGNANT NEOPLASM OF LOWER-INNER QUADRANT OF RIGHT BREAST OF FEMALE, ESTROGEN RECEPTOR POSITIVE: Primary | ICD-10-CM

## 2023-09-29 PROCEDURE — 99214 PR OFFICE/OUTPT VISIT, EST, LEVL IV, 30-39 MIN: ICD-10-PCS | Mod: 95,,, | Performed by: NURSE PRACTITIONER

## 2023-09-29 PROCEDURE — 99214 OFFICE O/P EST MOD 30 MIN: CPT | Mod: 95,,, | Performed by: NURSE PRACTITIONER

## 2023-10-12 ENCOUNTER — TELEPHONE (OUTPATIENT)
Dept: HEMATOLOGY/ONCOLOGY | Facility: CLINIC | Age: 68
End: 2023-10-12
Payer: MEDICARE

## 2023-10-12 NOTE — TELEPHONE ENCOUNTER
Patient called with complaints of bilateral hand swelling and numbness. She states that the swelling and numbness got progressively worst since Tuesday and is now having a hard time opening things. She is currently not on any diuretics. She is visiting her son in [place] now until Monday. Reports that her hands are sensitive.   Message sent to cleopatra in regards to patients concerns.

## 2023-10-18 ENCOUNTER — OFFICE VISIT (OUTPATIENT)
Dept: ORTHOPEDICS | Facility: CLINIC | Age: 68
End: 2023-10-18
Payer: MEDICARE

## 2023-10-18 ENCOUNTER — HOSPITAL ENCOUNTER (OUTPATIENT)
Dept: RADIOLOGY | Facility: CLINIC | Age: 68
Discharge: HOME | End: 2023-10-18
Attending: ORTHOPAEDIC SURGERY
Payer: MEDICARE

## 2023-10-18 VITALS
HEIGHT: 62 IN | SYSTOLIC BLOOD PRESSURE: 117 MMHG | DIASTOLIC BLOOD PRESSURE: 68 MMHG | HEART RATE: 65 BPM | WEIGHT: 89.06 LBS | BODY MASS INDEX: 16.39 KG/M2

## 2023-10-18 DIAGNOSIS — M25.511 CHRONIC RIGHT SHOULDER PAIN: ICD-10-CM

## 2023-10-18 DIAGNOSIS — G89.29 CHRONIC RIGHT SHOULDER PAIN: ICD-10-CM

## 2023-10-18 DIAGNOSIS — M75.30 CALCIFIC BURSITIS OF SHOULDER: Primary | ICD-10-CM

## 2023-10-18 PROCEDURE — 20610 LARGE JOINT ASPIRATION/INJECTION: R SUBACROMIAL BURSA: ICD-10-PCS | Mod: RT,,, | Performed by: ORTHOPAEDIC SURGERY

## 2023-10-18 PROCEDURE — 73030 XR SHOULDER COMPLETE 2 OR MORE VIEWS RIGHT: ICD-10-PCS | Mod: RT,,, | Performed by: ORTHOPAEDIC SURGERY

## 2023-10-18 PROCEDURE — 73030 X-RAY EXAM OF SHOULDER: CPT | Mod: RT,,, | Performed by: ORTHOPAEDIC SURGERY

## 2023-10-18 PROCEDURE — 99213 OFFICE O/P EST LOW 20 MIN: CPT | Mod: 25,,, | Performed by: ORTHOPAEDIC SURGERY

## 2023-10-18 PROCEDURE — 20610 DRAIN/INJ JOINT/BURSA W/O US: CPT | Mod: RT,,, | Performed by: ORTHOPAEDIC SURGERY

## 2023-10-18 PROCEDURE — 99213 PR OFFICE/OUTPT VISIT, EST, LEVL III, 20-29 MIN: ICD-10-PCS | Mod: 25,,, | Performed by: ORTHOPAEDIC SURGERY

## 2023-10-18 RX ORDER — BETAMETHASONE SODIUM PHOSPHATE AND BETAMETHASONE ACETATE 3; 3 MG/ML; MG/ML
6 INJECTION, SUSPENSION INTRA-ARTICULAR; INTRALESIONAL; INTRAMUSCULAR; SOFT TISSUE
Status: DISCONTINUED | OUTPATIENT
Start: 2023-10-18 | End: 2023-10-18 | Stop reason: HOSPADM

## 2023-10-18 RX ORDER — LIDOCAINE HYDROCHLORIDE 20 MG/ML
5 INJECTION, SOLUTION EPIDURAL; INFILTRATION; INTRACAUDAL; PERINEURAL
Status: DISCONTINUED | OUTPATIENT
Start: 2023-10-18 | End: 2023-10-18 | Stop reason: HOSPADM

## 2023-10-18 RX ADMIN — BETAMETHASONE SODIUM PHOSPHATE AND BETAMETHASONE ACETATE 6 MG: 3; 3 INJECTION, SUSPENSION INTRA-ARTICULAR; INTRALESIONAL; INTRAMUSCULAR; SOFT TISSUE at 02:10

## 2023-10-18 RX ADMIN — LIDOCAINE HYDROCHLORIDE 5 ML: 20 INJECTION, SOLUTION EPIDURAL; INFILTRATION; INTRACAUDAL; PERINEURAL at 02:10

## 2023-10-18 NOTE — PROGRESS NOTES
Chief Complaint:   Chief Complaint   Patient presents with    Right Shoulder - Pain    Pain     Est patient here with Right shoulder pain. Injection 3/10/2023. Was working with PT which helped especially with strengthening. Cancer returned and had to put shoulder and other care on hold. Wanting to discuss options. X-rays today.   mn       Consulting Physician: No ref. provider found    History of present illness:    She is a pleasant 67-year-old whose had right shoulder pain since February 2022.  The pains located laterally on the shoulder.  She notes it worse with activity and somewhat better with rest.  She thinks she may have aggravated while working out.  She is tried some anti-inflammatory medicines and a home exercise program for greater than 6 weeks without relief.  She denies any numbness or tingling.  Tried an injection April of 2022 which did help.  The pain is not terribly bothersome for but it does bother her with activities and working out.    She returns today.  We tried injection as well as formal physical therapy.  She had a return of her breast cancer and that is kind of put her shoulder on hold from a surgical standpoint.    Past Medical History:   Diagnosis Date    Breast cancer     3 occurences    Constipation     Gastric reflux     Other calcification of muscle, right shoulder     Thyroid disease        Past Surgical History:   Procedure Laterality Date    BREAST LUMPECTOMY Left 01/2009    BREAST LUMPECTOMY Right 01/2005    COLONOSCOPY  06/05/2013    Murali Causey MD    DIAGNOSTIC LAPAROSCOPY N/A 7/12/2023    Procedure: LAPAROSCOPY, DIAGNOSTIC;  Surgeon: Get Swan MD;  Location: [facility] OR;  Service: General;  Laterality: N/A;    ENDOSCOPY      HYSTERECTOMY  6/1996    LAPAROSCOPIC BIOPSY OF RETROPERITONEAL LYMPH NODE N/A 7/12/2023    Procedure: BIOPSY, RETROPERITONEAL LYMPH NODE, LAPAROSCOPIC;  Surgeon: Get Swan MD;  Location: [facility] OR;  Service: General;  Laterality: N/A;     LAPAROSCOPIC TOTAL HYSTERECTOMY  08/2011    MASTECTOMY Bilateral 08/2011    MEDIPORT INSERTION, SINGLE      MEDIPORT REMOVAL      UTERINE SUSPENSION  1990       Current Outpatient Medications   Medication Sig    aloe vera 25 mg Cap Take by mouth.    APPLE CIDER VINEGAR ORAL Take by mouth.    ascorbic acid, vitamin C, (VITAMIN C) 100 MG tablet Take 100 mg by mouth once daily.    biotin 1 mg Cap Take by mouth.    capecitabine (XELODA) 500 MG Tab Take 1 tablet (500 mg total) by mouth 2 (two) times daily on days 1-14 of each 21 day cycle. Take within 30 minutes after a meal.    enzymes,digestive (DIGESTIVE ENZYMES ORAL) Take by mouth.    ergocalciferol, vitamin D2, (VITAMIN D ORAL) Take by mouth.    GINGER OIL ORAL Take by mouth.    Lactobac no.41/Bifidobact no.7 (PROBIOTIC-10 ORAL) Take by mouth.    lactulose (CHRONULAC) 10 gram/15 mL solution Take 30 mLs by mouth Daily.    multivitamin (THERAGRAN) per tablet Take 1 tablet by mouth once daily.    omega-3 fatty acids/fish oil (FISH OIL-OMEGA-3 FATTY ACIDS) 300-1,000 mg capsule Take by mouth once daily.    ondansetron (ZOFRAN-ODT) 4 MG TbDL Take 4 mg by mouth once.    oxybutynin (DITROPAN-XL) 5 MG TR24 Take 1 tablet (5 mg total) by mouth once daily.    pantoprazole (PROTONIX) 40 MG tablet Take 1 tablet by mouth once daily.    sertraline (ZOLOFT) 100 MG tablet Take 1 tablet (100 mg total) by mouth once daily.    thyroid, pork, (ARMOUR THYROID) 15 mg Tab TAKE ONE TABLET BY MOUTH IN THE MORNING BEFORE BREAKFAST*TO BE TAKEN WITH ARMOUR THYROID 30MG FOR A TOTAL OF 45MG*    thyroid, pork, (ARMOUR THYROID) 30 mg Tab Take 1 tablet (30 mg total) by mouth before breakfast. Will also take a 15 mg for 45 mg total daily dose.    ALPRAZolam (XANAX) 0.25 MG tablet Take 1 tablet (0.25 mg total) by mouth 2 (two) times daily as needed for Anxiety (**use sparingly**).     No current facility-administered medications for this visit.       Review of patient's allergies indicates:  No Known  "Allergies    Family History   Problem Relation Age of Onset    Alzheimer's disease Mother     Hypertension Father     Diabetes Father 80    Kidney cancer Father     Other Father         Placed in NH    Cancer Father         Kidney cancer    Anxiety disorder Brother     No Known Problems Brother     Cancer Son         Appendix       Social History     Socioeconomic History    Marital status:     Number of children: 2   Occupational History    Occupation: Homemaker   Tobacco Use    Smoking status: Never    Smokeless tobacco: Never   Substance and Sexual Activity    Alcohol use: Never    Drug use: Never    Sexual activity: Yes     Partners: Male     Birth control/protection: Post-menopausal   Social History Narrative    2 adopted sons.       Review of Systems:    Constitution:   Denies chills, fever, and sweats.  HENT:   Denies headaches or blurry vision.  Cardiovascular:  Denies chest pain or irregular heart beat.  Respiratory:   Denies cough or shortness of breath.  Gastrointestinal:  Denies abdominal pain, nausea, or vomiting.  Musculoskeletal:   Denies muscle cramps.  Neurological:   Denies dizziness or focal weakness.  Psychiatric/Behavior: Normal mental status.  Hematology/Lymph:  Denies bleeding problem or easy bruising/bleeding.  Skin:    Denies rash or suspicious lesions.    Examination:    Vital Signs:    Vitals:    10/18/23 1515 10/18/23 1516   BP: 117/68    Pulse: 65    Weight: 40.4 kg (89 lb 1.1 oz)    Height: 5' 2.01" (1.575 m)    PainSc:    4       Body mass index is 16.29 kg/m².    Constitution:   Well-developed, well nourished patient in no acute distress.  Neurological:   Alert and oriented x 3 and cooperative to examination.     Psychiatric/Behavior: Normal mental status.  Respiratory:   No shortness of breath.  Eyes:    Extraoccular muscles intact  Skin:    No scars, rash or suspicious lesions.    MSK:   Shoulder Exam:                   Right        Left  Skin:                                 "   Normal     Normal  AC joint tenderness:           None         None  Forward Flexion:                160            180  Abduction:                          100           180  External Rotation:               80              80  Internal Rotation:                80             80  Supraspinatus stress test:    +        Neg  Hawkin's Impingement:        +           Neg  Neer Impingement:            Neg           Neg  Apprehension:                   Neg           Neg  [place]'s:                             +           Neg  Speed's test:                     Neg            Neg  Strength:  External Rotation:           5/5                5/5  Lift Off/belly press:          5/5                5/5    N-V status:                   Intact             Intact    C-spine: Normal ROM, NT    Four views of the right shoulder show calcific bursitis.       Assessment: Calcific bursitis of shoulder    Chronic right shoulder pain  -     X-ray Shoulder 2 or More Views Right; Future; Expected date: 10/18/2023          Plan:  We are going to try repeat injection today.  If her pain returns we could consider referral for image guided lavage of her calcific bursa

## 2023-10-18 NOTE — PROCEDURES
Large Joint Aspiration/Injection: R subacromial bursa    Date/Time: 10/18/2023 2:45 PM    Performed by: Doug Higgins Jr., MD  Authorized by: Doug Higgins Jr., MD    Consent Done?:  Yes (Verbal)  Indications:  Pain  Site marked: the procedure site was marked    Timeout: prior to procedure the correct patient, procedure, and site was verified    Prep: patient was prepped and draped in usual sterile fashion      Local anesthesia used?: Yes    Local anesthetic:  Topical anesthetic    Details:  Needle Size:  21 G  Ultrasonic Guidance for needle placement?: No    Approach:  Posterior  Location:  Shoulder  Site:  R subacromial bursa  Medications:  5 mL LIDOcaine (PF) 20 mg/mL (2%) 20 mg/mL (2 %); 6 mg betamethasone acetate-betamethasone sodium phosphate 6 mg/mL  Patient tolerance:  Patient tolerated the procedure well with no immediate complications

## 2023-10-24 ENCOUNTER — OFFICE VISIT (OUTPATIENT)
Dept: HEMATOLOGY/ONCOLOGY | Facility: CLINIC | Age: 68
End: 2023-10-24
Payer: MEDICARE

## 2023-10-24 VITALS
HEIGHT: 62 IN | HEART RATE: 65 BPM | OXYGEN SATURATION: 98 % | WEIGHT: 87.88 LBS | SYSTOLIC BLOOD PRESSURE: 113 MMHG | BODY MASS INDEX: 16.17 KG/M2 | DIASTOLIC BLOOD PRESSURE: 67 MMHG | RESPIRATION RATE: 15 BRPM | TEMPERATURE: 98 F

## 2023-10-24 DIAGNOSIS — Z17.0 MALIGNANT NEOPLASM OF LOWER-INNER QUADRANT OF RIGHT BREAST OF FEMALE, ESTROGEN RECEPTOR POSITIVE: Primary | ICD-10-CM

## 2023-10-24 DIAGNOSIS — L27.0 DRUG RASH: ICD-10-CM

## 2023-10-24 DIAGNOSIS — C50.311 MALIGNANT NEOPLASM OF LOWER-INNER QUADRANT OF RIGHT BREAST OF FEMALE, ESTROGEN RECEPTOR POSITIVE: Primary | ICD-10-CM

## 2023-10-24 DIAGNOSIS — C78.6 SECONDARY MALIGNANT NEOPLASM OF PERITONEUM: ICD-10-CM

## 2023-10-24 PROCEDURE — 99999 PR PBB SHADOW E&M-EST. PATIENT-LVL V: CPT | Mod: PBBFAC,,, | Performed by: NURSE PRACTITIONER

## 2023-10-24 PROCEDURE — 99215 OFFICE O/P EST HI 40 MIN: CPT | Mod: PBBFAC | Performed by: NURSE PRACTITIONER

## 2023-10-24 PROCEDURE — 99999 PR PBB SHADOW E&M-EST. PATIENT-LVL V: ICD-10-PCS | Mod: PBBFAC,,, | Performed by: NURSE PRACTITIONER

## 2023-10-24 PROCEDURE — 99214 OFFICE O/P EST MOD 30 MIN: CPT | Mod: S$PBB,,, | Performed by: NURSE PRACTITIONER

## 2023-10-24 PROCEDURE — 99214 PR OFFICE/OUTPT VISIT, EST, LEVL IV, 30-39 MIN: ICD-10-PCS | Mod: S$PBB,,, | Performed by: NURSE PRACTITIONER

## 2023-10-24 RX ORDER — CAPECITABINE 500 MG/1
500 TABLET, FILM COATED ORAL 2 TIMES DAILY
Qty: 28 TABLET | Refills: 5 | Status: ACTIVE | OUTPATIENT
Start: 2023-10-24

## 2023-11-08 ENCOUNTER — OFFICE VISIT (OUTPATIENT)
Dept: ORTHOPEDICS | Facility: CLINIC | Age: 68
End: 2023-11-08
Payer: MEDICARE

## 2023-11-08 VITALS — WEIGHT: 87.94 LBS | HEIGHT: 62 IN | BODY MASS INDEX: 16.18 KG/M2

## 2023-11-08 DIAGNOSIS — M75.30 CALCIFIC BURSITIS OF SHOULDER: Primary | ICD-10-CM

## 2023-11-08 DIAGNOSIS — M75.111 NONTRAUMATIC INCOMPLETE TEAR OF RIGHT ROTATOR CUFF: ICD-10-CM

## 2023-11-08 PROCEDURE — 99213 PR OFFICE/OUTPT VISIT, EST, LEVL III, 20-29 MIN: ICD-10-PCS | Mod: ,,, | Performed by: ORTHOPAEDIC SURGERY

## 2023-11-08 PROCEDURE — 99213 OFFICE O/P EST LOW 20 MIN: CPT | Mod: ,,, | Performed by: ORTHOPAEDIC SURGERY

## 2023-11-08 NOTE — PROGRESS NOTES
Chief Complaint:   Chief Complaint   Patient presents with    Right Shoulder - Pain    Pain     Right shoulder MRI Results.           Consulting Physician: No ref. provider found    History of present illness:    She is a pleasant 67-year-old whose had right shoulder pain since February 2022.  The pains located laterally on the shoulder.  She notes it worse with activity and somewhat better with rest.  She thinks she may have aggravated while working out.  She is tried some anti-inflammatory medicines and a home exercise program for greater than 6 weeks without relief.  She denies any numbness or tingling.  Tried an injection April of 2022 which did help.  The pain is not terribly bothersome for but it does bother her with activities and working out.    She returns today.  We tried injection as well as formal physical therapy.  She had a return of her breast cancer and that is kind of put her shoulder on hold from a surgical standpoint.  We tried an injection in October of 2023 with minimal relief.  She is status post MRI.    Past Medical History:   Diagnosis Date    Breast cancer     3 occurences    Constipation     Gastric reflux     Other calcification of muscle, right shoulder     Thyroid disease        Past Surgical History:   Procedure Laterality Date    BREAST LUMPECTOMY Left 01/2009    BREAST LUMPECTOMY Right 01/2005    COLONOSCOPY  06/05/2013    Murali Causey MD    DIAGNOSTIC LAPAROSCOPY N/A 7/12/2023    Procedure: LAPAROSCOPY, DIAGNOSTIC;  Surgeon: Get Swan MD;  Location: [facility] OR;  Service: General;  Laterality: N/A;    ENDOSCOPY      HYSTERECTOMY  6/1996    LAPAROSCOPIC BIOPSY OF RETROPERITONEAL LYMPH NODE N/A 7/12/2023    Procedure: BIOPSY, RETROPERITONEAL LYMPH NODE, LAPAROSCOPIC;  Surgeon: Get Swan MD;  Location: [facility] OR;  Service: General;  Laterality: N/A;    LAPAROSCOPIC TOTAL HYSTERECTOMY  08/2011    MASTECTOMY Bilateral 08/2011    MEDIPORT INSERTION, SINGLE      MEDIPORT REMOVAL       UTERINE SUSPENSION  1990       Current Outpatient Medications   Medication Sig    aloe vera 25 mg Cap Take by mouth.    APPLE CIDER VINEGAR ORAL Take by mouth.    ascorbic acid, vitamin C, (VITAMIN C) 100 MG tablet Take 100 mg by mouth once daily.    biotin 1 mg Cap Take by mouth.    capecitabine (XELODA) 500 MG Tab Take 1 tablet (500 mg total) by mouth 2 (two) times daily for 7 days on, followed by 7 days off and repeat.    enzymes,digestive (DIGESTIVE ENZYMES ORAL) Take by mouth.    ergocalciferol, vitamin D2, (VITAMIN D ORAL) Take by mouth.    GINGER OIL ORAL Take by mouth.    Lactobac no.41/Bifidobact no.7 (PROBIOTIC-10 ORAL) Take by mouth.    lactulose (CHRONULAC) 10 gram/15 mL solution Take 30 mLs by mouth Daily.    multivitamin (THERAGRAN) per tablet Take 1 tablet by mouth once daily.    omega-3 fatty acids/fish oil (FISH OIL-OMEGA-3 FATTY ACIDS) 300-1,000 mg capsule Take by mouth once daily.    ondansetron (ZOFRAN-ODT) 4 MG TbDL Take 4 mg by mouth once.    oxybutynin (DITROPAN-XL) 5 MG TR24 Take 1 tablet (5 mg total) by mouth once daily.    pantoprazole (PROTONIX) 40 MG tablet Take 1 tablet by mouth once daily.    sertraline (ZOLOFT) 100 MG tablet Take 1 tablet (100 mg total) by mouth once daily.    thyroid, pork, (ARMOUR THYROID) 15 mg Tab TAKE ONE TABLET BY MOUTH IN THE MORNING BEFORE BREAKFAST*TO BE TAKEN WITH ARMOUR THYROID 30MG FOR A TOTAL OF 45MG*    thyroid, pork, (ARMOUR THYROID) 30 mg Tab Take 1 tablet (30 mg total) by mouth before breakfast. Will also take a 15 mg for 45 mg total daily dose.    ALPRAZolam (XANAX) 0.25 MG tablet Take 1 tablet (0.25 mg total) by mouth 2 (two) times daily as needed for Anxiety (**use sparingly**).     No current facility-administered medications for this visit.       Review of patient's allergies indicates:  No Known Allergies    Family History   Problem Relation Age of Onset    Alzheimer's disease Mother     Hypertension Father     Diabetes Father 80    Kidney  "cancer Father     Other Father         Placed in NH    Cancer Father         Kidney cancer    Anxiety disorder Brother     No Known Problems Brother     Cancer Son         Appendix       Social History     Socioeconomic History    Marital status:     Number of children: 2   Occupational History    Occupation: Homemaker   Tobacco Use    Smoking status: Never    Smokeless tobacco: Never   Substance and Sexual Activity    Alcohol use: Never    Drug use: Never    Sexual activity: Yes     Partners: Male     Birth control/protection: Post-menopausal   Social History Narrative    2 adopted sons.       Review of Systems:    Constitution:   Denies chills, fever, and sweats.  HENT:   Denies headaches or blurry vision.  Cardiovascular:  Denies chest pain or irregular heart beat.  Respiratory:   Denies cough or shortness of breath.  Gastrointestinal:  Denies abdominal pain, nausea, or vomiting.  Musculoskeletal:   Denies muscle cramps.  Neurological:   Denies dizziness or focal weakness.  Psychiatric/Behavior: Normal mental status.  Hematology/Lymph:  Denies bleeding problem or easy bruising/bleeding.  Skin:    Denies rash or suspicious lesions.    Examination:    Vital Signs:    Vitals:    11/08/23 1548   Weight: 39.9 kg (87 lb 15.4 oz)   Height: 5' 2.01" (1.575 m)       Body mass index is 16.08 kg/m².    Constitution:   Well-developed, well nourished patient in no acute distress.  Neurological:   Alert and oriented x 3 and cooperative to examination.     Psychiatric/Behavior: Normal mental status.  Respiratory:   No shortness of breath.  Eyes:    Extraoccular muscles intact  Skin:    No scars, rash or suspicious lesions.    MSK:   Shoulder Exam:                   Right        Left  Skin:                                   Normal     Normal  AC joint tenderness:           None         None  Forward Flexion:                160            180  Abduction:                          100           180  External Rotation:        "        80              80  Internal Rotation:                80             80  Supraspinatus stress test:    +        Neg  Hawkin's Impingement:        +           Neg  Neer Impingement:            Neg           Neg  Apprehension:                   Neg           Neg  [place]:                             +           Neg  Speed's test:                     Neg            Neg  Strength:  External Rotation:           5/5                5/5  Lift Off/belly press:          5/5                5/5    N-V status:                   Intact             Intact    C-spine: Normal ROM, NT    MRI was reviewed which shows large calcific deposits within the bursa with incomplete bursal sided rotator cuff tearing.       Assessment: Calcific bursitis of shoulder    Nontraumatic incomplete tear of right rotator cuff          Plan:  With her ongoing cancer treatments I think it would be best to avoid surgery.  I am going to refer her for a image guided subacromial calcific bursa lavage.  I will see her back in 2 months for re-evaluation

## 2023-11-14 ENCOUNTER — TELEPHONE (OUTPATIENT)
Dept: HEMATOLOGY/ONCOLOGY | Facility: CLINIC | Age: 68
End: 2023-11-14
Payer: MEDICARE

## 2023-11-14 NOTE — LETTER
2023    Kim[address]lyn Selby  [address] [address]  : 1955             [facility]  [address], [address]  [place] [address]  Phone: [phone] To Whom It May Concern,     is currently under my care for the treatment of metastatic breast cancer that was diagnosed on 2023. She is actively being treated with chemotherapy for her disease. Maintaining a low stress environment is medically recommended.    If you have any questions or concerns, please don't hesitate to call.    Sincerely,          Dr. Daren webb

## 2023-11-14 NOTE — TELEPHONE ENCOUNTER
----- Message from Ne Echevarria LPN sent at 11/14/2023  9:08 AM CST -----  Regarding: letter  Patient is requesting a letter stating cancer diagnosis and date of diagnosis. States it would benefit her if the letter would also state that she should not undergo unnecessary stress while undergoing treatment. She currently has a restraining order against her son that is going to lapse soon. She would like to take this letter to court to help in getting the restraining order reinstated. Her court date is this Thursday. She asked that it be emailed to her or she can , if needed.

## 2023-11-15 ENCOUNTER — HOSPITAL ENCOUNTER (OUTPATIENT)
Dept: RADIOLOGY | Facility: HOSPITAL | Age: 68
Discharge: HOME | End: 2023-11-15
Attending: NURSE PRACTITIONER
Payer: MEDICARE

## 2023-11-15 DIAGNOSIS — C78.6 SECONDARY MALIGNANT NEOPLASM OF PERITONEUM: ICD-10-CM

## 2023-11-15 DIAGNOSIS — Z17.0 MALIGNANT NEOPLASM OF LOWER-INNER QUADRANT OF RIGHT BREAST OF FEMALE, ESTROGEN RECEPTOR POSITIVE: ICD-10-CM

## 2023-11-15 DIAGNOSIS — C50.311 MALIGNANT NEOPLASM OF LOWER-INNER QUADRANT OF RIGHT BREAST OF FEMALE, ESTROGEN RECEPTOR POSITIVE: ICD-10-CM

## 2023-11-15 PROCEDURE — 71260 CT THORAX DX C+: CPT | Mod: TC

## 2023-11-15 PROCEDURE — 25500020 PHARM REV CODE 255: Performed by: NURSE PRACTITIONER

## 2023-11-15 PROCEDURE — 74177 CT ABD & PELVIS W/CONTRAST: CPT | Mod: TC

## 2023-11-15 RX ADMIN — IOPAMIDOL 100 ML: 755 INJECTION, SOLUTION INTRAVENOUS at 11:11

## 2023-11-15 RX ADMIN — DIATRIZOATE MEGLUMINE AND DIATRIZOATE SODIUM 30 ML: 660; 100 LIQUID ORAL; RECTAL at 10:11

## 2023-11-17 NOTE — PROGRESS NOTES
Subjective:       Patient ID: Kimberlyn Selby is a 68 y.o. female.    Chief Complaint:  I feel better, I have gained weight    Diagnosis:  Recurrent metastatic breast cancer with peritoneal carcinomatosis -ER+ 4%, TX 0, HER-2 neg (IHC 0)                     Stage IA R breast cancer 8/11 (T1b N0 M0), 0.7 cm, GI, ER+/TX-, HER-2 negative                     Stage IIA L breast cancer 1/09 (T1c N1 M0), 1.6 cm, GIII, 1+ LN, ER/TX -, HER-2 negative                     Stage IIIA R breast cancer 1/05 (T2 N2a M0), 1.6 cm, GII, 4+ LN's, ER/TX +, HER-2 negative                     S/p bilateral mastectomies                     Post menopausal s/p hysterectomy                     Declined adjuvant XRT & hormonal therapy with initial breast cancer                     + COVID vaccinated     Treatment History  Adjuvant TAC x 6 completed 6/05  Adjuvant TC x 4 completed 5/09 --> XRT L breast    Current Therapy:  Xeloda started 8/23 - current dose 500 mg b.i.d. 7 days on/7 days off     Clinical History: WF status post right lumpectomy 1/05 for invasive breast cancer with the above characteristics. She completed 6 cycles of adjuvant TAC but declined radiation. She was placed on Arimidex but discontinued treatment due to significant myalgias. She had similar side effects with Femara and also complained of bone aching due to Tamoxifen and stopped therapy 9/05. Due to her hormone receptor status, she was placed on Evista but again was unable to tolerate therapy. She had an abnormal surveillance mammogram 11/08 showing clustered microcalcifications in the upper outer quadrant of the left breast with an associated 1 cm mass by ultrasound. 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 Patient underwent left lumpectomy and axillary dissection 1/15/09. Final pathology as outlined above. Postop course was complicated by cellulitis. She completed 4 cycles of adjuvant TC 5/09. She eventually consented to  adjuvant radiation therapy to the left breast. Surveillance CT PET scan 12/09 showed no abnormal hypermetabolic activity. Mammogram 6/22/11 showed a new suspicious finding in the right breast at the 4:00 position. Ultrasound was suspicious for malignancy. Ultrasound-guided core biopsies revealed invasive ductal carcinoma strongly ER positive at 85%, OH negative and HER-2 negative. CT PET scan 5/11 showed no abnormal hypermetabolic activity to indicate recurrent or metastatic disease. Although patient did have radiation therapy post lumpectomy on the left side, she never had radiation therapy to the right breast. She elected to undergo bilateral mastectomies and prophylactic right oophorectomy 8/16/11. Final pathology showed a 0.7 cm grade 1 infiltrating ductal carcinoma the right breast. Lamar dissection was not done to her previous surgery. She declined BRCA testing. Adjuvant hormonal therapy was recommended with Aromasin but she declined. She underwent a screening colonoscopy 6/5/13 which showed diverticula but no other abnormal findings; 10 year followup was recommended.      She was seen for a surveillance appointment 8/24/21 and did not return for her annual visit in 2022.    She developed symptoms of GE reflux and constipation that she treated with OTC medications without significant improvement.  She denied significant weight loss.  She was referred for a CT of the abdomen and pelvis by GI 6/2/23 ([facility]) that showed a diffuse abnormal enhancing soft tissue density in the retroperitoneum and mesenteric root with nodular intense enhancement.  Findings suspicious for an infiltrative lymphoproliferative disorder.  Mass encased the aorta without stenosis.  IVC appeared mildly narrowed without focal internal filling defect.  There were no additional abnormal findings.  Spleen was unremarkable.   EGD 6/13/23 showed mild chronic reflux changes and gastritis.  Colonoscopy showed a single polyp which was  removed.  Pathology was consistent with metastatic breast cancer.  She underwent a diagnostic laparoscopy 7/12/23 revealing peritoneal carcinomatosis.  Biopsies were positive for metastatic poorly differentiated adenocarcinoma with signet ring morphology consistent with breast primary (LIZ-3 and CK7 positive; CK20 and CDX2 negative).  ER was low positive 4%, MI negative and HER2 negative (IHC 0).    CT-PET 7/28/23:  Poorly delineated bulky abdominal lymphadenopathy with low-grade FDG uptake, SUV 2.4.  Small volume ascites.  No definite metastatic disease outside of the abdomen.  MRI Brain 8/2/23:  No evidence of metastatic disease.  Mild chronic microvascular ischemia and atrophy.    She was initiated on treatment with oral Xeloda.  She did not tolerate dose escalation secondary to hand-foot syndrome.  She required dose adjustment to manage her side effects.    CT C/A/P 11/15/23:  Improved confluent retroperitoneal and mesenteric adenopathy.  Improved mesenteric soft tissue density and edema.    Interval History  She returns to the office today by herself for a three-week follow-up visit.  She remains on palliative therapy with single agent Xeloda, current dose 500 mg b.i.d. 7 days on and 7 days off.  She has stable grade 1 hand-foot syndrome at this dose.  Primarily involving the hands and not the feet.  No mucositis or diarrhea.  She continues to have issues with constipation.  She is taking daily lactulose and MiraLax and also a stool softener.  Her elevated CEA and CA 27-29 levels show serial decreased since starting treatment.  CT scans of the chest, abdomen and pelvis 11/15/23 shows significant decrease of the confluent lymphadenopathy in the retroperitoneum and mesentery compared to her previous imaging with some residual mesenteric edema and small volume free intraperitoneal fluid.  There were also small, scattered but stable sclerotic osseous lesions.  She has no evidence of bone pain.      Review of  "Systems   Constitutional:  Positive for appetite change (improved). Negative for activity change, fatigue and fever.   HENT:  Negative for mouth sores, sore throat and trouble swallowing.    Eyes: Negative.    Respiratory:  Negative for cough and shortness of breath.    Cardiovascular:  Negative for chest pain, palpitations and leg swelling.   Gastrointestinal:  Positive for constipation. Negative for abdominal distention, abdominal pain, diarrhea, nausea and vomiting.   Genitourinary:  Negative for dysuria, frequency and urgency.   Musculoskeletal:  Negative for arthralgias and back pain.   Integumentary:  Negative for pallor and rash.   Neurological:  Negative for dizziness, weakness, numbness and headaches.   Hematological:  Negative for adenopathy. Does not bruise/bleed easily.   Psychiatric/Behavioral: Negative.         PMHx:  Bilateral breast cancer, arthritis, hypothyroidism, GERD  PSHx:  Bilateral breast lumpectomies, bilateral mastectomies, hysterectomy/BSO, MediPort insertion and removal, uterine suspension, diagnostic laparoscopy  SH:  Lifetime nonsmoker, no significant alcohol use.  Lives in [place] with her .  FH:  Her father had kidney cancer, son had appendiceal cancer and MGF had liver cancer.    Objective:        BP (!) 142/67 (BP Location: Right arm)   Pulse (!) 57   Temp 97.4 °F (36.3 °C) (Oral)   Resp 18   Ht 5' 2" (1.575 m)   Wt 40.9 kg (90 lb 1.6 oz)   BMI 16.48 kg/m²    Physical Exam  Constitutional:       Comments: Very thin white female in NAD   HENT:      Head: Normocephalic.      Mouth/Throat:      Mouth: Mucous membranes are moist.      Pharynx: Oropharynx is clear. No posterior oropharyngeal erythema.      Comments: No mucositis  Eyes:      General: No scleral icterus.     Extraocular Movements: Extraocular movements intact.      Pupils: Pupils are equal, round, and reactive to light.   Cardiovascular:      Rate and Rhythm: Normal rate and regular rhythm.      Heart " sounds: No murmur heard.  Pulmonary:      Comments: Lungs clear to auscultation  Chest:      Comments: Well-healed bilateral mastectomy incisions.  No suspicious masses, skin changes or axillary nodes bilaterally.  Abdominal:      General: Bowel sounds are normal. There is no distension.      Palpations: There is no mass.      Tenderness: There is no abdominal tenderness.      Comments: Mildly distended   Musculoskeletal:         General: No swelling or tenderness. Normal range of motion.      Cervical back: Neck supple. No tenderness.   Lymphadenopathy:      Cervical: No cervical adenopathy.      Upper Body:      Right upper body: No supraclavicular or axillary adenopathy.      Left upper body: No supraclavicular or axillary adenopathy.   Skin:     General: Skin is warm and dry.      Findings: No rash.      Comments: Grade 1 hand-foot syndrome of the hands   Neurological:      General: No focal deficit present.      Mental Status: She is alert and oriented to person, place, and time.      Cranial Nerves: No cranial nerve deficit.      Motor: No weakness.       ECOG SCORE    1 - Restricted in strenuous activity-ambulatory and able to carry out work of a light nature          LABORATORY  Recent Results (from the past 336 hour(s))   Cancer Antigen 27-29    Collection Time: 11/15/23  3:08 PM   Result Value Ref Range    Breast Carcinoma Assoc Ag(CA 27.29) 31.5 <=38.0 U/mL   CEA    Collection Time: 11/15/23  3:08 PM   Result Value Ref Range    Carcinoembryonic Antigen 21.76 (H) 0.00 - 3.00 ng/mL   Comprehensive Metabolic Panel    Collection Time: 11/15/23  3:08 PM   Result Value Ref Range    Sodium Level 139 136 - 145 mmol/L    Potassium Level 3.7 3.5 - 5.1 mmol/L    Chloride 100 98 - 107 mmol/L    Carbon Dioxide 31 23 - 31 mmol/L    Glucose Level 74 (L) 82 - 115 mg/dL    Blood Urea Nitrogen 22.5 (H) 9.8 - 20.1 mg/dL    Creatinine 0.85 0.55 - 1.02 mg/dL    Calcium Level Total 9.6 8.4 - 10.2 mg/dL    Protein Total 7.0 5.8  - 7.6 gm/dL    Albumin Level 4.1 3.4 - 4.8 g/dL    Globulin 2.9 2.4 - 3.5 gm/dL    Albumin/Globulin Ratio 1.4 1.1 - 2.0 ratio    Bilirubin Total 0.3 <=1.5 mg/dL    Alkaline Phosphatase 39 (L) 40 - 150 unit/L    Alanine Aminotransferase 15 0 - 55 unit/L    Aspartate Aminotransferase 27 5 - 34 unit/L    eGFR >60 mls/min/1.73/m2   CBC with Differential    Collection Time: 11/15/23  3:08 PM   Result Value Ref Range    WBC 7.33 4.50 - 11.50 x10(3)/mcL    RBC 3.33 (L) 4.20 - 5.40 x10(6)/mcL    Hgb 10.9 (L) 12.0 - 16.0 g/dL    Hct 34.8 (L) 37.0 - 47.0 %    .5 (H) 80.0 - 94.0 fL    MCH 32.7 (H) 27.0 - 31.0 pg    MCHC 31.3 (L) 33.0 - 36.0 g/dL    RDW 18.2 (H) 11.5 - 17.0 %    Platelet 359 130 - 400 x10(3)/mcL    MPV 9.6 7.4 - 10.4 fL    Neut % 61.6 %    Lymph % 23.3 %    Mono % 12.1 %    Eos % 1.9 %    Basophil % 0.7 %    Lymph # 1.71 0.6 - 4.6 x10(3)/mcL    Neut # 4.51 2.1 - 9.2 x10(3)/mcL    Mono # 0.89 0.1 - 1.3 x10(3)/mcL    Eos # 0.14 0 - 0.9 x10(3)/mcL    Baso # 0.05 <=0.2 x10(3)/mcL    IG# 0.03 0 - 0.04 x10(3)/mcL    IG% 0.4 %                          9/20/23   10/24/23   11/15/23  CEA              50.9         30.7         21.7  CA 27-29      43.7         40.1         31.5      Assessment:   Recurrent metastatic breast cancer with peritoneal carcinomatosis - ER+ 4%, ID neg, Her-2 neg (IHC 0)  History of bilateral breast cancer s/p bilateral mastectomies      Plan:   CT scans show interval improvement and her serum tumor markers also show concomitant decrease consistent with response to therapy.  CT images were reviewed in the office in the presence of the patient.  Continue treatment with oral Xeloda 500 mg b.i.d. 7 days on and 7 days off.  She did not tolerate dose escalation secondary to side effects.  RTC in 6 weeks for a follow-up visit and clinical exam with repeat laboratory.      JOEL DAVIDSON MD    Other Physicians  Dr. Darrell Swan

## 2023-11-21 ENCOUNTER — CLINICAL SUPPORT (OUTPATIENT)
Dept: HEMATOLOGY/ONCOLOGY | Facility: CLINIC | Age: 68
End: 2023-11-21
Payer: MEDICARE

## 2023-11-21 ENCOUNTER — OFFICE VISIT (OUTPATIENT)
Dept: HEMATOLOGY/ONCOLOGY | Facility: CLINIC | Age: 68
End: 2023-11-21
Payer: MEDICARE

## 2023-11-21 VITALS
HEART RATE: 57 BPM | TEMPERATURE: 97 F | WEIGHT: 90.13 LBS | SYSTOLIC BLOOD PRESSURE: 142 MMHG | RESPIRATION RATE: 18 BRPM | DIASTOLIC BLOOD PRESSURE: 67 MMHG | BODY MASS INDEX: 16.58 KG/M2 | HEIGHT: 62 IN

## 2023-11-21 DIAGNOSIS — C50.311 MALIGNANT NEOPLASM OF LOWER-INNER QUADRANT OF RIGHT BREAST OF FEMALE, ESTROGEN RECEPTOR POSITIVE: Primary | ICD-10-CM

## 2023-11-21 DIAGNOSIS — Z17.0 MALIGNANT NEOPLASM OF LOWER-INNER QUADRANT OF RIGHT BREAST OF FEMALE, ESTROGEN RECEPTOR POSITIVE: Primary | ICD-10-CM

## 2023-11-21 DIAGNOSIS — C78.6 SECONDARY MALIGNANT NEOPLASM OF PERITONEUM: ICD-10-CM

## 2023-11-21 PROCEDURE — 99214 OFFICE O/P EST MOD 30 MIN: CPT | Mod: PBBFAC | Performed by: INTERNAL MEDICINE

## 2023-11-21 PROCEDURE — 99999 PR PBB SHADOW E&M-EST. PATIENT-LVL IV: ICD-10-PCS | Mod: PBBFAC,,, | Performed by: INTERNAL MEDICINE

## 2023-11-21 PROCEDURE — 99999 PR PBB SHADOW E&M-EST. PATIENT-LVL IV: CPT | Mod: PBBFAC,,, | Performed by: INTERNAL MEDICINE

## 2023-11-21 PROCEDURE — 99215 OFFICE O/P EST HI 40 MIN: CPT | Mod: S$PBB,,, | Performed by: INTERNAL MEDICINE

## 2023-11-21 PROCEDURE — 99215 PR OFFICE/OUTPT VISIT, EST, LEVL V, 40-54 MIN: ICD-10-PCS | Mod: S$PBB,,, | Performed by: INTERNAL MEDICINE

## 2023-11-21 RX ORDER — NAPROXEN 500 MG/1
500 TABLET ORAL 2 TIMES DAILY WITH MEALS
COMMUNITY
Start: 2023-10-27

## 2023-11-21 RX ORDER — ACETAMINOPHEN AND CODEINE PHOSPHATE 300; 30 MG/1; MG/1
1 TABLET ORAL
COMMUNITY
Start: 2023-10-28

## 2023-11-21 NOTE — PROGRESS NOTES
"Oncology Nutrition Assessment for Medical Nutrition Therapy      Kimberlyn Selby   1955    Oncology Provider:   Daren Atkins MD     Reason for Visit:  Nutrition f/u    Oncology/Hematology Diagnosis:   Recurrent metastatic breast cancer with peritoneal carcinomatosis     Treatment Plan:  Xeloda     Nutrition Recommendations:  1. Continue small frequent meals; avoid foods that trigger acid reflux  2. Protein powder smoothies daily  3. RD to continue monitoring    Nutrition Assessment    8/28/23: This is a 67 y.o.female with a medical diagnosis of stg IV breast CA (peritoneal carcinomatosis). She reports fair appetite and po intake. Notes that she has been dealing with constipation for years as well as GERD. She intentionally lost ~20# a few years ago and was stable at ~100# for quite a while. Recently has lost 2-5# in the past couple months due to worsening in constipation. She was started on Xeloda and tolerated well (no diarrhea) but did have blisters develop on feet, this was addressed with Beronica NP today via telemed since her  has COVID and she could not come in to the office. She does drink a protein smoothie but not daily. She does not take pantoprazole regularly because she states it makes her constipation worse. Her wt today at home was 95#.    9/20/23: Pt here today for lab. She reports continued constipation and reflux/intolerance to certain foods such as tomato products, eggs, several spices, fruits, etc. She has had a few episodes n/v with intake of certain things that didn't "agree" with her. She reports ok appetite in the morning but by evening she has severe early satiety to the point where she cannot eat without fear of vomiting. She continues with a daily protein smoothie that has at least 20 g protein. She adds avocado and fruit to this as well. She notes her wt at home today was 88#, 7# less than a month ago. We discussed a trial of MCT oil in her smoothies for calorie increase.  "     11/21/23: Pt here for MD ramon. She has gained ~3# since last visit. She notes today that digestion has been going well and she has had a good appetite. No new complaints from a nutrition standpoint. She notes she is not exercising as frequently/strenuously in an attempt to prevent burning calories.    Nutrition Factors Affecting Intake  none identified    PMHx: EDITH breast CA s/p mastectomies, JONI/BSO, GERD     Allergies: Patient has no known allergies.    Current Medications:    Current Outpatient Medications:     acetaminophen-codeine 300-30mg (TYLENOL #3) 300-30 mg Tab, Take 1 tablet by mouth every 6 to 8 hours as needed (pain)., Disp: , Rfl:     aloe vera 25 mg Cap, Take by mouth., Disp: , Rfl:     ALPRAZolam (XANAX) 0.25 MG tablet, Take 1 tablet (0.25 mg total) by mouth 2 (two) times daily as needed for Anxiety (**use sparingly**)., Disp: 30 tablet, Rfl: 1    APPLE CIDER VINEGAR ORAL, Take by mouth., Disp: , Rfl:     ascorbic acid, vitamin C, (VITAMIN C) 100 MG tablet, Take 100 mg by mouth once daily., Disp: , Rfl:     biotin 1 mg Cap, Take by mouth., Disp: , Rfl:     capecitabine (XELODA) 500 MG Tab, Take 1 tablet (500 mg total) by mouth 2 (two) times daily for 7 days on, followed by 7 days off and repeat., Disp: 28 tablet, Rfl: 5    enzymes,digestive (DIGESTIVE ENZYMES ORAL), Take by mouth., Disp: , Rfl:     ergocalciferol, vitamin D2, (VITAMIN D ORAL), Take by mouth., Disp: , Rfl:     GINGER OIL ORAL, Take by mouth., Disp: , Rfl:     Lactobac no.41/Bifidobact no.7 (PROBIOTIC-10 ORAL), Take by mouth., Disp: , Rfl:     lactulose (CHRONULAC) 10 gram/15 mL solution, Take 30 mLs by mouth Daily., Disp: , Rfl:     multivitamin (THERAGRAN) per tablet, Take 1 tablet by mouth once daily., Disp: , Rfl:     naproxen (NAPROSYN) 500 MG tablet, Take 500 mg by mouth 2 (two) times daily with meals., Disp: , Rfl:     omega-3 fatty acids/fish oil (FISH OIL-OMEGA-3 FATTY ACIDS) 300-1,000 mg capsule, Take by mouth once daily.,  "Disp: , Rfl:     ondansetron (ZOFRAN-ODT) 4 MG TbDL, Take 4 mg by mouth once., Disp: , Rfl:     oxybutynin (DITROPAN-XL) 5 MG TR24, Take 1 tablet (5 mg total) by mouth once daily., Disp: 30 tablet, Rfl: 5    pantoprazole (PROTONIX) 40 MG tablet, Take 1 tablet by mouth once daily., Disp: , Rfl:     sertraline (ZOLOFT) 100 MG tablet, Take 1 tablet (100 mg total) by mouth once daily., Disp: 90 tablet, Rfl: 2    thyroid, pork, (ARMOUR THYROID) 15 mg Tab, TAKE ONE TABLET BY MOUTH IN THE MORNING BEFORE BREAKFAST*TO BE TAKEN WITH ARMOUR THYROID 30MG FOR A TOTAL OF 45MG*, Disp: 90 tablet, Rfl: 1    thyroid, pork, (ARMOUR THYROID) 30 mg Tab, Take 1 tablet (30 mg total) by mouth before breakfast. Will also take a 15 mg for 45 mg total daily dose., Disp: 90 tablet, Rfl: 0    Labs: 11/15/23 BUN 22.5 (H), alk phos 39 (H)    Anthropometrics    Height:   Ht Readings from Last 1 Encounters:   11/21/23 5' 2" (1.575 m)      Weight:   Wt Readings from Last 5 Encounters:   11/21/23 40.9 kg (90 lb 1.6 oz)   11/08/23 39.9 kg (87 lb 15.4 oz)   10/24/23 39.9 kg (87 lb 14.4 oz)   10/18/23 40.4 kg (89 lb 1.1 oz)   09/22/23 40.4 kg (89 lb)        Usual Body Weight: 45.7 kg (100 lb)   % Weight Change: -13% in 3-4 months to lowest wt 87 lb, has gained back 3 lb    BMI: 16.4 (underweight)    Ideal Weight: 50 kg (110 lb)  % Ideal Weight: 82%    Malnutrition in the context of chronic illness  Degree of Malnutrition: non-severe (moderate) malnutrition  Energy Intake: does not meet criteria  Interpretation of Weight Loss: >7.5% in 3 months  Body Fat: mild depletion  Area of Body Fat Loss: orbital region , upper arm region - triceps / biceps, and thoracic and lumbar region - ribs, lower back, midaxillary line  Muscle Mass Loss: mild depletion  Area of Muscle Mass Loss: temple region - temporalis muscle, clavicle bone region - pectoralis major, deltoid, trapezius muscles, clavicle and acromion bone region - deltoid muscle, scapular bone region - " trapezius, supraspinus, infraspinus muscles, dorsal hand - interosseous musle, patellar region - quadricep muscle, anterior thigh region - quadriceps muscles, and posterior calf region - gastrocnemius muscle  Fluid Accumulation: does not meet criteria  Edema: no edema present  Reduced  Strength: unable to obtain  A minimum of two characteristics is recommended for diagnosis of either severe or non-severe malnutrition.    Estimated Needs (using CBW 40.9 kg)  1464 kcal/day  Webb St. Jeor x 1.1 activity factor x 1.5 stress factor  61 g protein/day 1.5 g/kg CBW  1464 ML fluid/day 1 mL/kcal    Nutrition Diagnosis    PES: Malnutrition related to acute illness as evidenced by >7.5% wt loss 3 month, mild fat/muscle depletion. (active)     Nutrition Risk  moderate     Nutrition Intervention    Interventions(treatment strategy):  modified composition of meals/snacks, commercial beverage, and collaboration with other providers      Nutrition Monitoring and Evaluation    Monitor: food and beverage intake, weight change, and electrolyte/renal panel    Follow up at next [facility] provider visit.        Demetria Jacobs, MS, RD, , LDN

## 2023-11-30 DIAGNOSIS — M75.30 CALCIFIC BURSITIS OF SHOULDER: Primary | ICD-10-CM

## 2023-12-06 ENCOUNTER — HOSPITAL ENCOUNTER (OUTPATIENT)
Dept: RADIOLOGY | Facility: HOSPITAL | Age: 68
Discharge: HOME | End: 2023-12-06
Attending: ORTHOPAEDIC SURGERY
Payer: MEDICARE

## 2023-12-06 DIAGNOSIS — M75.30 CALCIFIC BURSITIS OF SHOULDER: ICD-10-CM

## 2023-12-06 PROCEDURE — 20610 DRAIN/INJ JOINT/BURSA W/O US: CPT

## 2023-12-06 PROCEDURE — 76942 ECHO GUIDE FOR BIOPSY: CPT | Mod: TC

## 2023-12-06 RX ORDER — ROPIVACAINE HYDROCHLORIDE 5 MG/ML
15 INJECTION, SOLUTION EPIDURAL; INFILTRATION; PERINEURAL ONCE
Status: DISCONTINUED | OUTPATIENT
Start: 2023-12-06 | End: 2023-12-07 | Stop reason: HOSPADM

## 2023-12-06 RX ORDER — LIDOCAINE HYDROCHLORIDE 10 MG/ML
1 INJECTION, SOLUTION EPIDURAL; INFILTRATION; INTRACAUDAL; PERINEURAL ONCE
Status: DISCONTINUED | OUTPATIENT
Start: 2023-12-06 | End: 2023-12-07 | Stop reason: HOSPADM

## 2023-12-06 RX ORDER — DEXAMETHASONE SODIUM PHOSPHATE 100 MG/10ML
10 INJECTION INTRAMUSCULAR; INTRAVENOUS ONCE
Status: DISCONTINUED | OUTPATIENT
Start: 2023-12-06 | End: 2023-12-07 | Stop reason: HOSPADM

## 2023-12-14 DIAGNOSIS — K59.00 CONSTIPATION, UNSPECIFIED CONSTIPATION TYPE: Primary | ICD-10-CM

## 2023-12-14 RX ORDER — LACTULOSE 10 G/15ML
30 SOLUTION ORAL; RECTAL DAILY
Qty: 237 ML | Refills: 0 | Status: SHIPPED | OUTPATIENT
Start: 2023-12-14 | End: 2024-01-22 | Stop reason: SDUPTHER

## 2023-12-28 NOTE — PROGRESS NOTES
Subjective:       Patient ID: Kimberlyn Selby is a 68 y.o. female.    Chief Complaint:  I'm doing pretty well    Diagnosis:  Recurrent metastatic breast cancer with peritoneal carcinomatosis -ER+ 4%, NM 0, HER-2 neg (IHC 0)                     Stage IA R breast cancer 8/11 (T1b N0 M0), 0.7 cm, GI, ER+/NM-, HER-2 negative                     Stage IIA L breast cancer 1/09 (T1c N1 M0), 1.6 cm, GIII, 1+ LN, ER/NM -, HER-2 negative                     Stage IIIA R breast cancer 1/05 (T2 N2a M0), 1.6 cm, GII, 4+ LN's, ER/NM +, HER-2 negative                     S/p bilateral mastectomies                     Post menopausal s/p hysterectomy                     Declined adjuvant XRT & hormonal therapy with initial breast cancer     Treatment History  Adjuvant TAC x 6 completed 6/05  Adjuvant TC x 4 completed 5/09 --> XRT L breast    Current Therapy:  Xeloda started 8/23 - current dose 500 mg b.i.d. 7 days on/7 days off     Clinical History: WF status post right lumpectomy 1/05 for invasive breast cancer with the above characteristics. She completed 6 cycles of adjuvant TAC but declined radiation. She was placed on Arimidex but discontinued treatment due to significant myalgias. She had similar side effects with Femara and also complained of bone aching due to Tamoxifen and stopped therapy 9/05. Due to her hormone receptor status, she was placed on Evista but again was unable to tolerate therapy. She had an abnormal surveillance mammogram 11/08 showing clustered microcalcifications in the upper outer quadrant of the left breast with an associated 1 cm mass by ultrasound. 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 Patient underwent left lumpectomy and axillary dissection 1/15/09. Final pathology as outlined above. Postop course was complicated by cellulitis. She completed 4 cycles of adjuvant TC 5/09. She eventually consented to adjuvant radiation therapy to the left breast.  Surveillance CT PET scan 12/09 showed no abnormal hypermetabolic activity. Mammogram 6/22/11 showed a new suspicious finding in the right breast at the 4:00 position. Ultrasound was suspicious for malignancy. Ultrasound-guided core biopsies revealed invasive ductal carcinoma strongly ER positive at 85%, W[place] negative and HER-2 negative. CT PET scan 5/11 showed no abnormal hypermetabolic activity to indicate recurrent or metastatic disease. Although patient did have radiation therapy post lumpectomy on the left side, she never had radiation therapy to the right breast. She elected to undergo bilateral mastectomies and prophylactic right oophorectomy 8/16/11. Final pathology showed a 0.7 cm grade 1 infiltrating ductal carcinoma the right breast. Lamar dissection was not done to her previous surgery. She declined BRCA testing. Adjuvant hormonal therapy was recommended with Aromasin but she declined. She underwent a screening colonoscopy 6/5/13 which showed diverticula but no other abnormal findings; 10 year followup was recommended.      She was seen for a surveillance appointment 8/24/21 and did not return for her annual visit in 2022.    She developed symptoms of GE reflux and constipation that she treated with OTC medications without significant improvement.  She denied significant weight loss.  She was referred for a CT of the abdomen and pelvis by GI 6/2/23 ([facility]) that showed a diffuse abnormal enhancing soft tissue density in the retroperitoneum and mesenteric root with nodular intense enhancement.  Findings suspicious for an infiltrative lymphoproliferative disorder.  Mass encased the aorta without stenosis.  IVC appeared mildly narrowed without focal internal filling defect.  There were no additional abnormal findings.  Spleen was unremarkable.   EGD 6/13/23 showed mild chronic reflux changes and gastritis.  Colonoscopy showed a single polyp which was removed.  Pathology was consistent with metastatic  breast cancer.  She underwent a diagnostic laparoscopy 7/12/23 revealing peritoneal carcinomatosis.  Biopsies were positive for metastatic poorly differentiated adenocarcinoma with signet ring morphology consistent with breast primary (LIZ-3 and CK7 positive; CK20 and CDX2 negative).  ER was low positive 4%, NE negative and HER2 negative (IHC 0).    CT-PET 7/28/23:  Poorly delineated bulky abdominal lymphadenopathy with low-grade FDG uptake, SUV 2.4.  Small volume ascites.  No definite metastatic disease outside of the abdomen.  MRI Brain 8/2/23:  No evidence of metastatic disease.  Mild chronic microvascular ischemia and atrophy.    She was initiated on treatment with oral Xeloda.  She did not tolerate dose escalation secondary to hand-foot syndrome.  She required dose adjustment to manage her side effects.    CT C/A/P 11/15/23:  Improved confluent retroperitoneal and mesenteric adenopathy.  Improved mesenteric soft tissue density and edema.    Interval History  She returns to clinic today for a 6 week follow-up visit accompanied by her .  She is taking Xeloda 500 mg b.i.d. 7 days on and 7 days off.  She was unable to tolerate dose escalation secondary to hand-foot syndrome.  She currently has grade 1 hand-foot syndrome.  Her symptoms are manageable.  Her abdominal symptoms and bloating have improved.  Her constipation has improved.  She has gained weight.  She has more energy.  CT scans 11/15/23 showed interval disease improvement.  Serum CEA and 27-29 levels have improved on treatment.      Review of Systems   Constitutional:  Negative for activity change, appetite change, fatigue, fever and unexpected weight change.   HENT:  Negative for mouth sores, sore throat and trouble swallowing.    Eyes: Negative.    Respiratory:  Negative for cough and shortness of breath.    Cardiovascular:  Negative for chest pain, palpitations and leg swelling.   Gastrointestinal:  Positive for constipation (improved).  "Negative for abdominal distention, abdominal pain, diarrhea, nausea and vomiting.   Genitourinary:  Negative for dysuria, frequency and urgency.   Musculoskeletal:  Negative for arthralgias and back pain.   Integumentary:  Negative for pallor and rash.   Neurological:  Negative for dizziness, weakness, numbness and headaches.   Hematological:  Negative for adenopathy. Does not bruise/bleed easily.   Psychiatric/Behavioral: Negative.         PMHx:  Bilateral breast cancer, arthritis, hypothyroidism, GERD  PSHx:  Bilateral breast lumpectomies, bilateral mastectomies, hysterectomy/BSO, MediPort insertion and removal, uterine suspension, diagnostic laparoscopy  SH:  Lifetime nonsmoker, no significant alcohol use.  Lives in [place] with her .  FH:  Her father had kidney cancer, son had appendiceal cancer and MGF had liver cancer.    Objective:        /63 (BP Location: Right arm, Patient Position: Sitting)   Pulse (!) 56   Temp 97.6 °F (36.4 °C) (Oral)   Resp 14   Ht 5' 2" (1.575 m)   Wt 42.3 kg (93 lb 3.2 oz)   SpO2 96%   BMI 17.05 kg/m²    Physical Exam  Constitutional:       Comments: Thin white female in NAD   HENT:      Head: Normocephalic.      Mouth/Throat:      Mouth: Mucous membranes are moist.      Pharynx: Oropharynx is clear. No posterior oropharyngeal erythema.      Comments: No mucositis  Eyes:      General: No scleral icterus.     Extraocular Movements: Extraocular movements intact.      Pupils: Pupils are equal, round, and reactive to light.   Cardiovascular:      Rate and Rhythm: Normal rate and regular rhythm.      Heart sounds: No murmur heard.  Pulmonary:      Comments: Lungs clear to auscultation  Chest:      Comments: Well-healed bilateral mastectomy incisions.  No suspicious masses, skin changes or axillary nodes bilaterally.  Abdominal:      General: Bowel sounds are normal. There is no distension.      Palpations: There is no mass.      Tenderness: There is no abdominal " tenderness.      Comments: Mildly distended   Musculoskeletal:         General: No swelling or tenderness. Normal range of motion.      Cervical back: Neck supple. No tenderness.   Lymphadenopathy:      Cervical: No cervical adenopathy.      Upper Body:      Right upper body: No supraclavicular or axillary adenopathy.      Left upper body: No supraclavicular or axillary adenopathy.   Skin:     General: Skin is warm and dry.      Findings: No rash.      Comments: Grade 1 hand-foot syndrome of the hands   Neurological:      General: No focal deficit present.      Mental Status: She is alert and oriented to person, place, and time.      Cranial Nerves: No cranial nerve deficit.      Motor: No weakness.       ECOG SCORE    0 - Fully active-able to carry on all pre-disease performance without restriction          LABORATORY  Recent Results (from the past 336 hour(s))   Comprehensive Metabolic Panel    Collection Time: 01/02/24  3:31 PM   Result Value Ref Range    Sodium Level 142 136 - 145 mmol/L    Potassium Level 3.7 3.5 - 5.1 mmol/L    Chloride 102 98 - 107 mmol/L    Carbon Dioxide 31 23 - 31 mmol/L    Glucose Level 85 82 - 115 mg/dL    Blood Urea Nitrogen 23.2 (H) 9.8 - 20.1 mg/dL    Creatinine 0.75 0.55 - 1.02 mg/dL    Calcium Level Total 9.6 8.4 - 10.2 mg/dL    Protein Total 7.1 5.8 - 7.6 gm/dL    Albumin Level 4.1 3.4 - 4.8 g/dL    Globulin 3.0 2.4 - 3.5 gm/dL    Albumin/Globulin Ratio 1.4 1.1 - 2.0 ratio    Bilirubin Total 0.3 <=1.5 mg/dL    Alkaline Phosphatase 43 40 - 150 unit/L    Alanine Aminotransferase 16 0 - 55 unit/L    Aspartate Aminotransferase 26 5 - 34 unit/L    eGFR >60 mls/min/1.73/m2   CEA    Collection Time: 01/02/24  3:31 PM   Result Value Ref Range    Carcinoembryonic Antigen 14.53 (H) 0.00 - 3.00 ng/mL   CBC with Differential    Collection Time: 01/02/24  3:31 PM   Result Value Ref Range    WBC 7.43 4.50 - 11.50 x10(3)/mcL    RBC 3.25 (L) 4.20 - 5.40 x10(6)/mcL    Hgb 11.2 (L) 12.0 - 16.0 g/dL     Hct 34.5 (L) 37.0 - 47.0 %    .2 (H) 80.0 - 94.0 fL    MCH 34.5 (H) 27.0 - 31.0 pg    MCHC 32.5 (L) 33.0 - 36.0 g/dL    RDW 15.0 11.5 - 17.0 %    Platelet 228 130 - 400 x10(3)/mcL    MPV 9.1 7.4 - 10.4 fL    Neut % 74.8 %    Lymph % 13.3 %    Mono % 9.4 %    Eos % 1.9 %    Basophil % 0.5 %    Lymph # 0.99 0.6 - 4.6 x10(3)/mcL    Neut # 5.55 2.1 - 9.2 x10(3)/mcL    Mono # 0.70 0.1 - 1.3 x10(3)/mcL    Eos # 0.14 0 - 0.9 x10(3)/mcL    Baso # 0.04 <=0.2 x10(3)/mcL    IG# 0.01 0 - 0.04 x10(3)/mcL    IG% 0.1 %                          9/20/23   10/24/23   11/15/23   1/02/24  CEA              50.9         30.7         21.7          14.5  CA 27-29      43.7         40.1         31.5        Pending      Assessment:   Recurrent metastatic breast cancer with peritoneal carcinomatosis - ER+ 4%, VA neg, Her-2 neg (IHC 0)  History of bilateral breast cancer s/p bilateral mastectomies      Plan:   Patient has had a favorable response to therapy with improved abdominal symptoms, decreasing serum tumor markers and decreased disease on follow-up imaging.  Continue oral Xeloda 500 mg b.i.d. 7 days on and 7 days off.  Schedule follow-up CT scans of the chest, abdomen and pelvis in 8 weeks.    RTC after the scans for a follow-up visit and clinical exam with repeat laboratory.      JOEL DAVIDSON MD    Other Physicians  Dr. Darrell Swan

## 2024-01-02 ENCOUNTER — LAB VISIT (OUTPATIENT)
Dept: LAB | Facility: HOSPITAL | Age: 69
End: 2024-01-02
Attending: INTERNAL MEDICINE
Payer: MEDICARE

## 2024-01-02 DIAGNOSIS — C78.6 SECONDARY MALIGNANT NEOPLASM OF PERITONEUM: ICD-10-CM

## 2024-01-02 DIAGNOSIS — Z17.0 MALIGNANT NEOPLASM OF LOWER-INNER QUADRANT OF RIGHT BREAST OF FEMALE, ESTROGEN RECEPTOR POSITIVE: ICD-10-CM

## 2024-01-02 DIAGNOSIS — C50.311 MALIGNANT NEOPLASM OF LOWER-INNER QUADRANT OF RIGHT BREAST OF FEMALE, ESTROGEN RECEPTOR POSITIVE: ICD-10-CM

## 2024-01-02 LAB
ALBUMIN SERPL-MCNC: 4.1 G/DL (ref 3.4–4.8)
ALBUMIN/GLOB SERPL: 1.4 RATIO (ref 1.1–2)
ALP SERPL-CCNC: 43 UNIT/L (ref 40–150)
ALT SERPL-CCNC: 16 UNIT/L (ref 0–55)
AST SERPL-CCNC: 26 UNIT/L (ref 5–34)
BASOPHILS # BLD AUTO: 0.04 X10(3)/MCL
BASOPHILS NFR BLD AUTO: 0.5 %
BILIRUB SERPL-MCNC: 0.3 MG/DL
BUN SERPL-MCNC: 23.2 MG/DL (ref 9.8–20.1)
CALCIUM SERPL-MCNC: 9.6 MG/DL (ref 8.4–10.2)
CEA SERPL-MCNC: 14.53 NG/ML (ref 0–3)
CHLORIDE SERPL-SCNC: 102 MMOL/L (ref 98–107)
CO2 SERPL-SCNC: 31 MMOL/L (ref 23–31)
CREAT SERPL-MCNC: 0.75 MG/DL (ref 0.55–1.02)
EOSINOPHIL # BLD AUTO: 0.14 X10(3)/MCL (ref 0–0.9)
EOSINOPHIL NFR BLD AUTO: 1.9 %
ERYTHROCYTE [DISTWIDTH] IN BLOOD BY AUTOMATED COUNT: 15 % (ref 11.5–17)
GFR SERPLBLD CREATININE-BSD FMLA CKD-EPI: >60 MLS/MIN/1.73/M2
GLOBULIN SER-MCNC: 3 GM/DL (ref 2.4–3.5)
GLUCOSE SERPL-MCNC: 85 MG/DL (ref 82–115)
HCT VFR BLD AUTO: 34.5 % (ref 37–47)
HGB BLD-MCNC: 11.2 G/DL (ref 12–16)
IMM GRANULOCYTES # BLD AUTO: 0.01 X10(3)/MCL (ref 0–0.04)
IMM GRANULOCYTES NFR BLD AUTO: 0.1 %
LYMPHOCYTES # BLD AUTO: 0.99 X10(3)/MCL (ref 0.6–4.6)
LYMPHOCYTES NFR BLD AUTO: 13.3 %
MCH RBC QN AUTO: 34.5 PG (ref 27–31)
MCHC RBC AUTO-ENTMCNC: 32.5 G/DL (ref 33–36)
MCV RBC AUTO: 106.2 FL (ref 80–94)
MONOCYTES # BLD AUTO: 0.7 X10(3)/MCL (ref 0.1–1.3)
MONOCYTES NFR BLD AUTO: 9.4 %
NEUTROPHILS # BLD AUTO: 5.55 X10(3)/MCL (ref 2.1–9.2)
NEUTROPHILS NFR BLD AUTO: 74.8 %
PLATELET # BLD AUTO: 228 X10(3)/MCL (ref 130–400)
PMV BLD AUTO: 9.1 FL (ref 7.4–10.4)
POTASSIUM SERPL-SCNC: 3.7 MMOL/L (ref 3.5–5.1)
PROT SERPL-MCNC: 7.1 GM/DL (ref 5.8–7.6)
RBC # BLD AUTO: 3.25 X10(6)/MCL (ref 4.2–5.4)
SODIUM SERPL-SCNC: 142 MMOL/L (ref 136–145)
WBC # SPEC AUTO: 7.43 X10(3)/MCL (ref 4.5–11.5)

## 2024-01-02 PROCEDURE — 36415 COLL VENOUS BLD VENIPUNCTURE: CPT

## 2024-01-02 PROCEDURE — 86300 IMMUNOASSAY TUMOR CA 15-3: CPT

## 2024-01-02 PROCEDURE — 82378 CARCINOEMBRYONIC ANTIGEN: CPT

## 2024-01-02 PROCEDURE — 85025 COMPLETE CBC W/AUTO DIFF WBC: CPT

## 2024-01-02 PROCEDURE — 80053 COMPREHEN METABOLIC PANEL: CPT

## 2024-01-03 ENCOUNTER — OFFICE VISIT (OUTPATIENT)
Dept: HEMATOLOGY/ONCOLOGY | Facility: CLINIC | Age: 69
End: 2024-01-03
Payer: MEDICARE

## 2024-01-03 VITALS
RESPIRATION RATE: 14 BRPM | BODY MASS INDEX: 17.15 KG/M2 | OXYGEN SATURATION: 96 % | HEIGHT: 62 IN | TEMPERATURE: 98 F | HEART RATE: 56 BPM | DIASTOLIC BLOOD PRESSURE: 63 MMHG | WEIGHT: 93.19 LBS | SYSTOLIC BLOOD PRESSURE: 114 MMHG

## 2024-01-03 DIAGNOSIS — C50.311 MALIGNANT NEOPLASM OF LOWER-INNER QUADRANT OF RIGHT BREAST OF FEMALE, ESTROGEN RECEPTOR POSITIVE: Primary | ICD-10-CM

## 2024-01-03 DIAGNOSIS — C78.6 SECONDARY MALIGNANT NEOPLASM OF PERITONEUM: ICD-10-CM

## 2024-01-03 DIAGNOSIS — Z17.0 MALIGNANT NEOPLASM OF LOWER-INNER QUADRANT OF RIGHT BREAST OF FEMALE, ESTROGEN RECEPTOR POSITIVE: Primary | ICD-10-CM

## 2024-01-03 PROCEDURE — 99999 PR PBB SHADOW E&M-EST. PATIENT-LVL V: CPT | Mod: PBBFAC,,, | Performed by: INTERNAL MEDICINE

## 2024-01-03 PROCEDURE — 99214 OFFICE O/P EST MOD 30 MIN: CPT | Mod: S$PBB,,, | Performed by: INTERNAL MEDICINE

## 2024-01-03 PROCEDURE — 99215 OFFICE O/P EST HI 40 MIN: CPT | Mod: PBBFAC | Performed by: INTERNAL MEDICINE

## 2024-01-04 LAB — CANCER AG27-29 SERPL-ACNC: 29.8 U/ML

## 2024-01-22 DIAGNOSIS — K59.00 CONSTIPATION, UNSPECIFIED CONSTIPATION TYPE: ICD-10-CM

## 2024-01-22 RX ORDER — LACTULOSE 10 G/15ML
30 SOLUTION ORAL; RECTAL DAILY
Qty: 237 ML | Refills: 0 | Status: SHIPPED | OUTPATIENT
Start: 2024-01-22 | End: 2024-03-01 | Stop reason: SDUPTHER

## 2024-02-05 ENCOUNTER — PATIENT MESSAGE (OUTPATIENT)
Dept: HEMATOLOGY/ONCOLOGY | Facility: CLINIC | Age: 69
End: 2024-02-05
Payer: MEDICARE

## 2024-02-22 ENCOUNTER — HOSPITAL ENCOUNTER (OUTPATIENT)
Dept: RADIOLOGY | Facility: HOSPITAL | Age: 69
Discharge: HOME | End: 2024-02-22
Attending: INTERNAL MEDICINE
Payer: MEDICARE

## 2024-02-22 DIAGNOSIS — Z17.0 MALIGNANT NEOPLASM OF LOWER-INNER QUADRANT OF RIGHT BREAST OF FEMALE, ESTROGEN RECEPTOR POSITIVE: ICD-10-CM

## 2024-02-22 DIAGNOSIS — C50.311 MALIGNANT NEOPLASM OF LOWER-INNER QUADRANT OF RIGHT BREAST OF FEMALE, ESTROGEN RECEPTOR POSITIVE: ICD-10-CM

## 2024-02-22 DIAGNOSIS — C78.6 SECONDARY MALIGNANT NEOPLASM OF PERITONEUM: ICD-10-CM

## 2024-02-22 LAB
CREAT SERPL-MCNC: 0.7 MG/DL (ref 0.5–1.4)
SAMPLE: NORMAL

## 2024-02-22 PROCEDURE — 74177 CT ABD & PELVIS W/CONTRAST: CPT | Mod: TC

## 2024-02-22 PROCEDURE — 25500020 PHARM REV CODE 255: Performed by: INTERNAL MEDICINE

## 2024-02-22 RX ADMIN — DIATRIZOATE MEGLUMINE AND DIATRIZOATE SODIUM 30 ML: 600; 100 SOLUTION ORAL; RECTAL at 11:02

## 2024-02-22 RX ADMIN — IOHEXOL 100 ML: 350 INJECTION, SOLUTION INTRAVENOUS at 12:02

## 2024-02-29 ENCOUNTER — OFFICE VISIT (OUTPATIENT)
Dept: HEMATOLOGY/ONCOLOGY | Facility: CLINIC | Age: 69
End: 2024-02-29
Payer: MEDICARE

## 2024-02-29 VITALS
HEART RATE: 54 BPM | OXYGEN SATURATION: 97 % | SYSTOLIC BLOOD PRESSURE: 130 MMHG | DIASTOLIC BLOOD PRESSURE: 71 MMHG | HEIGHT: 62 IN | BODY MASS INDEX: 17.61 KG/M2 | RESPIRATION RATE: 15 BRPM | WEIGHT: 95.69 LBS | TEMPERATURE: 98 F

## 2024-02-29 DIAGNOSIS — Z17.0 MALIGNANT NEOPLASM OF LOWER-INNER QUADRANT OF RIGHT BREAST OF FEMALE, ESTROGEN RECEPTOR POSITIVE: Primary | ICD-10-CM

## 2024-02-29 DIAGNOSIS — C78.6 SECONDARY MALIGNANT NEOPLASM OF PERITONEUM: ICD-10-CM

## 2024-02-29 DIAGNOSIS — C50.311 MALIGNANT NEOPLASM OF LOWER-INNER QUADRANT OF RIGHT BREAST OF FEMALE, ESTROGEN RECEPTOR POSITIVE: Primary | ICD-10-CM

## 2024-02-29 PROCEDURE — 99214 OFFICE O/P EST MOD 30 MIN: CPT | Mod: PBBFAC | Performed by: INTERNAL MEDICINE

## 2024-02-29 PROCEDURE — 99214 OFFICE O/P EST MOD 30 MIN: CPT | Mod: S$PBB,,, | Performed by: INTERNAL MEDICINE

## 2024-02-29 PROCEDURE — 99999 PR PBB SHADOW E&M-EST. PATIENT-LVL IV: CPT | Mod: PBBFAC,,, | Performed by: INTERNAL MEDICINE

## 2024-02-29 NOTE — PROGRESS NOTES
Subjective:       Patient ID: Kimberlyn Selby is a 68 y.o. female.    Chief Complaint:  My hands stay cold all the time    Diagnosis:  Recurrent metastatic breast cancer with peritoneal carcinomatosis - ER+ 4%, HI 0, HER-2 neg (IHC 0)                         PD-L1 CPS >10, ROLANDA, low TMB, PIK3CA E365K and R0550L mutations                     Stage IA R breast cancer 8/11 (T1b N0 M0), 0.7 cm, GI, ER+/HI-, HER-2 negative                     Stage IIA L breast cancer 1/09 (T1c N1 M0), 1.6 cm, GIII, 1+ LN, ER/HI -, HER-2 negative                     Stage IIIA R breast cancer 1/05 (T2 N2a M0), 1.6 cm, GII, 4+ LN's, ER/HI +, HER-2 negative                     S/p bilateral mastectomies                     Post menopausal s/p hysterectomy                     Declined adjuvant XRT & hormonal therapy with initial breast cancer     Treatment History  Adjuvant TAC x 6 completed 6/05  Adjuvant TC x 4 completed 5/09 --> XRT L breast    Current Therapy:  Xeloda started 8/23 - current dose 500 mg b.i.d. 7 days on/10 days off     Clinical History: WF status post right lumpectomy 1/05 for invasive breast cancer with the above characteristics. She completed 6 cycles of adjuvant TAC but declined radiation. She was placed on Arimidex but discontinued treatment due to significant myalgias. She had similar side effects with Femara and also complained of bone aching due to Tamoxifen and stopped therapy 9/05. Due to her hormone receptor status, she was placed on Evista but again was unable to tolerate therapy. She had an abnormal surveillance mammogram 11/08 showing clustered microcalcifications in the upper outer quadrant of the left breast with an associated 1 cm mass by ultrasound. 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 Patient underwent left lumpectomy and axillary dissection 1/15/09. Final pathology as outlined above. Postop course was complicated by cellulitis. She completed 4 cycles of  adjuvant TC 5/09. She eventually consented to adjuvant radiation therapy to the left breast. Surveillance CT PET scan 12/09 showed no abnormal hypermetabolic activity. Mammogram 6/22/11 showed a new suspicious finding in the right breast at the 4:00 position. Ultrasound was suspicious for malignancy. Ultrasound-guided core biopsies revealed invasive ductal carcinoma strongly ER positive at 85%, MT negative and HER-2 negative. CT PET scan 5/11 showed no abnormal hypermetabolic activity to indicate recurrent or metastatic disease. Although patient did have radiation therapy post lumpectomy on the left side, she never had radiation therapy to the right breast. She elected to undergo bilateral mastectomies and prophylactic right oophorectomy 8/16/11. Final pathology showed a 0.7 cm grade 1 infiltrating ductal carcinoma the right breast. Lamar dissection was not done to her previous surgery. She declined BRCA testing. Adjuvant hormonal therapy was recommended with Aromasin but she declined. She underwent a screening colonoscopy 6/5/13 which showed diverticula but no other abnormal findings; 10 year followup was recommended.      She was seen for a surveillance appointment 8/24/21 and did not return for her annual visit in 2022.    She developed symptoms of GE reflux and constipation that she treated with OTC medications without significant improvement.  She denied significant weight loss.  She was referred for a CT of the abdomen and pelvis by GI 6/2/23 ([facility]) that showed a diffuse abnormal enhancing soft tissue density in the retroperitoneum and mesenteric root with nodular intense enhancement.  Findings suspicious for an infiltrative lymphoproliferative disorder.  Mass encased the aorta without stenosis.  IVC appeared mildly narrowed without focal internal filling defect.  There were no additional abnormal findings.  Spleen was unremarkable.   EGD 6/13/23 showed mild chronic reflux changes and gastritis.   Colonoscopy showed a single polyp which was removed.  Pathology was consistent with metastatic breast cancer.  She underwent a diagnostic laparoscopy 7/12/23 revealing peritoneal carcinomatosis.  Biopsies were positive for metastatic poorly differentiated adenocarcinoma with signet ring morphology consistent with breast primary (LIZ-3 and CK7 positive; CK20 and CDX2 negative).  ER was low positive 4%, RI negative and HER2 negative (IHC 0).    Molecular Testing:  PDL1 CPS >10, ROLANDA, low TMB.  Mutations of BLM, CHEK2, PIK3CA, SMARCA4 and JAK1.    CT-PET 7/28/23:  Poorly delineated bulky abdominal lymphadenopathy with low-grade FDG uptake, SUV 2.4.  Small volume ascites.  No definite metastatic disease outside of the abdomen.  MRI Brain 8/2/23:  No evidence of metastatic disease.  Mild chronic microvascular ischemia and atrophy.    She was initiated on treatment with oral Xeloda.  She did not tolerate dose escalation secondary to hand-foot syndrome.  She required dose adjustment to manage her side effects.    CT C/A/P 11/15/23:  Improved confluent retroperitoneal and mesenteric adenopathy.  Improved mesenteric soft tissue density and edema.  CT C/A/P 2/27/24:  Radiology reported no detrimental change from 11/23.  My review shows further decrease in the soft tissue density in the retroperitoneum and mesentery with no new sites of measurable disease.  Chronic constipation.    Interval History  She returns to the office today by herself for an 8 week follow-up visit.  She remains on palliative therapy with oral Xeloda.  Current doses 500 mg b.i.d. for 7 days on and 10 days off to allow for her hand-foot syndrome symptoms to improve.  Her hands stay very cold all the time.  She has discoloration of her fingers at times but no associated pain.  She has chronic constipation which is stable on alternating MiraLax and lactulose.  Appetite has been good, she gained a few pounds.  She has no symptoms suspicious for progressive  "disease.  My review of her CT scans show ongoing response to treatment with decreased soft tissue density in the mesentery and retroperitoneum compared to her prior exams.  Serum tumor markers also show an ongoing response to therapy.      Review of Systems   Constitutional:  Negative for activity change, appetite change, fatigue, fever and unexpected weight change.   HENT:  Negative for mouth sores, sore throat and trouble swallowing.    Eyes: Negative.    Respiratory:  Negative for cough and shortness of breath.    Cardiovascular:  Negative for chest pain, palpitations and leg swelling.   Gastrointestinal:  Positive for constipation. Negative for abdominal distention, abdominal pain, diarrhea, nausea and vomiting.   Genitourinary:  Negative for dysuria, frequency and urgency.   Musculoskeletal:  Negative for arthralgias and back pain.        Erythema and mild tenderness of the hands and feet   Integumentary:  Negative for pallor and rash.   Neurological:  Negative for dizziness, weakness, numbness and headaches.   Hematological:  Negative for adenopathy. Does not bruise/bleed easily.   Psychiatric/Behavioral: Negative.         PMHx:  Bilateral breast cancer, arthritis, hypothyroidism, GERD  PSHx:  Bilateral breast lumpectomies, bilateral mastectomies, hysterectomy/BSO, MediPort insertion and removal, uterine suspension, diagnostic laparoscopy  SH:  Lifetime nonsmoker, no significant alcohol use.  Lives in [place] with her .  FH:  Her father had kidney cancer, son had appendiceal cancer and MGF had liver cancer.    Objective:        /71   Pulse (!) 54   Temp 97.8 °F (36.6 °C)   Resp 15   Ht 5' 2" (1.575 m)   Wt 43.4 kg (95 lb 11.2 oz)   SpO2 97%   BMI 17.50 kg/m²    Physical Exam  Constitutional:       Comments: Thin white female in NAD   HENT:      Head: Normocephalic.      Mouth/Throat:      Mouth: Mucous membranes are moist.      Pharynx: Oropharynx is clear. No posterior oropharyngeal " erythema.      Comments: No mucositis  Eyes:      General: No scleral icterus.     Extraocular Movements: Extraocular movements intact.      Pupils: Pupils are equal, round, and reactive to light.   Cardiovascular:      Rate and Rhythm: Normal rate and regular rhythm.      Heart sounds: No murmur heard.  Pulmonary:      Comments: Lungs clear to auscultation  Chest:      Comments: Well-healed bilateral mastectomy incisions.  No suspicious masses, skin changes or axillary nodes bilaterally.  Abdominal:      General: Bowel sounds are normal. There is no distension.      Tenderness: There is no abdominal tenderness.      Comments: No palpable masses or organomegaly   Musculoskeletal:         General: No swelling or tenderness. Normal range of motion.      Cervical back: Neck supple. No tenderness.   Lymphadenopathy:      Cervical: No cervical adenopathy.      Upper Body:      Right upper body: No supraclavicular or axillary adenopathy.      Left upper body: No supraclavicular or axillary adenopathy.   Skin:     General: Skin is warm and dry.      Findings: No rash.      Comments: Grade 1 hand-foot syndrome   Neurological:      General: No focal deficit present.      Mental Status: She is alert and oriented to person, place, and time.      Cranial Nerves: No cranial nerve deficit.      Motor: No weakness.       ECOG SCORE    1 - Restricted in strenuous activity-ambulatory and able to carry out work of a light nature          LABORATORY  Recent Results (from the past 336 hour(s))   Cancer Antigen 27-29    Collection Time: 02/22/24 11:21 AM   Result Value Ref Range    Breast Carcinoma Assoc Ag(CA 27.29) 28.0 <=38.0 U/mL   CEA    Collection Time: 02/22/24 11:21 AM   Result Value Ref Range    Carcinoembryonic Antigen 13.07 (H) 0.00 - 3.00 ng/mL   Comprehensive Metabolic Panel    Collection Time: 02/22/24 11:21 AM   Result Value Ref Range    Sodium Level 142 136 - 145 mmol/L    Potassium Level 4.0 3.5 - 5.1 mmol/L    Chloride  106 98 - 107 mmol/L    Carbon Dioxide 30 23 - 31 mmol/L    Glucose Level 84 82 - 115 mg/dL    Blood Urea Nitrogen 21.5 (H) 9.8 - 20.1 mg/dL    Creatinine 0.78 0.55 - 1.02 mg/dL    Calcium Level Total 10.0 8.4 - 10.2 mg/dL    Protein Total 7.0 5.8 - 7.6 gm/dL    Albumin Level 4.1 3.4 - 4.8 g/dL    Globulin 2.9 2.4 - 3.5 gm/dL    Albumin/Globulin Ratio 1.4 1.1 - 2.0 ratio    Bilirubin Total 0.3 <=1.5 mg/dL    Alkaline Phosphatase 43 40 - 150 unit/L    Alanine Aminotransferase 14 0 - 55 unit/L    Aspartate Aminotransferase 26 5 - 34 unit/L    eGFR >60 mls/min/1.73/m2   CBC with Differential    Collection Time: 02/22/24 11:21 AM   Result Value Ref Range    WBC 5.82 4.50 - 11.50 x10(3)/mcL    RBC 3.42 (L) 4.20 - 5.40 x10(6)/mcL    Hgb 11.4 (L) 12.0 - 16.0 g/dL    Hct 35.3 (L) 37.0 - 47.0 %    .2 (H) 80.0 - 94.0 fL    MCH 33.3 (H) 27.0 - 31.0 pg    MCHC 32.3 (L) 33.0 - 36.0 g/dL    RDW 14.2 11.5 - 17.0 %    Platelet 271 130 - 400 x10(3)/mcL    MPV 9.4 7.4 - 10.4 fL    Neut % 58.4 %    Lymph % 25.6 %    Mono % 12.5 %    Eos % 2.4 %    Basophil % 0.9 %    Lymph # 1.49 0.6 - 4.6 x10(3)/mcL    Neut # 3.40 2.1 - 9.2 x10(3)/mcL    Mono # 0.73 0.1 - 1.3 x10(3)/mcL    Eos # 0.14 0 - 0.9 x10(3)/mcL    Baso # 0.05 <=0.2 x10(3)/mcL    IG# 0.01 0 - 0.04 x10(3)/mcL    IG% 0.2 %   ISTAT CREATININE    Collection Time: 02/22/24 12:14 PM   Result Value Ref Range    POC Creatinine 0.7 0.5 - 1.4 mg/dL    Sample unknown                           9/20/23   10/24/23   11/15/23   1/02/24   2/22/24  CEA              50.9         30.7         21.7          14.5        13.1  CA 27-29      43.7         40.1         31.5          29.8        28.0      Assessment:   Recurrent metastatic breast cancer with peritoneal carcinomatosis - ER+ 4%, [place] neg, Her-2 neg (IHC 0)  History of bilateral breast cancer s/p bilateral mastectomies      Plan:   CT scans and laboratory show an ongoing favorable response to treatment with oral Xeloda.  She will  continue treatment at the current dose and schedule.  She was unable to tolerate dose escalation due to side effects.  RTC in 8 weeks for a follow-up visit for a clinical exam and repeat laboratory.  Repeat imaging studies in 4-6 months depending on symptoms, exam and laboratory findings.      JOEL DAVIDSON MD    Other Physicians  Dr. Darrell Swan

## 2024-03-01 DIAGNOSIS — K59.00 CONSTIPATION, UNSPECIFIED CONSTIPATION TYPE: ICD-10-CM

## 2024-03-01 RX ORDER — LACTULOSE 10 G/15ML
30 SOLUTION ORAL; RECTAL DAILY
Qty: 237 ML | Refills: 0 | Status: SHIPPED | OUTPATIENT
Start: 2024-03-01 | End: 2024-03-22 | Stop reason: SDUPTHER

## 2024-03-22 DIAGNOSIS — K59.00 CONSTIPATION, UNSPECIFIED CONSTIPATION TYPE: ICD-10-CM

## 2024-03-22 RX ORDER — LACTULOSE 10 G/15ML
30 SOLUTION ORAL; RECTAL DAILY
Qty: 237 ML | Refills: 3 | Status: SHIPPED | OUTPATIENT
Start: 2024-03-22 | End: 2024-04-29 | Stop reason: SDUPTHER

## 2024-04-04 ENCOUNTER — PATIENT MESSAGE (OUTPATIENT)
Dept: HEMATOLOGY/ONCOLOGY | Facility: CLINIC | Age: 69
End: 2024-04-04
Payer: MEDICARE

## 2024-04-04 DIAGNOSIS — Z17.0 MALIGNANT NEOPLASM OF LOWER-INNER QUADRANT OF RIGHT BREAST OF FEMALE, ESTROGEN RECEPTOR POSITIVE: ICD-10-CM

## 2024-04-04 DIAGNOSIS — C50.311 MALIGNANT NEOPLASM OF LOWER-INNER QUADRANT OF RIGHT BREAST OF FEMALE, ESTROGEN RECEPTOR POSITIVE: ICD-10-CM

## 2024-04-04 DIAGNOSIS — R11.0 NAUSEA: ICD-10-CM

## 2024-04-04 DIAGNOSIS — R51.9 NEW ONSET OF HEADACHES: Primary | ICD-10-CM

## 2024-04-04 DIAGNOSIS — C78.6 SECONDARY MALIGNANT NEOPLASM OF PERITONEUM: ICD-10-CM

## 2024-04-04 DIAGNOSIS — R42 DIZZINESS: ICD-10-CM

## 2024-04-05 ENCOUNTER — APPOINTMENT (OUTPATIENT)
Dept: RADIOLOGY | Facility: HOSPITAL | Age: 69
End: 2024-04-05
Attending: INTERNAL MEDICINE
Payer: MEDICARE

## 2024-04-05 DIAGNOSIS — R51.9 NEW ONSET OF HEADACHES: ICD-10-CM

## 2024-04-05 DIAGNOSIS — Z17.0 MALIGNANT NEOPLASM OF LOWER-INNER QUADRANT OF RIGHT BREAST OF FEMALE, ESTROGEN RECEPTOR POSITIVE: ICD-10-CM

## 2024-04-05 DIAGNOSIS — C50.311 MALIGNANT NEOPLASM OF LOWER-INNER QUADRANT OF RIGHT BREAST OF FEMALE, ESTROGEN RECEPTOR POSITIVE: ICD-10-CM

## 2024-04-05 DIAGNOSIS — C78.6 SECONDARY MALIGNANT NEOPLASM OF PERITONEUM: ICD-10-CM

## 2024-04-05 DIAGNOSIS — R42 DIZZINESS: ICD-10-CM

## 2024-04-05 PROCEDURE — A9577 INJ MULTIHANCE: HCPCS | Performed by: INTERNAL MEDICINE

## 2024-04-05 PROCEDURE — 70553 MRI BRAIN STEM W/O & W/DYE: CPT | Mod: TC

## 2024-04-05 PROCEDURE — 25500020 PHARM REV CODE 255: Performed by: INTERNAL MEDICINE

## 2024-04-05 RX ADMIN — GADOBENATE DIMEGLUMINE 9 ML: 529 INJECTION, SOLUTION INTRAVENOUS at 11:04

## 2024-04-17 NOTE — PROGRESS NOTES
"Subjective:       Patient ID: Kimberlyn Selby is a 68 y.o. female.    Chief Complaint:  "I have to watch my diet"    Diagnosis:  Recurrent metastatic breast cancer with peritoneal carcinomatosis - ER+ 4%, NC 0, HER-2 neg (IHC 0)                         PD-L1 CPS >10, ROLANDA, low TMB, PIK3CA E365K and Q3284D mutations                     Stage IA R breast cancer 8/11 (T1b N0 M0), 0.7 cm, GI, ER+/NC-, HER-2 negative                     Stage IIA L breast cancer 1/09 (T1c N1 M0), 1.6 cm, GIII, 1+ LN, ER/NC -, HER-2 negative                     Stage IIIA R breast cancer 1/05 (T2 N2a M0), 1.6 cm, GII, 4+ LN's, ER/NC +, HER-2 negative                     S/p bilateral mastectomies                     Post menopausal s/p hysterectomy                     Declined adjuvant XRT & hormonal therapy with initial breast cancer     Treatment History  Adjuvant TAC x 6 completed 6/05  Adjuvant TC x 4 completed 5/09 --> XRT L breast    Current Therapy:  Xeloda started 8/23 - current dose 500 mg b.i.d. 7 days on/10 days off     Clinical History: WF status post right lumpectomy 1/05 for invasive breast cancer with the above characteristics. She completed 6 cycles of adjuvant TAC but declined radiation. She was placed on Arimidex but discontinued treatment due to significant myalgias. She had similar side effects with Femara and also complained of bone aching due to Tamoxifen and stopped therapy 9/05. Due to her hormone receptor status, she was placed on Evista but again was unable to tolerate therapy. She had an abnormal surveillance mammogram 11/08 showing clustered microcalcifications in the upper outer quadrant of the left breast with an associated 1 cm mass by ultrasound. 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 Patient underwent left lumpectomy and axillary dissection 1/15/09. Final pathology as outlined above. Postop course was complicated by cellulitis. She completed 4 cycles of adjuvant " TC 5/09. She eventually consented to adjuvant radiation therapy to the left breast. Surveillance CT PET scan 12/09 showed no abnormal hypermetabolic activity. Mammogram 6/22/11 showed a new suspicious finding in the right breast at the 4:00 position. Ultrasound was suspicious for malignancy. Ultrasound-guided core biopsies revealed invasive ductal carcinoma strongly ER positive at 85%, VT negative and HER-2 negative. CT PET scan 5/11 showed no abnormal hypermetabolic activity to indicate recurrent or metastatic disease. Although patient did have radiation therapy post lumpectomy on the left side, she never had radiation therapy to the right breast. She elected to undergo bilateral mastectomies and prophylactic right oophorectomy 8/16/11. Final pathology showed a 0.7 cm grade 1 infiltrating ductal carcinoma the right breast. Lamar dissection was not done to her previous surgery. She declined BRCA testing. Adjuvant hormonal therapy was recommended with Aromasin but she declined. She underwent a screening colonoscopy 6/5/13 which showed diverticula but no other abnormal findings; 10 year followup was recommended.      She was seen for a surveillance appointment 8/24/21 and did not return for her annual visit in 2022.    She developed symptoms of GE reflux and constipation that she treated with OTC medications without significant improvement.  She denied significant weight loss.  She was referred for a CT of the abdomen and pelvis by GI 6/2/23 ([facility]) that showed a diffuse abnormal enhancing soft tissue density in the retroperitoneum and mesenteric root with nodular intense enhancement.  Findings suspicious for an infiltrative lymphoproliferative disorder.  Mass encased the aorta without stenosis.  IVC appeared mildly narrowed without focal internal filling defect.  There were no additional abnormal findings.  Spleen was unremarkable.   EGD 6/13/23 showed mild chronic reflux changes and gastritis.   Colonoscopy showed a single polyp which was removed.  Pathology was consistent with metastatic breast cancer.  She underwent a diagnostic laparoscopy 7/12/23 revealing peritoneal carcinomatosis.  Biopsies were positive for metastatic poorly differentiated adenocarcinoma with signet ring morphology consistent with breast primary (LIZ-3 and CK7 positive; CK20 and CDX2 negative).  ER was low positive 4%, RI negative and HER2 negative (IHC 0).    Molecular Testing:  PDL1 CPS >10, ROLANDA, low TMB.  Mutations of BLM, CHEK2, PIK3CA, SMARCA4 and JAK1.    CT-PET 7/28/23:  Poorly delineated bulky abdominal lymphadenopathy with low-grade FDG uptake, SUV 2.4.  Small volume ascites.  No definite metastatic disease outside of the abdomen.  MRI Brain 8/2/23:  No evidence of metastatic disease.  Mild chronic microvascular ischemia and atrophy.    She was initiated on treatment with oral Xeloda.  She did not tolerate dose escalation secondary to hand-foot syndrome.  She required dose adjustment to manage her side effects.    CT C/A/P 11/15/23:  Improved confluent retroperitoneal and mesenteric adenopathy.  Improved mesenteric soft tissue density and edema.  CT C/A/P 2/27/24:  Radiology reported no detrimental change from 11/23.  My review shows further decrease in the soft tissue density in the retroperitoneum and mesentery with no new sites of measurable disease.  Chronic constipation.  MRI brain 4/5/24: No acute findings or metastatic disease.    Interval History  Mrs. Selby is here today by herself for a 8 week on treatment follow-up visit.  She remains on palliative therapy with oral Xeloda, 500 mg twice daily, 7 days on and 10 days off.  CT scans 2/24 showed stable disease to mild interval improvement.  She was referred for a brain MRI 04/05/2024 after complaining of headache and dizziness.  There was no evidence of metastatic disease.  After much consideration, she felt her symptoms were due to dehydration and over exertion.  She  has not experienced recurrent symptoms.  She has stable discoloration of the fingers, but no progressive hand-foot syndrome.  She has chronic constipation with which she manages with lactulose and MiraLax.  She finds lactulose most effective and is inquiring about more refills or a larger supply.  She is also now back on Protonix for symptoms of reflux.  Laboratory for this visit show stable mild anemia and an unremarkable CMP.  CEA shows mild interval increased from 13 to 16.  CA 27-29 is normal/stable.  She has no new or worsening symptoms worrisome for disease progression.    Review of Systems   Constitutional:  Negative for activity change, appetite change, fatigue, fever and unexpected weight change.   HENT:  Negative for mouth sores, sore throat and trouble swallowing.    Eyes: Negative.  Negative for visual disturbance.   Respiratory:  Negative for cough and shortness of breath.    Cardiovascular:  Negative for chest pain, palpitations and leg swelling.   Gastrointestinal:  Positive for constipation and reflux. Negative for abdominal distention, abdominal pain, diarrhea, nausea and vomiting.   Genitourinary:  Negative for dysuria, frequency and urgency.   Musculoskeletal:  Negative for arthralgias and back pain.   Integumentary:  Negative for pallor and rash.        Discoloration of fingertips, non tender.  Toenail changes   Neurological:  Negative for dizziness, weakness, numbness and headaches.   Hematological:  Negative for adenopathy. Does not bruise/bleed easily.   Psychiatric/Behavioral: Negative.         PMHx:  Bilateral breast cancer, arthritis, hypothyroidism, GERD  PSHx:  Bilateral breast lumpectomies, bilateral mastectomies, hysterectomy/BSO, MediPort insertion and removal, uterine suspension, diagnostic laparoscopy  SH:  Lifetime nonsmoker, no significant alcohol use.  Lives in [place] with her .  FH:  Her father had kidney cancer, son had appendiceal cancer and MGF had liver  "cancer.    Objective:        BP (!) 113/54   Pulse (!) 56   Temp 97.3 °F (36.3 °C)   Resp 14   Ht 5' 2" (1.575 m)   Wt 41.9 kg (92 lb 6.4 oz)   SpO2 98%   BMI 16.90 kg/m²    Physical Exam  Constitutional:       Comments: Thin white female in NAD   HENT:      Head: Normocephalic.      Mouth/Throat:      Mouth: Mucous membranes are moist.      Pharynx: Oropharynx is clear. No posterior oropharyngeal erythema.      Comments: No mucositis  Eyes:      General: No scleral icterus.     Extraocular Movements: Extraocular movements intact.      Pupils: Pupils are equal, round, and reactive to light.   Cardiovascular:      Rate and Rhythm: Normal rate and regular rhythm.      Heart sounds: No murmur heard.  Pulmonary:      Comments: Lungs clear to auscultation  Abdominal:      General: Bowel sounds are normal. There is no distension.      Tenderness: There is no abdominal tenderness.      Comments: No palpable masses or organomegaly   Musculoskeletal:         General: No swelling or tenderness. Normal range of motion.      Cervical back: Neck supple. No tenderness.   Lymphadenopathy:      Cervical: No cervical adenopathy.      Upper Body:      Right upper body: No supraclavicular or axillary adenopathy.      Left upper body: No supraclavicular or axillary adenopathy.   Skin:     General: Skin is warm and dry.      Findings: No rash.      Comments: Grade 1 hand-foot syndrome   Neurological:      General: No focal deficit present.      Mental Status: She is alert and oriented to person, place, and time.      Cranial Nerves: No cranial nerve deficit.      Motor: No weakness.       ECOG SCORE    1 - Restricted in strenuous activity-ambulatory and able to carry out work of a light nature          LABORATORY  Recent Results (from the past 336 hour(s))   CEA    Collection Time: 04/26/24  9:40 AM   Result Value Ref Range    Carcinoembryonic Antigen 16.47 (H) 0.00 - 3.00 ng/mL   Comprehensive Metabolic Panel    Collection Time: " 04/26/24  9:40 AM   Result Value Ref Range    Sodium Level 142 136 - 145 mmol/L    Potassium Level 4.1 3.5 - 5.1 mmol/L    Chloride 103 98 - 107 mmol/L    Carbon Dioxide 30 23 - 31 mmol/L    Glucose Level 81 (L) 82 - 115 mg/dL    Blood Urea Nitrogen 22.4 (H) 9.8 - 20.1 mg/dL    Creatinine 0.79 0.55 - 1.02 mg/dL    Calcium Level Total 9.2 8.4 - 10.2 mg/dL    Protein Total 6.9 5.8 - 7.6 gm/dL    Albumin Level 4.0 3.4 - 4.8 g/dL    Globulin 2.9 2.4 - 3.5 gm/dL    Albumin/Globulin Ratio 1.4 1.1 - 2.0 ratio    Bilirubin Total 0.3 <=1.5 mg/dL    Alkaline Phosphatase 49 40 - 150 unit/L    Alanine Aminotransferase 14 0 - 55 unit/L    Aspartate Aminotransferase 24 5 - 34 unit/L    eGFR >60 mls/min/1.73/m2   Cancer Antigen 27-29    Collection Time: 04/26/24  9:40 AM   Result Value Ref Range    Breast Carcinoma Assoc Ag(CA 27.29) 32.0 <=38.0 U/mL   CBC with Differential    Collection Time: 04/26/24  9:40 AM   Result Value Ref Range    WBC 5.07 4.50 - 11.50 x10(3)/mcL    RBC 3.21 (L) 4.20 - 5.40 x10(6)/mcL    Hgb 10.7 (L) 12.0 - 16.0 g/dL    Hct 32.6 (L) 37.0 - 47.0 %    .6 (H) 80.0 - 94.0 fL    MCH 33.3 (H) 27.0 - 31.0 pg    MCHC 32.8 (L) 33.0 - 36.0 g/dL    RDW 15.7 11.5 - 17.0 %    Platelet 259 130 - 400 x10(3)/mcL    MPV 8.6 7.4 - 10.4 fL    Neut % 55.1 %    Lymph % 26.6 %    Mono % 13.6 %    Eos % 3.7 %    Basophil % 0.8 %    Lymph # 1.35 0.6 - 4.6 x10(3)/mcL    Neut # 2.79 2.1 - 9.2 x10(3)/mcL    Mono # 0.69 0.1 - 1.3 x10(3)/mcL    Eos # 0.19 0 - 0.9 x10(3)/mcL    Baso # 0.04 <=0.2 x10(3)/mcL    IG# 0.01 0 - 0.04 x10(3)/mcL    IG% 0.2 %                            9/20/23   10/24/23   11/15/23   1/02/24   2/22/24   4/26/24  CEA              50.9         30.7         21.7          14.5        13.1       16.4  CA 27-29      43.7         40.1         31.5          29.8        28.0        32      Assessment:   Recurrent metastatic breast cancer with peritoneal carcinomatosis - ER+ 4%, MI neg, Her-2 neg (IHC 0)  History  of bilateral breast cancer s/p bilateral mastectomies  GERD and constipation    Plan:   Continue Protonix 40 mg daily.  Prescription renewed.  Lactulose 30 ml daily as needed for constipation.  #600 ml with refills.  Hold for diarrhea.  Continue Xeloda 500 mg BID, 7 days on and 10 days off.  RTC 2 months for follow-up with lab, or sooner needed.  Plan for restaging scans in August unless she develops clinical symptoms or laboratory findings worrisome for disease progression.  All questions answered to the satisfaction of the patient.  She is in agreement with the plan as outlined above.    JON DIXON, FNP-C  [facility] [place] Physicians  Dr. Darrell Swan

## 2024-04-26 ENCOUNTER — LAB VISIT (OUTPATIENT)
Dept: LAB | Facility: HOSPITAL | Age: 69
End: 2024-04-26
Attending: INTERNAL MEDICINE
Payer: MEDICARE

## 2024-04-26 DIAGNOSIS — Z17.0 MALIGNANT NEOPLASM OF LOWER-INNER QUADRANT OF RIGHT BREAST OF FEMALE, ESTROGEN RECEPTOR POSITIVE: ICD-10-CM

## 2024-04-26 DIAGNOSIS — C50.311 MALIGNANT NEOPLASM OF LOWER-INNER QUADRANT OF RIGHT BREAST OF FEMALE, ESTROGEN RECEPTOR POSITIVE: ICD-10-CM

## 2024-04-26 DIAGNOSIS — C78.6 SECONDARY MALIGNANT NEOPLASM OF PERITONEUM: ICD-10-CM

## 2024-04-26 LAB
ALBUMIN SERPL-MCNC: 4 G/DL (ref 3.4–4.8)
ALBUMIN/GLOB SERPL: 1.4 RATIO (ref 1.1–2)
ALP SERPL-CCNC: 49 UNIT/L (ref 40–150)
ALT SERPL-CCNC: 14 UNIT/L (ref 0–55)
AST SERPL-CCNC: 24 UNIT/L (ref 5–34)
BASOPHILS # BLD AUTO: 0.04 X10(3)/MCL
BASOPHILS NFR BLD AUTO: 0.8 %
BILIRUB SERPL-MCNC: 0.3 MG/DL
BUN SERPL-MCNC: 22.4 MG/DL (ref 9.8–20.1)
CALCIUM SERPL-MCNC: 9.2 MG/DL (ref 8.4–10.2)
CEA SERPL-MCNC: 16.47 NG/ML (ref 0–3)
CHLORIDE SERPL-SCNC: 103 MMOL/L (ref 98–107)
CO2 SERPL-SCNC: 30 MMOL/L (ref 23–31)
CREAT SERPL-MCNC: 0.79 MG/DL (ref 0.55–1.02)
EOSINOPHIL # BLD AUTO: 0.19 X10(3)/MCL (ref 0–0.9)
EOSINOPHIL NFR BLD AUTO: 3.7 %
ERYTHROCYTE [DISTWIDTH] IN BLOOD BY AUTOMATED COUNT: 15.7 % (ref 11.5–17)
GFR SERPLBLD CREATININE-BSD FMLA CKD-EPI: >60 MLS/MIN/1.73/M2
GLOBULIN SER-MCNC: 2.9 GM/DL (ref 2.4–3.5)
GLUCOSE SERPL-MCNC: 81 MG/DL (ref 82–115)
HCT VFR BLD AUTO: 32.6 % (ref 37–47)
HGB BLD-MCNC: 10.7 G/DL (ref 12–16)
IMM GRANULOCYTES # BLD AUTO: 0.01 X10(3)/MCL (ref 0–0.04)
IMM GRANULOCYTES NFR BLD AUTO: 0.2 %
LYMPHOCYTES # BLD AUTO: 1.35 X10(3)/MCL (ref 0.6–4.6)
LYMPHOCYTES NFR BLD AUTO: 26.6 %
MCH RBC QN AUTO: 33.3 PG (ref 27–31)
MCHC RBC AUTO-ENTMCNC: 32.8 G/DL (ref 33–36)
MCV RBC AUTO: 101.6 FL (ref 80–94)
MONOCYTES # BLD AUTO: 0.69 X10(3)/MCL (ref 0.1–1.3)
MONOCYTES NFR BLD AUTO: 13.6 %
NEUTROPHILS # BLD AUTO: 2.79 X10(3)/MCL (ref 2.1–9.2)
NEUTROPHILS NFR BLD AUTO: 55.1 %
PLATELET # BLD AUTO: 259 X10(3)/MCL (ref 130–400)
PMV BLD AUTO: 8.6 FL (ref 7.4–10.4)
POTASSIUM SERPL-SCNC: 4.1 MMOL/L (ref 3.5–5.1)
PROT SERPL-MCNC: 6.9 GM/DL (ref 5.8–7.6)
RBC # BLD AUTO: 3.21 X10(6)/MCL (ref 4.2–5.4)
SODIUM SERPL-SCNC: 142 MMOL/L (ref 136–145)
WBC # SPEC AUTO: 5.07 X10(3)/MCL (ref 4.5–11.5)

## 2024-04-26 PROCEDURE — 85025 COMPLETE CBC W/AUTO DIFF WBC: CPT

## 2024-04-26 PROCEDURE — 82378 CARCINOEMBRYONIC ANTIGEN: CPT

## 2024-04-26 PROCEDURE — 36415 COLL VENOUS BLD VENIPUNCTURE: CPT

## 2024-04-26 PROCEDURE — 80053 COMPREHEN METABOLIC PANEL: CPT

## 2024-04-26 PROCEDURE — 86300 IMMUNOASSAY TUMOR CA 15-3: CPT

## 2024-04-29 ENCOUNTER — OFFICE VISIT (OUTPATIENT)
Dept: HEMATOLOGY/ONCOLOGY | Facility: CLINIC | Age: 69
End: 2024-04-29
Payer: MEDICARE

## 2024-04-29 VITALS
RESPIRATION RATE: 14 BRPM | OXYGEN SATURATION: 98 % | TEMPERATURE: 97 F | DIASTOLIC BLOOD PRESSURE: 54 MMHG | HEART RATE: 56 BPM | HEIGHT: 62 IN | WEIGHT: 92.38 LBS | BODY MASS INDEX: 17 KG/M2 | SYSTOLIC BLOOD PRESSURE: 113 MMHG

## 2024-04-29 DIAGNOSIS — K59.00 CONSTIPATION, UNSPECIFIED CONSTIPATION TYPE: ICD-10-CM

## 2024-04-29 DIAGNOSIS — K29.60 REFLUX GASTRITIS: ICD-10-CM

## 2024-04-29 DIAGNOSIS — C78.6 SECONDARY MALIGNANT NEOPLASM OF PERITONEUM: ICD-10-CM

## 2024-04-29 DIAGNOSIS — Z79.899 DRUG-INDUCED IMMUNODEFICIENCY: ICD-10-CM

## 2024-04-29 DIAGNOSIS — C50.311 MALIGNANT NEOPLASM OF LOWER-INNER QUADRANT OF RIGHT BREAST OF FEMALE, ESTROGEN RECEPTOR POSITIVE: Primary | ICD-10-CM

## 2024-04-29 DIAGNOSIS — D84.821 DRUG-INDUCED IMMUNODEFICIENCY: ICD-10-CM

## 2024-04-29 DIAGNOSIS — Z17.0 MALIGNANT NEOPLASM OF LOWER-INNER QUADRANT OF RIGHT BREAST OF FEMALE, ESTROGEN RECEPTOR POSITIVE: Primary | ICD-10-CM

## 2024-04-29 LAB — CANCER AG27-29 SERPL-ACNC: 32 U/ML

## 2024-04-29 PROCEDURE — 99214 OFFICE O/P EST MOD 30 MIN: CPT | Mod: S$PBB,,, | Performed by: NURSE PRACTITIONER

## 2024-04-29 PROCEDURE — 99215 OFFICE O/P EST HI 40 MIN: CPT | Mod: PBBFAC | Performed by: NURSE PRACTITIONER

## 2024-04-29 PROCEDURE — 99999 PR PBB SHADOW E&M-EST. PATIENT-LVL V: CPT | Mod: PBBFAC,,, | Performed by: NURSE PRACTITIONER

## 2024-04-29 RX ORDER — LACTULOSE 10 G/15ML
30 SOLUTION ORAL; RECTAL DAILY
Qty: 600 ML | Refills: 3 | Status: SHIPPED | OUTPATIENT
Start: 2024-04-29

## 2024-04-29 RX ORDER — PANTOPRAZOLE SODIUM 40 MG/1
40 TABLET, DELAYED RELEASE ORAL DAILY
Qty: 30 TABLET | Refills: 3 | Status: SHIPPED | OUTPATIENT
Start: 2024-04-29

## 2024-06-26 ENCOUNTER — LAB VISIT (OUTPATIENT)
Dept: LAB | Facility: HOSPITAL | Age: 69
End: 2024-06-26
Attending: NURSE PRACTITIONER
Payer: MEDICARE

## 2024-06-26 DIAGNOSIS — C78.6 SECONDARY MALIGNANT NEOPLASM OF PERITONEUM: ICD-10-CM

## 2024-06-26 DIAGNOSIS — Z17.0 MALIGNANT NEOPLASM OF LOWER-INNER QUADRANT OF RIGHT BREAST OF FEMALE, ESTROGEN RECEPTOR POSITIVE: ICD-10-CM

## 2024-06-26 DIAGNOSIS — Z79.899 DRUG-INDUCED IMMUNODEFICIENCY: ICD-10-CM

## 2024-06-26 DIAGNOSIS — D84.821 DRUG-INDUCED IMMUNODEFICIENCY: ICD-10-CM

## 2024-06-26 DIAGNOSIS — C50.311 MALIGNANT NEOPLASM OF LOWER-INNER QUADRANT OF RIGHT BREAST OF FEMALE, ESTROGEN RECEPTOR POSITIVE: ICD-10-CM

## 2024-06-26 LAB
ALBUMIN SERPL-MCNC: 4 G/DL (ref 3.4–4.8)
ALBUMIN/GLOB SERPL: 1.3 RATIO (ref 1.1–2)
ALP SERPL-CCNC: 50 UNIT/L (ref 40–150)
ALT SERPL-CCNC: 16 UNIT/L (ref 0–55)
ANION GAP SERPL CALC-SCNC: 10 MEQ/L
AST SERPL-CCNC: 28 UNIT/L (ref 5–34)
BASOPHILS # BLD AUTO: 0.05 X10(3)/MCL
BASOPHILS NFR BLD AUTO: 1 %
BILIRUB SERPL-MCNC: 0.3 MG/DL
BUN SERPL-MCNC: 21 MG/DL (ref 9.8–20.1)
CALCIUM SERPL-MCNC: 9.3 MG/DL (ref 8.4–10.2)
CEA SERPL-MCNC: 22.39 NG/ML (ref 0–3)
CHLORIDE SERPL-SCNC: 103 MMOL/L (ref 98–107)
CO2 SERPL-SCNC: 28 MMOL/L (ref 23–31)
CREAT SERPL-MCNC: 0.83 MG/DL (ref 0.55–1.02)
CREAT/UREA NIT SERPL: 25
EOSINOPHIL # BLD AUTO: 0.26 X10(3)/MCL (ref 0–0.9)
EOSINOPHIL NFR BLD AUTO: 5.4 %
ERYTHROCYTE [DISTWIDTH] IN BLOOD BY AUTOMATED COUNT: 15.6 % (ref 11.5–17)
GFR SERPLBLD CREATININE-BSD FMLA CKD-EPI: >60 ML/MIN/1.73/M2
GLOBULIN SER-MCNC: 3 GM/DL (ref 2.4–3.5)
GLUCOSE SERPL-MCNC: 73 MG/DL (ref 82–115)
HCT VFR BLD AUTO: 35.8 % (ref 37–47)
HGB BLD-MCNC: 11.5 G/DL (ref 12–16)
IMM GRANULOCYTES # BLD AUTO: 0.01 X10(3)/MCL (ref 0–0.04)
IMM GRANULOCYTES NFR BLD AUTO: 0.2 %
LYMPHOCYTES # BLD AUTO: 1.39 X10(3)/MCL (ref 0.6–4.6)
LYMPHOCYTES NFR BLD AUTO: 28.7 %
MCH RBC QN AUTO: 33 PG (ref 27–31)
MCHC RBC AUTO-ENTMCNC: 32.1 G/DL (ref 33–36)
MCV RBC AUTO: 102.9 FL (ref 80–94)
MONOCYTES # BLD AUTO: 0.68 X10(3)/MCL (ref 0.1–1.3)
MONOCYTES NFR BLD AUTO: 14 %
NEUTROPHILS # BLD AUTO: 2.46 X10(3)/MCL (ref 2.1–9.2)
NEUTROPHILS NFR BLD AUTO: 50.7 %
PLATELET # BLD AUTO: 228 X10(3)/MCL (ref 130–400)
PMV BLD AUTO: 9.4 FL (ref 7.4–10.4)
POTASSIUM SERPL-SCNC: 3.7 MMOL/L (ref 3.5–5.1)
PROT SERPL-MCNC: 7 GM/DL (ref 5.8–7.6)
RBC # BLD AUTO: 3.48 X10(6)/MCL (ref 4.2–5.4)
SODIUM SERPL-SCNC: 141 MMOL/L (ref 136–145)
WBC # BLD AUTO: 4.85 X10(3)/MCL (ref 4.5–11.5)

## 2024-06-26 PROCEDURE — 85025 COMPLETE CBC W/AUTO DIFF WBC: CPT

## 2024-06-26 PROCEDURE — 82378 CARCINOEMBRYONIC ANTIGEN: CPT

## 2024-06-26 PROCEDURE — 80053 COMPREHEN METABOLIC PANEL: CPT

## 2024-06-26 PROCEDURE — 36415 COLL VENOUS BLD VENIPUNCTURE: CPT

## 2024-06-26 PROCEDURE — 86300 IMMUNOASSAY TUMOR CA 15-3: CPT

## 2024-06-27 LAB — CANCER AG27-29 SERPL-ACNC: 45.5 U/ML

## 2024-06-28 NOTE — PROGRESS NOTES
"Subjective:       Patient ID: Kimberlyn Selby is a 68 y.o. female.    Chief Complaint:  "I have a lot going on.  I am also worried about my lab results"    Diagnosis:  Recurrent metastatic breast cancer with peritoneal carcinomatosis - ER+ 4%, IA 0, HER-2 neg (IHC 0)                         PD-L1 CPS >10, ROLANDA, low TMB, PIK3CA E365K and L7714V mutations                     Stage IA R breast cancer 8/11 (T1b N0 M0), 0.7 cm, GI, ER+/IA-, HER-2 negative                     Stage IIA L breast cancer 1/09 (T1c N1 M0), 1.6 cm, GIII, 1+ LN, ER/IA -, HER-2 negative                     Stage IIIA R breast cancer 1/05 (T2 N2a M0), 1.6 cm, GII, 4+ LN's, ER/IA +, HER-2 negative                     S/p bilateral mastectomies                     Post menopausal s/p hysterectomy                     Declined adjuvant XRT & hormonal therapy with initial breast cancer     Treatment History  Adjuvant TAC x 6 completed 6/05  Adjuvant TC x 4 completed 5/09 --> XRT L breast    Current Therapy:  Xeloda started 8/23 - current dose 500 mg b.i.d. 7 days on/10 days off     Clinical History: WF status post right lumpectomy 1/05 for invasive breast cancer with the above characteristics. She completed 6 cycles of adjuvant TAC but declined radiation. She was placed on Arimidex but discontinued treatment due to significant myalgias. She had similar side effects with Femara and also complained of bone aching due to Tamoxifen and stopped therapy 9/05. Due to her hormone receptor status, she was placed on Evista but again was unable to tolerate therapy. She had an abnormal surveillance mammogram 11/08 showing clustered microcalcifications in the upper outer quadrant of the left breast with an associated 1 cm mass by ultrasound. 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 Patient underwent left lumpectomy and axillary dissection 1/15/09. Final pathology as outlined above. Postop course was complicated by " cellulitis. She completed 4 cycles of adjuvant TC 5/09. She eventually consented to adjuvant radiation therapy to the left breast. Surveillance CT PET scan 12/09 showed no abnormal hypermetabolic activity. Mammogram 6/22/11 showed a new suspicious finding in the right breast at the 4:00 position. Ultrasound was suspicious for malignancy. Ultrasound-guided core biopsies revealed invasive ductal carcinoma strongly ER positive at 85%, MD negative and HER-2 negative. CT PET scan 5/11 showed no abnormal hypermetabolic activity to indicate recurrent or metastatic disease. Although patient did have radiation therapy post lumpectomy on the left side, she never had radiation therapy to the right breast. She elected to undergo bilateral mastectomies and prophylactic right oophorectomy 8/16/11. Final pathology showed a 0.7 cm grade 1 infiltrating ductal carcinoma the right breast. Lamar dissection was not done to her previous surgery. She declined BRCA testing. Adjuvant hormonal therapy was recommended with Aromasin but she declined. She underwent a screening colonoscopy 6/5/13 which showed diverticula but no other abnormal findings; 10 year followup was recommended.      She was seen for a surveillance appointment 8/24/21 and did not return for her annual visit in 2022.    She developed symptoms of GE reflux and constipation that she treated with OTC medications without significant improvement.  She denied significant weight loss.  She was referred for a CT of the abdomen and pelvis by GI 6/2/23 ([facility]) that showed a diffuse abnormal enhancing soft tissue density in the retroperitoneum and mesenteric root with nodular intense enhancement.  Findings suspicious for an infiltrative lymphoproliferative disorder.  Mass encased the aorta without stenosis.  IVC appeared mildly narrowed without focal internal filling defect.  There were no additional abnormal findings.  Spleen was unremarkable.   EGD 6/13/23 showed mild  chronic reflux changes and gastritis.  Colonoscopy showed a single polyp which was removed.  Pathology was consistent with metastatic breast cancer.  She underwent a diagnostic laparoscopy 7/12/23 revealing peritoneal carcinomatosis.  Biopsies were positive for metastatic poorly differentiated adenocarcinoma with signet ring morphology consistent with breast primary (LIZ-3 and CK7 positive; CK20 and CDX2 negative).  ER was low positive 4%, [place] negative and HER2 negative (IHC 0).    Molecular Testing:  PDL1 CPS >10, ROLANDA, low TMB.  Mutations of BLM, CHEK2, PIK3CA E365K, SMARCA4 and JAK1.  PIK3CA E365K AND G6061o are not approved bio-markers    CT-PET 7/28/23:  Poorly delineated bulky abdominal lymphadenopathy with low-grade FDG uptake, SUV 2.4.  Small volume ascites.  No definite metastatic disease outside of the abdomen.  MRI Brain 8/2/23:  No evidence of metastatic disease.  Mild chronic microvascular ischemia and atrophy.    She was initiated on treatment with oral Xeloda.  She did not tolerate dose escalation secondary to hand-foot syndrome.  She required dose adjustment to manage her side effects.    CT C/A/P 11/15/23:  Improved confluent retroperitoneal and mesenteric adenopathy.  Improved mesenteric soft tissue density and edema.  CT C/A/P 2/27/24:  Radiology reported no detrimental change from 11/23.  My review shows further decrease in the soft tissue density in the retroperitoneum and mesentery with no new sites of measurable disease.  Chronic constipation.  MRI brain 4/5/24: No acute findings or metastatic disease.    Interval History  Mrs. Selby is here today by herself for a 2 month on treatment follow-up visit.  She continues on oral Xeloda 500 mg BID, 7 days on and 10 days off.  She has been having some difficulty sleeping, which she attributes to some increased stress at home.  She has also had some slight worsening of her abdominal symptoms (discomfort, reflux, constipation).  Laboratory shows  "progression of her CEA and CA 27-29 levels in the interim.  She does not want a prescription for a sleep aid.    Review of Systems   Constitutional:  Positive for fatigue. Negative for activity change, appetite change, fever and unexpected weight change.        Difficulty sleeping   HENT:  Negative for mouth sores, sore throat and trouble swallowing.    Eyes: Negative.  Negative for visual disturbance.   Respiratory:  Negative for cough and shortness of breath.    Cardiovascular:  Negative for chest pain, palpitations and leg swelling.   Gastrointestinal:  Positive for constipation and reflux. Negative for abdominal distention, abdominal pain, diarrhea, nausea and vomiting.   Genitourinary:  Negative for dysuria, frequency and urgency.   Musculoskeletal:  Negative for arthralgias and back pain.   Integumentary:  Negative for pallor and rash.   Neurological:  Negative for dizziness, weakness, numbness and headaches.   Hematological:  Negative for adenopathy. Does not bruise/bleed easily.   Psychiatric/Behavioral: Negative.         PMHx:  Bilateral breast cancer, arthritis, hypothyroidism, GERD  PSHx:  Bilateral breast lumpectomies, bilateral mastectomies, hysterectomy/BSO, MediPort insertion and removal, uterine suspension, diagnostic laparoscopy  SH:  Lifetime nonsmoker, no significant alcohol use.  Lives in [place] with her .  FH:  Her father had kidney cancer, son had appendiceal cancer and MGF had liver cancer.    Objective:        /69   Pulse (!) 54   Temp 97.8 °F (36.6 °C)   Resp 15   Ht 5' 2" (1.575 m)   Wt 44.1 kg (97 lb 4.8 oz)   SpO2 97%   BMI 17.80 kg/m²    Physical Exam  Constitutional:       Comments: Thin white female in NAD   HENT:      Head: Normocephalic.      Mouth/Throat:      Mouth: Mucous membranes are moist.      Pharynx: Oropharynx is clear. No posterior oropharyngeal erythema.      Comments: No mucositis  Eyes:      General: No scleral icterus.     Extraocular Movements: " Extraocular movements intact.      Pupils: Pupils are equal, round, and reactive to light.   Cardiovascular:      Rate and Rhythm: Normal rate and regular rhythm.      Heart sounds: No murmur heard.  Pulmonary:      Comments: Lungs clear to auscultation  Abdominal:      General: Bowel sounds are normal. There is no distension.      Tenderness: There is no abdominal tenderness.      Comments: No palpable masses or organomegaly   Musculoskeletal:         General: No swelling or tenderness. Normal range of motion.      Cervical back: Neck supple. No tenderness.   Lymphadenopathy:      Cervical: No cervical adenopathy.      Upper Body:      Right upper body: No supraclavicular or axillary adenopathy.      Left upper body: No supraclavicular or axillary adenopathy.   Skin:     General: Skin is warm and dry.      Findings: No rash.   Neurological:      General: No focal deficit present.      Mental Status: She is alert and oriented to person, place, and time.      Cranial Nerves: No cranial nerve deficit.      Motor: No weakness.       ECOG SCORE    1 - Restricted in strenuous activity-ambulatory and able to carry out work of a light nature          LABORATORY  Recent Results (from the past 336 hour(s))   CEA    Collection Time: 06/26/24  7:59 AM   Result Value Ref Range    Carcinoembryonic Antigen 22.39 (H) 0.00 - 3.00 ng/mL   Cancer Antigen 27-29    Collection Time: 06/26/24  7:59 AM   Result Value Ref Range    Breast Carcinoma Assoc Ag(CA 27.29) 45.5 (H) <=38.0 U/mL   Comprehensive Metabolic Panel    Collection Time: 06/26/24  7:59 AM   Result Value Ref Range    Sodium 141 136 - 145 mmol/L    Potassium 3.7 3.5 - 5.1 mmol/L    Chloride 103 98 - 107 mmol/L    CO2 28 23 - 31 mmol/L    Glucose 73 (L) 82 - 115 mg/dL    Blood Urea Nitrogen 21.0 (H) 9.8 - 20.1 mg/dL    Creatinine 0.83 0.55 - 1.02 mg/dL    Calcium 9.3 8.4 - 10.2 mg/dL    Protein Total 7.0 5.8 - 7.6 gm/dL    Albumin 4.0 3.4 - 4.8 g/dL    Globulin 3.0 2.4 - 3.5  gm/dL    Albumin/Globulin Ratio 1.3 1.1 - 2.0 ratio    Bilirubin Total 0.3 <=1.5 mg/dL    ALP 50 40 - 150 unit/L    ALT 16 0 - 55 unit/L    AST 28 5 - 34 unit/L    eGFR >60 mL/min/1.73/m2    Anion Gap 10.0 mEq/L    BUN/Creatinine Ratio 25    CBC with Differential    Collection Time: 06/26/24  7:59 AM   Result Value Ref Range    WBC 4.85 4.50 - 11.50 x10(3)/mcL    RBC 3.48 (L) 4.20 - 5.40 x10(6)/mcL    Hgb 11.5 (L) 12.0 - 16.0 g/dL    Hct 35.8 (L) 37.0 - 47.0 %    .9 (H) 80.0 - 94.0 fL    MCH 33.0 (H) 27.0 - 31.0 pg    MCHC 32.1 (L) 33.0 - 36.0 g/dL    RDW 15.6 11.5 - 17.0 %    Platelet 228 130 - 400 x10(3)/mcL    MPV 9.4 7.4 - 10.4 fL    Neut % 50.7 %    Lymph % 28.7 %    Mono % 14.0 %    Eos % 5.4 %    Basophil % 1.0 %    Lymph # 1.39 0.6 - 4.6 x10(3)/mcL    Neut # 2.46 2.1 - 9.2 x10(3)/mcL    Mono # 0.68 0.1 - 1.3 x10(3)/mcL    Eos # 0.26 0 - 0.9 x10(3)/mcL    Baso # 0.05 <=0.2 x10(3)/mcL    IG# 0.01 0 - 0.04 x10(3)/mcL    IG% 0.2 %                            9/20/23   10/24/23   11/15/23   1/02/24   2/22/24   4/26/24   6/26/24  CEA              50.9         30.7         21.7          14.5        13.1       16.4       22.39  CA 27-29      43.7         40.1         31.5          29.8        28.0        32          45      Assessment:   Recurrent metastatic breast cancer with peritoneal carcinomatosis - ER+ 4%, ME neg, Her-2 neg (IHC 0)  History of bilateral breast cancer s/p bilateral mastectomies  GERD and constipation    Plan:   Continue Xeloda 500 mg BID, 7 days on and 7 days off.  She has no evidence of HFS.  Refer for restaging CT scans of the C/A/P in light of increasing tumor markers.  Continue Protonix and daily Lactulose.  RTC after scans for results review and additional recommendations.     She is in agreement with the plan as outlined above.    JON DIXON, FNP-C  Can[facility] Center [facility] [place]     Other Physicians  Dr. Darrell Swan

## 2024-07-01 ENCOUNTER — OFFICE VISIT (OUTPATIENT)
Dept: HEMATOLOGY/ONCOLOGY | Facility: CLINIC | Age: 69
End: 2024-07-01
Payer: MEDICARE

## 2024-07-01 VITALS
RESPIRATION RATE: 15 BRPM | OXYGEN SATURATION: 97 % | DIASTOLIC BLOOD PRESSURE: 69 MMHG | WEIGHT: 97.31 LBS | BODY MASS INDEX: 17.91 KG/M2 | HEIGHT: 62 IN | TEMPERATURE: 98 F | SYSTOLIC BLOOD PRESSURE: 132 MMHG | HEART RATE: 54 BPM

## 2024-07-01 DIAGNOSIS — C78.6 SECONDARY MALIGNANT NEOPLASM OF PERITONEUM: ICD-10-CM

## 2024-07-01 DIAGNOSIS — Z17.0 MALIGNANT NEOPLASM OF LOWER-INNER QUADRANT OF RIGHT BREAST OF FEMALE, ESTROGEN RECEPTOR POSITIVE: Primary | ICD-10-CM

## 2024-07-01 DIAGNOSIS — C50.311 MALIGNANT NEOPLASM OF LOWER-INNER QUADRANT OF RIGHT BREAST OF FEMALE, ESTROGEN RECEPTOR POSITIVE: Primary | ICD-10-CM

## 2024-07-01 PROCEDURE — 99215 OFFICE O/P EST HI 40 MIN: CPT | Mod: PBBFAC | Performed by: NURSE PRACTITIONER

## 2024-07-01 PROCEDURE — 99214 OFFICE O/P EST MOD 30 MIN: CPT | Mod: S$PBB,,, | Performed by: NURSE PRACTITIONER

## 2024-07-01 PROCEDURE — 99999 PR PBB SHADOW E&M-EST. PATIENT-LVL V: CPT | Mod: PBBFAC,,, | Performed by: NURSE PRACTITIONER

## 2024-07-18 NOTE — PROGRESS NOTES
Subjective:       Patient ID: Kimberlyn Selby is a 68 y.o. female.    Chief Complaint:  Mild fatigue    Diagnosis:  Recurrent metastatic breast cancer with peritoneal carcinomatosis - ER+ 4%, OH 0, HER-2 neg (IHC 0)                         PD-L1 CPS >10, ROLANDA, low TMB, PIK3CA E365K and [id number] mutations                     Stage IA R breast cancer 8/11 (T1b N0 M0), 0.7 cm, GI, ER+/OH-, HER-2 negative                     Stage IIA L breast cancer 1/09 (T1c N1 M0), 1.6 cm, GIII, 1+ LN, ER/OH -, HER-2 negative                     Stage IIIA R breast cancer 1/05 (T2 N2a M0), 1.6 cm, GII, 4+ LN's, ER/OH +, HER-2 negative                     S/p bilateral mastectomies                     Post menopausal s/p hysterectomy                     Declined adjuvant XRT & hormonal therapy with initial breast cancer     Treatment History  Adjuvant TAC x 6 completed 6/05  Adjuvant TC x 4 completed 5/09 --> XRT L breast    Current Therapy:  Xeloda started 8/23 - current dose 500 mg b.i.d. 7 days on/10 days off     Clinical History: WF status post right lumpectomy 1/05 for invasive breast cancer with the above characteristics. She completed 6 cycles of adjuvant TAC but declined radiation. She was placed on Arimidex but discontinued treatment due to significant myalgias. She had similar side effects with Femara and also complained of bone aching due to Tamoxifen and stopped therapy 9/05. Due to her hormone receptor status, she was placed on Evista but again was unable to tolerate therapy. She had an abnormal surveillance mammogram 11/08 showing clustered microcalcifications in the upper outer quadrant of the left breast with an associated 1 cm mass by ultrasound. 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 Patient underwent left lumpectomy and axillary dissection 1/15/09. Final pathology as outlined above. Postop course was complicated by cellulitis. She completed 4 cycles of adjuvant TC 5/09. She  eventually consented to adjuvant radiation therapy to the left breast. Surveillance CT PET scan 12/09 showed no abnormal hypermetabolic activity. Mammogram 6/22/11 showed a new suspicious finding in the right breast at the 4:00 position. Ultrasound was suspicious for malignancy. Ultrasound-guided core biopsies revealed invasive ductal carcinoma strongly ER positive at 85%, AZ negative and HER-2 negative. CT PET scan 5/11 showed no abnormal hypermetabolic activity to indicate recurrent or metastatic disease. Although patient did have radiation therapy post lumpectomy on the left side, she never had radiation therapy to the right breast. She elected to undergo bilateral mastectomies and prophylactic right oophorectomy 8/16/11. Final pathology showed a 0.7 cm grade 1 infiltrating ductal carcinoma the right breast. Lamar dissection was not done to her previous surgery. She declined BRCA testing. Adjuvant hormonal therapy was recommended with Aromasin but she declined. She underwent a screening colonoscopy 6/5/13 which showed diverticula but no other abnormal findings; 10 year followup was recommended.      She was seen for a surveillance appointment 8/24/21 and did not return for her annual visit in 2022.    She developed symptoms of GE reflux and constipation that she treated with OTC medications without significant improvement.  She denied significant weight loss.  She was referred for a CT of the abdomen and pelvis by GI 6/2/23 ([facility]) that showed a diffuse abnormal enhancing soft tissue density in the retroperitoneum and mesenteric root with nodular intense enhancement.  Findings suspicious for an infiltrative lymphoproliferative disorder.  Mass encased the aorta without stenosis.  IVC appeared mildly narrowed without focal internal filling defect.  There were no additional abnormal findings.  Spleen was unremarkable.   EGD 6/13/23 showed mild chronic reflux changes and gastritis.  Colonoscopy showed a  single polyp which was removed.  Pathology was consistent with metastatic breast cancer.  She underwent a diagnostic laparoscopy 7/12/23 revealing peritoneal carcinomatosis.  Biopsies were positive for metastatic poorly differentiated adenocarcinoma with signet ring morphology consistent with breast primary (LIZ-3 and CK7 positive; CK20 and CDX2 negative).  ER was low positive 4%, NC negative and HER2 negative (IHC 0).    Molecular Testing:  PDL1 CPS >10, ROLANDA, low TMB.  Mutations of BLM, CHEK2, PIK3CA E365K, SMARCA4 and JAK1.  PIK3CA E365K AND G5437a are not approved bio-markers    CT-PET 7/28/23:  Poorly delineated bulky abdominal lymphadenopathy with low-grade FDG uptake, SUV 2.4.  Small volume ascites.  No definite metastatic disease outside of the abdomen.  MRI Brain 8/2/23:  No evidence of metastatic disease.  Mild chronic microvascular ischemia and atrophy.    She was initiated on treatment with oral Xeloda.  She did not tolerate dose escalation secondary to hand-foot syndrome.  She required dose adjustment to manage her side effects.    CT C/A/P 11/15/23:  Improved confluent retroperitoneal and mesenteric adenopathy.  Improved mesenteric soft tissue density and edema.  CT C/A/P 2/27/24:  Radiology reported no detrimental change from 11/23.  My review shows further decrease in the soft tissue density in the retroperitoneum and mesentery with no new sites of measurable disease.  Chronic constipation.  MRI brain 4/5/24: No acute findings or metastatic disease.  CT C/A/P 7/19/24:  No pulmonary or hepatic lesions.  Moderate-to-large amount of stool diffusely throughout the colon.  Mesenteric soft tissue density stable to slightly decreased.  Numerous small sclerotic skeletal lesions mildly increased in size and density.    Interval History  She returns to the office today for a three-week follow-up visit accompanied by her .  She is currently off of her Xeloda.  She continues to have chronic constipation.   "She has intermittent gas and bloating with occasional reflux.  No significant weight loss.  She denies significant abdominal pain.  She is taking MiraLax alternating with lactulose.  Restaging CT scans 07/1924 showed minor disease progression with increased size of multiple small skeletal metastases.  The difficult to measure mesenteric soft tissue density does not show significant progression.  There were no new sites of disease.  Her serum tumor markers show additional interval increase.      Review of Systems   Constitutional:  Positive for fatigue. Negative for activity change, appetite change, fever and unexpected weight change.        Difficulty sleeping   HENT:  Negative for mouth sores, sore throat and trouble swallowing.    Eyes: Negative.  Negative for visual disturbance.   Respiratory:  Negative for cough and shortness of breath.    Cardiovascular:  Negative for chest pain, palpitations and leg swelling.   Gastrointestinal:  Positive for constipation and reflux. Negative for abdominal distention, abdominal pain, diarrhea, nausea and vomiting.   Genitourinary:  Negative for dysuria, frequency and urgency.   Musculoskeletal:  Negative for arthralgias and back pain.   Integumentary:  Negative for pallor and rash.   Neurological:  Negative for dizziness, weakness, numbness and headaches.   Hematological:  Negative for adenopathy. Does not bruise/bleed easily.   Psychiatric/Behavioral: Negative.  The patient is not nervous/anxious.        PMHx:  Bilateral breast cancer, arthritis, hypothyroidism, GERD  PSHx:  Bilateral breast lumpectomies, bilateral mastectomies, hysterectomy/BSO, MediPort insertion and removal, uterine suspension, diagnostic laparoscopy  SH:  Lifetime nonsmoker, no significant alcohol use.  Lives in [place] with her .  FH:  Her father had kidney cancer, son had appendiceal cancer and MGF had liver cancer.    Objective:        /66   Pulse 68   Resp 15   Ht 5' 2" (1.575 m)   " Wt 44.3 kg (97 lb 11.2 oz)   SpO2 99%   BMI 17.87 kg/m²    Physical Exam  Constitutional:       Comments: Thin white female in NAD   HENT:      Head: Normocephalic.      Mouth/Throat:      Mouth: Mucous membranes are moist.      Pharynx: Oropharynx is clear. No posterior oropharyngeal erythema.      Comments: No mucositis  Eyes:      General: No scleral icterus.     Extraocular Movements: Extraocular movements intact.      Pupils: Pupils are equal, round, and reactive to light.   Cardiovascular:      Rate and Rhythm: Normal rate and regular rhythm.      Heart sounds: No murmur heard.  Pulmonary:      Comments: Lungs clear to auscultation  Abdominal:      General: Bowel sounds are normal. There is no distension.      Tenderness: There is no abdominal tenderness.      Comments: No palpable masses or organomegaly   Musculoskeletal:         General: No swelling or tenderness. Normal range of motion.      Cervical back: Neck supple. No tenderness.   Lymphadenopathy:      Cervical: No cervical adenopathy.      Upper Body:      Right upper body: No supraclavicular or axillary adenopathy.      Left upper body: No supraclavicular or axillary adenopathy.   Skin:     General: Skin is warm and dry.      Findings: No rash.      Comments: No significant hand-foot syndrome   Neurological:      General: No focal deficit present.      Mental Status: She is alert and oriented to person, place, and time.      Cranial Nerves: No cranial nerve deficit.      Motor: No weakness.       ECOG SCORE    0 - Fully active-able to carry on all pre-disease performance without restriction          LABORATORY  Recent Results (from the past 336 hour(s))   CEA    Collection Time: 07/19/24 11:25 AM   Result Value Ref Range    Carcinoembryonic Antigen 50.08 (H) 0.00 - 3.00 ng/mL   Comprehensive Metabolic Panel    Collection Time: 07/19/24 11:25 AM   Result Value Ref Range    Sodium 137 136 - 145 mmol/L    Potassium 3.8 3.5 - 5.1 mmol/L    Chloride 100  98 - 107 mmol/L    CO2 28 23 - 31 mmol/L    Glucose 95 82 - 115 mg/dL    Blood Urea Nitrogen 20.7 (H) 9.8 - 20.1 mg/dL    Creatinine 0.81 0.55 - 1.02 mg/dL    Calcium 9.6 8.4 - 10.2 mg/dL    Protein Total 6.9 5.8 - 7.6 gm/dL    Albumin 4.0 3.4 - 4.8 g/dL    Globulin 2.9 2.4 - 3.5 gm/dL    Albumin/Globulin Ratio 1.4 1.1 - 2.0 ratio    Bilirubin Total 0.4 <=1.5 mg/dL    ALP 51 40 - 150 unit/L    ALT 14 0 - 55 unit/L    AST 27 5 - 34 unit/L    eGFR >60 mL/min/1.73/m2    Anion Gap 9.0 mEq/L    BUN/Creatinine Ratio 26    Cancer Antigen 27-29    Collection Time: 07/19/24 11:25 AM   Result Value Ref Range    Breast Carcinoma Assoc Ag(CA 27.29) 63.8 (H) <=38.0 U/mL   CBC with Differential    Collection Time: 07/19/24 11:25 AM   Result Value Ref Range    WBC 5.53 4.50 - 11.50 x10(3)/mcL    RBC 3.49 (L) 4.20 - 5.40 x10(6)/mcL    Hgb 11.7 (L) 12.0 - 16.0 g/dL    Hct 35.5 (L) 37.0 - 47.0 %    .7 (H) 80.0 - 94.0 fL    MCH 33.5 (H) 27.0 - 31.0 pg    MCHC 33.0 33.0 - 36.0 g/dL    RDW 15.7 11.5 - 17.0 %    Platelet 233 130 - 400 x10(3)/mcL    MPV 9.5 7.4 - 10.4 fL    Neut % 65.8 %    Lymph % 21.7 %    Mono % 9.8 %    Eos % 1.8 %    Basophil % 0.7 %    Lymph # 1.20 0.6 - 4.6 x10(3)/mcL    Neut # 3.64 2.1 - 9.2 x10(3)/mcL    Mono # 0.54 0.1 - 1.3 x10(3)/mcL    Eos # 0.10 0 - 0.9 x10(3)/mcL    Baso # 0.04 <=0.2 x10(3)/mcL    IG# 0.01 0 - 0.04 x10(3)/mcL    IG% 0.2 %                              9/20/23   10/24/23   11/15/23   1/02/24   2/22/24   4/26/24   6/26/24   7/19/24  CEA              50.9         30.7         21.7          14.5        13.1       16.4        22.4         50.1  CA 27-29      43.7         40.1         31.5          29.8        28.0        32           45           63.8      Assessment:   Recurrent metastatic breast cancer with peritoneal carcinomatosis - ER+ 4%, [place] neg, Her-2 neg (IHC 0)  History of bilateral breast cancer s/p bilateral mastectomies  GERD and constipation    Plan:   CT images reviewed in  the office today in the presence of the patient and her .  Based on her imaging and increasing serum tumor markers, she has evidence of disease progression.    Oral Xeloda will be discontinued.    Additional treatment was recommended with Eribulin to start in approximately 2 weeks to allow for adequate time off of Xeloda therapy.  Benefits, risks, toxicities and side effects of Eribulin were discussed and reviewed in detail. All questions were answered. Literature regarding the treatment plan and specific drug information was also provided.  I will plan to evaluate her after her 1st cycle of therapy.  Patient understands that her disease is incurable and treatment goals remain palliative.      JOEL DAVIDSON MD    Other Physicians  Dr. Darrell Swan

## 2024-07-19 ENCOUNTER — LAB VISIT (OUTPATIENT)
Dept: LAB | Facility: HOSPITAL | Age: 69
End: 2024-07-19
Attending: PSYCHOLOGIST
Payer: MEDICARE

## 2024-07-19 DIAGNOSIS — C78.6 SECONDARY MALIGNANT NEOPLASM OF PERITONEUM: ICD-10-CM

## 2024-07-19 DIAGNOSIS — C50.311 MALIGNANT NEOPLASM OF LOWER-INNER QUADRANT OF RIGHT BREAST OF FEMALE, ESTROGEN RECEPTOR POSITIVE: ICD-10-CM

## 2024-07-19 DIAGNOSIS — Z17.0 MALIGNANT NEOPLASM OF LOWER-INNER QUADRANT OF RIGHT BREAST OF FEMALE, ESTROGEN RECEPTOR POSITIVE: ICD-10-CM

## 2024-07-19 LAB
ALBUMIN SERPL-MCNC: 4 G/DL (ref 3.4–4.8)
ALBUMIN/GLOB SERPL: 1.4 RATIO (ref 1.1–2)
ALP SERPL-CCNC: 51 UNIT/L (ref 40–150)
ALT SERPL-CCNC: 14 UNIT/L (ref 0–55)
ANION GAP SERPL CALC-SCNC: 9 MEQ/L
AST SERPL-CCNC: 27 UNIT/L (ref 5–34)
BASOPHILS # BLD AUTO: 0.04 X10(3)/MCL
BASOPHILS NFR BLD AUTO: 0.7 %
BILIRUB SERPL-MCNC: 0.4 MG/DL
BUN SERPL-MCNC: 20.7 MG/DL (ref 9.8–20.1)
CALCIUM SERPL-MCNC: 9.6 MG/DL (ref 8.4–10.2)
CEA SERPL-MCNC: 50.08 NG/ML (ref 0–3)
CHLORIDE SERPL-SCNC: 100 MMOL/L (ref 98–107)
CO2 SERPL-SCNC: 28 MMOL/L (ref 23–31)
CREAT SERPL-MCNC: 0.81 MG/DL (ref 0.55–1.02)
CREAT/UREA NIT SERPL: 26
EOSINOPHIL # BLD AUTO: 0.1 X10(3)/MCL (ref 0–0.9)
EOSINOPHIL NFR BLD AUTO: 1.8 %
ERYTHROCYTE [DISTWIDTH] IN BLOOD BY AUTOMATED COUNT: 15.7 % (ref 11.5–17)
GFR SERPLBLD CREATININE-BSD FMLA CKD-EPI: >60 ML/MIN/1.73/M2
GLOBULIN SER-MCNC: 2.9 GM/DL (ref 2.4–3.5)
GLUCOSE SERPL-MCNC: 95 MG/DL (ref 82–115)
HCT VFR BLD AUTO: 35.5 % (ref 37–47)
HGB BLD-MCNC: 11.7 G/DL (ref 12–16)
IMM GRANULOCYTES # BLD AUTO: 0.01 X10(3)/MCL (ref 0–0.04)
IMM GRANULOCYTES NFR BLD AUTO: 0.2 %
LYMPHOCYTES # BLD AUTO: 1.2 X10(3)/MCL (ref 0.6–4.6)
LYMPHOCYTES NFR BLD AUTO: 21.7 %
MCH RBC QN AUTO: 33.5 PG (ref 27–31)
MCHC RBC AUTO-ENTMCNC: 33 G/DL (ref 33–36)
MCV RBC AUTO: 101.7 FL (ref 80–94)
MONOCYTES # BLD AUTO: 0.54 X10(3)/MCL (ref 0.1–1.3)
MONOCYTES NFR BLD AUTO: 9.8 %
NEUTROPHILS # BLD AUTO: 3.64 X10(3)/MCL (ref 2.1–9.2)
NEUTROPHILS NFR BLD AUTO: 65.8 %
PLATELET # BLD AUTO: 233 X10(3)/MCL (ref 130–400)
PMV BLD AUTO: 9.5 FL (ref 7.4–10.4)
POTASSIUM SERPL-SCNC: 3.8 MMOL/L (ref 3.5–5.1)
PROT SERPL-MCNC: 6.9 GM/DL (ref 5.8–7.6)
RBC # BLD AUTO: 3.49 X10(6)/MCL (ref 4.2–5.4)
SODIUM SERPL-SCNC: 137 MMOL/L (ref 136–145)
WBC # BLD AUTO: 5.53 X10(3)/MCL (ref 4.5–11.5)

## 2024-07-19 PROCEDURE — 86300 IMMUNOASSAY TUMOR CA 15-3: CPT

## 2024-07-19 PROCEDURE — 85025 COMPLETE CBC W/AUTO DIFF WBC: CPT

## 2024-07-19 PROCEDURE — 36415 COLL VENOUS BLD VENIPUNCTURE: CPT

## 2024-07-19 PROCEDURE — 82378 CARCINOEMBRYONIC ANTIGEN: CPT

## 2024-07-19 PROCEDURE — 80053 COMPREHEN METABOLIC PANEL: CPT

## 2024-07-22 LAB — CANCER AG27-29 SERPL-ACNC: 63.8 U/ML

## 2024-07-25 ENCOUNTER — OFFICE VISIT (OUTPATIENT)
Dept: HEMATOLOGY/ONCOLOGY | Facility: CLINIC | Age: 69
End: 2024-07-25
Payer: MEDICARE

## 2024-07-25 VITALS
HEIGHT: 62 IN | SYSTOLIC BLOOD PRESSURE: 119 MMHG | WEIGHT: 97.69 LBS | RESPIRATION RATE: 15 BRPM | OXYGEN SATURATION: 99 % | BODY MASS INDEX: 17.98 KG/M2 | HEART RATE: 68 BPM | DIASTOLIC BLOOD PRESSURE: 66 MMHG

## 2024-07-25 DIAGNOSIS — G47.00 INSOMNIA, UNSPECIFIED TYPE: ICD-10-CM

## 2024-07-25 DIAGNOSIS — C50.311 MALIGNANT NEOPLASM OF LOWER-INNER QUADRANT OF RIGHT BREAST OF FEMALE, ESTROGEN RECEPTOR POSITIVE: Primary | ICD-10-CM

## 2024-07-25 DIAGNOSIS — C78.6 SECONDARY MALIGNANT NEOPLASM OF PERITONEUM: ICD-10-CM

## 2024-07-25 DIAGNOSIS — Z17.0 MALIGNANT NEOPLASM OF LOWER-INNER QUADRANT OF RIGHT BREAST OF FEMALE, ESTROGEN RECEPTOR POSITIVE: Primary | ICD-10-CM

## 2024-07-25 PROCEDURE — 99999 PR PBB SHADOW E&M-EST. PATIENT-LVL IV: CPT | Mod: PBBFAC,,, | Performed by: INTERNAL MEDICINE

## 2024-07-25 PROCEDURE — 99214 OFFICE O/P EST MOD 30 MIN: CPT | Mod: PBBFAC | Performed by: INTERNAL MEDICINE

## 2024-07-25 RX ORDER — SODIUM CHLORIDE 0.9 % (FLUSH) 0.9 %
10 SYRINGE (ML) INJECTION
OUTPATIENT
Start: 2024-08-07

## 2024-07-25 RX ORDER — TRAZODONE HYDROCHLORIDE 50 MG/1
50 TABLET ORAL NIGHTLY PRN
Qty: 30 TABLET | Refills: 1 | Status: SHIPPED | OUTPATIENT
Start: 2024-07-25 | End: 2025-07-25

## 2024-07-25 RX ORDER — HEPARIN 100 UNIT/ML
500 SYRINGE INTRAVENOUS
OUTPATIENT
Start: 2024-08-07

## 2024-07-25 RX ORDER — ONDANSETRON HYDROCHLORIDE 2 MG/ML
8 INJECTION, SOLUTION INTRAVENOUS
OUTPATIENT
Start: 2024-08-07

## 2024-07-25 RX ORDER — SODIUM CHLORIDE 0.9 % (FLUSH) 0.9 %
10 SYRINGE (ML) INJECTION
OUTPATIENT
Start: 2024-08-14

## 2024-07-25 RX ORDER — HEPARIN 100 UNIT/ML
500 SYRINGE INTRAVENOUS
OUTPATIENT
Start: 2024-08-14

## 2024-07-25 RX ORDER — ONDANSETRON HYDROCHLORIDE 2 MG/ML
8 INJECTION, SOLUTION INTRAVENOUS
OUTPATIENT
Start: 2024-08-14

## 2024-08-01 ENCOUNTER — OFFICE VISIT (OUTPATIENT)
Dept: HEMATOLOGY/ONCOLOGY | Facility: CLINIC | Age: 69
End: 2024-08-01
Payer: MEDICARE

## 2024-08-01 VITALS
HEART RATE: 58 BPM | OXYGEN SATURATION: 99 % | HEIGHT: 62 IN | BODY MASS INDEX: 18.4 KG/M2 | WEIGHT: 100 LBS | SYSTOLIC BLOOD PRESSURE: 128 MMHG | TEMPERATURE: 98 F | DIASTOLIC BLOOD PRESSURE: 70 MMHG

## 2024-08-01 DIAGNOSIS — Z17.0 MALIGNANT NEOPLASM OF LOWER-INNER QUADRANT OF RIGHT BREAST OF FEMALE, ESTROGEN RECEPTOR POSITIVE: Primary | ICD-10-CM

## 2024-08-01 DIAGNOSIS — C50.311 MALIGNANT NEOPLASM OF LOWER-INNER QUADRANT OF RIGHT BREAST OF FEMALE, ESTROGEN RECEPTOR POSITIVE: Primary | ICD-10-CM

## 2024-08-01 PROCEDURE — 99214 OFFICE O/P EST MOD 30 MIN: CPT | Mod: PBBFAC

## 2024-08-01 PROCEDURE — 99999 PR PBB SHADOW E&M-EST. PATIENT-LVL IV: CPT | Mod: PBBFAC,,,

## 2024-08-01 NOTE — PROGRESS NOTES
THERAPY EDUCATION: ERIBULIN  Subjective:      Patient ID: Kimberlyn Selby is a 68 y.o. female.    Chief Complaint: Therapy Education     Diagnosis:  Recurrent metastatic breast cancer with peritoneal carcinomatosis - ER+ 4%, OR 0, HER-2 neg (IHC 0)                         PD-L1 CPS >10, ROLANDA, low TMB, PIK3CA E365K and [id number] mutations                     Stage IA R breast cancer 8/11 (T1b N0 M0), 0.7 cm, GI, ER+/OR-, HER-2 negative                     Stage IIA L breast cancer 1/09 (T1c N1 M0), 1.6 cm, GIII, 1+ LN, ER/OR -, HER-2 negative                     Stage IIIA R breast cancer 1/05 (T2 N2a M0), 1.6 cm, GII, 4+ LN's, ER/OR +, HER-2 negative                     S/p bilateral mastectomies                     Post menopausal s/p hysterectomy                     Declined adjuvant XRT & hormonal therapy with initial breast cancer     Treatment History  Adjuvant TAC x 6 completed 6/05  Adjuvant TC x 4 completed 5/09 --> XRT L breast  Xeloda started 8/23 - current dose 500 mg b.i.d. 7 days on/10 days off    Current Therapy:  Halaven Q3W to start on 8/7/24     Clinical History: WF status post right lumpectomy 1/05 for invasive breast cancer with the above characteristics. She completed 6 cycles of adjuvant TAC but declined radiation. She was placed on Arimidex but discontinued treatment due to significant myalgias. She had similar side effects with Femara and also complained of bone aching due to Tamoxifen and stopped therapy 9/05. Due to her hormone receptor status, she was placed on Evista but again was unable to tolerate therapy. She had an abnormal surveillance mammogram 11/08 showing clustered microcalcifications in the upper outer quadrant of the left breast with an associated 1 cm mass by ultrasound. 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 Patient underwent left lumpectomy and axillary dissection 1/15/09. Final pathology as outlined above. Postop course was complicated  by cellulitis. She completed 4 cycles of adjuvant TC 5/09. She eventually consented to adjuvant radiation therapy to the left breast. Surveillance CT PET scan 12/09 showed no abnormal hypermetabolic activity. Mammogram 6/22/11 showed a new suspicious finding in the right breast at the 4:00 position. Ultrasound was suspicious for malignancy. Ultrasound-guided core biopsies revealed invasive ductal carcinoma strongly ER positive at 85%, MD negative and HER-2 negative. CT PET scan 5/11 showed no abnormal hypermetabolic activity to indicate recurrent or metastatic disease. Although patient did have radiation therapy post lumpectomy on the left side, she never had radiation therapy to the right breast. She elected to undergo bilateral mastectomies and prophylactic right oophorectomy 8/16/11. Final pathology showed a 0.7 cm grade 1 infiltrating ductal carcinoma the right breast. Lamar dissection was not done to her previous surgery. She declined BRCA testing. Adjuvant hormonal therapy was recommended with Aromasin but she declined. She underwent a screening colonoscopy 6/5/13 which showed diverticula but no other abnormal findings; 10 year followup was recommended.      She was seen for a surveillance appointment 8/24/21 and did not return for her annual visit in 2022.    She developed symptoms of GE reflux and constipation that she treated with OTC medications without significant improvement.  She denied significant weight loss.  She was referred for a CT of the abdomen and pelvis by GI 6/2/23 ([facility]) that showed a diffuse abnormal enhancing soft tissue density in the retroperitoneum and mesenteric root with nodular intense enhancement.  Findings suspicious for an infiltrative lymphoproliferative disorder.  Mass encased the aorta without stenosis.  IVC appeared mildly narrowed without focal internal filling defect.  There were no additional abnormal findings.  Spleen was unremarkable.   EGD 6/13/23 showed mild  chronic reflux changes and gastritis.  Colonoscopy showed a single polyp which was removed.  Pathology was consistent with metastatic breast cancer.  She underwent a diagnostic laparoscopy 7/12/23 revealing peritoneal carcinomatosis.  Biopsies were positive for metastatic poorly differentiated adenocarcinoma with signet ring morphology consistent with breast primary (LIZ-3 and CK7 positive; CK20 and CDX2 negative).  ER was low positive 4%, AK negative and HER2 negative (IHC 0).    Molecular Testing:  PDL1 CPS >10, ROLANDA, low TMB.  Mutations of BLM, CHEK2, PIK3CA E365K, SMARCA4 and JAK1.  PIK3CA E365K AND O1200s are not approved bio-markers    CT-PET 7/28/23:  Poorly delineated bulky abdominal lymphadenopathy with low-grade FDG uptake, SUV 2.4.  Small volume ascites.  No definite metastatic disease outside of the abdomen.  MRI Brain 8/2/23:  No evidence of metastatic disease.  Mild chronic microvascular ischemia and atrophy.    She was initiated on treatment with oral Xeloda.  She did not tolerate dose escalation secondary to hand-foot syndrome.  She required dose adjustment to manage her side effects.    CT C/A/P 11/15/23:  Improved confluent retroperitoneal and mesenteric adenopathy.  Improved mesenteric soft tissue density and edema.  CT C/A/P 2/27/24:  Radiology reported no detrimental change from 11/23.  My review shows further decrease in the soft tissue density in the retroperitoneum and mesentery with no new sites of measurable disease.  Chronic constipation.  MRI brain 4/5/24: No acute findings or metastatic disease.  CT C/A/P 7/19/24:  No pulmonary or hepatic lesions.  Moderate-to-large amount of stool diffusely throughout the colon.  Mesenteric soft tissue density stable to slightly decreased.  Numerous small sclerotic skeletal lesions mildly increased in size and density.    Interval History    8/1/24: Mrs. Selby presents to the clinic by herself for therapy education. Patient reports no major complaints at  today's visit. Denies fever, chills, SOB, N/V/D, constipation, recent infection, chest pain or unexplained bleeding or bruising.      7/25/24:She returns to the office today for a three-week follow-up visit accompanied by her .  She is currently off of her Xeloda.  She continues to have chronic constipation.  She has intermittent gas and bloating with occasional reflux.  No significant weight loss.  She denies significant abdominal pain.  She is taking MiraLax alternating with lactulose.  Restaging CT scans 07/1924 showed minor disease progression with increased size of multiple small skeletal metastases.  The difficult to measure mesenteric soft tissue density does not show significant progression.  There were no new sites of disease.  Her serum tumor markers show additional interval increase.      Review of Systems   Constitutional:  Positive for fatigue. Negative for activity change, appetite change, fever and unexpected weight change.        Difficulty sleeping   HENT:  Negative for mouth sores, sore throat and trouble swallowing.    Eyes: Negative.  Negative for visual disturbance.   Respiratory:  Negative for cough and shortness of breath.    Cardiovascular:  Negative for chest pain, palpitations and leg swelling.   Gastrointestinal:  Positive for constipation and reflux. Negative for abdominal distention, abdominal pain, diarrhea, nausea and vomiting.   Genitourinary:  Negative for dysuria, frequency and urgency.   Musculoskeletal:  Negative for arthralgias and back pain.   Integumentary:  Negative for pallor and rash.   Neurological:  Negative for dizziness, weakness, numbness and headaches.   Hematological:  Negative for adenopathy. Does not bruise/bleed easily.   Psychiatric/Behavioral: Negative.  The patient is not nervous/anxious.        PMHx:  Bilateral breast cancer, arthritis, hypothyroidism, GERD  PSHx:  Bilateral breast lumpectomies, bilateral mastectomies, hysterectomy/BSO, MediPort  "insertion and removal, uterine suspension, diagnostic laparoscopy  SH:  Lifetime nonsmoker, no significant alcohol use.  Lives in [place] with her .  FH:  Her father had kidney cancer, son had appendiceal cancer and MGF had liver cancer.    Objective:       /70 (BP Location: Left arm, Patient Position: Sitting)   Pulse (!) 58   Temp 98.2 °F (36.8 °C) (Oral)   Ht 5' 2" (1.575 m)   Wt 45.4 kg (100 lb)   SpO2 99%   BMI 18.29 kg/m²             LABORATORY  Recent Results (from the past 336 hour(s))   CEA    Collection Time: 07/19/24 11:25 AM   Result Value Ref Range    Carcinoembryonic Antigen 50.08 (H) 0.00 - 3.00 ng/mL   Comprehensive Metabolic Panel    Collection Time: 07/19/24 11:25 AM   Result Value Ref Range    Sodium 137 136 - 145 mmol/L    Potassium 3.8 3.5 - 5.1 mmol/L    Chloride 100 98 - 107 mmol/L    CO2 28 23 - 31 mmol/L    Glucose 95 82 - 115 mg/dL    Blood Urea Nitrogen 20.7 (H) 9.8 - 20.1 mg/dL    Creatinine 0.81 0.55 - 1.02 mg/dL    Calcium 9.6 8.4 - 10.2 mg/dL    Protein Total 6.9 5.8 - 7.6 gm/dL    Albumin 4.0 3.4 - 4.8 g/dL    Globulin 2.9 2.4 - 3.5 gm/dL    Albumin/Globulin Ratio 1.4 1.1 - 2.0 ratio    Bilirubin Total 0.4 <=1.5 mg/dL    ALP 51 40 - 150 unit/L    ALT 14 0 - 55 unit/L    AST 27 5 - 34 unit/L    eGFR >60 mL/min/1.73/m2    Anion Gap 9.0 mEq/L    BUN/Creatinine Ratio 26    Cancer Antigen 27-29    Collection Time: 07/19/24 11:25 AM   Result Value Ref Range    Breast Carcinoma Assoc Ag(CA 27.29) 63.8 (H) <=38.0 U/mL   CBC with Differential    Collection Time: 07/19/24 11:25 AM   Result Value Ref Range    WBC 5.53 4.50 - 11.50 x10(3)/mcL    RBC 3.49 (L) 4.20 - 5.40 x10(6)/mcL    Hgb 11.7 (L) 12.0 - 16.0 g/dL    Hct 35.5 (L) 37.0 - 47.0 %    .7 (H) 80.0 - 94.0 fL    MCH 33.5 (H) 27.0 - 31.0 pg    MCHC 33.0 33.0 - 36.0 g/dL    RDW 15.7 11.5 - 17.0 %    Platelet 233 130 - 400 x10(3)/mcL    MPV 9.5 7.4 - 10.4 fL    Neut % 65.8 %    Lymph % 21.7 %    Mono % 9.8 %    " Eos % 1.8 %    Basophil % 0.7 %    Lymph # 1.20 0.6 - 4.6 x10(3)/mcL    Neut # 3.64 2.1 - 9.2 x10(3)/mcL    Mono # 0.54 0.1 - 1.3 x10(3)/mcL    Eos # 0.10 0 - 0.9 x10(3)/mcL    Baso # 0.04 <=0.2 x10(3)/mcL    IG# 0.01 0 - 0.04 x10(3)/mcL    IG% 0.2 %                              9/20/23   10/24/23   11/15/23   1/02/24   2/22/24   4/26/24   6/26/24   7/19/24  CEA              50.9         30.7         21.7          14.5        13.1       16.4        22.4         50.1  CA 27-29      43.7         40.1         31.5          29.8        28.0        32           45           63.8      Assessment:   Recurrent metastatic breast cancer with peritoneal carcinomatosis - ER+ 4%, VA neg, Her-2 neg (IHC 0)  History of bilateral breast cancer s/p bilateral mastectomies  GERD and constipation  Plan:   -Therapy education completed on 8/1/24.  -Plans to start Halaven D1&8 Q3W on 8/7/24. Patient understood that she will receive premedications 30 minutes prior to each treatment to help prevent nausea.   -Zofran PRN prescribed for at home with instructions to be taken by mouth every 8 hours as needed for nausea. If not working, Compazine can be prescribed as 2nd choice.    -OTC Imodium AD recommended for diarrhea (4-5 BMs a day). Take 2 tablets after the first loose bowel movement, and 1 tablet after each loose bowel movement after the first dose has been taken. No more than 4 tablets should be taken in any 24-hour period. If not working, Lomotil can be prescribed as 2nd choice.    -Mouth sore prevention with 1-quart warm water with 1 tsp of baking soda and salt and alcohol-free mouthwash.   -Emphasized adequate hand-hygiene and limited contact with people who are sick.   -Monitor and notify any bleeding in urine, stool, or sputum.  As well as unusual bleeding or bruising and stomach pain.   - Emphasized hydration with 4 16 oz bottles of water a day.    -Call clinic if fever >100.4, shakes or chills, shortness of breath, chest pain,  uncontrolled vomiting or diarrhea, pain and tingling in the chest or arm, or just not feeling well.    -Plans to RTC on 8/20/24 for same day labs and toxicity check with MD.    DISCUSSION:    1.  A total of 60 minutes were spent in counseling today, in which 100% were face-to-face.  At today's therapy teaching session, we discussed the patient's cancer diagnosis as well as planned therapy regimen, protocol, side effects and toxicities.  A handout of each therapeutic agent in the regimen was provided and reviewed in detail.    2.  The following side effects were discussed but not limited to:    Eribulin Side Effects:  Allergic Reactions  Bruising  Chills  Constipation  Cough  Fever  Hair Loss  Headache  Infection  Itching  Joint Pain  Low Blood Counts  Nausea  Numbness  Pain  Rash  Swelling  Tingling  Vomiting                a.  Discussed the risk of infection while on therapy related to pancytopenia, specifically a decrease in their white blood cell count.  Instructed to contact our office for temperature >100.4 F, chills, sudden onset cough or shortness of breath, symptoms of a urinary tract infection.                b.  Discussed the risk of anemia. Instructed to contact our office for dizziness, heart palpitations, or extreme or sudden changes in weakness.                c.  Discussed the risk of thrombocytopenia, which increases the risk of bruising or bleeding.  Instructed the patient to contact our office for spontaneous signs of bleeding, including nose bleeds, bleeding from the gums or mouth, blood in sputum, urine or stool and unusual or excessive bruising or rash.                d.  Discussed GI side effects including weight changes, changes in appetite, altered sense of taste, stomatitis, nausea, vomiting, diarrhea, constipation, and heartburn.                e.  Discussed  side effects including painful urination, changes in the amount of urination, possible urine color changes.  Discussed fertility  issues and to prevent  pregnancy if of child bearing age.                f.  Discussed neurological side effects including the risk of peripheral neuropathy, either temporary or permanent.                g.  Discussed the potential for skin, hair, and nail changes.       3.  Instructed to contact our office for discussion of medication changes, the addition of vitamin and/or herbal supplementation as they may interact with some chemotherapy agents.    4.  Discussed dietary modifications and the need to maintain adequate caloric intake and proper oral hydration.  Recommended 64 ounces of fluid per day.    5.  Discussed anti-emetic protocol and bowel regimen protocol.    6.  Office contact information given including after hours number.  Discussed there is an oncologist on call 24/7, 365 days including weekends.  Provided primary nurse's information .    7.  In summary, the patient is in agreement with the plan of care.  Questions appeared to be answered to their satisfaction. Consented the patient to the treatment plan and the patient was educated on the planned duration of the treatment and schedule of the treatment administration. Copy to be scanned into the chart.    All questions answered to the satisfaction of the patient and family.     Follow up appointments given to patient.      ANNAMARIA ZELAYA-C  PATIENT EDUCATOR  [facility]

## 2024-08-05 NOTE — PROGRESS NOTES
Subjective:       Patient ID: Kimberlyn Selby is a 68 y.o. female.    Chief Complaint:  I did okay with the treatment    Diagnosis:  Recurrent metastatic breast cancer with peritoneal carcinomatosis - ER+ 4%, WV 0, HER-2 neg (IHC 0)                         PD-L1 CPS >10, ROLANDA, low TMB, PIK3CA E365K and U9694R mutations                     Stage IA R breast cancer 8/11 (T1b N0 M0), 0.7 cm, GI, ER+/WV-, HER-2 negative                     Stage IIA L breast cancer 1/09 (T1c N1 M0), 1.6 cm, GIII, 1+ LN, ER/WV -, HER-2 negative                     Stage IIIA R breast cancer 1/05 (T2 N2a M0), 1.6 cm, GII, 4+ LN's, ER/WV +, HER-2 negative                     S/p bilateral mastectomies                     Post menopausal s/p hysterectomy                     Declined adjuvant XRT & hormonal therapy with initial breast cancer     Treatment History  Adjuvant TAC x 6 completed 6/05  Adjuvant TC x 4 completed 5/09 --> XRT L breast  Xeloda 8/23-7/24    Current Therapy:  Eribulin s/p 1 cycle (8/7/24)     Clinical History: WF status post right lumpectomy 1/05 for invasive breast cancer with the above characteristics. She completed 6 cycles of adjuvant TAC but declined radiation. She was placed on Arimidex but discontinued treatment due to significant myalgias. She had similar side effects with Femara and also complained of bone aching due to Tamoxifen and stopped therapy 9/05. Due to her hormone receptor status, she was placed on Evista but again was unable to tolerate therapy. She had an abnormal surveillance mammogram 11/08 showing clustered microcalcifications in the upper outer quadrant of the left breast with an associated 1 cm mass by ultrasound. 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 Patient underwent left lumpectomy and axillary dissection 1/15/09. Final pathology as outlined above. Postop course was complicated by cellulitis. She completed 4 cycles of adjuvant TC 5/09. She  eventually consented to adjuvant radiation therapy to the left breast. Surveillance CT PET scan 12/09 showed no abnormal hypermetabolic activity. Mammogram 6/22/11 showed a new suspicious finding in the right breast at the 4:00 position. Ultrasound was suspicious for malignancy. Ultrasound-guided core biopsies revealed invasive ductal carcinoma strongly ER positive at 85%, AR negative and HER-2 negative. CT PET scan 5/11 showed no abnormal hypermetabolic activity to indicate recurrent or metastatic disease. Although patient did have radiation therapy post lumpectomy on the left side, she never had radiation therapy to the right breast. She elected to undergo bilateral mastectomies and prophylactic right oophorectomy 8/16/11. Final pathology showed a 0.7 cm grade 1 infiltrating ductal carcinoma the right breast. Lamar dissection was not done to her previous surgery. She declined BRCA testing. Adjuvant hormonal therapy was recommended with Aromasin but she declined. She underwent a screening colonoscopy 6/5/13 which showed diverticula but no other abnormal findings; 10 year followup was recommended.      She was seen for a surveillance appointment 8/24/21 and did not return for her annual visit in 2022.    She developed symptoms of GE reflux and constipation that she treated with OTC medications without significant improvement.  She denied significant weight loss.  She was referred for a CT of the abdomen and pelvis by GI 6/2/23 ([facility]) that showed a diffuse abnormal enhancing soft tissue density in the retroperitoneum and mesenteric root with nodular intense enhancement.  Findings suspicious for an infiltrative lymphoproliferative disorder.  Mass encased the aorta without stenosis.  IVC appeared mildly narrowed without focal internal filling defect.  There were no additional abnormal findings.  Spleen was unremarkable.   EGD 6/13/23 showed mild chronic reflux changes and gastritis.  Colonoscopy showed a  single polyp which was removed.  Pathology was consistent with metastatic breast cancer.  She underwent a diagnostic laparoscopy 7/12/23 revealing peritoneal carcinomatosis.  Biopsies were positive for metastatic poorly differentiated adenocarcinoma with signet ring morphology consistent with breast primary (LIZ-3 and CK7 positive; CK20 and CDX2 negative).  ER was low positive 4%, CO negative and HER2 negative (IHC 0).    Molecular Testing:  PDL1 CPS >10, ROLANDA, low TMB.  Mutations of BLM, CHEK2, PIK3CA E365K, SMARCA4 and JAK1.  PIK3CA E365K AND Q2350v are not approved bio-markers    CT-PET 7/28/23:  Poorly delineated bulky abdominal lymphadenopathy with low-grade FDG uptake, SUV 2.4.  Small volume ascites.  No definite metastatic disease outside of the abdomen.  MRI Brain 8/2/23:  No evidence of metastatic disease.  Mild chronic microvascular ischemia and atrophy.    She was initiated on treatment with oral Xeloda.  She did not tolerate dose escalation secondary to hand-foot syndrome.  She required dose adjustment to manage her side effects.    CT C/A/P 11/15/23:  Improved confluent retroperitoneal and mesenteric adenopathy.  Improved mesenteric soft tissue density and edema.  CT C/A/P 2/27/24:  Radiology reported no detrimental change from 11/23.  My review shows further decrease in the soft tissue density in the retroperitoneum and mesentery with no new sites of measurable disease.  Chronic constipation.  MRI brain 4/5/24: No acute findings or metastatic disease.  CT C/A/P 7/19/24:  No pulmonary or hepatic lesions.  Moderate-to-large amount of stool diffusely throughout the colon.  Mesenteric soft tissue density stable to slightly decreased.  Numerous small sclerotic skeletal lesions mildly increased in size and density.    Interval History  She returns to clinic today for a three-week follow-up visit accompanied by her .  She is day 14 of her 1st cycle of Eribulin.  She tolerated treatment well.  She did  "not have any significant nausea or vomiting.  She denies fatigue or weakness.  CBC shows leukopenia and neutropenia with mild anemia.  She denies any fever or chills.  She denies abdominal pain, bloating or cramping.  Her chronic constipation is unchanged.      Review of Systems   Constitutional:  Positive for fatigue. Negative for activity change, appetite change, fever and unexpected weight change.        Difficulty sleeping   HENT:  Negative for mouth sores, sore throat and trouble swallowing.    Eyes: Negative.    Respiratory:  Negative for cough and shortness of breath.    Cardiovascular:  Negative for chest pain, palpitations and leg swelling.   Gastrointestinal:  Positive for constipation and reflux. Negative for abdominal distention, abdominal pain, diarrhea and nausea.   Genitourinary:  Negative for dysuria, frequency and urgency.   Musculoskeletal:  Negative for arthralgias and back pain.   Integumentary:  Negative for pallor and rash.   Neurological:  Negative for dizziness, weakness, numbness and headaches.   Hematological:  Negative for adenopathy. Does not bruise/bleed easily.   Psychiatric/Behavioral: Negative.  The patient is not nervous/anxious.        PMHx:  Bilateral breast cancer, arthritis, hypothyroidism, GERD  PSHx:  Bilateral breast lumpectomies, bilateral mastectomies, hysterectomy/BSO, MediPort insertion and removal, uterine suspension, diagnostic laparoscopy  SH:  Lifetime nonsmoker, no significant alcohol use.  Lives in [place] with her .  FH:  Her father had kidney cancer, son had appendiceal cancer and MGF had liver cancer.    Objective:        /64   Pulse (!) 56   Temp 98.6 °F (37 °C)   Resp 15   Ht 5' 2" (1.575 m)   Wt 44.9 kg (99 lb)   SpO2 98%   BMI 18.11 kg/m²    Physical Exam  Constitutional:       Comments: Thin white female in NAD   HENT:      Head: Normocephalic.      Mouth/Throat:      Mouth: Mucous membranes are moist.      Pharynx: Oropharynx is clear. " No posterior oropharyngeal erythema.      Comments: No mucositis  Eyes:      General: No scleral icterus.     Extraocular Movements: Extraocular movements intact.      Pupils: Pupils are equal, round, and reactive to light.   Cardiovascular:      Rate and Rhythm: Normal rate and regular rhythm.      Heart sounds: No murmur heard.  Pulmonary:      Comments: Lungs clear to auscultation  Abdominal:      General: Bowel sounds are normal. There is no distension.      Tenderness: There is no abdominal tenderness.      Comments: No palpable masses or organomegaly   Musculoskeletal:         General: No swelling or tenderness. Normal range of motion.      Cervical back: Neck supple. No tenderness.   Lymphadenopathy:      Cervical: No cervical adenopathy.      Upper Body:      Right upper body: No supraclavicular or axillary adenopathy.      Left upper body: No supraclavicular or axillary adenopathy.   Skin:     General: Skin is warm and dry.      Findings: No rash.   Neurological:      General: No focal deficit present.      Mental Status: She is alert and oriented to person, place, and time.      Cranial Nerves: No cranial nerve deficit.      Motor: No weakness.       ECOG SCORE    1 - Restricted in strenuous activity-ambulatory and able to carry out work of a light nature          LABORATORY  Recent Results (from the past 336 hour(s))   CBC with Differential    Collection Time: 08/07/24  1:29 PM   Result Value Ref Range    WBC 5.24 4.50 - 11.50 x10(3)/mcL    RBC 3.57 (L) 4.20 - 5.40 x10(6)/mcL    Hgb 11.9 (L) 12.0 - 16.0 g/dL    Hct 36.3 (L) 37.0 - 47.0 %    .7 (H) 80.0 - 94.0 fL    MCH 33.3 (H) 27.0 - 31.0 pg    MCHC 32.8 (L) 33.0 - 36.0 g/dL    RDW 15.3 11.5 - 17.0 %    Platelet 260 130 - 400 x10(3)/mcL    MPV 8.8 7.4 - 10.4 fL    Neut % 55.5 %    Lymph % 26.5 %    Mono % 13.0 %    Eos % 4.0 %    Basophil % 0.8 %    Lymph # 1.39 0.6 - 4.6 x10(3)/mcL    Neut # 2.91 2.1 - 9.2 x10(3)/mcL    Mono # 0.68 0.1 - 1.3  x10(3)/mcL    Eos # 0.21 0 - 0.9 x10(3)/mcL    Baso # 0.04 <=0.2 x10(3)/mcL    IG# 0.01 0 - 0.04 x10(3)/mcL    IG% 0.2 %   CBC with Differential    Collection Time: 08/14/24  2:16 PM   Result Value Ref Range    WBC 3.14 (L) 4.50 - 11.50 x10(3)/mcL    RBC 3.22 (L) 4.20 - 5.40 x10(6)/mcL    Hgb 10.6 (L) 12.0 - 16.0 g/dL    Hct 32.3 (L) 37.0 - 47.0 %    .3 (H) 80.0 - 94.0 fL    MCH 32.9 (H) 27.0 - 31.0 pg    MCHC 32.8 (L) 33.0 - 36.0 g/dL    RDW 14.9 11.5 - 17.0 %    Platelet 226 130 - 400 x10(3)/mcL    MPV 9.2 7.4 - 10.4 fL    Neut % 44.2 %    Lymph % 40.1 %    Mono % 7.0 %    Eos % 1.6 %    Basophil % 1.0 %    Lymph # 1.26 0.6 - 4.6 x10(3)/mcL    Neut # 1.39 (L) 2.1 - 9.2 x10(3)/mcL    Mono # 0.22 0.1 - 1.3 x10(3)/mcL    Eos # 0.05 0 - 0.9 x10(3)/mcL    Baso # 0.03 <=0.2 x10(3)/mcL    IG# 0.19 (H) 0 - 0.04 x10(3)/mcL    IG% 6.1 %   Comprehensive Metabolic Panel    Collection Time: 08/14/24  3:20 PM   Result Value Ref Range    Sodium 140 136 - 145 mmol/L    Potassium 3.7 3.5 - 5.1 mmol/L    Chloride 101 98 - 107 mmol/L    CO2 29 23 - 31 mmol/L    Glucose 96 82 - 115 mg/dL    Blood Urea Nitrogen 16.6 9.8 - 20.1 mg/dL    Creatinine 0.82 0.55 - 1.02 mg/dL    Calcium 9.8 8.4 - 10.2 mg/dL    Protein Total 6.9 5.8 - 7.6 gm/dL    Albumin 4.1 3.4 - 4.8 g/dL    Globulin 2.8 2.4 - 3.5 gm/dL    Albumin/Globulin Ratio 1.5 1.1 - 2.0 ratio    Bilirubin Total 0.3 <=1.5 mg/dL    ALP 45 40 - 150 unit/L    ALT 17 0 - 55 unit/L    AST 31 5 - 34 unit/L    eGFR >60 mL/min/1.73/m2    Anion Gap 10.0 mEq/L    BUN/Creatinine Ratio 20    CBC with Differential    Collection Time: 08/20/24  2:24 PM   Result Value Ref Range    WBC 1.57 (LL) 4.50 - 11.50 x10(3)/mcL    RBC 3.09 (L) 4.20 - 5.40 x10(6)/mcL    Hgb 10.5 (L) 12.0 - 16.0 g/dL    Hct 30.7 (L) 37.0 - 47.0 %    MCV 99.4 (H) 80.0 - 94.0 fL    MCH 34.0 (H) 27.0 - 31.0 pg    MCHC 34.2 33.0 - 36.0 g/dL    RDW 14.3 11.5 - 17.0 %    Platelet 272 130 - 400 x10(3)/mcL    MPV 9.1 7.4 - 10.4  fL    Neut % 21.7 %    Lymph % 61.1 %    Mono % 11.5 %    Eos % 1.3 %    Basophil % 2.5 %    Lymph # 0.96 0.6 - 4.6 x10(3)/mcL    Neut # 0.34 (L) 2.1 - 9.2 x10(3)/mcL    Mono # 0.18 0.1 - 1.3 x10(3)/mcL    Eos # 0.02 0 - 0.9 x10(3)/mcL    Baso # 0.04 <=0.2 x10(3)/mcL    IG# 0.03 0 - 0.04 x10(3)/mcL    IG% 1.9 %                          9/20/23   10/24/23   11/15/23   1/02/24   2/22/24   4/26/24   6/26/24   7/19/24  CEA              50.9         30.7         21.7          14.5        13.1       16.4        22.4         50.1  CA 27-29      43.7         40.1         31.5          29.8        28.0        32           45           63.8      Assessment:   Recurrent metastatic breast cancer with peritoneal carcinomatosis - ER+ 4%, [place] neg, Her-2 neg (IHC 0)  Asymptomatic neutropenia  History of bilateral breast cancer s/p bilateral mastectomies  GERD and constipation    Plan:   She will be treated with a single dose of Neupogen 480 mcg today for her treatment induced neutropenia.    Neutropenic precautions were reviewed and explained.    Repeat CBC next week prior to her next cycle of chemotherapy.  If counts adequately recovered, she will proceed with a 2nd cycle of Eribulin without dose adjustment.  Continue weekly blood count monitoring with chemotherapy .  RTC in 3 weeks for a follow-up visit and clinical exam with repeat laboratory including CEA and CA 27-29 levels.    Plan restaging scans after 3 cycles of therapy.    Would also defer follow-up colonoscopy until after her 3rd cycle of chemotherapy.      JOEL DAVIDSON MD    Other Physicians  Dr. Darrell Swan

## 2024-08-07 ENCOUNTER — INFUSION (OUTPATIENT)
Dept: INFUSION THERAPY | Facility: HOSPITAL | Age: 69
End: 2024-08-07
Attending: INTERNAL MEDICINE
Payer: MEDICARE

## 2024-08-07 ENCOUNTER — LAB VISIT (OUTPATIENT)
Dept: LAB | Facility: HOSPITAL | Age: 69
End: 2024-08-07
Attending: INTERNAL MEDICINE
Payer: MEDICARE

## 2024-08-07 VITALS
SYSTOLIC BLOOD PRESSURE: 126 MMHG | DIASTOLIC BLOOD PRESSURE: 66 MMHG | OXYGEN SATURATION: 96 % | TEMPERATURE: 98 F | HEART RATE: 50 BPM

## 2024-08-07 DIAGNOSIS — C78.6 SECONDARY MALIGNANT NEOPLASM OF PERITONEUM: ICD-10-CM

## 2024-08-07 DIAGNOSIS — Z17.0 MALIGNANT NEOPLASM OF LOWER-INNER QUADRANT OF RIGHT BREAST OF FEMALE, ESTROGEN RECEPTOR POSITIVE: ICD-10-CM

## 2024-08-07 DIAGNOSIS — C50.311 MALIGNANT NEOPLASM OF LOWER-INNER QUADRANT OF RIGHT BREAST OF FEMALE, ESTROGEN RECEPTOR POSITIVE: Primary | ICD-10-CM

## 2024-08-07 DIAGNOSIS — C50.311 MALIGNANT NEOPLASM OF LOWER-INNER QUADRANT OF RIGHT BREAST OF FEMALE, ESTROGEN RECEPTOR POSITIVE: ICD-10-CM

## 2024-08-07 DIAGNOSIS — Z17.0 MALIGNANT NEOPLASM OF LOWER-INNER QUADRANT OF RIGHT BREAST OF FEMALE, ESTROGEN RECEPTOR POSITIVE: Primary | ICD-10-CM

## 2024-08-07 LAB
BASOPHILS # BLD AUTO: 0.04 X10(3)/MCL
BASOPHILS NFR BLD AUTO: 0.8 %
EOSINOPHIL # BLD AUTO: 0.21 X10(3)/MCL (ref 0–0.9)
EOSINOPHIL NFR BLD AUTO: 4 %
ERYTHROCYTE [DISTWIDTH] IN BLOOD BY AUTOMATED COUNT: 15.3 % (ref 11.5–17)
HCT VFR BLD AUTO: 36.3 % (ref 37–47)
HGB BLD-MCNC: 11.9 G/DL (ref 12–16)
IMM GRANULOCYTES # BLD AUTO: 0.01 X10(3)/MCL (ref 0–0.04)
IMM GRANULOCYTES NFR BLD AUTO: 0.2 %
LYMPHOCYTES # BLD AUTO: 1.39 X10(3)/MCL (ref 0.6–4.6)
LYMPHOCYTES NFR BLD AUTO: 26.5 %
MCH RBC QN AUTO: 33.3 PG (ref 27–31)
MCHC RBC AUTO-ENTMCNC: 32.8 G/DL (ref 33–36)
MCV RBC AUTO: 101.7 FL (ref 80–94)
MONOCYTES # BLD AUTO: 0.68 X10(3)/MCL (ref 0.1–1.3)
MONOCYTES NFR BLD AUTO: 13 %
NEUTROPHILS # BLD AUTO: 2.91 X10(3)/MCL (ref 2.1–9.2)
NEUTROPHILS NFR BLD AUTO: 55.5 %
PLATELET # BLD AUTO: 260 X10(3)/MCL (ref 130–400)
PMV BLD AUTO: 8.8 FL (ref 7.4–10.4)
RBC # BLD AUTO: 3.57 X10(6)/MCL (ref 4.2–5.4)
WBC # BLD AUTO: 5.24 X10(3)/MCL (ref 4.5–11.5)

## 2024-08-07 PROCEDURE — 63600175 PHARM REV CODE 636 W HCPCS: Performed by: INTERNAL MEDICINE

## 2024-08-07 PROCEDURE — 96409 CHEMO IV PUSH SNGL DRUG: CPT

## 2024-08-07 PROCEDURE — 85025 COMPLETE CBC W/AUTO DIFF WBC: CPT

## 2024-08-07 PROCEDURE — 96375 TX/PRO/DX INJ NEW DRUG ADDON: CPT

## 2024-08-07 PROCEDURE — 36415 COLL VENOUS BLD VENIPUNCTURE: CPT

## 2024-08-07 PROCEDURE — 25000003 PHARM REV CODE 250: Performed by: INTERNAL MEDICINE

## 2024-08-07 RX ORDER — ONDANSETRON HYDROCHLORIDE 2 MG/ML
8 INJECTION, SOLUTION INTRAVENOUS
Status: COMPLETED | OUTPATIENT
Start: 2024-08-07 | End: 2024-08-07

## 2024-08-07 RX ORDER — SODIUM CHLORIDE 0.9 % (FLUSH) 0.9 %
10 SYRINGE (ML) INJECTION
Status: DISCONTINUED | OUTPATIENT
Start: 2024-08-07 | End: 2024-08-07 | Stop reason: HOSPADM

## 2024-08-07 RX ORDER — HEPARIN 100 UNIT/ML
500 SYRINGE INTRAVENOUS
Status: DISCONTINUED | OUTPATIENT
Start: 2024-08-07 | End: 2024-08-07 | Stop reason: HOSPADM

## 2024-08-07 RX ADMIN — ERIBULIN MESYLATE 1.95 MG: 0.5 INJECTION INTRAVENOUS at 02:08

## 2024-08-07 RX ADMIN — ONDANSETRON 8 MG: 2 INJECTION INTRAMUSCULAR; INTRAVENOUS at 02:08

## 2024-08-14 ENCOUNTER — LAB VISIT (OUTPATIENT)
Dept: LAB | Facility: HOSPITAL | Age: 69
End: 2024-08-14
Attending: INTERNAL MEDICINE
Payer: MEDICARE

## 2024-08-14 ENCOUNTER — INFUSION (OUTPATIENT)
Dept: INFUSION THERAPY | Facility: HOSPITAL | Age: 69
End: 2024-08-14
Attending: INTERNAL MEDICINE
Payer: MEDICARE

## 2024-08-14 VITALS
WEIGHT: 98.69 LBS | BODY MASS INDEX: 18.16 KG/M2 | RESPIRATION RATE: 16 BRPM | HEART RATE: 57 BPM | HEIGHT: 62 IN | OXYGEN SATURATION: 100 % | DIASTOLIC BLOOD PRESSURE: 65 MMHG | SYSTOLIC BLOOD PRESSURE: 134 MMHG | TEMPERATURE: 98 F

## 2024-08-14 DIAGNOSIS — C78.6 SECONDARY MALIGNANT NEOPLASM OF PERITONEUM: ICD-10-CM

## 2024-08-14 DIAGNOSIS — C50.311 MALIGNANT NEOPLASM OF LOWER-INNER QUADRANT OF RIGHT BREAST OF FEMALE, ESTROGEN RECEPTOR POSITIVE: Primary | ICD-10-CM

## 2024-08-14 DIAGNOSIS — Z17.0 MALIGNANT NEOPLASM OF LOWER-INNER QUADRANT OF RIGHT BREAST OF FEMALE, ESTROGEN RECEPTOR POSITIVE: ICD-10-CM

## 2024-08-14 DIAGNOSIS — C50.311 MALIGNANT NEOPLASM OF LOWER-INNER QUADRANT OF RIGHT BREAST OF FEMALE, ESTROGEN RECEPTOR POSITIVE: ICD-10-CM

## 2024-08-14 DIAGNOSIS — Z17.0 MALIGNANT NEOPLASM OF LOWER-INNER QUADRANT OF RIGHT BREAST OF FEMALE, ESTROGEN RECEPTOR POSITIVE: Primary | ICD-10-CM

## 2024-08-14 LAB
ALBUMIN SERPL-MCNC: 4.1 G/DL (ref 3.4–4.8)
ALBUMIN/GLOB SERPL: 1.5 RATIO (ref 1.1–2)
ALP SERPL-CCNC: 45 UNIT/L (ref 40–150)
ALT SERPL-CCNC: 17 UNIT/L (ref 0–55)
ANION GAP SERPL CALC-SCNC: 10 MEQ/L
AST SERPL-CCNC: 31 UNIT/L (ref 5–34)
BASOPHILS # BLD AUTO: 0.03 X10(3)/MCL
BASOPHILS NFR BLD AUTO: 1 %
BILIRUB SERPL-MCNC: 0.3 MG/DL
BUN SERPL-MCNC: 16.6 MG/DL (ref 9.8–20.1)
CALCIUM SERPL-MCNC: 9.8 MG/DL (ref 8.4–10.2)
CHLORIDE SERPL-SCNC: 101 MMOL/L (ref 98–107)
CO2 SERPL-SCNC: 29 MMOL/L (ref 23–31)
CREAT SERPL-MCNC: 0.82 MG/DL (ref 0.55–1.02)
CREAT/UREA NIT SERPL: 20
EOSINOPHIL # BLD AUTO: 0.05 X10(3)/MCL (ref 0–0.9)
EOSINOPHIL NFR BLD AUTO: 1.6 %
ERYTHROCYTE [DISTWIDTH] IN BLOOD BY AUTOMATED COUNT: 14.9 % (ref 11.5–17)
GFR SERPLBLD CREATININE-BSD FMLA CKD-EPI: >60 ML/MIN/1.73/M2
GLOBULIN SER-MCNC: 2.8 GM/DL (ref 2.4–3.5)
GLUCOSE SERPL-MCNC: 96 MG/DL (ref 82–115)
HCT VFR BLD AUTO: 32.3 % (ref 37–47)
HGB BLD-MCNC: 10.6 G/DL (ref 12–16)
IMM GRANULOCYTES # BLD AUTO: 0.19 X10(3)/MCL (ref 0–0.04)
IMM GRANULOCYTES NFR BLD AUTO: 6.1 %
LYMPHOCYTES # BLD AUTO: 1.26 X10(3)/MCL (ref 0.6–4.6)
LYMPHOCYTES NFR BLD AUTO: 40.1 %
MCH RBC QN AUTO: 32.9 PG (ref 27–31)
MCHC RBC AUTO-ENTMCNC: 32.8 G/DL (ref 33–36)
MCV RBC AUTO: 100.3 FL (ref 80–94)
MONOCYTES # BLD AUTO: 0.22 X10(3)/MCL (ref 0.1–1.3)
MONOCYTES NFR BLD AUTO: 7 %
NEUTROPHILS # BLD AUTO: 1.39 X10(3)/MCL (ref 2.1–9.2)
NEUTROPHILS NFR BLD AUTO: 44.2 %
PLATELET # BLD AUTO: 226 X10(3)/MCL (ref 130–400)
PMV BLD AUTO: 9.2 FL (ref 7.4–10.4)
POTASSIUM SERPL-SCNC: 3.7 MMOL/L (ref 3.5–5.1)
PROT SERPL-MCNC: 6.9 GM/DL (ref 5.8–7.6)
RBC # BLD AUTO: 3.22 X10(6)/MCL (ref 4.2–5.4)
SODIUM SERPL-SCNC: 140 MMOL/L (ref 136–145)
WBC # BLD AUTO: 3.14 X10(3)/MCL (ref 4.5–11.5)

## 2024-08-14 PROCEDURE — 80053 COMPREHEN METABOLIC PANEL: CPT

## 2024-08-14 PROCEDURE — 63600175 PHARM REV CODE 636 W HCPCS: Mod: JZ,JG | Performed by: INTERNAL MEDICINE

## 2024-08-14 PROCEDURE — 96375 TX/PRO/DX INJ NEW DRUG ADDON: CPT

## 2024-08-14 PROCEDURE — 85025 COMPLETE CBC W/AUTO DIFF WBC: CPT

## 2024-08-14 PROCEDURE — 36415 COLL VENOUS BLD VENIPUNCTURE: CPT | Mod: 91

## 2024-08-14 PROCEDURE — 96409 CHEMO IV PUSH SNGL DRUG: CPT

## 2024-08-14 PROCEDURE — 25000003 PHARM REV CODE 250: Performed by: INTERNAL MEDICINE

## 2024-08-14 RX ORDER — ONDANSETRON HYDROCHLORIDE 2 MG/ML
8 INJECTION, SOLUTION INTRAVENOUS
Status: COMPLETED | OUTPATIENT
Start: 2024-08-14 | End: 2024-08-14

## 2024-08-14 RX ORDER — HEPARIN 100 UNIT/ML
500 SYRINGE INTRAVENOUS
Status: DISCONTINUED | OUTPATIENT
Start: 2024-08-14 | End: 2024-08-14 | Stop reason: HOSPADM

## 2024-08-14 RX ORDER — SODIUM CHLORIDE 0.9 % (FLUSH) 0.9 %
10 SYRINGE (ML) INJECTION
Status: DISCONTINUED | OUTPATIENT
Start: 2024-08-14 | End: 2024-08-14 | Stop reason: HOSPADM

## 2024-08-14 RX ADMIN — ONDANSETRON 8 MG: 2 INJECTION INTRAMUSCULAR; INTRAVENOUS at 03:08

## 2024-08-14 RX ADMIN — ERIBULIN MESYLATE 1.95 MG: 0.5 INJECTION INTRAVENOUS at 03:08

## 2024-08-20 ENCOUNTER — INFUSION (OUTPATIENT)
Dept: INFUSION THERAPY | Facility: HOSPITAL | Age: 69
End: 2024-08-20
Attending: INTERNAL MEDICINE
Payer: MEDICARE

## 2024-08-20 ENCOUNTER — LAB VISIT (OUTPATIENT)
Dept: LAB | Facility: HOSPITAL | Age: 69
End: 2024-08-20
Attending: FAMILY MEDICINE
Payer: MEDICARE

## 2024-08-20 ENCOUNTER — OFFICE VISIT (OUTPATIENT)
Dept: HEMATOLOGY/ONCOLOGY | Facility: CLINIC | Age: 69
End: 2024-08-20
Payer: MEDICARE

## 2024-08-20 VITALS
HEIGHT: 62 IN | BODY MASS INDEX: 18.22 KG/M2 | OXYGEN SATURATION: 98 % | WEIGHT: 99 LBS | HEART RATE: 56 BPM | SYSTOLIC BLOOD PRESSURE: 110 MMHG | TEMPERATURE: 99 F | DIASTOLIC BLOOD PRESSURE: 64 MMHG | RESPIRATION RATE: 15 BRPM

## 2024-08-20 DIAGNOSIS — C78.6 SECONDARY MALIGNANT NEOPLASM OF PERITONEUM: ICD-10-CM

## 2024-08-20 DIAGNOSIS — Z17.0 MALIGNANT NEOPLASM OF LOWER-INNER QUADRANT OF RIGHT BREAST OF FEMALE, ESTROGEN RECEPTOR POSITIVE: Primary | ICD-10-CM

## 2024-08-20 DIAGNOSIS — C50.311 MALIGNANT NEOPLASM OF LOWER-INNER QUADRANT OF RIGHT BREAST OF FEMALE, ESTROGEN RECEPTOR POSITIVE: ICD-10-CM

## 2024-08-20 DIAGNOSIS — C50.311 MALIGNANT NEOPLASM OF LOWER-INNER QUADRANT OF RIGHT BREAST OF FEMALE, ESTROGEN RECEPTOR POSITIVE: Primary | ICD-10-CM

## 2024-08-20 DIAGNOSIS — T45.1X5A CHEMOTHERAPY-INDUCED NEUTROPENIA: ICD-10-CM

## 2024-08-20 DIAGNOSIS — D70.1 CHEMOTHERAPY-INDUCED NEUTROPENIA: Primary | ICD-10-CM

## 2024-08-20 DIAGNOSIS — D70.1 CHEMOTHERAPY-INDUCED NEUTROPENIA: ICD-10-CM

## 2024-08-20 DIAGNOSIS — T45.1X5A CHEMOTHERAPY-INDUCED NEUTROPENIA: Primary | ICD-10-CM

## 2024-08-20 DIAGNOSIS — Z17.0 MALIGNANT NEOPLASM OF LOWER-INNER QUADRANT OF RIGHT BREAST OF FEMALE, ESTROGEN RECEPTOR POSITIVE: ICD-10-CM

## 2024-08-20 LAB
BASOPHILS # BLD AUTO: 0.04 X10(3)/MCL
BASOPHILS NFR BLD AUTO: 2.5 %
EOSINOPHIL # BLD AUTO: 0.02 X10(3)/MCL (ref 0–0.9)
EOSINOPHIL NFR BLD AUTO: 1.3 %
ERYTHROCYTE [DISTWIDTH] IN BLOOD BY AUTOMATED COUNT: 14.3 % (ref 11.5–17)
HCT VFR BLD AUTO: 30.7 % (ref 37–47)
HGB BLD-MCNC: 10.5 G/DL (ref 12–16)
IMM GRANULOCYTES # BLD AUTO: 0.03 X10(3)/MCL (ref 0–0.04)
IMM GRANULOCYTES NFR BLD AUTO: 1.9 %
LYMPHOCYTES # BLD AUTO: 0.96 X10(3)/MCL (ref 0.6–4.6)
LYMPHOCYTES NFR BLD AUTO: 61.1 %
MCH RBC QN AUTO: 34 PG (ref 27–31)
MCHC RBC AUTO-ENTMCNC: 34.2 G/DL (ref 33–36)
MCV RBC AUTO: 99.4 FL (ref 80–94)
MONOCYTES # BLD AUTO: 0.18 X10(3)/MCL (ref 0.1–1.3)
MONOCYTES NFR BLD AUTO: 11.5 %
NEUTROPHILS # BLD AUTO: 0.34 X10(3)/MCL (ref 2.1–9.2)
NEUTROPHILS NFR BLD AUTO: 21.7 %
PLATELET # BLD AUTO: 272 X10(3)/MCL (ref 130–400)
PMV BLD AUTO: 9.1 FL (ref 7.4–10.4)
RBC # BLD AUTO: 3.09 X10(6)/MCL (ref 4.2–5.4)
WBC # BLD AUTO: 1.57 X10(3)/MCL (ref 4.5–11.5)

## 2024-08-20 PROCEDURE — 36415 COLL VENOUS BLD VENIPUNCTURE: CPT

## 2024-08-20 PROCEDURE — 85025 COMPLETE CBC W/AUTO DIFF WBC: CPT

## 2024-08-20 PROCEDURE — 99214 OFFICE O/P EST MOD 30 MIN: CPT | Mod: PBBFAC,25 | Performed by: INTERNAL MEDICINE

## 2024-08-20 PROCEDURE — 63600175 PHARM REV CODE 636 W HCPCS: Mod: JZ,JB,JG | Performed by: INTERNAL MEDICINE

## 2024-08-20 PROCEDURE — 99215 OFFICE O/P EST HI 40 MIN: CPT | Mod: S$PBB,,, | Performed by: INTERNAL MEDICINE

## 2024-08-20 PROCEDURE — 99999 PR PBB SHADOW E&M-EST. PATIENT-LVL IV: CPT | Mod: PBBFAC,,, | Performed by: INTERNAL MEDICINE

## 2024-08-20 PROCEDURE — 96372 THER/PROPH/DIAG INJ SC/IM: CPT

## 2024-08-20 RX ADMIN — FILGRASTIM-SNDZ 480 MCG: 480 INJECTION, SOLUTION INTRAVENOUS; SUBCUTANEOUS at 03:08

## 2024-08-20 NOTE — PLAN OF CARE
Pt received zarxio #1/1; monitored post inj, tolerated well.  Educated on neutropenic precautions-pt verb unstg..

## 2024-08-21 ENCOUNTER — PATIENT MESSAGE (OUTPATIENT)
Dept: HEMATOLOGY/ONCOLOGY | Facility: CLINIC | Age: 69
End: 2024-08-21
Payer: MEDICARE

## 2024-08-21 DIAGNOSIS — K12.30 ORAL MUCOSITIS: Primary | ICD-10-CM

## 2024-08-21 RX ORDER — NYSTATIN 100000 [USP'U]/ML
SUSPENSION ORAL
Qty: 100 ML | Refills: 1 | Status: SHIPPED | OUTPATIENT
Start: 2024-08-21 | End: 2024-08-22 | Stop reason: SDUPTHER

## 2024-08-22 RX ORDER — NYSTATIN 100000 [USP'U]/ML
SUSPENSION ORAL
Qty: 100 ML | Refills: 1 | Status: SHIPPED | OUTPATIENT
Start: 2024-08-22

## 2024-08-23 ENCOUNTER — PATIENT MESSAGE (OUTPATIENT)
Dept: HEMATOLOGY/ONCOLOGY | Facility: CLINIC | Age: 69
End: 2024-08-23
Payer: MEDICARE

## 2024-08-25 RX ORDER — ONDANSETRON HYDROCHLORIDE 2 MG/ML
8 INJECTION, SOLUTION INTRAVENOUS
OUTPATIENT
Start: 2024-09-04

## 2024-08-25 RX ORDER — ONDANSETRON HYDROCHLORIDE 2 MG/ML
8 INJECTION, SOLUTION INTRAVENOUS
Status: CANCELLED | OUTPATIENT
Start: 2024-08-28

## 2024-08-25 RX ORDER — SODIUM CHLORIDE 0.9 % (FLUSH) 0.9 %
10 SYRINGE (ML) INJECTION
OUTPATIENT
Start: 2024-09-04

## 2024-08-25 RX ORDER — SODIUM CHLORIDE 0.9 % (FLUSH) 0.9 %
10 SYRINGE (ML) INJECTION
Status: CANCELLED | OUTPATIENT
Start: 2024-08-28

## 2024-08-25 RX ORDER — HEPARIN 100 UNIT/ML
500 SYRINGE INTRAVENOUS
Status: CANCELLED | OUTPATIENT
Start: 2024-08-28

## 2024-08-25 RX ORDER — HEPARIN 100 UNIT/ML
500 SYRINGE INTRAVENOUS
OUTPATIENT
Start: 2024-09-04

## 2024-08-28 ENCOUNTER — INFUSION (OUTPATIENT)
Dept: INFUSION THERAPY | Facility: HOSPITAL | Age: 69
End: 2024-08-28
Attending: INTERNAL MEDICINE
Payer: MEDICARE

## 2024-08-28 VITALS
TEMPERATURE: 97 F | RESPIRATION RATE: 16 BRPM | HEIGHT: 62 IN | WEIGHT: 99.19 LBS | DIASTOLIC BLOOD PRESSURE: 58 MMHG | SYSTOLIC BLOOD PRESSURE: 103 MMHG | HEART RATE: 57 BPM | BODY MASS INDEX: 18.25 KG/M2

## 2024-08-28 DIAGNOSIS — C50.311 MALIGNANT NEOPLASM OF LOWER-INNER QUADRANT OF RIGHT BREAST OF FEMALE, ESTROGEN RECEPTOR POSITIVE: Primary | ICD-10-CM

## 2024-08-28 DIAGNOSIS — C78.6 SECONDARY MALIGNANT NEOPLASM OF PERITONEUM: ICD-10-CM

## 2024-08-28 DIAGNOSIS — Z17.0 MALIGNANT NEOPLASM OF LOWER-INNER QUADRANT OF RIGHT BREAST OF FEMALE, ESTROGEN RECEPTOR POSITIVE: Primary | ICD-10-CM

## 2024-08-28 PROCEDURE — 63600175 PHARM REV CODE 636 W HCPCS: Performed by: INTERNAL MEDICINE

## 2024-08-28 PROCEDURE — 25000003 PHARM REV CODE 250: Performed by: INTERNAL MEDICINE

## 2024-08-28 PROCEDURE — 96375 TX/PRO/DX INJ NEW DRUG ADDON: CPT

## 2024-08-28 PROCEDURE — 96409 CHEMO IV PUSH SNGL DRUG: CPT

## 2024-08-28 RX ORDER — ONDANSETRON HYDROCHLORIDE 2 MG/ML
8 INJECTION, SOLUTION INTRAVENOUS
Status: COMPLETED | OUTPATIENT
Start: 2024-08-28 | End: 2024-08-28

## 2024-08-28 RX ORDER — SODIUM CHLORIDE 0.9 % (FLUSH) 0.9 %
10 SYRINGE (ML) INJECTION
Status: DISCONTINUED | OUTPATIENT
Start: 2024-08-28 | End: 2024-08-28 | Stop reason: HOSPADM

## 2024-08-28 RX ORDER — HEPARIN 100 UNIT/ML
500 SYRINGE INTRAVENOUS
Status: DISCONTINUED | OUTPATIENT
Start: 2024-08-28 | End: 2024-08-28 | Stop reason: HOSPADM

## 2024-08-28 RX ADMIN — ONDANSETRON 8 MG: 2 INJECTION INTRAMUSCULAR; INTRAVENOUS at 01:08

## 2024-08-28 RX ADMIN — ERIBULIN MESYLATE 1.95 MG: 0.5 INJECTION INTRAVENOUS at 02:08

## 2024-09-04 ENCOUNTER — LAB VISIT (OUTPATIENT)
Dept: LAB | Facility: HOSPITAL | Age: 69
End: 2024-09-04
Attending: INTERNAL MEDICINE
Payer: MEDICARE

## 2024-09-04 ENCOUNTER — INFUSION (OUTPATIENT)
Dept: INFUSION THERAPY | Facility: HOSPITAL | Age: 69
End: 2024-09-04
Attending: INTERNAL MEDICINE
Payer: MEDICARE

## 2024-09-04 VITALS
RESPIRATION RATE: 18 BRPM | OXYGEN SATURATION: 98 % | SYSTOLIC BLOOD PRESSURE: 106 MMHG | HEART RATE: 78 BPM | DIASTOLIC BLOOD PRESSURE: 46 MMHG | TEMPERATURE: 98 F

## 2024-09-04 DIAGNOSIS — C78.6 SECONDARY MALIGNANT NEOPLASM OF PERITONEUM: ICD-10-CM

## 2024-09-04 DIAGNOSIS — C50.311 MALIGNANT NEOPLASM OF LOWER-INNER QUADRANT OF RIGHT BREAST OF FEMALE, ESTROGEN RECEPTOR POSITIVE: ICD-10-CM

## 2024-09-04 DIAGNOSIS — C50.311 MALIGNANT NEOPLASM OF LOWER-INNER QUADRANT OF RIGHT BREAST OF FEMALE, ESTROGEN RECEPTOR POSITIVE: Primary | ICD-10-CM

## 2024-09-04 DIAGNOSIS — Z17.0 MALIGNANT NEOPLASM OF LOWER-INNER QUADRANT OF RIGHT BREAST OF FEMALE, ESTROGEN RECEPTOR POSITIVE: Primary | ICD-10-CM

## 2024-09-04 DIAGNOSIS — Z17.0 MALIGNANT NEOPLASM OF LOWER-INNER QUADRANT OF RIGHT BREAST OF FEMALE, ESTROGEN RECEPTOR POSITIVE: ICD-10-CM

## 2024-09-04 LAB
BASOPHILS # BLD AUTO: 0.06 X10(3)/MCL
BASOPHILS NFR BLD AUTO: 1.2 %
EOSINOPHIL # BLD AUTO: 0.01 X10(3)/MCL (ref 0–0.9)
EOSINOPHIL NFR BLD AUTO: 0.2 %
ERYTHROCYTE [DISTWIDTH] IN BLOOD BY AUTOMATED COUNT: 14.3 % (ref 11.5–17)
HCT VFR BLD AUTO: 32.7 % (ref 37–47)
HGB BLD-MCNC: 10.8 G/DL (ref 12–16)
IMM GRANULOCYTES # BLD AUTO: 0.25 X10(3)/MCL (ref 0–0.04)
IMM GRANULOCYTES NFR BLD AUTO: 5 %
LYMPHOCYTES # BLD AUTO: 1.31 X10(3)/MCL (ref 0.6–4.6)
LYMPHOCYTES NFR BLD AUTO: 26 %
MCH RBC QN AUTO: 32.5 PG (ref 27–31)
MCHC RBC AUTO-ENTMCNC: 33 G/DL (ref 33–36)
MCV RBC AUTO: 98.5 FL (ref 80–94)
MONOCYTES # BLD AUTO: 0.22 X10(3)/MCL (ref 0.1–1.3)
MONOCYTES NFR BLD AUTO: 4.4 %
NEUTROPHILS # BLD AUTO: 3.18 X10(3)/MCL (ref 2.1–9.2)
NEUTROPHILS NFR BLD AUTO: 63.2 %
PLATELET # BLD AUTO: 253 X10(3)/MCL (ref 130–400)
PMV BLD AUTO: 9.5 FL (ref 7.4–10.4)
RBC # BLD AUTO: 3.32 X10(6)/MCL (ref 4.2–5.4)
WBC # BLD AUTO: 5.03 X10(3)/MCL (ref 4.5–11.5)

## 2024-09-04 PROCEDURE — 36415 COLL VENOUS BLD VENIPUNCTURE: CPT

## 2024-09-04 PROCEDURE — 96375 TX/PRO/DX INJ NEW DRUG ADDON: CPT

## 2024-09-04 PROCEDURE — 25000003 PHARM REV CODE 250: Performed by: INTERNAL MEDICINE

## 2024-09-04 PROCEDURE — 63600175 PHARM REV CODE 636 W HCPCS: Performed by: INTERNAL MEDICINE

## 2024-09-04 PROCEDURE — 85025 COMPLETE CBC W/AUTO DIFF WBC: CPT

## 2024-09-04 PROCEDURE — 96409 CHEMO IV PUSH SNGL DRUG: CPT

## 2024-09-04 RX ORDER — SODIUM CHLORIDE 0.9 % (FLUSH) 0.9 %
10 SYRINGE (ML) INJECTION
Status: DISCONTINUED | OUTPATIENT
Start: 2024-09-04 | End: 2024-09-04 | Stop reason: HOSPADM

## 2024-09-04 RX ORDER — ONDANSETRON HYDROCHLORIDE 2 MG/ML
8 INJECTION, SOLUTION INTRAVENOUS
Status: COMPLETED | OUTPATIENT
Start: 2024-09-04 | End: 2024-09-04

## 2024-09-04 RX ORDER — HEPARIN 100 UNIT/ML
500 SYRINGE INTRAVENOUS
Status: DISCONTINUED | OUTPATIENT
Start: 2024-09-04 | End: 2024-09-04 | Stop reason: HOSPADM

## 2024-09-04 RX ADMIN — ONDANSETRON 8 MG: 2 INJECTION INTRAMUSCULAR; INTRAVENOUS at 12:09

## 2024-09-04 RX ADMIN — ERIBULIN MESYLATE 1.95 MG: 0.5 INJECTION INTRAVENOUS at 12:09

## 2024-09-04 RX ADMIN — SODIUM CHLORIDE: 9 INJECTION, SOLUTION INTRAVENOUS at 12:09

## 2024-09-04 NOTE — PLAN OF CARE
Problem: Adult Inpatient Plan of Care  Goal: Plan of Care Review  Outcome: Met  Flowsheets (Taken 9/4/2024 1306)  Plan of Care Reviewed With:   patient   spouse  Goal: Absence of Hospital-Acquired Illness or Injury  Outcome: Met  Intervention: Identify and Manage Fall Risk  Flowsheets (Taken 9/4/2024 1306)  Safety Promotion/Fall Prevention:   assistive device/personal item within reach   Fall Risk reviewed with patient/family   in recliner, wheels locked     Next appt reviewed; pt denied questions or further needs at the time of discharge.

## 2024-09-10 ENCOUNTER — LAB VISIT (OUTPATIENT)
Dept: LAB | Facility: HOSPITAL | Age: 69
End: 2024-09-10
Attending: INTERNAL MEDICINE
Payer: MEDICARE

## 2024-09-10 ENCOUNTER — OFFICE VISIT (OUTPATIENT)
Dept: HEMATOLOGY/ONCOLOGY | Facility: CLINIC | Age: 69
End: 2024-09-10
Payer: MEDICARE

## 2024-09-10 ENCOUNTER — INFUSION (OUTPATIENT)
Dept: INFUSION THERAPY | Facility: HOSPITAL | Age: 69
End: 2024-09-10
Attending: INTERNAL MEDICINE
Payer: MEDICARE

## 2024-09-10 VITALS
SYSTOLIC BLOOD PRESSURE: 111 MMHG | HEART RATE: 66 BPM | HEIGHT: 62 IN | DIASTOLIC BLOOD PRESSURE: 68 MMHG | RESPIRATION RATE: 15 BRPM | TEMPERATURE: 98 F | BODY MASS INDEX: 18.15 KG/M2 | WEIGHT: 98.63 LBS | OXYGEN SATURATION: 98 %

## 2024-09-10 DIAGNOSIS — T45.1X5A CHEMOTHERAPY-INDUCED NEUTROPENIA: ICD-10-CM

## 2024-09-10 DIAGNOSIS — C50.419 MALIGNANT NEOPLASM OF UPPER-OUTER QUADRANT OF FEMALE BREAST, UNSPECIFIED ESTROGEN RECEPTOR STATUS, UNSPECIFIED LATERALITY: Primary | ICD-10-CM

## 2024-09-10 DIAGNOSIS — D70.1 CHEMOTHERAPY-INDUCED NEUTROPENIA: ICD-10-CM

## 2024-09-10 DIAGNOSIS — C78.6 SECONDARY MALIGNANT NEOPLASM OF PERITONEUM: ICD-10-CM

## 2024-09-10 DIAGNOSIS — C50.311 MALIGNANT NEOPLASM OF LOWER-INNER QUADRANT OF RIGHT BREAST OF FEMALE, ESTROGEN RECEPTOR POSITIVE: Primary | ICD-10-CM

## 2024-09-10 DIAGNOSIS — T45.1X5A ANEMIA DUE TO CHEMOTHERAPY: ICD-10-CM

## 2024-09-10 DIAGNOSIS — Z17.0 MALIGNANT NEOPLASM OF LOWER-INNER QUADRANT OF RIGHT BREAST OF FEMALE, ESTROGEN RECEPTOR POSITIVE: Primary | ICD-10-CM

## 2024-09-10 DIAGNOSIS — Z17.0 MALIGNANT NEOPLASM OF LOWER-INNER QUADRANT OF RIGHT BREAST OF FEMALE, ESTROGEN RECEPTOR POSITIVE: ICD-10-CM

## 2024-09-10 DIAGNOSIS — C50.311 MALIGNANT NEOPLASM OF LOWER-INNER QUADRANT OF RIGHT BREAST OF FEMALE, ESTROGEN RECEPTOR POSITIVE: ICD-10-CM

## 2024-09-10 DIAGNOSIS — D64.81 ANEMIA DUE TO CHEMOTHERAPY: ICD-10-CM

## 2024-09-10 LAB
ALBUMIN SERPL-MCNC: 3.8 G/DL (ref 3.4–4.8)
ALBUMIN/GLOB SERPL: 1.5 RATIO (ref 1.1–2)
ALP SERPL-CCNC: 41 UNIT/L (ref 40–150)
ALT SERPL-CCNC: 17 UNIT/L (ref 0–55)
ANION GAP SERPL CALC-SCNC: 11 MEQ/L
AST SERPL-CCNC: 30 UNIT/L (ref 5–34)
BASOPHILS # BLD AUTO: 0.04 X10(3)/MCL
BASOPHILS NFR BLD AUTO: 2.4 %
BILIRUB SERPL-MCNC: 0.3 MG/DL
BUN SERPL-MCNC: 21.7 MG/DL (ref 9.8–20.1)
CALCIUM SERPL-MCNC: 9.2 MG/DL (ref 8.4–10.2)
CHLORIDE SERPL-SCNC: 101 MMOL/L (ref 98–107)
CO2 SERPL-SCNC: 31 MMOL/L (ref 23–31)
CREAT SERPL-MCNC: 0.79 MG/DL (ref 0.55–1.02)
CREAT/UREA NIT SERPL: 27
EOSINOPHIL # BLD AUTO: 0.01 X10(3)/MCL (ref 0–0.9)
EOSINOPHIL NFR BLD AUTO: 0.6 %
ERYTHROCYTE [DISTWIDTH] IN BLOOD BY AUTOMATED COUNT: 14.3 % (ref 11.5–17)
GFR SERPLBLD CREATININE-BSD FMLA CKD-EPI: >60 ML/MIN/1.73/M2
GLOBULIN SER-MCNC: 2.6 GM/DL (ref 2.4–3.5)
GLUCOSE SERPL-MCNC: 105 MG/DL (ref 82–115)
HCT VFR BLD AUTO: 28.3 % (ref 37–47)
HGB BLD-MCNC: 9.5 G/DL (ref 12–16)
IMM GRANULOCYTES # BLD AUTO: 0.05 X10(3)/MCL (ref 0–0.04)
IMM GRANULOCYTES NFR BLD AUTO: 2.9 %
LYMPHOCYTES # BLD AUTO: 1.02 X10(3)/MCL (ref 0.6–4.6)
LYMPHOCYTES NFR BLD AUTO: 60 %
MCH RBC QN AUTO: 33.2 PG (ref 27–31)
MCHC RBC AUTO-ENTMCNC: 33.6 G/DL (ref 33–36)
MCV RBC AUTO: 99 FL (ref 80–94)
MONOCYTES # BLD AUTO: 0.09 X10(3)/MCL (ref 0.1–1.3)
MONOCYTES NFR BLD AUTO: 5.3 %
NEUTROPHILS # BLD AUTO: 0.49 X10(3)/MCL (ref 2.1–9.2)
NEUTROPHILS NFR BLD AUTO: 28.8 %
PLATELET # BLD AUTO: 292 X10(3)/MCL (ref 130–400)
PMV BLD AUTO: 9 FL (ref 7.4–10.4)
POTASSIUM SERPL-SCNC: 4.2 MMOL/L (ref 3.5–5.1)
PROT SERPL-MCNC: 6.4 GM/DL (ref 5.8–7.6)
RBC # BLD AUTO: 2.86 X10(6)/MCL (ref 4.2–5.4)
SODIUM SERPL-SCNC: 143 MMOL/L (ref 136–145)
WBC # BLD AUTO: 1.7 X10(3)/MCL (ref 4.5–11.5)

## 2024-09-10 PROCEDURE — 99214 OFFICE O/P EST MOD 30 MIN: CPT | Mod: PBBFAC,25 | Performed by: NURSE PRACTITIONER

## 2024-09-10 PROCEDURE — 99999 PR PBB SHADOW E&M-EST. PATIENT-LVL IV: CPT | Mod: PBBFAC,,, | Performed by: NURSE PRACTITIONER

## 2024-09-10 PROCEDURE — 96372 THER/PROPH/DIAG INJ SC/IM: CPT

## 2024-09-10 PROCEDURE — 63600175 PHARM REV CODE 636 W HCPCS: Mod: JZ,JB,JG | Performed by: NURSE PRACTITIONER

## 2024-09-10 PROCEDURE — 99215 OFFICE O/P EST HI 40 MIN: CPT | Mod: S$PBB,,, | Performed by: NURSE PRACTITIONER

## 2024-09-10 PROCEDURE — 80053 COMPREHEN METABOLIC PANEL: CPT

## 2024-09-10 PROCEDURE — 85025 COMPLETE CBC W/AUTO DIFF WBC: CPT

## 2024-09-10 PROCEDURE — 36415 COLL VENOUS BLD VENIPUNCTURE: CPT

## 2024-09-10 RX ADMIN — FILGRASTIM-SNDZ 300 MCG: 300 INJECTION, SOLUTION INTRAVENOUS; SUBCUTANEOUS at 03:09

## 2024-09-10 NOTE — PROGRESS NOTES
Subjective:       Patient ID: Kimberlyn Selby is a 68 y.o. female.    Chief Complaint: Constipation, shortness of breath    Diagnosis:  Recurrent metastatic breast cancer with peritoneal carcinomatosis - ER+ 4%, RI 0, HER-2 neg (IHC 0)                         PD-L1 CPS >10, ROLANDA, low TMB, PIK3CA E365K and Y5223C mutations                     Stage IA R breast cancer 8/11 (T1b N0 M0), 0.7 cm, GI, ER+/RI-, HER-2 negative                     Stage IIA L breast cancer 1/09 (T1c N1 M0), 1.6 cm, GIII, 1+ LN, ER/RI -, HER-2 negative                     Stage IIIA R breast cancer 1/05 (T2 N2a M0), 1.6 cm, GII, 4+ LN's, ER/RI +, HER-2 negative                     S/p bilateral mastectomies                     Post menopausal s/p hysterectomy                     Declined adjuvant XRT & hormonal therapy with initial breast cancer     Treatment History  Adjuvant TAC x 6 completed 6/05  Adjuvant TC x 4 completed 5/09 --> XRT L breast  Xeloda 8/23-7/24    Current Therapy:  Eribulin s/p 2 cycles (Started 8/7/24)     Clinical History: WF status post right lumpectomy 1/05 for invasive breast cancer with the above characteristics. She completed 6 cycles of adjuvant TAC but declined radiation. She was placed on Arimidex but discontinued treatment due to significant myalgias. She had similar side effects with Femara and also complained of bone aching due to Tamoxifen and stopped therapy 9/05. Due to her hormone receptor status, she was placed on Evista but again was unable to tolerate therapy. She had an abnormal surveillance mammogram 11/08 showing clustered microcalcifications in the upper outer quadrant of the left breast with an associated 1 cm mass by ultrasound. 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 Patient underwent left lumpectomy and axillary dissection 1/15/09. Final pathology as outlined above. Postop course was complicated by cellulitis. She completed 4 cycles of adjuvant TC 5/09.  She eventually consented to adjuvant radiation therapy to the left breast. Surveillance CT PET scan 12/09 showed no abnormal hypermetabolic activity. Mammogram 6/22/11 showed a new suspicious finding in the right breast at the 4:00 position. Ultrasound was suspicious for malignancy. Ultrasound-guided core biopsies revealed invasive ductal carcinoma strongly ER positive at 85%, DE negative and HER-2 negative. CT PET scan 5/11 showed no abnormal hypermetabolic activity to indicate recurrent or metastatic disease. Although patient did have radiation therapy post lumpectomy on the left side, she never had radiation therapy to the right breast. She elected to undergo bilateral mastectomies and prophylactic right oophorectomy 8/16/11. Final pathology showed a 0.7 cm grade 1 infiltrating ductal carcinoma the right breast. Lamar dissection was not done to her previous surgery. She declined BRCA testing. Adjuvant hormonal therapy was recommended with Aromasin but she declined. She underwent a screening colonoscopy 6/5/13 which showed diverticula but no other abnormal findings; 10 year followup was recommended.      She was seen for a surveillance appointment 8/24/21 and did not return for her annual visit in 2022.    She developed symptoms of GE reflux and constipation that she treated with OTC medications without significant improvement.  She denied significant weight loss.  She was referred for a CT of the abdomen and pelvis by GI 6/2/23 ([facility]) that showed a diffuse abnormal enhancing soft tissue density in the retroperitoneum and mesenteric root with nodular intense enhancement.  Findings suspicious for an infiltrative lymphoproliferative disorder.  Mass encased the aorta without stenosis.  IVC appeared mildly narrowed without focal internal filling defect.  There were no additional abnormal findings.  Spleen was unremarkable.   EGD 6/13/23 showed mild chronic reflux changes and gastritis.  Colonoscopy showed  a single polyp which was removed.  Pathology was consistent with metastatic breast cancer.  She underwent a diagnostic laparoscopy 7/12/23 revealing peritoneal carcinomatosis.  Biopsies were positive for metastatic poorly differentiated adenocarcinoma with signet ring morphology consistent with breast primary (LIZ-3 and CK7 positive; CK20 and CDX2 negative).  ER was low positive 4%, [place] negative and HER2 negative (IHC 0).    Molecular Testing:  PDL1 CPS >10, ROLANDA, low TMB.  Mutations of BLM, CHEK2, PIK3CA E365K, SMARCA4 and JAK1.  PIK3CA E365K AND T3898p are not approved bio-markers    CT-PET 7/28/23:  Poorly delineated bulky abdominal lymphadenopathy with low-grade FDG uptake, SUV 2.4.  Small volume ascites.  No definite metastatic disease outside of the abdomen.  MRI Brain 8/2/23:  No evidence of metastatic disease.  Mild chronic microvascular ischemia and atrophy.    She was initiated on treatment with oral Xeloda.  She did not tolerate dose escalation secondary to hand-foot syndrome.  She required dose adjustment to manage her side effects.    CT C/A/P 11/15/23:  Improved confluent retroperitoneal and mesenteric adenopathy.  Improved mesenteric soft tissue density and edema.  CT C/A/P 2/27/24:  Radiology reported no detrimental change from 11/23.  My review shows further decrease in the soft tissue density in the retroperitoneum and mesentery with no new sites of measurable disease.  Chronic constipation.  MRI brain 4/5/24: No acute findings or metastatic disease.  CT C/A/P 7/19/24:  No pulmonary or hepatic lesions.  Moderate-to-large amount of stool diffusely throughout the colon.  Mesenteric soft tissue density stable to slightly decreased.  Numerous small sclerotic skeletal lesions mildly increased in size and density.    Interval History  She returns to clinic today for a three-week follow-up visit accompanied by her .  She is day 14 of her 2nd cycle of Eribulin.  She has chronic constipation and takes  "OTC and prescription laxatives as needed.  She is eating small meals and maintaining her weight.  Laboratory shows chemotherapy induced neutropenia with an ANC of 490 and anemia with a hemoglobin of 9.5.  She has no fever or infection.  She does complain of shortness of breath.    Review of Systems   Constitutional:  Positive for fatigue. Negative for activity change, appetite change, fever and unexpected weight change.        Difficulty sleeping   HENT:  Negative for mouth sores, sore throat and trouble swallowing.    Eyes: Negative.    Respiratory:  Negative for cough and shortness of breath.    Cardiovascular:  Negative for chest pain, palpitations and leg swelling.   Gastrointestinal:  Positive for constipation and reflux. Negative for abdominal distention, abdominal pain, diarrhea and nausea.   Genitourinary:  Negative for dysuria, frequency and urgency.   Musculoskeletal:  Negative for arthralgias and back pain.   Integumentary:  Negative for pallor and rash.   Neurological:  Negative for dizziness, weakness, numbness and headaches.   Hematological:  Negative for adenopathy. Does not bruise/bleed easily.   Psychiatric/Behavioral: Negative.  The patient is not nervous/anxious.        PMHx:  Bilateral breast cancer, arthritis, hypothyroidism, GERD  PSHx:  Bilateral breast lumpectomies, bilateral mastectomies, hysterectomy/BSO, MediPort insertion and removal, uterine suspension, diagnostic laparoscopy  SH:  Lifetime nonsmoker, no significant alcohol use.  Lives in [place] with her .  FH:  Her father had kidney cancer, son had appendiceal cancer and MGF had liver cancer.    Objective:        /68   Pulse 66   Temp 98.3 °F (36.8 °C)   Resp 15   Ht 5' 2" (1.575 m)   Wt 44.7 kg (98 lb 9.6 oz)   SpO2 98%   BMI 18.03 kg/m²    Physical Exam  Constitutional:       Comments: Thin white female in NAD   HENT:      Head: Normocephalic.      Comments: Alopecia     Mouth/Throat:      Mouth: Mucous " membranes are moist.      Pharynx: Oropharynx is clear. No posterior oropharyngeal erythema.      Comments: No mucositis  Eyes:      General: No scleral icterus.     Extraocular Movements: Extraocular movements intact.      Pupils: Pupils are equal, round, and reactive to light.   Cardiovascular:      Rate and Rhythm: Normal rate and regular rhythm.      Heart sounds: No murmur heard.  Pulmonary:      Comments: Lungs clear to auscultation  Abdominal:      General: Bowel sounds are normal. There is no distension.      Tenderness: There is no abdominal tenderness.      Comments: No palpable masses or organomegaly   Musculoskeletal:         General: No swelling or tenderness. Normal range of motion.      Cervical back: Neck supple. No tenderness.   Lymphadenopathy:      Cervical: No cervical adenopathy.      Upper Body:      Right upper body: No supraclavicular or axillary adenopathy.      Left upper body: No supraclavicular or axillary adenopathy.   Skin:     General: Skin is warm and dry.      Findings: No rash.   Neurological:      General: No focal deficit present.      Mental Status: She is alert and oriented to person, place, and time.      Cranial Nerves: No cranial nerve deficit.      Motor: No weakness.       ECOG SCORE    2 - Capable of all selfcare but unable to carry out any work activities, active > 50% of hours          LABORATORY  Recent Results (from the past 336 hour(s))   CBC with Differential    Collection Time: 08/28/24  1:19 PM   Result Value Ref Range    WBC 10.38 4.50 - 11.50 x10(3)/mcL    RBC 3.39 (L) 4.20 - 5.40 x10(6)/mcL    Hgb 11.2 (L) 12.0 - 16.0 g/dL    Hct 33.8 (L) 37.0 - 47.0 %    MCV 99.7 (H) 80.0 - 94.0 fL    MCH 33.0 (H) 27.0 - 31.0 pg    MCHC 33.1 33.0 - 36.0 g/dL    RDW 14.9 11.5 - 17.0 %    Platelet 275 130 - 400 x10(3)/mcL    MPV 9.5 7.4 - 10.4 fL    Neut % 54.4 %    Lymph % 17.6 %    Mono % 13.5 %    Eos % 0.2 %    Basophil % 0.9 %    Lymph # 1.83 0.6 - 4.6 x10(3)/mcL    Neut #  5.65 2.1 - 9.2 x10(3)/mcL    Mono # 1.40 (H) 0.1 - 1.3 x10(3)/mcL    Eos # 0.02 0 - 0.9 x10(3)/mcL    Baso # 0.09 <=0.2 x10(3)/mcL    IG# 1.39 (H) 0 - 0.04 x10(3)/mcL    IG% 13.4 %   CBC with Differential    Collection Time: 09/04/24 11:41 AM   Result Value Ref Range    WBC 5.03 4.50 - 11.50 x10(3)/mcL    RBC 3.32 (L) 4.20 - 5.40 x10(6)/mcL    Hgb 10.8 (L) 12.0 - 16.0 g/dL    Hct 32.7 (L) 37.0 - 47.0 %    MCV 98.5 (H) 80.0 - 94.0 fL    MCH 32.5 (H) 27.0 - 31.0 pg    MCHC 33.0 33.0 - 36.0 g/dL    RDW 14.3 11.5 - 17.0 %    Platelet 253 130 - 400 x10(3)/mcL    MPV 9.5 7.4 - 10.4 fL    Neut % 63.2 %    Lymph % 26.0 %    Mono % 4.4 %    Eos % 0.2 %    Basophil % 1.2 %    Lymph # 1.31 0.6 - 4.6 x10(3)/mcL    Neut # 3.18 2.1 - 9.2 x10(3)/mcL    Mono # 0.22 0.1 - 1.3 x10(3)/mcL    Eos # 0.01 0 - 0.9 x10(3)/mcL    Baso # 0.06 <=0.2 x10(3)/mcL    IG# 0.25 (H) 0 - 0.04 x10(3)/mcL    IG% 5.0 %   CBC with Differential    Collection Time: 09/10/24  2:23 PM   Result Value Ref Range    WBC 1.70 (LL) 4.50 - 11.50 x10(3)/mcL    RBC 2.86 (L) 4.20 - 5.40 x10(6)/mcL    Hgb 9.5 (L) 12.0 - 16.0 g/dL    Hct 28.3 (L) 37.0 - 47.0 %    MCV 99.0 (H) 80.0 - 94.0 fL    MCH 33.2 (H) 27.0 - 31.0 pg    MCHC 33.6 33.0 - 36.0 g/dL    RDW 14.3 11.5 - 17.0 %    Platelet 292 130 - 400 x10(3)/mcL    MPV 9.0 7.4 - 10.4 fL    Neut % 28.8 %    Lymph % 60.0 %    Mono % 5.3 %    Eos % 0.6 %    Basophil % 2.4 %    Lymph # 1.02 0.6 - 4.6 x10(3)/mcL    Neut # 0.49 (L) 2.1 - 9.2 x10(3)/mcL    Mono # 0.09 (L) 0.1 - 1.3 x10(3)/mcL    Eos # 0.01 0 - 0.9 x10(3)/mcL    Baso # 0.04 <=0.2 x10(3)/mcL    IG# 0.05 (H) 0 - 0.04 x10(3)/mcL    IG% 2.9 %                          9/20/23   10/24/23   11/15/23   1/02/24   2/22/24   4/26/24   6/26/24   7/19/24  CEA              50.9         30.7         21.7          14.5        13.1       16.4        22.4         50.1  CA 27-29      43.7         40.1         31.5          29.8        28.0        32           45           63.8       Assessment:   Recurrent metastatic breast cancer with peritoneal carcinomatosis - ER+ 4%, [place] neg, Her-2 neg (IHC 0)  Asymptomatic neutropenia  History of bilateral breast cancer s/p bilateral mastectomies  GERD and constipation  Chemotherapy induced anemia    Plan:   Neupogen 300 mcg today for chemotherapy induced neutropenia.  Neutropenic precautions.  She is traveling out of town next week.  She will resume treatment with Eribulin on 9/26/24.  Update CBC and tumor markers that day.  CBC weekly post treatment.  Discussed PICC line versus MP for chemo administration.  She will consider and let me know.  RTC 4 weeks for follow-up with restaging CT scans prior to visit.  All questions answered to the satisfaction of the patient.    JON DIXON, FNP-C  [facility] [place]     Other Physicians  Dr. Darrell Swan

## 2024-09-10 NOTE — PLAN OF CARE
Plan of care reviewed with patient; patient in agreement. Neupogen x1 dose completed. Appts reviewed with patient; patient uses portal.

## 2024-09-22 RX ORDER — ONDANSETRON HYDROCHLORIDE 2 MG/ML
8 INJECTION, SOLUTION INTRAVENOUS
Status: CANCELLED | OUTPATIENT
Start: 2024-09-22

## 2024-09-22 RX ORDER — HEPARIN 100 UNIT/ML
500 SYRINGE INTRAVENOUS
OUTPATIENT
Start: 2024-09-26

## 2024-09-22 RX ORDER — HEPARIN 100 UNIT/ML
500 SYRINGE INTRAVENOUS
Status: CANCELLED | OUTPATIENT
Start: 2024-09-22

## 2024-09-22 RX ORDER — SODIUM CHLORIDE 0.9 % (FLUSH) 0.9 %
10 SYRINGE (ML) INJECTION
Status: CANCELLED | OUTPATIENT
Start: 2024-09-22

## 2024-09-22 RX ORDER — SODIUM CHLORIDE 0.9 % (FLUSH) 0.9 %
10 SYRINGE (ML) INJECTION
OUTPATIENT
Start: 2024-09-26

## 2024-09-22 RX ORDER — ONDANSETRON HYDROCHLORIDE 2 MG/ML
8 INJECTION, SOLUTION INTRAVENOUS
OUTPATIENT
Start: 2024-09-26

## 2024-09-25 ENCOUNTER — INFUSION (OUTPATIENT)
Dept: INFUSION THERAPY | Facility: HOSPITAL | Age: 69
End: 2024-09-25
Attending: INTERNAL MEDICINE
Payer: MEDICARE

## 2024-09-25 VITALS
SYSTOLIC BLOOD PRESSURE: 127 MMHG | HEART RATE: 55 BPM | TEMPERATURE: 98 F | HEIGHT: 62 IN | DIASTOLIC BLOOD PRESSURE: 65 MMHG | OXYGEN SATURATION: 99 % | WEIGHT: 97 LBS | BODY MASS INDEX: 17.85 KG/M2 | RESPIRATION RATE: 16 BRPM

## 2024-09-25 DIAGNOSIS — Z17.0 MALIGNANT NEOPLASM OF LOWER-INNER QUADRANT OF RIGHT BREAST OF FEMALE, ESTROGEN RECEPTOR POSITIVE: Primary | ICD-10-CM

## 2024-09-25 DIAGNOSIS — C50.311 MALIGNANT NEOPLASM OF LOWER-INNER QUADRANT OF RIGHT BREAST OF FEMALE, ESTROGEN RECEPTOR POSITIVE: Primary | ICD-10-CM

## 2024-09-25 DIAGNOSIS — C78.6 SECONDARY MALIGNANT NEOPLASM OF PERITONEUM: ICD-10-CM

## 2024-09-25 PROCEDURE — 96375 TX/PRO/DX INJ NEW DRUG ADDON: CPT

## 2024-09-25 PROCEDURE — 63600175 PHARM REV CODE 636 W HCPCS: Mod: JZ,JG | Performed by: INTERNAL MEDICINE

## 2024-09-25 PROCEDURE — 25000003 PHARM REV CODE 250: Performed by: INTERNAL MEDICINE

## 2024-09-25 PROCEDURE — 96409 CHEMO IV PUSH SNGL DRUG: CPT

## 2024-09-25 RX ORDER — ONDANSETRON HYDROCHLORIDE 2 MG/ML
8 INJECTION, SOLUTION INTRAVENOUS
Status: COMPLETED | OUTPATIENT
Start: 2024-09-25 | End: 2024-09-25

## 2024-09-25 RX ORDER — SODIUM CHLORIDE 0.9 % (FLUSH) 0.9 %
10 SYRINGE (ML) INJECTION
Status: DISCONTINUED | OUTPATIENT
Start: 2024-09-25 | End: 2024-09-25 | Stop reason: HOSPADM

## 2024-09-25 RX ORDER — HEPARIN 100 UNIT/ML
500 SYRINGE INTRAVENOUS
Status: DISCONTINUED | OUTPATIENT
Start: 2024-09-25 | End: 2024-09-25 | Stop reason: HOSPADM

## 2024-09-25 RX ADMIN — ERIBULIN MESYLATE 1.95 MG: 0.5 INJECTION INTRAVENOUS at 03:09

## 2024-09-25 RX ADMIN — ONDANSETRON 8 MG: 2 INJECTION INTRAMUSCULAR; INTRAVENOUS at 03:09

## 2024-09-25 NOTE — PROGRESS NOTES
Subjective:       Patient ID: Kimberlyn Selby is a 68 y.o. female.    Chief Complaint:  Mild fatigue    Diagnosis:  Recurrent metastatic breast cancer with peritoneal carcinomatosis - ER+ 4%, VT 0, HER-2 neg (IHC 0)                         PD-L1 CPS >10, ROLANDA, low TMB, PIK3CA E365K and G3157W mutations                     Stage IA R breast cancer 8/11 (T1b N0 M0), 0.7 cm, GI, ER+/VT-, HER-2 negative                     Stage IIA L breast cancer 1/09 (T1c N1 M0), 1.6 cm, GIII, 1+ LN, ER/VT -, HER-2 negative                     Stage IIIA R breast cancer 1/05 (T2 N2a M0), 1.6 cm, GII, 4+ LN's, ER/VT +, HER-2 negative                     S/p bilateral mastectomies                     Post menopausal s/p hysterectomy                     Declined adjuvant XRT & hormonal therapy with initial breast cancer     Treatment History  Adjuvant TAC x 6 completed 6/05  Adjuvant TC x 4 completed 5/09 --> XRT L breast  Xeloda 8/23-7/24    Current Therapy:  Eribulin s/p 3 cycles (Started 8/7/24)     Clinical History: WF status post right lumpectomy 1/05 for invasive breast cancer with the above characteristics. She completed 6 cycles of adjuvant TAC but declined radiation. She was placed on Arimidex but discontinued treatment due to significant myalgias. She had similar side effects with Femara and also complained of bone aching due to Tamoxifen and stopped therapy 9/05. Due to her hormone receptor status, she was placed on Evista but again was unable to tolerate therapy. She had an abnormal surveillance mammogram 11/08 showing clustered microcalcifications in the upper outer quadrant of the left breast with an associated 1 cm mass by ultrasound. 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 Patient underwent left lumpectomy and axillary dissection 1/15/09. Final pathology as outlined above. Postop course was complicated by cellulitis. She completed 4 cycles of adjuvant TC 5/09. She eventually  consented to adjuvant radiation therapy to the left breast. Surveillance CT PET scan 12/09 showed no abnormal hypermetabolic activity. Mammogram 6/22/11 showed a new suspicious finding in the right breast at the 4:00 position. Ultrasound was suspicious for malignancy. Ultrasound-guided core biopsies revealed invasive ductal carcinoma strongly ER positive at 85%, VT negative and HER-2 negative. CT PET scan 5/11 showed no abnormal hypermetabolic activity to indicate recurrent or metastatic disease. Although patient did have radiation therapy post lumpectomy on the left side, she never had radiation therapy to the right breast. She elected to undergo bilateral mastectomies and prophylactic right oophorectomy 8/16/11. Final pathology showed a 0.7 cm grade 1 infiltrating ductal carcinoma the right breast. Lamar dissection was not done to her previous surgery. She declined BRCA testing. Adjuvant hormonal therapy was recommended with Aromasin but she declined. She underwent a screening colonoscopy 6/5/13 which showed diverticula but no other abnormal findings; 10 year followup was recommended.      She was seen for a surveillance appointment 8/24/21 and did not return for her annual visit in 2022.    She developed symptoms of GE reflux and constipation that she treated with OTC medications without significant improvement.  She denied significant weight loss.  She was referred for a CT of the abdomen and pelvis by GI 6/2/23 ([facility]) that showed a diffuse abnormal enhancing soft tissue density in the retroperitoneum and mesenteric root with nodular intense enhancement.  Findings suspicious for an infiltrative lymphoproliferative disorder.  Mass encased the aorta without stenosis.  IVC appeared mildly narrowed without focal internal filling defect.  There were no additional abnormal findings.  Spleen was unremarkable.   EGD 6/13/23 showed mild chronic reflux changes and gastritis.  Colonoscopy showed a single polyp  which was removed.  Pathology was consistent with metastatic breast cancer.  She underwent a diagnostic laparoscopy 7/12/23 revealing peritoneal carcinomatosis.  Biopsies were positive for metastatic poorly differentiated adenocarcinoma with signet ring morphology consistent with breast primary (LIZ-3 and CK7 positive; CK20 and CDX2 negative).  ER was low positive 4%, [place] negative and HER2 negative (IHC 0).    Molecular Testing:  PDL1 CPS >10, ROLANDA, low TMB.  Mutations of BLM, CHEK2, PIK3CA E365K, SMARCA4 and JAK1.  PIK3CA E365K AND [id number] are not approved bio-markers for treatment    CT-PET 7/28/23:  Poorly delineated bulky abdominal lymphadenopathy with low-grade FDG uptake, SUV 2.4.  Small volume ascites.  No definite metastatic disease outside of the abdomen.  MRI Brain 8/2/23:  No evidence of metastatic disease.  Mild chronic microvascular ischemia and atrophy.    She was initiated on treatment with oral Xeloda.  She did not tolerate dose escalation secondary to hand-foot syndrome.  She required dose adjustment to manage her side effects.    CT C/A/P 11/15/23:  Improved confluent retroperitoneal and mesenteric adenopathy.  Improved mesenteric soft tissue density and edema.  CT C/A/P 2/27/24:  Radiology reported no detrimental change from 11/23.  My review shows further decrease in the soft tissue density in the retroperitoneum and mesentery with no new sites of measurable disease.  Chronic constipation.  MRI brain 4/5/24: No acute findings or metastatic disease.  CT C/A/P 7/19/24:  No pulmonary or hepatic lesions.  Moderate-to-large amount of stool diffusely throughout the colon.  Mesenteric soft tissue density stable to slightly decreased.  Numerous small sclerotic skeletal lesions mildly increased in size and density.  CT C/A/P 10/07/24:  Stable osteoblastic skeletal metastases.  No pulmonary or hepatic lesions.  Slight decrease in mesenteric density with resolution of previous pelvic ascites.  Persistent  large amount of stool throughout the colon.    Interval History  She returns to the office today for a three-week follow-up visit accompanied by her .  She has now completed 3 cycles of treatment with eribulin.  She required a dose reduction of 20% secondary to treatment-related neutropenia requiring Neupogen support.  She has ongoing mild fatigue.  She has chronic constipation which is unchanged.  No significant abdominal pain, nausea or weight loss.  Restaging CT scans 10/7/24 showed interval improvement and resolution of previous pelvic ascites.  Sclerotic bone mets were stable.  There was no evidence of disease progression.      Review of Systems   Constitutional:  Positive for fatigue. Negative for activity change, appetite change, fever and unexpected weight change.   HENT:  Negative for mouth sores, sore throat and trouble swallowing.    Eyes: Negative.    Respiratory:  Negative for cough and shortness of breath.    Cardiovascular:  Negative for chest pain, palpitations and leg swelling.   Gastrointestinal:  Positive for constipation. Negative for abdominal distention, abdominal pain, diarrhea and nausea.   Genitourinary:  Negative for dysuria, flank pain and frequency.   Musculoskeletal:  Negative for arthralgias and back pain.   Integumentary:  Negative for pallor and rash.   Neurological:  Negative for dizziness, weakness, numbness and headaches.   Hematological:  Negative for adenopathy. Does not bruise/bleed easily.   Psychiatric/Behavioral: Negative.         PMHx:  Bilateral breast cancer, arthritis, hypothyroidism, GERD  PSHx:  Bilateral breast lumpectomies, bilateral mastectomies, hysterectomy/BSO, MediPort insertion and removal, uterine suspension, diagnostic laparoscopy  SH:  Lifetime nonsmoker, no significant alcohol use.  Lives in [place] with her .  FH:  Her father had kidney cancer, son had appendiceal cancer and MGF had liver cancer.    Objective:        /73 (BP Location:  "Right arm, Patient Position: Sitting)   Pulse 63   Temp 97.5 °F (36.4 °C)   Resp 15   Ht 5' 2" (1.575 m)   Wt 44 kg (97 lb 1.6 oz)   SpO2 97%   BMI 17.76 kg/m²    Physical Exam  Constitutional:       Comments: Thin white female in NAD   HENT:      Head: Normocephalic.      Mouth/Throat:      Mouth: Mucous membranes are moist.      Pharynx: Oropharynx is clear. No posterior oropharyngeal erythema.   Eyes:      General: No scleral icterus.     Extraocular Movements: Extraocular movements intact.      Pupils: Pupils are equal, round, and reactive to light.   Cardiovascular:      Rate and Rhythm: Normal rate and regular rhythm.      Heart sounds: No murmur heard.  Pulmonary:      Comments: Lungs clear to auscultation  Abdominal:      General: Bowel sounds are normal. There is no distension.      Tenderness: There is no abdominal tenderness.      Comments: No palpable masses or organomegaly   Musculoskeletal:         General: No swelling or tenderness. Normal range of motion.      Cervical back: Neck supple. No tenderness.   Lymphadenopathy:      Cervical: No cervical adenopathy.      Upper Body:      Right upper body: No supraclavicular or axillary adenopathy.      Left upper body: No supraclavicular or axillary adenopathy.   Skin:     General: Skin is warm and dry.      Findings: No rash.   Neurological:      General: No focal deficit present.      Mental Status: She is alert and oriented to person, place, and time.      Cranial Nerves: No cranial nerve deficit.      Motor: No weakness.       ECOG SCORE    1 - Restricted in strenuous activity-ambulatory and able to carry out work of a light nature          LABORATORY  Recent Results (from the past 2 weeks)   CBC with Differential    Collection Time: 09/25/24  2:33 PM   Result Value Ref Range    WBC 5.11 4.50 - 11.50 x10(3)/mcL    RBC 3.44 (L) 4.20 - 5.40 x10(6)/mcL    Hgb 11.0 (L) 12.0 - 16.0 g/dL    Hct 33.8 (L) 37.0 - 47.0 %    MCV 98.3 (H) 80.0 - 94.0 fL    " MCH 32.0 (H) 27.0 - 31.0 pg    MCHC 32.5 (L) 33.0 - 36.0 g/dL    RDW 14.4 11.5 - 17.0 %    Platelet 263 130 - 400 x10(3)/mcL    MPV 9.2 7.4 - 10.4 fL    Neut % 61.0 %    Lymph % 27.2 %    Mono % 9.2 %    Eos % 0.8 %    Basophil % 1.6 %    Lymph # 1.39 0.6 - 4.6 x10(3)/mcL    Neut # 3.12 2.1 - 9.2 x10(3)/mcL    Mono # 0.47 0.1 - 1.3 x10(3)/mcL    Eos # 0.04 0 - 0.9 x10(3)/mcL    Baso # 0.08 <=0.2 x10(3)/mcL    IG# 0.01 0 - 0.04 x10(3)/mcL    IG% 0.2 %   CBC with Differential    Collection Time: 10/02/24  2:35 PM   Result Value Ref Range    WBC 2.67 (L) 4.50 - 11.50 x10(3)/mcL    RBC 3.48 (L) 4.20 - 5.40 x10(6)/mcL    Hgb 11.1 (L) 12.0 - 16.0 g/dL    Hct 33.7 (L) 37.0 - 47.0 %    MCV 96.8 (H) 80.0 - 94.0 fL    MCH 31.9 (H) 27.0 - 31.0 pg    MCHC 32.9 (L) 33.0 - 36.0 g/dL    RDW 14.3 11.5 - 17.0 %    Platelet 256 130 - 400 x10(3)/mcL    MPV 8.9 7.4 - 10.4 fL    Neut % 30.7 %    Lymph % 49.1 %    Mono % 7.1 %    Eos % 1.9 %    Basophil % 1.5 %    Lymph # 1.31 0.6 - 4.6 x10(3)/mcL    Neut # 0.82 (L) 2.1 - 9.2 x10(3)/mcL    Mono # 0.19 0.1 - 1.3 x10(3)/mcL    Eos # 0.05 0 - 0.9 x10(3)/mcL    Baso # 0.04 <=0.2 x10(3)/mcL    IG# 0.26 (H) 0 - 0.04 x10(3)/mcL    IG% 9.7 %   CBC with Differential    Collection Time: 10/08/24 10:36 AM   Result Value Ref Range    WBC 1.60 (LL) 4.50 - 11.50 x10(3)/mcL    RBC 3.25 (L) 4.20 - 5.40 x10(6)/mcL    Hgb 10.5 (L) 12.0 - 16.0 g/dL    Hct 31.8 (L) 37.0 - 47.0 %    MCV 97.8 (H) 80.0 - 94.0 fL    MCH 32.3 (H) 27.0 - 31.0 pg    MCHC 33.0 33.0 - 36.0 g/dL    RDW 14.2 11.5 - 17.0 %    Platelet 253 130 - 400 x10(3)/mcL    MPV 9.4 7.4 - 10.4 fL    Neut % 21.7 %    Lymph % 62.5 %    Mono % 11.3 %    Eos % 1.9 %    Basophil % 1.3 %    Lymph # 1.00 0.6 - 4.6 x10(3)/mcL    Neut # 0.35 (L) 2.1 - 9.2 x10(3)/mcL    Mono # 0.18 0.1 - 1.3 x10(3)/mcL    Eos # 0.03 0 - 0.9 x10(3)/mcL    Baso # 0.02 <=0.2 x10(3)/mcL    IG# 0.02 0 - 0.04 x10(3)/mcL    IG% 1.3 %                          9/20/23   10/24/23    11/15/23   1/02/24   2/22/24   4/26/24   6/26/24   7/19/24  CEA              50.9         30.7         21.7          14.5        13.1       16.4        22.4         50.1  CA 27-29      43.7         40.1         31.5          29.8        28.0        32           45           63.8      Assessment:   Recurrent metastatic breast cancer with peritoneal carcinomatosis - ER+ 4%, [place] neg, Her-2 neg (IHC 0)  Asymptomatic neutropenia  History of bilateral breast cancer s/p bilateral mastectomies  Chronic constipation    Plan:   CT images were reviewed in the office today in the presence of the patient and her .  Scans show interval disease improvement following 3 cycles of Eribulin.  She will proceed with her 4th cycle of therapy next week on 10/16/24 as long as her counts have adequately recovered.  Maintain previous 20% dose reduction.  Continue laboratory monitoring weekly.  Will plan follow-up imaging after 3 additional cycles of treatment.  If she has stable to improved disease at that time, will consider placing on an aromatase inhibitor for maintenance therapy given her low positive ER expression.  Treatment plan was reviewed and discussed with the patient and her .  All questions answered.      JOEL DAVIDSON MD    Other Physicians  Dr. Darrell Swan

## 2024-10-02 ENCOUNTER — INFUSION (OUTPATIENT)
Dept: INFUSION THERAPY | Facility: HOSPITAL | Age: 69
End: 2024-10-02
Attending: INTERNAL MEDICINE
Payer: MEDICARE

## 2024-10-02 ENCOUNTER — LAB VISIT (OUTPATIENT)
Dept: LAB | Facility: HOSPITAL | Age: 69
End: 2024-10-02
Attending: INTERNAL MEDICINE
Payer: MEDICARE

## 2024-10-02 VITALS — SYSTOLIC BLOOD PRESSURE: 114 MMHG | DIASTOLIC BLOOD PRESSURE: 64 MMHG

## 2024-10-02 DIAGNOSIS — C78.6 SECONDARY MALIGNANT NEOPLASM OF PERITONEUM: ICD-10-CM

## 2024-10-02 DIAGNOSIS — C50.311 MALIGNANT NEOPLASM OF LOWER-INNER QUADRANT OF RIGHT BREAST OF FEMALE, ESTROGEN RECEPTOR POSITIVE: ICD-10-CM

## 2024-10-02 DIAGNOSIS — C50.311 MALIGNANT NEOPLASM OF LOWER-INNER QUADRANT OF RIGHT BREAST OF FEMALE, ESTROGEN RECEPTOR POSITIVE: Primary | ICD-10-CM

## 2024-10-02 DIAGNOSIS — Z17.0 MALIGNANT NEOPLASM OF LOWER-INNER QUADRANT OF RIGHT BREAST OF FEMALE, ESTROGEN RECEPTOR POSITIVE: ICD-10-CM

## 2024-10-02 DIAGNOSIS — Z17.0 MALIGNANT NEOPLASM OF LOWER-INNER QUADRANT OF RIGHT BREAST OF FEMALE, ESTROGEN RECEPTOR POSITIVE: Primary | ICD-10-CM

## 2024-10-02 LAB
BASOPHILS # BLD AUTO: 0.04 X10(3)/MCL
BASOPHILS NFR BLD AUTO: 1.5 %
EOSINOPHIL # BLD AUTO: 0.05 X10(3)/MCL (ref 0–0.9)
EOSINOPHIL NFR BLD AUTO: 1.9 %
ERYTHROCYTE [DISTWIDTH] IN BLOOD BY AUTOMATED COUNT: 14.3 % (ref 11.5–17)
HCT VFR BLD AUTO: 33.7 % (ref 37–47)
HGB BLD-MCNC: 11.1 G/DL (ref 12–16)
IMM GRANULOCYTES # BLD AUTO: 0.26 X10(3)/MCL (ref 0–0.04)
IMM GRANULOCYTES NFR BLD AUTO: 9.7 %
LYMPHOCYTES # BLD AUTO: 1.31 X10(3)/MCL (ref 0.6–4.6)
LYMPHOCYTES NFR BLD AUTO: 49.1 %
MCH RBC QN AUTO: 31.9 PG (ref 27–31)
MCHC RBC AUTO-ENTMCNC: 32.9 G/DL (ref 33–36)
MCV RBC AUTO: 96.8 FL (ref 80–94)
MONOCYTES # BLD AUTO: 0.19 X10(3)/MCL (ref 0.1–1.3)
MONOCYTES NFR BLD AUTO: 7.1 %
NEUTROPHILS # BLD AUTO: 0.82 X10(3)/MCL (ref 2.1–9.2)
NEUTROPHILS NFR BLD AUTO: 30.7 %
PLATELET # BLD AUTO: 256 X10(3)/MCL (ref 130–400)
PMV BLD AUTO: 8.9 FL (ref 7.4–10.4)
RBC # BLD AUTO: 3.48 X10(6)/MCL (ref 4.2–5.4)
WBC # BLD AUTO: 2.67 X10(3)/MCL (ref 4.5–11.5)

## 2024-10-02 PROCEDURE — 63600175 PHARM REV CODE 636 W HCPCS: Mod: JW,JG | Performed by: INTERNAL MEDICINE

## 2024-10-02 PROCEDURE — 96375 TX/PRO/DX INJ NEW DRUG ADDON: CPT

## 2024-10-02 PROCEDURE — 36415 COLL VENOUS BLD VENIPUNCTURE: CPT

## 2024-10-02 PROCEDURE — 85025 COMPLETE CBC W/AUTO DIFF WBC: CPT

## 2024-10-02 PROCEDURE — 96409 CHEMO IV PUSH SNGL DRUG: CPT

## 2024-10-02 PROCEDURE — 25000003 PHARM REV CODE 250: Performed by: INTERNAL MEDICINE

## 2024-10-02 RX ORDER — ONDANSETRON HYDROCHLORIDE 2 MG/ML
8 INJECTION, SOLUTION INTRAVENOUS
Status: COMPLETED | OUTPATIENT
Start: 2024-10-02 | End: 2024-10-02

## 2024-10-02 RX ORDER — HEPARIN 100 UNIT/ML
500 SYRINGE INTRAVENOUS
Status: DISCONTINUED | OUTPATIENT
Start: 2024-10-02 | End: 2024-10-02 | Stop reason: HOSPADM

## 2024-10-02 RX ORDER — SODIUM CHLORIDE 0.9 % (FLUSH) 0.9 %
10 SYRINGE (ML) INJECTION
Status: DISCONTINUED | OUTPATIENT
Start: 2024-10-02 | End: 2024-10-02 | Stop reason: HOSPADM

## 2024-10-02 RX ADMIN — SODIUM CHLORIDE: 9 INJECTION, SOLUTION INTRAVENOUS at 03:10

## 2024-10-02 RX ADMIN — ONDANSETRON 8 MG: 2 INJECTION INTRAMUSCULAR; INTRAVENOUS at 03:10

## 2024-10-02 RX ADMIN — ERIBULIN MESYLATE 1.55 MG: 0.5 INJECTION INTRAVENOUS at 03:10

## 2024-10-03 DIAGNOSIS — C50.311 MALIGNANT NEOPLASM OF LOWER-INNER QUADRANT OF RIGHT BREAST OF FEMALE, ESTROGEN RECEPTOR POSITIVE: Primary | ICD-10-CM

## 2024-10-03 DIAGNOSIS — Z17.0 MALIGNANT NEOPLASM OF LOWER-INNER QUADRANT OF RIGHT BREAST OF FEMALE, ESTROGEN RECEPTOR POSITIVE: Primary | ICD-10-CM

## 2024-10-03 DIAGNOSIS — C78.6 SECONDARY MALIGNANT NEOPLASM OF PERITONEUM: ICD-10-CM

## 2024-10-08 ENCOUNTER — OFFICE VISIT (OUTPATIENT)
Dept: HEMATOLOGY/ONCOLOGY | Facility: CLINIC | Age: 69
End: 2024-10-08
Payer: MEDICARE

## 2024-10-08 ENCOUNTER — LAB VISIT (OUTPATIENT)
Dept: LAB | Facility: HOSPITAL | Age: 69
End: 2024-10-08
Attending: INTERNAL MEDICINE
Payer: MEDICARE

## 2024-10-08 VITALS
HEIGHT: 62 IN | SYSTOLIC BLOOD PRESSURE: 130 MMHG | DIASTOLIC BLOOD PRESSURE: 73 MMHG | TEMPERATURE: 98 F | HEART RATE: 63 BPM | RESPIRATION RATE: 15 BRPM | OXYGEN SATURATION: 97 % | WEIGHT: 97.13 LBS | BODY MASS INDEX: 17.87 KG/M2

## 2024-10-08 DIAGNOSIS — C50.311 MALIGNANT NEOPLASM OF LOWER-INNER QUADRANT OF RIGHT BREAST OF FEMALE, ESTROGEN RECEPTOR POSITIVE: ICD-10-CM

## 2024-10-08 DIAGNOSIS — C50.311 MALIGNANT NEOPLASM OF LOWER-INNER QUADRANT OF RIGHT BREAST OF FEMALE, ESTROGEN RECEPTOR POSITIVE: Primary | ICD-10-CM

## 2024-10-08 DIAGNOSIS — C78.6 SECONDARY MALIGNANT NEOPLASM OF PERITONEUM: ICD-10-CM

## 2024-10-08 DIAGNOSIS — Z17.0 MALIGNANT NEOPLASM OF LOWER-INNER QUADRANT OF RIGHT BREAST OF FEMALE, ESTROGEN RECEPTOR POSITIVE: ICD-10-CM

## 2024-10-08 DIAGNOSIS — Z17.0 MALIGNANT NEOPLASM OF LOWER-INNER QUADRANT OF RIGHT BREAST OF FEMALE, ESTROGEN RECEPTOR POSITIVE: Primary | ICD-10-CM

## 2024-10-08 LAB
ALBUMIN SERPL-MCNC: 4 G/DL (ref 3.4–4.8)
ALBUMIN/GLOB SERPL: 1.5 RATIO (ref 1.1–2)
ALP SERPL-CCNC: 39 UNIT/L (ref 40–150)
ALT SERPL-CCNC: 21 UNIT/L (ref 0–55)
ANION GAP SERPL CALC-SCNC: 9 MEQ/L
AST SERPL-CCNC: 37 UNIT/L (ref 5–34)
BASOPHILS # BLD AUTO: 0.02 X10(3)/MCL
BASOPHILS NFR BLD AUTO: 1.3 %
BILIRUB SERPL-MCNC: 0.3 MG/DL
BUN SERPL-MCNC: 13.3 MG/DL (ref 9.8–20.1)
CALCIUM SERPL-MCNC: 9.2 MG/DL (ref 8.4–10.2)
CEA SERPL-MCNC: 24.96 NG/ML (ref 0–3)
CHLORIDE SERPL-SCNC: 106 MMOL/L (ref 98–107)
CO2 SERPL-SCNC: 28 MMOL/L (ref 23–31)
CREAT SERPL-MCNC: 0.74 MG/DL (ref 0.55–1.02)
CREAT/UREA NIT SERPL: 18
EOSINOPHIL # BLD AUTO: 0.03 X10(3)/MCL (ref 0–0.9)
EOSINOPHIL NFR BLD AUTO: 1.9 %
ERYTHROCYTE [DISTWIDTH] IN BLOOD BY AUTOMATED COUNT: 14.2 % (ref 11.5–17)
GFR SERPLBLD CREATININE-BSD FMLA CKD-EPI: >60 ML/MIN/1.73/M2
GLOBULIN SER-MCNC: 2.7 GM/DL (ref 2.4–3.5)
GLUCOSE SERPL-MCNC: 99 MG/DL (ref 82–115)
HCT VFR BLD AUTO: 31.8 % (ref 37–47)
HGB BLD-MCNC: 10.5 G/DL (ref 12–16)
IMM GRANULOCYTES # BLD AUTO: 0.02 X10(3)/MCL (ref 0–0.04)
IMM GRANULOCYTES NFR BLD AUTO: 1.3 %
LYMPHOCYTES # BLD AUTO: 1 X10(3)/MCL (ref 0.6–4.6)
LYMPHOCYTES NFR BLD AUTO: 62.5 %
MCH RBC QN AUTO: 32.3 PG (ref 27–31)
MCHC RBC AUTO-ENTMCNC: 33 G/DL (ref 33–36)
MCV RBC AUTO: 97.8 FL (ref 80–94)
MONOCYTES # BLD AUTO: 0.18 X10(3)/MCL (ref 0.1–1.3)
MONOCYTES NFR BLD AUTO: 11.3 %
NEUTROPHILS # BLD AUTO: 0.35 X10(3)/MCL (ref 2.1–9.2)
NEUTROPHILS NFR BLD AUTO: 21.7 %
PLATELET # BLD AUTO: 253 X10(3)/MCL (ref 130–400)
PMV BLD AUTO: 9.4 FL (ref 7.4–10.4)
POTASSIUM SERPL-SCNC: 3.9 MMOL/L (ref 3.5–5.1)
PROT SERPL-MCNC: 6.7 GM/DL (ref 5.8–7.6)
RBC # BLD AUTO: 3.25 X10(6)/MCL (ref 4.2–5.4)
SODIUM SERPL-SCNC: 143 MMOL/L (ref 136–145)
WBC # BLD AUTO: 1.6 X10(3)/MCL (ref 4.5–11.5)

## 2024-10-08 PROCEDURE — 82378 CARCINOEMBRYONIC ANTIGEN: CPT

## 2024-10-08 PROCEDURE — 80053 COMPREHEN METABOLIC PANEL: CPT

## 2024-10-08 PROCEDURE — 36415 COLL VENOUS BLD VENIPUNCTURE: CPT

## 2024-10-08 PROCEDURE — 86300 IMMUNOASSAY TUMOR CA 15-3: CPT

## 2024-10-08 PROCEDURE — 99214 OFFICE O/P EST MOD 30 MIN: CPT | Mod: PBBFAC | Performed by: INTERNAL MEDICINE

## 2024-10-08 PROCEDURE — 99999 PR PBB SHADOW E&M-EST. PATIENT-LVL IV: CPT | Mod: PBBFAC,,, | Performed by: INTERNAL MEDICINE

## 2024-10-08 PROCEDURE — 85025 COMPLETE CBC W/AUTO DIFF WBC: CPT

## 2024-10-10 LAB — CANCER AG27-29 SERPL-ACNC: 43.4 U/ML

## 2024-10-13 RX ORDER — HEPARIN 100 UNIT/ML
500 SYRINGE INTRAVENOUS
OUTPATIENT
Start: 2024-10-23

## 2024-10-13 RX ORDER — ONDANSETRON HYDROCHLORIDE 2 MG/ML
8 INJECTION, SOLUTION INTRAVENOUS
OUTPATIENT
Start: 2024-10-23

## 2024-10-13 RX ORDER — SODIUM CHLORIDE 0.9 % (FLUSH) 0.9 %
10 SYRINGE (ML) INJECTION
Status: CANCELLED | OUTPATIENT
Start: 2024-10-16

## 2024-10-13 RX ORDER — SODIUM CHLORIDE 0.9 % (FLUSH) 0.9 %
10 SYRINGE (ML) INJECTION
OUTPATIENT
Start: 2024-10-23

## 2024-10-13 RX ORDER — ONDANSETRON HYDROCHLORIDE 2 MG/ML
8 INJECTION, SOLUTION INTRAVENOUS
Status: CANCELLED | OUTPATIENT
Start: 2024-10-16

## 2024-10-13 RX ORDER — HEPARIN 100 UNIT/ML
500 SYRINGE INTRAVENOUS
Status: CANCELLED | OUTPATIENT
Start: 2024-10-16

## 2024-10-14 ENCOUNTER — TELEPHONE (OUTPATIENT)
Dept: HEMATOLOGY/ONCOLOGY | Facility: CLINIC | Age: 69
End: 2024-10-14
Payer: MEDICARE

## 2024-10-14 DIAGNOSIS — R53.83 FATIGUE, UNSPECIFIED TYPE: Primary | ICD-10-CM

## 2024-10-14 NOTE — TELEPHONE ENCOUNTER
"Patient has treatment on Wednesday and is c/o "extreme exhaustion". Does she does not feel ill, just fatigued. She is asking if Dr. Atkins would be willing to order a B12 injection to give her a little boost. Please advise.  "

## 2024-10-16 ENCOUNTER — LAB VISIT (OUTPATIENT)
Dept: LAB | Facility: HOSPITAL | Age: 69
End: 2024-10-16
Attending: INTERNAL MEDICINE
Payer: MEDICARE

## 2024-10-16 ENCOUNTER — INFUSION (OUTPATIENT)
Dept: INFUSION THERAPY | Facility: HOSPITAL | Age: 69
End: 2024-10-16
Attending: INTERNAL MEDICINE
Payer: MEDICARE

## 2024-10-16 VITALS
HEART RATE: 60 BPM | DIASTOLIC BLOOD PRESSURE: 65 MMHG | RESPIRATION RATE: 18 BRPM | OXYGEN SATURATION: 97 % | WEIGHT: 97.25 LBS | TEMPERATURE: 98 F | SYSTOLIC BLOOD PRESSURE: 122 MMHG | BODY MASS INDEX: 17.9 KG/M2 | HEIGHT: 62 IN

## 2024-10-16 DIAGNOSIS — R53.83 FATIGUE, UNSPECIFIED TYPE: ICD-10-CM

## 2024-10-16 DIAGNOSIS — C50.311 MALIGNANT NEOPLASM OF LOWER-INNER QUADRANT OF RIGHT BREAST OF FEMALE, ESTROGEN RECEPTOR POSITIVE: Primary | ICD-10-CM

## 2024-10-16 DIAGNOSIS — C50.311 MALIGNANT NEOPLASM OF LOWER-INNER QUADRANT OF RIGHT BREAST OF FEMALE, ESTROGEN RECEPTOR POSITIVE: ICD-10-CM

## 2024-10-16 DIAGNOSIS — Z17.0 MALIGNANT NEOPLASM OF LOWER-INNER QUADRANT OF RIGHT BREAST OF FEMALE, ESTROGEN RECEPTOR POSITIVE: ICD-10-CM

## 2024-10-16 DIAGNOSIS — C78.6 SECONDARY MALIGNANT NEOPLASM OF PERITONEUM: ICD-10-CM

## 2024-10-16 DIAGNOSIS — Z17.0 MALIGNANT NEOPLASM OF LOWER-INNER QUADRANT OF RIGHT BREAST OF FEMALE, ESTROGEN RECEPTOR POSITIVE: Primary | ICD-10-CM

## 2024-10-16 LAB
BASOPHILS # BLD AUTO: 0.07 X10(3)/MCL
BASOPHILS NFR BLD AUTO: 1.4 %
EOSINOPHIL # BLD AUTO: 0.08 X10(3)/MCL (ref 0–0.9)
EOSINOPHIL NFR BLD AUTO: 1.6 %
ERYTHROCYTE [DISTWIDTH] IN BLOOD BY AUTOMATED COUNT: 14.3 % (ref 11.5–17)
HCT VFR BLD AUTO: 36 % (ref 37–47)
HGB BLD-MCNC: 11.8 G/DL (ref 12–16)
IMM GRANULOCYTES # BLD AUTO: 0.04 X10(3)/MCL (ref 0–0.04)
IMM GRANULOCYTES NFR BLD AUTO: 0.8 %
LYMPHOCYTES # BLD AUTO: 1.53 X10(3)/MCL (ref 0.6–4.6)
LYMPHOCYTES NFR BLD AUTO: 30.4 %
MCH RBC QN AUTO: 31.9 PG (ref 27–31)
MCHC RBC AUTO-ENTMCNC: 32.8 G/DL (ref 33–36)
MCV RBC AUTO: 97.3 FL (ref 80–94)
MONOCYTES # BLD AUTO: 0.83 X10(3)/MCL (ref 0.1–1.3)
MONOCYTES NFR BLD AUTO: 16.5 %
NEUTROPHILS # BLD AUTO: 2.48 X10(3)/MCL (ref 2.1–9.2)
NEUTROPHILS NFR BLD AUTO: 49.3 %
PLATELET # BLD AUTO: 355 X10(3)/MCL (ref 130–400)
PMV BLD AUTO: 9.3 FL (ref 7.4–10.4)
RBC # BLD AUTO: 3.7 X10(6)/MCL (ref 4.2–5.4)
VIT B12 SERPL-MCNC: 940 PG/ML (ref 213–816)
WBC # BLD AUTO: 5.03 X10(3)/MCL (ref 4.5–11.5)

## 2024-10-16 PROCEDURE — 36415 COLL VENOUS BLD VENIPUNCTURE: CPT

## 2024-10-16 PROCEDURE — 82607 VITAMIN B-12: CPT

## 2024-10-16 PROCEDURE — 25000003 PHARM REV CODE 250: Performed by: INTERNAL MEDICINE

## 2024-10-16 PROCEDURE — 63600175 PHARM REV CODE 636 W HCPCS: Mod: JG | Performed by: INTERNAL MEDICINE

## 2024-10-16 PROCEDURE — 96375 TX/PRO/DX INJ NEW DRUG ADDON: CPT

## 2024-10-16 PROCEDURE — 96409 CHEMO IV PUSH SNGL DRUG: CPT

## 2024-10-16 PROCEDURE — 85025 COMPLETE CBC W/AUTO DIFF WBC: CPT

## 2024-10-16 RX ORDER — HEPARIN 100 UNIT/ML
500 SYRINGE INTRAVENOUS
Status: DISCONTINUED | OUTPATIENT
Start: 2024-10-16 | End: 2024-10-16 | Stop reason: HOSPADM

## 2024-10-16 RX ORDER — ONDANSETRON HYDROCHLORIDE 2 MG/ML
8 INJECTION, SOLUTION INTRAVENOUS
Status: COMPLETED | OUTPATIENT
Start: 2024-10-16 | End: 2024-10-16

## 2024-10-16 RX ORDER — SODIUM CHLORIDE 0.9 % (FLUSH) 0.9 %
10 SYRINGE (ML) INJECTION
Status: DISCONTINUED | OUTPATIENT
Start: 2024-10-16 | End: 2024-10-16 | Stop reason: HOSPADM

## 2024-10-16 RX ADMIN — SODIUM CHLORIDE: 9 INJECTION, SOLUTION INTRAVENOUS at 03:10

## 2024-10-16 RX ADMIN — ERIBULIN MESYLATE 1.55 MG: 0.5 INJECTION INTRAVENOUS at 03:10

## 2024-10-16 RX ADMIN — ONDANSETRON 8 MG: 2 INJECTION INTRAMUSCULAR; INTRAVENOUS at 03:10

## 2024-10-16 NOTE — PLAN OF CARE
C4D1 [place] given; tolerated well with no complications; pt was a hard stick- required 4 IV sticks this treatment; next appointments confirmed with patient; discharged home in stable condition

## 2024-10-23 ENCOUNTER — INFUSION (OUTPATIENT)
Dept: INFUSION THERAPY | Facility: HOSPITAL | Age: 69
End: 2024-10-23
Attending: INTERNAL MEDICINE
Payer: MEDICARE

## 2024-10-23 VITALS
SYSTOLIC BLOOD PRESSURE: 110 MMHG | BODY MASS INDEX: 17.9 KG/M2 | TEMPERATURE: 98 F | HEIGHT: 62 IN | HEART RATE: 61 BPM | DIASTOLIC BLOOD PRESSURE: 58 MMHG | WEIGHT: 97.25 LBS

## 2024-10-23 DIAGNOSIS — C50.311 MALIGNANT NEOPLASM OF LOWER-INNER QUADRANT OF RIGHT BREAST OF FEMALE, ESTROGEN RECEPTOR POSITIVE: Primary | ICD-10-CM

## 2024-10-23 DIAGNOSIS — C78.6 SECONDARY MALIGNANT NEOPLASM OF PERITONEUM: ICD-10-CM

## 2024-10-23 DIAGNOSIS — Z17.0 MALIGNANT NEOPLASM OF LOWER-INNER QUADRANT OF RIGHT BREAST OF FEMALE, ESTROGEN RECEPTOR POSITIVE: Primary | ICD-10-CM

## 2024-10-23 PROCEDURE — 63600175 PHARM REV CODE 636 W HCPCS: Mod: JG | Performed by: INTERNAL MEDICINE

## 2024-10-23 PROCEDURE — 25000003 PHARM REV CODE 250: Performed by: INTERNAL MEDICINE

## 2024-10-23 RX ORDER — ONDANSETRON HYDROCHLORIDE 2 MG/ML
8 INJECTION, SOLUTION INTRAVENOUS
Status: COMPLETED | OUTPATIENT
Start: 2024-10-23 | End: 2024-10-23

## 2024-10-23 RX ORDER — HEPARIN 100 UNIT/ML
500 SYRINGE INTRAVENOUS
Status: DISCONTINUED | OUTPATIENT
Start: 2024-10-23 | End: 2024-10-23 | Stop reason: HOSPADM

## 2024-10-23 RX ORDER — SODIUM CHLORIDE 0.9 % (FLUSH) 0.9 %
10 SYRINGE (ML) INJECTION
Status: DISCONTINUED | OUTPATIENT
Start: 2024-10-23 | End: 2024-10-23 | Stop reason: HOSPADM

## 2024-10-23 RX ADMIN — ONDANSETRON 8 MG: 2 INJECTION INTRAMUSCULAR; INTRAVENOUS at 02:10

## 2024-10-23 RX ADMIN — ERIBULIN MESYLATE 1.55 MG: 0.5 INJECTION INTRAVENOUS at 02:10

## 2024-10-30 ENCOUNTER — OFFICE VISIT (OUTPATIENT)
Dept: HEMATOLOGY/ONCOLOGY | Facility: CLINIC | Age: 69
End: 2024-10-30
Payer: MEDICARE

## 2024-10-30 ENCOUNTER — LAB VISIT (OUTPATIENT)
Dept: LAB | Facility: HOSPITAL | Age: 69
End: 2024-10-30
Attending: INTERNAL MEDICINE
Payer: MEDICARE

## 2024-10-30 VITALS
BODY MASS INDEX: 17.96 KG/M2 | WEIGHT: 97.63 LBS | TEMPERATURE: 99 F | RESPIRATION RATE: 15 BRPM | OXYGEN SATURATION: 99 % | HEART RATE: 64 BPM | SYSTOLIC BLOOD PRESSURE: 103 MMHG | HEIGHT: 62 IN | DIASTOLIC BLOOD PRESSURE: 62 MMHG

## 2024-10-30 DIAGNOSIS — C78.6 SECONDARY MALIGNANT NEOPLASM OF PERITONEUM: ICD-10-CM

## 2024-10-30 DIAGNOSIS — C50.311 MALIGNANT NEOPLASM OF LOWER-INNER QUADRANT OF RIGHT BREAST OF FEMALE, ESTROGEN RECEPTOR POSITIVE: ICD-10-CM

## 2024-10-30 DIAGNOSIS — Z17.0 MALIGNANT NEOPLASM OF LOWER-INNER QUADRANT OF RIGHT BREAST OF FEMALE, ESTROGEN RECEPTOR POSITIVE: Primary | ICD-10-CM

## 2024-10-30 DIAGNOSIS — C50.311 MALIGNANT NEOPLASM OF LOWER-INNER QUADRANT OF RIGHT BREAST OF FEMALE, ESTROGEN RECEPTOR POSITIVE: Primary | ICD-10-CM

## 2024-10-30 DIAGNOSIS — K29.60 REFLUX GASTRITIS: ICD-10-CM

## 2024-10-30 DIAGNOSIS — R11.0 CHEMOTHERAPY-INDUCED NAUSEA: ICD-10-CM

## 2024-10-30 DIAGNOSIS — T45.1X5A CHEMOTHERAPY-INDUCED NAUSEA: ICD-10-CM

## 2024-10-30 DIAGNOSIS — Z17.0 MALIGNANT NEOPLASM OF LOWER-INNER QUADRANT OF RIGHT BREAST OF FEMALE, ESTROGEN RECEPTOR POSITIVE: ICD-10-CM

## 2024-10-30 LAB
ALBUMIN SERPL-MCNC: 3.9 G/DL (ref 3.4–4.8)
ALBUMIN/GLOB SERPL: 1.3 RATIO (ref 1.1–2)
ALP SERPL-CCNC: 40 UNIT/L (ref 40–150)
ALT SERPL-CCNC: 15 UNIT/L (ref 0–55)
ANION GAP SERPL CALC-SCNC: 6 MEQ/L
AST SERPL-CCNC: 29 UNIT/L (ref 5–34)
BASOPHILS # BLD AUTO: 0.04 X10(3)/MCL
BASOPHILS NFR BLD AUTO: 1.9 %
BILIRUB SERPL-MCNC: 0.3 MG/DL
BUN SERPL-MCNC: 13.1 MG/DL (ref 9.8–20.1)
CALCIUM SERPL-MCNC: 9.2 MG/DL (ref 8.4–10.2)
CHLORIDE SERPL-SCNC: 103 MMOL/L (ref 98–107)
CO2 SERPL-SCNC: 30 MMOL/L (ref 23–31)
CREAT SERPL-MCNC: 0.83 MG/DL (ref 0.55–1.02)
CREAT/UREA NIT SERPL: 16
EOSINOPHIL # BLD AUTO: 0.01 X10(3)/MCL (ref 0–0.9)
EOSINOPHIL NFR BLD AUTO: 0.5 %
ERYTHROCYTE [DISTWIDTH] IN BLOOD BY AUTOMATED COUNT: 14 % (ref 11.5–17)
GFR SERPLBLD CREATININE-BSD FMLA CKD-EPI: >60 ML/MIN/1.73/M2
GLOBULIN SER-MCNC: 3 GM/DL (ref 2.4–3.5)
GLUCOSE SERPL-MCNC: 89 MG/DL (ref 82–115)
HCT VFR BLD AUTO: 31.5 % (ref 37–47)
HGB BLD-MCNC: 10.4 G/DL (ref 12–16)
IMM GRANULOCYTES # BLD AUTO: 0.09 X10(3)/MCL (ref 0–0.04)
IMM GRANULOCYTES NFR BLD AUTO: 4.3 %
LYMPHOCYTES # BLD AUTO: 1.05 X10(3)/MCL (ref 0.6–4.6)
LYMPHOCYTES NFR BLD AUTO: 49.8 %
MCH RBC QN AUTO: 32 PG (ref 27–31)
MCHC RBC AUTO-ENTMCNC: 33 G/DL (ref 33–36)
MCV RBC AUTO: 96.9 FL (ref 80–94)
MONOCYTES # BLD AUTO: 0.16 X10(3)/MCL (ref 0.1–1.3)
MONOCYTES NFR BLD AUTO: 7.6 %
NEUTROPHILS # BLD AUTO: 0.76 X10(3)/MCL (ref 2.1–9.2)
NEUTROPHILS NFR BLD AUTO: 35.9 %
PLATELET # BLD AUTO: 247 X10(3)/MCL (ref 130–400)
PMV BLD AUTO: 9.2 FL (ref 7.4–10.4)
POTASSIUM SERPL-SCNC: 3.9 MMOL/L (ref 3.5–5.1)
PROT SERPL-MCNC: 6.9 GM/DL (ref 5.8–7.6)
RBC # BLD AUTO: 3.25 X10(6)/MCL (ref 4.2–5.4)
SODIUM SERPL-SCNC: 139 MMOL/L (ref 136–145)
WBC # BLD AUTO: 2.11 X10(3)/MCL (ref 4.5–11.5)

## 2024-10-30 PROCEDURE — 99214 OFFICE O/P EST MOD 30 MIN: CPT | Mod: PBBFAC | Performed by: INTERNAL MEDICINE

## 2024-10-30 PROCEDURE — 99214 OFFICE O/P EST MOD 30 MIN: CPT | Mod: S$PBB,,, | Performed by: INTERNAL MEDICINE

## 2024-10-30 PROCEDURE — 85025 COMPLETE CBC W/AUTO DIFF WBC: CPT

## 2024-10-30 PROCEDURE — 80053 COMPREHEN METABOLIC PANEL: CPT

## 2024-10-30 PROCEDURE — 99999 PR PBB SHADOW E&M-EST. PATIENT-LVL IV: CPT | Mod: PBBFAC,,, | Performed by: INTERNAL MEDICINE

## 2024-10-30 PROCEDURE — 36415 COLL VENOUS BLD VENIPUNCTURE: CPT

## 2024-10-30 RX ORDER — ONDANSETRON 8 MG/1
8 TABLET, ORALLY DISINTEGRATING ORAL EVERY 6 HOURS PRN
Qty: 90 TABLET | Refills: 1 | Status: SHIPPED | OUTPATIENT
Start: 2024-10-30

## 2024-10-30 RX ORDER — PANTOPRAZOLE SODIUM 40 MG/1
40 TABLET, DELAYED RELEASE ORAL DAILY
Qty: 90 TABLET | Refills: 1 | Status: SHIPPED | OUTPATIENT
Start: 2024-10-30

## 2024-11-03 RX ORDER — HEPARIN 100 UNIT/ML
500 SYRINGE INTRAVENOUS
Status: CANCELLED | OUTPATIENT
Start: 2024-11-06

## 2024-11-03 RX ORDER — ONDANSETRON HYDROCHLORIDE 2 MG/ML
8 INJECTION, SOLUTION INTRAVENOUS
OUTPATIENT
Start: 2024-11-13

## 2024-11-03 RX ORDER — SODIUM CHLORIDE 0.9 % (FLUSH) 0.9 %
10 SYRINGE (ML) INJECTION
OUTPATIENT
Start: 2024-11-13

## 2024-11-03 RX ORDER — ONDANSETRON HYDROCHLORIDE 2 MG/ML
8 INJECTION, SOLUTION INTRAVENOUS
Status: CANCELLED | OUTPATIENT
Start: 2024-11-06

## 2024-11-03 RX ORDER — SODIUM CHLORIDE 0.9 % (FLUSH) 0.9 %
10 SYRINGE (ML) INJECTION
Status: CANCELLED | OUTPATIENT
Start: 2024-11-06

## 2024-11-03 RX ORDER — HEPARIN 100 UNIT/ML
500 SYRINGE INTRAVENOUS
OUTPATIENT
Start: 2024-11-13

## 2024-11-05 NOTE — PROGRESS NOTES
Subjective:       Patient ID: Kimberlyn Selby is a 69 y.o. female.    Chief Complaint:  I feel more tired than before    Diagnosis:  Recurrent metastatic breast cancer with peritoneal carcinomatosis - ER+ 4%, IA 0, HER-2 neg (IHC 0)                         PD-L1 CPS >10, ROLANDA, low TMB, PIK3CA E365K and [id number] mutations                     Stage IA R breast cancer 8/11 (T1b N0 M0), 0.7 cm, GI, ER+/IA-, HER-2 negative                     Stage IIA L breast cancer 1/09 (T1c N1 M0), 1.6 cm, GIII, 1+ LN, ER/IA -, HER-2 negative                     Stage IIIA R breast cancer 1/05 (T2 N2a M0), 1.6 cm, GII, 4+ LN's, ER/IA +, HER-2 negative                     S/p bilateral mastectomies                     Post menopausal s/p hysterectomy                     Declined adjuvant XRT & hormonal therapy with initial breast cancer     Treatment History  Adjuvant TAC x 6 completed 6/05  Adjuvant TC x 4 completed 5/09 --> XRT L breast  Xeloda 8/23-7/24    Current Therapy:  Eribulin s/p 5 cycles (Started 8/7/24)     Clinical History: WF status post right lumpectomy 1/05 for invasive breast cancer with the above characteristics. She completed 6 cycles of adjuvant TAC but declined radiation. She was placed on Arimidex but discontinued treatment due to significant myalgias. She had similar side effects with Femara and also complained of bone aching due to Tamoxifen and stopped therapy 9/05. Due to her hormone receptor status, she was placed on Evista but again was unable to tolerate therapy. She had an abnormal surveillance mammogram 11/08 showing clustered microcalcifications in the upper outer quadrant of the left breast with an associated 1 cm mass by ultrasound. 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 Patient underwent left lumpectomy and axillary dissection 1/15/09. Final pathology as outlined above. Postop course was complicated by cellulitis. She completed 4 cycles of adjuvant TC 5/09.  She eventually consented to adjuvant radiation therapy to the left breast. Surveillance CT PET scan 12/09 showed no abnormal hypermetabolic activity. Mammogram 6/22/11 showed a new suspicious finding in the right breast at the 4:00 position. Ultrasound was suspicious for malignancy. Ultrasound-guided core biopsies revealed invasive ductal carcinoma strongly ER positive at 85%, GA negative and HER-2 negative. CT PET scan 5/11 showed no abnormal hypermetabolic activity to indicate recurrent or metastatic disease. Although patient did have radiation therapy post lumpectomy on the left side, she never had radiation therapy to the right breast. She elected to undergo bilateral mastectomies and prophylactic right oophorectomy 8/16/11. Final pathology showed a 0.7 cm grade 1 infiltrating ductal carcinoma the right breast. Lamar dissection was not done to her previous surgery. She declined BRCA testing. Adjuvant hormonal therapy was recommended with Aromasin but she declined. She underwent a screening colonoscopy 6/5/13 which showed diverticula but no other abnormal findings; 10 year followup was recommended.      She was seen for a surveillance appointment 8/24/21 and did not return for her annual visit in 2022.    She developed symptoms of GE reflux and constipation that she treated with OTC medications without significant improvement.  She denied significant weight loss.  She was referred for a CT of the abdomen and pelvis by GI 6/2/23 ([facility]) that showed a diffuse abnormal enhancing soft tissue density in the retroperitoneum and mesenteric root with nodular intense enhancement.  Findings suspicious for an infiltrative lymphoproliferative disorder.  Mass encased the aorta without stenosis.  IVC appeared mildly narrowed without focal internal filling defect.  There were no additional abnormal findings.  Spleen was unremarkable.   EGD 6/13/23 showed mild chronic reflux changes and gastritis.  Colonoscopy showed  a single polyp which was removed.  Pathology was consistent with metastatic breast cancer.  She underwent a diagnostic laparoscopy 7/12/23 revealing peritoneal carcinomatosis.  Biopsies were positive for metastatic poorly differentiated adenocarcinoma with signet ring morphology consistent with breast primary (LIZ-3 and CK7 positive; CK20 and CDX2 negative).  ER was low positive 4%, WI negative and HER2 negative (IHC 0).    Molecular Testing:  PDL1 CPS >10, ROLANDA, low TMB.  Mutations of BLM, CHEK2, PIK3CA E365K, SMARCA4 and JAK1.  PIK3CA E365K AND F5375i are not approved bio-markers for treatment    CT-PET 7/28/23:  Poorly delineated bulky abdominal lymphadenopathy with low-grade FDG uptake, SUV 2.4.  Small volume ascites.  No definite metastatic disease outside of the abdomen.  MRI Brain 8/2/23:  No evidence of metastatic disease.  Mild chronic microvascular ischemia and atrophy.    She was initiated on treatment with oral Xeloda.  She did not tolerate dose escalation secondary to hand-foot syndrome.  She required dose adjustment to manage her side effects.    CT C/A/P 11/15/23:  Improved confluent retroperitoneal and mesenteric adenopathy.  Improved mesenteric soft tissue density and edema.  CT C/A/P 2/27/24:  Radiology reported no detrimental change from 11/23.  My review shows further decrease in the soft tissue density in the retroperitoneum and mesentery with no new sites of measurable disease.  Chronic constipation.  MRI brain 4/5/24: No acute findings or metastatic disease.  CT C/A/P 7/19/24:  No pulmonary or hepatic lesions.  Moderate-to-large amount of stool diffusely throughout the colon.  Mesenteric soft tissue density stable to slightly decreased.  Numerous small sclerotic skeletal lesions mildly increased in size and density.  CT C/A/P 10/07/24:  Stable osteoblastic skeletal metastases.  No pulmonary or hepatic lesions.  Slight decrease in mesenteric density with resolution of previous pelvic ascites.   Persistent large amount of stool throughout the colon.    Interval History  She returns to the office today for a three-week follow-up visit accompanied by her .  She has now completed 5 cycles of treatment with single agent Eribulin.  She required a 20% dose reduction secondary to treatment-related neutropenia.  She has done well following dose adjustment without additional neutropenia and has not required growth factor support.  She continues to have mild fatigue associated with her treatment.  She is trying to remain physically active.  She has mild cumulative anemia.  She has unchanged symptoms of chronic constipation.  No significant weight loss.  No abdominal pain, bloating, nausea or vomiting.  She denies any bone pain.      Review of Systems   Constitutional:  Positive for fatigue. Negative for activity change, appetite change, fever and unexpected weight change.   HENT:  Negative for mouth sores, sore throat and trouble swallowing.    Eyes: Negative.    Respiratory:  Negative for cough and shortness of breath.    Cardiovascular:  Negative for chest pain, palpitations and leg swelling.   Gastrointestinal:  Positive for constipation. Negative for abdominal distention, abdominal pain, diarrhea and nausea.   Genitourinary:  Negative for dysuria, flank pain and frequency.   Musculoskeletal:  Negative for arthralgias and back pain.   Integumentary:  Negative for pallor and rash.   Neurological:  Negative for dizziness, weakness, numbness and headaches.   Hematological:  Negative for adenopathy. Does not bruise/bleed easily.   Psychiatric/Behavioral: Negative.         PMHx:  Bilateral breast cancer, arthritis, hypothyroidism, GERD  PSHx:  Bilateral breast lumpectomies, bilateral mastectomies, hysterectomy/BSO, MediPort insertion and removal, uterine suspension, diagnostic laparoscopy  SH:  Lifetime nonsmoker, no significant alcohol use.  Lives in [place] with her .  FH:  Her father had kidney  "cancer, son had appendiceal cancer and MGF had liver cancer.    Objective:        /65   Pulse 60   Temp 98.1 °F (36.7 °C)   Resp 15   Ht 5' 2" (1.575 m)   Wt 44.4 kg (97 lb 14.4 oz)   SpO2 97%   BMI 17.91 kg/m²    Physical Exam  Constitutional:       Comments: Thin white female in NAD   HENT:      Head: Normocephalic.      Mouth/Throat:      Mouth: Mucous membranes are moist.      Pharynx: Oropharynx is clear. No posterior oropharyngeal erythema.   Eyes:      General: No scleral icterus.     Extraocular Movements: Extraocular movements intact.      Pupils: Pupils are equal, round, and reactive to light.   Cardiovascular:      Rate and Rhythm: Normal rate and regular rhythm.      Heart sounds: No murmur heard.  Pulmonary:      Comments: Lungs clear to auscultation  Abdominal:      General: Bowel sounds are normal. There is no distension.      Tenderness: There is no abdominal tenderness.      Comments: No palpable masses or organomegaly   Musculoskeletal:         General: No swelling or tenderness. Normal range of motion.      Cervical back: Neck supple. No tenderness.   Lymphadenopathy:      Cervical: No cervical adenopathy.      Upper Body:      Right upper body: No supraclavicular or axillary adenopathy.      Left upper body: No supraclavicular or axillary adenopathy.   Skin:     General: Skin is warm and dry.      Findings: No rash.   Neurological:      General: No focal deficit present.      Mental Status: She is alert and oriented to person, place, and time.      Cranial Nerves: No cranial nerve deficit.      Motor: No weakness.       ECOG SCORE    1 - Restricted in strenuous activity-ambulatory and able to carry out work of a light nature          LABORATORY  Recent Results (from the past 2 weeks)   CBC with Differential    Collection Time: 11/13/24  2:08 PM   Result Value Ref Range    WBC 5.06 4.50 - 11.50 x10(3)/mcL    RBC 3.38 (L) 4.20 - 5.40 x10(6)/mcL    Hgb 10.7 (L) 12.0 - 16.0 g/dL    Hct " 32.4 (L) 37.0 - 47.0 %    MCV 95.9 (H) 80.0 - 94.0 fL    MCH 31.7 (H) 27.0 - 31.0 pg    MCHC 33.0 33.0 - 36.0 g/dL    RDW 14.0 11.5 - 17.0 %    Platelet 255 130 - 400 x10(3)/mcL    MPV 9.1 7.4 - 10.4 fL    Neut % 65.9 %    Lymph % 25.9 %    Mono % 3.2 %    Eos % 0.2 %    Basophil % 0.8 %    Lymph # 1.31 0.6 - 4.6 x10(3)/mcL    Neut # 3.34 2.1 - 9.2 x10(3)/mcL    Mono # 0.16 0.1 - 1.3 x10(3)/mcL    Eos # 0.01 0 - 0.9 x10(3)/mcL    Baso # 0.04 <=0.2 x10(3)/mcL    IG# 0.20 (H) 0 - 0.04 x10(3)/mcL    IG% 4.0 %   Lipid Panel    Collection Time: 11/18/24  2:12 PM   Result Value Ref Range    Cholesterol Total 223 (H) 0 - 200 mg/dL    HDL Cholesterol 61 (H) 35 - 60 mg/dL    Triglyceride 58 30 - 150 mg/dL    Cholesterol/HDL Ratio 4     Very Low Density Lipoprotein 12     LDL Cholesterol 150.00 (H) 0.00 - 129.00 mg/dL   T4, Free    Collection Time: 11/18/24  2:12 PM   Result Value Ref Range    Thyroxine Free 0.80 0.76 - 1.46 ng/dL   TSH    Collection Time: 11/18/24  2:12 PM   Result Value Ref Range    TSH 0.912 0.350 - 4.940 uIU/mL   Vitamin D    Collection Time: 11/18/24  2:12 PM   Result Value Ref Range    Vitamin D 56 30 - 80 ng/mL   CBC with Differential    Collection Time: 11/20/24  1:56 PM   Result Value Ref Range    WBC 2.23 (L) 4.50 - 11.50 x10(3)/mcL    RBC 3.31 (L) 4.20 - 5.40 x10(6)/mcL    Hgb 10.3 (L) 12.0 - 16.0 g/dL    Hct 31.4 (L) 37.0 - 47.0 %    MCV 94.9 (H) 80.0 - 94.0 fL    MCH 31.1 (H) 27.0 - 31.0 pg    MCHC 32.8 (L) 33.0 - 36.0 g/dL    RDW 14.2 11.5 - 17.0 %    Platelet 268 130 - 400 x10(3)/mcL    MPV 9.1 7.4 - 10.4 fL    Neut % 38.6 %    Lymph % 48.9 %    Mono % 7.6 %    Eos % 0.4 %    Basophil % 1.8 %    Lymph # 1.09 0.6 - 4.6 x10(3)/mcL    Neut # 0.86 (L) 2.1 - 9.2 x10(3)/mcL    Mono # 0.17 0.1 - 1.3 x10(3)/mcL    Eos # 0.01 0 - 0.9 x10(3)/mcL    Baso # 0.04 <=0.2 x10(3)/mcL    IG# 0.06 (H) 0 - 0.04 x10(3)/mcL    IG% 2.7 %                          10/24/23   11/15/23   1/02/24   2/22/24   4/26/24    6/26/24   7/19/24   10/08/24  CEA               30.7         21.7          14.5        13.1       16.4        22.4         50.1         24.9  CA 27-29       40.1         31.5          29.8        28.0        32           45           63.8         43.4      Assessment:   Recurrent metastatic breast cancer with peritoneal carcinomatosis - ER+ 4%, KS neg, Her-2 neg (IHC 0)  Asymptomatic neutropenia  History of bilateral breast cancer s/p bilateral mastectomies  Chronic constipation    Plan:   She will proceed with cycle 6 Eribulin 11/27/24.  Maintain current dose reduction.  Schedule follow-up CT scans of the chest, abdomen and pelvis after her 6 cycle of therapy to evaluate response to treatment.  If she has stable to improved disease, will consider placing on an aromatase inhibitor for maintenance therapy given her low positive ER expression.  Treatment plan was reviewed and discussed with the patient and her .  All questions were answered.      JOEL DAVIDSON MD    Other Physicians  Dr. Darrell Swan

## 2024-11-06 ENCOUNTER — LAB VISIT (OUTPATIENT)
Dept: LAB | Facility: HOSPITAL | Age: 69
End: 2024-11-06
Attending: INTERNAL MEDICINE
Payer: MEDICARE

## 2024-11-06 ENCOUNTER — INFUSION (OUTPATIENT)
Dept: INFUSION THERAPY | Facility: HOSPITAL | Age: 69
End: 2024-11-06
Attending: INTERNAL MEDICINE
Payer: MEDICARE

## 2024-11-06 VITALS
WEIGHT: 96.63 LBS | DIASTOLIC BLOOD PRESSURE: 57 MMHG | BODY MASS INDEX: 17.78 KG/M2 | TEMPERATURE: 98 F | HEIGHT: 62 IN | SYSTOLIC BLOOD PRESSURE: 123 MMHG | HEART RATE: 60 BPM | RESPIRATION RATE: 16 BRPM | OXYGEN SATURATION: 100 %

## 2024-11-06 DIAGNOSIS — Z17.0 MALIGNANT NEOPLASM OF LOWER-INNER QUADRANT OF RIGHT BREAST OF FEMALE, ESTROGEN RECEPTOR POSITIVE: Primary | ICD-10-CM

## 2024-11-06 DIAGNOSIS — C78.6 SECONDARY MALIGNANT NEOPLASM OF PERITONEUM: ICD-10-CM

## 2024-11-06 DIAGNOSIS — C50.311 MALIGNANT NEOPLASM OF LOWER-INNER QUADRANT OF RIGHT BREAST OF FEMALE, ESTROGEN RECEPTOR POSITIVE: Primary | ICD-10-CM

## 2024-11-06 DIAGNOSIS — C50.311 MALIGNANT NEOPLASM OF LOWER-INNER QUADRANT OF RIGHT BREAST OF FEMALE, ESTROGEN RECEPTOR POSITIVE: ICD-10-CM

## 2024-11-06 DIAGNOSIS — Z17.0 MALIGNANT NEOPLASM OF LOWER-INNER QUADRANT OF RIGHT BREAST OF FEMALE, ESTROGEN RECEPTOR POSITIVE: ICD-10-CM

## 2024-11-06 LAB
BASOPHILS # BLD AUTO: 0.04 X10(3)/MCL
BASOPHILS NFR BLD AUTO: 1 %
EOSINOPHIL # BLD AUTO: 0.07 X10(3)/MCL (ref 0–0.9)
EOSINOPHIL NFR BLD AUTO: 1.8 %
ERYTHROCYTE [DISTWIDTH] IN BLOOD BY AUTOMATED COUNT: 14 % (ref 11.5–17)
HCT VFR BLD AUTO: 33.8 % (ref 37–47)
HGB BLD-MCNC: 11 G/DL (ref 12–16)
IMM GRANULOCYTES # BLD AUTO: 0.02 X10(3)/MCL (ref 0–0.04)
IMM GRANULOCYTES NFR BLD AUTO: 0.5 %
LYMPHOCYTES # BLD AUTO: 1.41 X10(3)/MCL (ref 0.6–4.6)
LYMPHOCYTES NFR BLD AUTO: 36.6 %
MCH RBC QN AUTO: 31.2 PG (ref 27–31)
MCHC RBC AUTO-ENTMCNC: 32.5 G/DL (ref 33–36)
MCV RBC AUTO: 95.8 FL (ref 80–94)
MONOCYTES # BLD AUTO: 0.76 X10(3)/MCL (ref 0.1–1.3)
MONOCYTES NFR BLD AUTO: 19.7 %
NEUTROPHILS # BLD AUTO: 1.55 X10(3)/MCL (ref 2.1–9.2)
NEUTROPHILS NFR BLD AUTO: 40.4 %
PLATELET # BLD AUTO: 301 X10(3)/MCL (ref 130–400)
PMV BLD AUTO: 9.1 FL (ref 7.4–10.4)
RBC # BLD AUTO: 3.53 X10(6)/MCL (ref 4.2–5.4)
WBC # BLD AUTO: 3.85 X10(3)/MCL (ref 4.5–11.5)

## 2024-11-06 PROCEDURE — 96375 TX/PRO/DX INJ NEW DRUG ADDON: CPT

## 2024-11-06 PROCEDURE — 36415 COLL VENOUS BLD VENIPUNCTURE: CPT

## 2024-11-06 PROCEDURE — 63600175 PHARM REV CODE 636 W HCPCS: Mod: JW,JG | Performed by: INTERNAL MEDICINE

## 2024-11-06 PROCEDURE — 25000003 PHARM REV CODE 250: Performed by: INTERNAL MEDICINE

## 2024-11-06 PROCEDURE — 96409 CHEMO IV PUSH SNGL DRUG: CPT

## 2024-11-06 PROCEDURE — 85025 COMPLETE CBC W/AUTO DIFF WBC: CPT

## 2024-11-06 RX ORDER — HEPARIN 100 UNIT/ML
500 SYRINGE INTRAVENOUS
Status: DISCONTINUED | OUTPATIENT
Start: 2024-11-06 | End: 2024-11-06 | Stop reason: HOSPADM

## 2024-11-06 RX ORDER — SODIUM CHLORIDE 0.9 % (FLUSH) 0.9 %
10 SYRINGE (ML) INJECTION
Status: DISCONTINUED | OUTPATIENT
Start: 2024-11-06 | End: 2024-11-06 | Stop reason: HOSPADM

## 2024-11-06 RX ORDER — ONDANSETRON HYDROCHLORIDE 2 MG/ML
8 INJECTION, SOLUTION INTRAVENOUS
Status: COMPLETED | OUTPATIENT
Start: 2024-11-06 | End: 2024-11-06

## 2024-11-06 RX ADMIN — SODIUM CHLORIDE: 9 INJECTION, SOLUTION INTRAVENOUS at 03:11

## 2024-11-06 RX ADMIN — ONDANSETRON 8 MG: 2 INJECTION INTRAMUSCULAR; INTRAVENOUS at 03:11

## 2024-11-06 RX ADMIN — ERIBULIN MESYLATE 1.55 MG: 0.5 INJECTION INTRAVENOUS at 03:11

## 2024-11-06 NOTE — PLAN OF CARE
Plan of care reviewed with patient; patient in agreement. C5D1 completed. Appts reviewed with patient; patient uses portal.

## 2024-11-13 ENCOUNTER — INFUSION (OUTPATIENT)
Dept: INFUSION THERAPY | Facility: HOSPITAL | Age: 69
End: 2024-11-13
Attending: INTERNAL MEDICINE
Payer: MEDICARE

## 2024-11-13 VITALS
OXYGEN SATURATION: 100 % | SYSTOLIC BLOOD PRESSURE: 108 MMHG | DIASTOLIC BLOOD PRESSURE: 56 MMHG | TEMPERATURE: 98 F | RESPIRATION RATE: 16 BRPM | BODY MASS INDEX: 18 KG/M2 | WEIGHT: 97.81 LBS | HEIGHT: 62 IN | HEART RATE: 58 BPM

## 2024-11-13 DIAGNOSIS — C78.6 SECONDARY MALIGNANT NEOPLASM OF PERITONEUM: ICD-10-CM

## 2024-11-13 DIAGNOSIS — Z17.0 MALIGNANT NEOPLASM OF LOWER-INNER QUADRANT OF RIGHT BREAST OF FEMALE, ESTROGEN RECEPTOR POSITIVE: Primary | ICD-10-CM

## 2024-11-13 DIAGNOSIS — C50.311 MALIGNANT NEOPLASM OF LOWER-INNER QUADRANT OF RIGHT BREAST OF FEMALE, ESTROGEN RECEPTOR POSITIVE: Primary | ICD-10-CM

## 2024-11-13 PROCEDURE — 96375 TX/PRO/DX INJ NEW DRUG ADDON: CPT

## 2024-11-13 PROCEDURE — 96409 CHEMO IV PUSH SNGL DRUG: CPT

## 2024-11-13 PROCEDURE — 25000003 PHARM REV CODE 250: Performed by: INTERNAL MEDICINE

## 2024-11-13 PROCEDURE — 63600175 PHARM REV CODE 636 W HCPCS: Mod: JZ,JG | Performed by: INTERNAL MEDICINE

## 2024-11-13 RX ORDER — HEPARIN 100 UNIT/ML
500 SYRINGE INTRAVENOUS
Status: DISCONTINUED | OUTPATIENT
Start: 2024-11-13 | End: 2024-11-13 | Stop reason: HOSPADM

## 2024-11-13 RX ORDER — ONDANSETRON HYDROCHLORIDE 2 MG/ML
8 INJECTION, SOLUTION INTRAVENOUS
Status: COMPLETED | OUTPATIENT
Start: 2024-11-13 | End: 2024-11-13

## 2024-11-13 RX ORDER — SODIUM CHLORIDE 0.9 % (FLUSH) 0.9 %
10 SYRINGE (ML) INJECTION
Status: DISCONTINUED | OUTPATIENT
Start: 2024-11-13 | End: 2024-11-13 | Stop reason: HOSPADM

## 2024-11-13 RX ADMIN — SODIUM CHLORIDE 1.55 MG: 9 INJECTION, SOLUTION INTRAVENOUS at 03:11

## 2024-11-13 RX ADMIN — ONDANSETRON 8 MG: 2 INJECTION INTRAMUSCULAR; INTRAVENOUS at 02:11

## 2024-11-13 RX ADMIN — SODIUM CHLORIDE: 9 INJECTION, SOLUTION INTRAVENOUS at 03:11

## 2024-11-13 NOTE — PLAN OF CARE
Plan of care reviewed with patient; patient in agreement. C5D8 completed. Appts reviewed with patient; patient uses portal.

## 2024-11-20 ENCOUNTER — LAB VISIT (OUTPATIENT)
Dept: LAB | Facility: HOSPITAL | Age: 69
End: 2024-11-20
Attending: INTERNAL MEDICINE
Payer: MEDICARE

## 2024-11-20 ENCOUNTER — OFFICE VISIT (OUTPATIENT)
Dept: HEMATOLOGY/ONCOLOGY | Facility: CLINIC | Age: 69
End: 2024-11-20
Payer: MEDICARE

## 2024-11-20 VITALS
HEIGHT: 62 IN | TEMPERATURE: 98 F | DIASTOLIC BLOOD PRESSURE: 65 MMHG | OXYGEN SATURATION: 97 % | SYSTOLIC BLOOD PRESSURE: 120 MMHG | BODY MASS INDEX: 18.01 KG/M2 | HEART RATE: 60 BPM | WEIGHT: 97.88 LBS | RESPIRATION RATE: 15 BRPM

## 2024-11-20 DIAGNOSIS — Z17.0 MALIGNANT NEOPLASM OF LOWER-INNER QUADRANT OF RIGHT BREAST OF FEMALE, ESTROGEN RECEPTOR POSITIVE: ICD-10-CM

## 2024-11-20 DIAGNOSIS — C50.311 MALIGNANT NEOPLASM OF LOWER-INNER QUADRANT OF RIGHT BREAST OF FEMALE, ESTROGEN RECEPTOR POSITIVE: Primary | ICD-10-CM

## 2024-11-20 DIAGNOSIS — C50.311 MALIGNANT NEOPLASM OF LOWER-INNER QUADRANT OF RIGHT BREAST OF FEMALE, ESTROGEN RECEPTOR POSITIVE: ICD-10-CM

## 2024-11-20 DIAGNOSIS — C78.6 SECONDARY MALIGNANT NEOPLASM OF PERITONEUM: ICD-10-CM

## 2024-11-20 DIAGNOSIS — Z17.0 MALIGNANT NEOPLASM OF LOWER-INNER QUADRANT OF RIGHT BREAST OF FEMALE, ESTROGEN RECEPTOR POSITIVE: Primary | ICD-10-CM

## 2024-11-20 LAB
ALBUMIN SERPL-MCNC: 3.9 G/DL (ref 3.4–4.8)
ALBUMIN/GLOB SERPL: 1.3 RATIO (ref 1.1–2)
ALP SERPL-CCNC: 41 UNIT/L (ref 40–150)
ALT SERPL-CCNC: 16 UNIT/L (ref 0–55)
ANION GAP SERPL CALC-SCNC: 6 MEQ/L
AST SERPL-CCNC: 28 UNIT/L (ref 5–34)
BASOPHILS # BLD AUTO: 0.04 X10(3)/MCL
BASOPHILS NFR BLD AUTO: 1.8 %
BILIRUB SERPL-MCNC: 0.2 MG/DL
BUN SERPL-MCNC: 15.4 MG/DL (ref 9.8–20.1)
CALCIUM SERPL-MCNC: 9.7 MG/DL (ref 8.4–10.2)
CEA SERPL-MCNC: 18.64 NG/ML (ref 0–3)
CHLORIDE SERPL-SCNC: 105 MMOL/L (ref 98–107)
CO2 SERPL-SCNC: 30 MMOL/L (ref 23–31)
CREAT SERPL-MCNC: 0.73 MG/DL (ref 0.55–1.02)
CREAT/UREA NIT SERPL: 21
EOSINOPHIL # BLD AUTO: 0.01 X10(3)/MCL (ref 0–0.9)
EOSINOPHIL NFR BLD AUTO: 0.4 %
ERYTHROCYTE [DISTWIDTH] IN BLOOD BY AUTOMATED COUNT: 14.2 % (ref 11.5–17)
GFR SERPLBLD CREATININE-BSD FMLA CKD-EPI: >60 ML/MIN/1.73/M2
GLOBULIN SER-MCNC: 2.9 GM/DL (ref 2.4–3.5)
GLUCOSE SERPL-MCNC: 110 MG/DL (ref 82–115)
HCT VFR BLD AUTO: 31.4 % (ref 37–47)
HGB BLD-MCNC: 10.3 G/DL (ref 12–16)
IMM GRANULOCYTES # BLD AUTO: 0.06 X10(3)/MCL (ref 0–0.04)
IMM GRANULOCYTES NFR BLD AUTO: 2.7 %
LYMPHOCYTES # BLD AUTO: 1.09 X10(3)/MCL (ref 0.6–4.6)
LYMPHOCYTES NFR BLD AUTO: 48.9 %
MCH RBC QN AUTO: 31.1 PG (ref 27–31)
MCHC RBC AUTO-ENTMCNC: 32.8 G/DL (ref 33–36)
MCV RBC AUTO: 94.9 FL (ref 80–94)
MONOCYTES # BLD AUTO: 0.17 X10(3)/MCL (ref 0.1–1.3)
MONOCYTES NFR BLD AUTO: 7.6 %
NEUTROPHILS # BLD AUTO: 0.86 X10(3)/MCL (ref 2.1–9.2)
NEUTROPHILS NFR BLD AUTO: 38.6 %
PLATELET # BLD AUTO: 268 X10(3)/MCL (ref 130–400)
PMV BLD AUTO: 9.1 FL (ref 7.4–10.4)
POTASSIUM SERPL-SCNC: 3.9 MMOL/L (ref 3.5–5.1)
PROT SERPL-MCNC: 6.8 GM/DL (ref 5.8–7.6)
RBC # BLD AUTO: 3.31 X10(6)/MCL (ref 4.2–5.4)
SODIUM SERPL-SCNC: 141 MMOL/L (ref 136–145)
WBC # BLD AUTO: 2.23 X10(3)/MCL (ref 4.5–11.5)

## 2024-11-20 PROCEDURE — 85025 COMPLETE CBC W/AUTO DIFF WBC: CPT

## 2024-11-20 PROCEDURE — 99214 OFFICE O/P EST MOD 30 MIN: CPT | Mod: PBBFAC | Performed by: INTERNAL MEDICINE

## 2024-11-20 PROCEDURE — 86300 IMMUNOASSAY TUMOR CA 15-3: CPT

## 2024-11-20 PROCEDURE — 36415 COLL VENOUS BLD VENIPUNCTURE: CPT

## 2024-11-20 PROCEDURE — 82378 CARCINOEMBRYONIC ANTIGEN: CPT

## 2024-11-20 PROCEDURE — 99999 PR PBB SHADOW E&M-EST. PATIENT-LVL IV: CPT | Mod: PBBFAC,,, | Performed by: INTERNAL MEDICINE

## 2024-11-20 PROCEDURE — 80053 COMPREHEN METABOLIC PANEL: CPT

## 2024-11-20 PROCEDURE — 99214 OFFICE O/P EST MOD 30 MIN: CPT | Mod: S$PBB,,, | Performed by: INTERNAL MEDICINE

## 2024-11-22 LAB — CANCER AG27-29 SERPL-ACNC: 41.7 U/ML

## 2024-11-24 RX ORDER — HEPARIN 100 UNIT/ML
500 SYRINGE INTRAVENOUS
OUTPATIENT
Start: 2024-12-04

## 2024-11-24 RX ORDER — ONDANSETRON HYDROCHLORIDE 2 MG/ML
8 INJECTION, SOLUTION INTRAVENOUS
OUTPATIENT
Start: 2024-12-04

## 2024-11-24 RX ORDER — SODIUM CHLORIDE 0.9 % (FLUSH) 0.9 %
10 SYRINGE (ML) INJECTION
Status: CANCELLED | OUTPATIENT
Start: 2024-11-27

## 2024-11-24 RX ORDER — ONDANSETRON HYDROCHLORIDE 2 MG/ML
8 INJECTION, SOLUTION INTRAVENOUS
Status: CANCELLED | OUTPATIENT
Start: 2024-11-27

## 2024-11-24 RX ORDER — SODIUM CHLORIDE 0.9 % (FLUSH) 0.9 %
10 SYRINGE (ML) INJECTION
OUTPATIENT
Start: 2024-12-04

## 2024-11-24 RX ORDER — HEPARIN 100 UNIT/ML
500 SYRINGE INTRAVENOUS
Status: CANCELLED | OUTPATIENT
Start: 2024-11-27

## 2024-11-27 ENCOUNTER — LAB VISIT (OUTPATIENT)
Dept: LAB | Facility: HOSPITAL | Age: 69
End: 2024-11-27
Attending: INTERNAL MEDICINE
Payer: MEDICARE

## 2024-11-27 ENCOUNTER — INFUSION (OUTPATIENT)
Dept: INFUSION THERAPY | Facility: HOSPITAL | Age: 69
End: 2024-11-27
Attending: INTERNAL MEDICINE
Payer: MEDICARE

## 2024-11-27 VITALS
RESPIRATION RATE: 16 BRPM | HEIGHT: 62 IN | DIASTOLIC BLOOD PRESSURE: 50 MMHG | TEMPERATURE: 98 F | WEIGHT: 96.31 LBS | SYSTOLIC BLOOD PRESSURE: 112 MMHG | BODY MASS INDEX: 17.72 KG/M2 | HEART RATE: 63 BPM

## 2024-11-27 DIAGNOSIS — Z17.0 MALIGNANT NEOPLASM OF LOWER-INNER QUADRANT OF RIGHT BREAST OF FEMALE, ESTROGEN RECEPTOR POSITIVE: Primary | ICD-10-CM

## 2024-11-27 DIAGNOSIS — C78.6 SECONDARY MALIGNANT NEOPLASM OF PERITONEUM: ICD-10-CM

## 2024-11-27 DIAGNOSIS — C50.311 MALIGNANT NEOPLASM OF LOWER-INNER QUADRANT OF RIGHT BREAST OF FEMALE, ESTROGEN RECEPTOR POSITIVE: ICD-10-CM

## 2024-11-27 DIAGNOSIS — C50.311 MALIGNANT NEOPLASM OF LOWER-INNER QUADRANT OF RIGHT BREAST OF FEMALE, ESTROGEN RECEPTOR POSITIVE: Primary | ICD-10-CM

## 2024-11-27 DIAGNOSIS — Z17.0 MALIGNANT NEOPLASM OF LOWER-INNER QUADRANT OF RIGHT BREAST OF FEMALE, ESTROGEN RECEPTOR POSITIVE: ICD-10-CM

## 2024-11-27 LAB
BASOPHILS # BLD AUTO: 0.06 X10(3)/MCL
BASOPHILS NFR BLD AUTO: 1.3 %
EOSINOPHIL # BLD AUTO: 0.05 X10(3)/MCL (ref 0–0.9)
EOSINOPHIL NFR BLD AUTO: 1.1 %
ERYTHROCYTE [DISTWIDTH] IN BLOOD BY AUTOMATED COUNT: 14.2 % (ref 11.5–17)
HCT VFR BLD AUTO: 36.4 % (ref 37–47)
HGB BLD-MCNC: 11.8 G/DL (ref 12–16)
IMM GRANULOCYTES # BLD AUTO: 0.05 X10(3)/MCL (ref 0–0.04)
IMM GRANULOCYTES NFR BLD AUTO: 1.1 %
LYMPHOCYTES # BLD AUTO: 1.46 X10(3)/MCL (ref 0.6–4.6)
LYMPHOCYTES NFR BLD AUTO: 31.8 %
MCH RBC QN AUTO: 31.1 PG (ref 27–31)
MCHC RBC AUTO-ENTMCNC: 32.4 G/DL (ref 33–36)
MCV RBC AUTO: 95.8 FL (ref 80–94)
MONOCYTES # BLD AUTO: 0.89 X10(3)/MCL (ref 0.1–1.3)
MONOCYTES NFR BLD AUTO: 19.4 %
NEUTROPHILS # BLD AUTO: 2.08 X10(3)/MCL (ref 2.1–9.2)
NEUTROPHILS NFR BLD AUTO: 45.3 %
PLATELET # BLD AUTO: 341 X10(3)/MCL (ref 130–400)
PMV BLD AUTO: 9.4 FL (ref 7.4–10.4)
RBC # BLD AUTO: 3.8 X10(6)/MCL (ref 4.2–5.4)
WBC # BLD AUTO: 4.59 X10(3)/MCL (ref 4.5–11.5)

## 2024-11-27 PROCEDURE — 96409 CHEMO IV PUSH SNGL DRUG: CPT

## 2024-11-27 PROCEDURE — 63600175 PHARM REV CODE 636 W HCPCS: Performed by: INTERNAL MEDICINE

## 2024-11-27 PROCEDURE — 36415 COLL VENOUS BLD VENIPUNCTURE: CPT

## 2024-11-27 PROCEDURE — 25000003 PHARM REV CODE 250: Performed by: INTERNAL MEDICINE

## 2024-11-27 PROCEDURE — 96375 TX/PRO/DX INJ NEW DRUG ADDON: CPT

## 2024-11-27 PROCEDURE — 85025 COMPLETE CBC W/AUTO DIFF WBC: CPT

## 2024-11-27 RX ORDER — ONDANSETRON HYDROCHLORIDE 2 MG/ML
8 INJECTION, SOLUTION INTRAVENOUS
Status: COMPLETED | OUTPATIENT
Start: 2024-11-27 | End: 2024-11-27

## 2024-11-27 RX ORDER — SODIUM CHLORIDE 0.9 % (FLUSH) 0.9 %
10 SYRINGE (ML) INJECTION
Status: DISCONTINUED | OUTPATIENT
Start: 2024-11-27 | End: 2024-11-27 | Stop reason: HOSPADM

## 2024-11-27 RX ORDER — HEPARIN 100 UNIT/ML
500 SYRINGE INTRAVENOUS
Status: DISCONTINUED | OUTPATIENT
Start: 2024-11-27 | End: 2024-11-27 | Stop reason: HOSPADM

## 2024-11-27 RX ADMIN — ONDANSETRON 8 MG: 2 INJECTION INTRAMUSCULAR; INTRAVENOUS at 02:11

## 2024-11-27 RX ADMIN — ERIBULIN MESYLATE 1.55 MG: 0.5 INJECTION INTRAVENOUS at 02:11

## 2024-12-02 ENCOUNTER — PATIENT MESSAGE (OUTPATIENT)
Dept: HEMATOLOGY/ONCOLOGY | Facility: CLINIC | Age: 69
End: 2024-12-02
Payer: MEDICARE

## 2024-12-03 ENCOUNTER — LAB VISIT (OUTPATIENT)
Dept: LAB | Facility: HOSPITAL | Age: 69
End: 2024-12-03
Attending: INTERNAL MEDICINE
Payer: MEDICARE

## 2024-12-03 DIAGNOSIS — C50.311 MALIGNANT NEOPLASM OF LOWER-INNER QUADRANT OF RIGHT BREAST OF FEMALE, ESTROGEN RECEPTOR POSITIVE: ICD-10-CM

## 2024-12-03 DIAGNOSIS — C78.6 SECONDARY MALIGNANT NEOPLASM OF PERITONEUM: ICD-10-CM

## 2024-12-03 DIAGNOSIS — Z17.0 MALIGNANT NEOPLASM OF LOWER-INNER QUADRANT OF RIGHT BREAST OF FEMALE, ESTROGEN RECEPTOR POSITIVE: ICD-10-CM

## 2024-12-03 LAB
ALBUMIN SERPL-MCNC: 4.2 G/DL (ref 3.4–4.8)
ALBUMIN/GLOB SERPL: 1.3 RATIO (ref 1.1–2)
ALP SERPL-CCNC: 46 UNIT/L (ref 40–150)
ALT SERPL-CCNC: 15 UNIT/L (ref 0–55)
ANION GAP SERPL CALC-SCNC: 10 MEQ/L
AST SERPL-CCNC: 30 UNIT/L (ref 5–34)
BASOPHILS # BLD AUTO: 0.06 X10(3)/MCL
BASOPHILS NFR BLD AUTO: 0.7 %
BILIRUB SERPL-MCNC: 0.3 MG/DL
BUN SERPL-MCNC: 22.8 MG/DL (ref 9.8–20.1)
CALCIUM SERPL-MCNC: 10 MG/DL (ref 8.4–10.2)
CHLORIDE SERPL-SCNC: 102 MMOL/L (ref 98–107)
CO2 SERPL-SCNC: 28 MMOL/L (ref 23–31)
CREAT SERPL-MCNC: 0.77 MG/DL (ref 0.55–1.02)
CREAT/UREA NIT SERPL: 30
EOSINOPHIL # BLD AUTO: 0.02 X10(3)/MCL (ref 0–0.9)
EOSINOPHIL NFR BLD AUTO: 0.2 %
ERYTHROCYTE [DISTWIDTH] IN BLOOD BY AUTOMATED COUNT: 14.1 % (ref 11.5–17)
GFR SERPLBLD CREATININE-BSD FMLA CKD-EPI: >60 ML/MIN/1.73/M2
GLOBULIN SER-MCNC: 3.2 GM/DL (ref 2.4–3.5)
GLUCOSE SERPL-MCNC: 108 MG/DL (ref 82–115)
HCT VFR BLD AUTO: 36 % (ref 37–47)
HGB BLD-MCNC: 11.9 G/DL (ref 12–16)
IMM GRANULOCYTES # BLD AUTO: 0.12 X10(3)/MCL (ref 0–0.04)
IMM GRANULOCYTES NFR BLD AUTO: 1.4 %
LYMPHOCYTES # BLD AUTO: 1.56 X10(3)/MCL (ref 0.6–4.6)
LYMPHOCYTES NFR BLD AUTO: 18.1 %
MCH RBC QN AUTO: 31 PG (ref 27–31)
MCHC RBC AUTO-ENTMCNC: 33.1 G/DL (ref 33–36)
MCV RBC AUTO: 93.8 FL (ref 80–94)
MONOCYTES # BLD AUTO: 0.23 X10(3)/MCL (ref 0.1–1.3)
MONOCYTES NFR BLD AUTO: 2.7 %
NEUTROPHILS # BLD AUTO: 6.62 X10(3)/MCL (ref 2.1–9.2)
NEUTROPHILS NFR BLD AUTO: 76.9 %
PLATELET # BLD AUTO: 282 X10(3)/MCL (ref 130–400)
PMV BLD AUTO: 9.5 FL (ref 7.4–10.4)
POTASSIUM SERPL-SCNC: 3.9 MMOL/L (ref 3.5–5.1)
PROT SERPL-MCNC: 7.4 GM/DL (ref 5.8–7.6)
RBC # BLD AUTO: 3.84 X10(6)/MCL (ref 4.2–5.4)
SODIUM SERPL-SCNC: 140 MMOL/L (ref 136–145)
WBC # BLD AUTO: 8.61 X10(3)/MCL (ref 4.5–11.5)

## 2024-12-03 PROCEDURE — 80053 COMPREHEN METABOLIC PANEL: CPT

## 2024-12-03 PROCEDURE — 36415 COLL VENOUS BLD VENIPUNCTURE: CPT

## 2024-12-03 PROCEDURE — 85025 COMPLETE CBC W/AUTO DIFF WBC: CPT

## 2024-12-04 ENCOUNTER — INFUSION (OUTPATIENT)
Dept: INFUSION THERAPY | Facility: HOSPITAL | Age: 69
End: 2024-12-04
Attending: INTERNAL MEDICINE
Payer: MEDICARE

## 2024-12-04 VITALS
WEIGHT: 96.31 LBS | HEART RATE: 65 BPM | BODY MASS INDEX: 17.72 KG/M2 | HEIGHT: 62 IN | DIASTOLIC BLOOD PRESSURE: 60 MMHG | SYSTOLIC BLOOD PRESSURE: 114 MMHG | RESPIRATION RATE: 18 BRPM

## 2024-12-04 DIAGNOSIS — C78.6 SECONDARY MALIGNANT NEOPLASM OF PERITONEUM: ICD-10-CM

## 2024-12-04 DIAGNOSIS — Z17.0 MALIGNANT NEOPLASM OF LOWER-INNER QUADRANT OF RIGHT BREAST OF FEMALE, ESTROGEN RECEPTOR POSITIVE: Primary | ICD-10-CM

## 2024-12-04 DIAGNOSIS — C50.311 MALIGNANT NEOPLASM OF LOWER-INNER QUADRANT OF RIGHT BREAST OF FEMALE, ESTROGEN RECEPTOR POSITIVE: Primary | ICD-10-CM

## 2024-12-04 PROCEDURE — 96409 CHEMO IV PUSH SNGL DRUG: CPT

## 2024-12-04 PROCEDURE — 96375 TX/PRO/DX INJ NEW DRUG ADDON: CPT

## 2024-12-04 PROCEDURE — 25000003 PHARM REV CODE 250: Performed by: INTERNAL MEDICINE

## 2024-12-04 PROCEDURE — 63600175 PHARM REV CODE 636 W HCPCS: Mod: JZ,JG | Performed by: INTERNAL MEDICINE

## 2024-12-04 RX ORDER — SODIUM CHLORIDE 0.9 % (FLUSH) 0.9 %
10 SYRINGE (ML) INJECTION
Status: DISCONTINUED | OUTPATIENT
Start: 2024-12-04 | End: 2024-12-04 | Stop reason: HOSPADM

## 2024-12-04 RX ORDER — HEPARIN 100 UNIT/ML
500 SYRINGE INTRAVENOUS
Status: DISCONTINUED | OUTPATIENT
Start: 2024-12-04 | End: 2024-12-04 | Stop reason: HOSPADM

## 2024-12-04 RX ORDER — ONDANSETRON HYDROCHLORIDE 2 MG/ML
8 INJECTION, SOLUTION INTRAVENOUS
Status: COMPLETED | OUTPATIENT
Start: 2024-12-04 | End: 2024-12-04

## 2024-12-04 RX ADMIN — ONDANSETRON 8 MG: 2 INJECTION INTRAMUSCULAR; INTRAVENOUS at 03:12

## 2024-12-04 RX ADMIN — ERIBULIN MESYLATE 1.55 MG: 0.5 INJECTION INTRAVENOUS at 03:12

## 2024-12-04 NOTE — PROGRESS NOTES
Subjective:       Patient ID: Kimberlyn Selby is a 69 y.o. female.    Chief Complaint:  I had nausea and vomiting for 2 days over the weekend    Diagnosis:  Recurrent metastatic breast cancer with peritoneal carcinomatosis - ER+ 4%, ID 0, HER-2 neg (IHC 0)                         PD-L1 CPS >10, ROLANDA, low TMB, PIK3CA E365K and W1636T mutations                     Stage IA R breast cancer 8/11 (T1b N0 M0), 0.7 cm, GI, ER+/ID-, HER-2 negative                     Stage IIA L breast cancer 1/09 (T1c N1 M0), 1.6 cm, GIII, 1+ LN, ER/ID -, HER-2 negative                     Stage IIIA R breast cancer 1/05 (T2 N2a M0), 1.6 cm, GII, 4+ LN's, ER/ID +, HER-2 negative                     S/p bilateral mastectomies                     Post menopausal s/p hysterectomy                     Declined adjuvant XRT & hormonal therapy with initial breast cancer     Treatment History  Adjuvant TAC x 6 completed 6/05  Adjuvant TC x 4 completed 5/09 --> XRT L breast  Xeloda 8/23-7/24    Current Therapy:  Eribulin s/p 6 cycles (Started 8/7/24)     Clinical History: WF status post right lumpectomy 1/05 for invasive breast cancer with the above characteristics. She completed 6 cycles of adjuvant TAC but declined radiation. She was placed on Arimidex but discontinued treatment due to significant myalgias. She had similar side effects with Femara and also complained of bone aching due to Tamoxifen and stopped therapy 9/05. Due to her hormone receptor status, she was placed on Evista but again was unable to tolerate therapy. She had an abnormal surveillance mammogram 11/08 showing clustered microcalcifications in the upper outer quadrant of the left breast with an associated 1 cm mass by ultrasound. 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 Patient underwent left lumpectomy and axillary dissection 1/15/09. Final pathology as outlined above. Postop course was complicated by cellulitis. She completed 4 cycles  of adjuvant TC 5/09. She eventually consented to adjuvant radiation therapy to the left breast. Surveillance CT PET scan 12/09 showed no abnormal hypermetabolic activity. Mammogram 6/22/11 showed a new suspicious finding in the right breast at the 4:00 position. Ultrasound was suspicious for malignancy. Ultrasound-guided core biopsies revealed invasive ductal carcinoma strongly ER positive at 85%, NE negative and HER-2 negative. CT PET scan 5/11 showed no abnormal hypermetabolic activity to indicate recurrent or metastatic disease. Although patient did have radiation therapy post lumpectomy on the left side, she never had radiation therapy to the right breast. She elected to undergo bilateral mastectomies and prophylactic right oophorectomy 8/16/11. Final pathology showed a 0.7 cm grade 1 infiltrating ductal carcinoma the right breast. Lamar dissection was not done to her previous surgery. She declined BRCA testing. Adjuvant hormonal therapy was recommended with Aromasin but she declined. She underwent a screening colonoscopy 6/5/13 which showed diverticula but no other abnormal findings; 10 year followup was recommended.      She was seen for a surveillance appointment 8/24/21 and did not return for her annual visit in 2022.    She developed symptoms of GE reflux and constipation that she treated with OTC medications without significant improvement.  She denied significant weight loss.  She was referred for a CT of the abdomen and pelvis by GI 6/2/23 ([facility]) that showed a diffuse abnormal enhancing soft tissue density in the retroperitoneum and mesenteric root with nodular intense enhancement.  Findings suspicious for an infiltrative lymphoproliferative disorder.  Mass encased the aorta without stenosis.  IVC appeared mildly narrowed without focal internal filling defect.  There were no additional abnormal findings.  Spleen was unremarkable.   EGD 6/13/23 showed mild chronic reflux changes and gastritis.   Colonoscopy showed a single polyp which was removed.  Pathology was consistent with metastatic breast cancer.  She underwent a diagnostic laparoscopy 7/12/23 revealing peritoneal carcinomatosis.  Biopsies were positive for metastatic poorly differentiated adenocarcinoma with signet ring morphology consistent with breast primary (LIZ-3 and CK7 positive; CK20 and CDX2 negative).  ER was low positive 4%, SD negative and HER2 negative (IHC 0).    Molecular Testing:  PDL1 CPS >10, ROLANDA, low TMB.  Mutations of BLM, CHEK2, PIK3CA E365K, SMARCA4 and JAK1.  PIK3CA E365K AND G1726j are not approved bio-markers for treatment    CT-PET 7/28/23:  Poorly delineated bulky abdominal lymphadenopathy with low-grade FDG uptake, SUV 2.4.  Small volume ascites.  No definite metastatic disease outside of the abdomen.  MRI Brain 8/2/23:  No evidence of metastatic disease.  Mild chronic microvascular ischemia and atrophy.    She was initiated on treatment with oral Xeloda.  She did not tolerate dose escalation secondary to hand-foot syndrome.  She required dose adjustment to manage her side effects.    CT C/A/P 11/15/23:  Improved confluent retroperitoneal and mesenteric adenopathy.  Improved mesenteric soft tissue density and edema.  CT C/A/P 2/27/24:  Radiology reported no detrimental change from 11/23.  My review shows further decrease in the soft tissue density in the retroperitoneum and mesentery with no new sites of measurable disease.  Chronic constipation.  MRI brain 4/5/24: No acute findings or metastatic disease.  CT C/A/P 7/19/24:  No pulmonary or hepatic lesions.  Moderate-to-large amount of stool diffusely throughout the colon.  Mesenteric soft tissue density stable to slightly decreased.  Numerous small sclerotic skeletal lesions mildly increased in size and density.  CT C/A/P 10/07/24:  Stable osteoblastic skeletal metastases.  No pulmonary or hepatic lesions.  Slight decrease in mesenteric density with resolution of  previous pelvic ascites.  Persistent large amount of stool throughout the colon.    Interval History  She returns to clinic today for a 4 week follow-up visit accompanied by her .  She has now completed 6 cycles of palliative chemotherapy with single agent Eribulin.  She required a 20% dose reduction secondary to treatment-related neutropenia.  She has had progressive fatigue associated with her treatment.  She also complains of increased tearing.  She has an upcoming ophthalmology exam scheduled.  She has a longstanding history of chronic constipation.  Two days ago, she had nausea and vomiting after eating.  It was well over 3 weeks after her last chemotherapy cycle.  Her symptoms have resolved and have not recurred.  She denies any abdominal pain or cramping.  No weight loss.  No bone pain.      Review of Systems   Constitutional:  Positive for fatigue. Negative for activity change, appetite change, fever and unexpected weight change.   HENT:  Negative for mouth sores, sore throat and trouble swallowing.    Eyes: Negative.    Respiratory:  Negative for cough and shortness of breath.    Cardiovascular:  Negative for chest pain, palpitations and leg swelling.   Gastrointestinal:  Positive for constipation. Negative for abdominal distention, abdominal pain, diarrhea and nausea.   Genitourinary:  Negative for dysuria, flank pain and frequency.   Musculoskeletal:  Negative for arthralgias and back pain.   Integumentary:  Negative for pallor and rash.   Neurological:  Negative for dizziness, weakness, numbness and headaches.   Hematological:  Negative for adenopathy. Does not bruise/bleed easily.   Psychiatric/Behavioral: Negative.  The patient is not nervous/anxious.        PMHx:  Bilateral breast cancer, arthritis, hypothyroidism, GERD  PSHx:  Bilateral breast lumpectomies, bilateral mastectomies, hysterectomy/BSO, MediPort insertion and removal, uterine suspension, diagnostic laparoscopy  SH:  Lifetime  "nonsmoker, no significant alcohol use.  Lives in [place] with her .  FH:  Her father had kidney cancer, son had appendiceal cancer and MGF had liver cancer.    Objective:        /61 (Patient Position: Sitting)   Pulse (!) 57   Temp 98.3 °F (36.8 °C)   Resp 15   Ht 5' 2" (1.575 m)   Wt 43.3 kg (95 lb 6.4 oz)   SpO2 97%   BMI 17.45 kg/m²    Physical Exam  Constitutional:       Comments: Thin white female in NAD   HENT:      Head: Normocephalic.      Mouth/Throat:      Mouth: Mucous membranes are moist.      Pharynx: Oropharynx is clear. No posterior oropharyngeal erythema.   Eyes:      General: No scleral icterus.     Extraocular Movements: Extraocular movements intact.      Pupils: Pupils are equal, round, and reactive to light.   Cardiovascular:      Rate and Rhythm: Normal rate and regular rhythm.      Heart sounds: No murmur heard.  Pulmonary:      Comments: Lungs clear to auscultation  Abdominal:      General: Bowel sounds are normal. There is no distension.      Tenderness: There is no abdominal tenderness.      Comments: Abdomen flat, thin.  No palpable masses or organomegaly   Musculoskeletal:         General: No swelling or tenderness. Normal range of motion.      Cervical back: Neck supple. No tenderness.   Lymphadenopathy:      Cervical: No cervical adenopathy.      Upper Body:      Right upper body: No supraclavicular or axillary adenopathy.      Left upper body: No supraclavicular or axillary adenopathy.   Skin:     General: Skin is warm and dry.      Findings: No rash.   Neurological:      General: No focal deficit present.      Mental Status: She is alert and oriented to person, place, and time.      Cranial Nerves: No cranial nerve deficit.      Motor: No weakness.       ECOG SCORE    1 - Restricted in strenuous activity-ambulatory and able to carry out work of a light nature          LABORATORY  Recent Results (from the past 2 weeks)   Comprehensive Metabolic Panel    Collection " Time: 12/03/24 12:58 PM   Result Value Ref Range    Sodium 140 136 - 145 mmol/L    Potassium 3.9 3.5 - 5.1 mmol/L    Chloride 102 98 - 107 mmol/L    CO2 28 23 - 31 mmol/L    Glucose 108 82 - 115 mg/dL    Blood Urea Nitrogen 22.8 (H) 9.8 - 20.1 mg/dL    Creatinine 0.77 0.55 - 1.02 mg/dL    Calcium 10.0 8.4 - 10.2 mg/dL    Protein Total 7.4 5.8 - 7.6 gm/dL    Albumin 4.2 3.4 - 4.8 g/dL    Globulin 3.2 2.4 - 3.5 gm/dL    Albumin/Globulin Ratio 1.3 1.1 - 2.0 ratio    Bilirubin Total 0.3 <=1.5 mg/dL    ALP 46 40 - 150 unit/L    ALT 15 0 - 55 unit/L    AST 30 5 - 34 unit/L    eGFR >60 mL/min/1.73/m2    Anion Gap 10.0 mEq/L    BUN/Creatinine Ratio 30    CBC with Differential    Collection Time: 12/03/24 12:58 PM   Result Value Ref Range    WBC 8.61 4.50 - 11.50 x10(3)/mcL    RBC 3.84 (L) 4.20 - 5.40 x10(6)/mcL    Hgb 11.9 (L) 12.0 - 16.0 g/dL    Hct 36.0 (L) 37.0 - 47.0 %    MCV 93.8 80.0 - 94.0 fL    MCH 31.0 27.0 - 31.0 pg    MCHC 33.1 33.0 - 36.0 g/dL    RDW 14.1 11.5 - 17.0 %    Platelet 282 130 - 400 x10(3)/mcL    MPV 9.5 7.4 - 10.4 fL    Neut % 76.9 %    Lymph % 18.1 %    Mono % 2.7 %    Eos % 0.2 %    Basophil % 0.7 %    Lymph # 1.56 0.6 - 4.6 x10(3)/mcL    Neut # 6.62 2.1 - 9.2 x10(3)/mcL    Mono # 0.23 0.1 - 1.3 x10(3)/mcL    Eos # 0.02 0 - 0.9 x10(3)/mcL    Baso # 0.06 <=0.2 x10(3)/mcL    IG# 0.12 (H) 0 - 0.04 x10(3)/mcL    IG% 1.4 %   Comprehensive Metabolic Panel    Collection Time: 12/13/24 10:02 AM   Result Value Ref Range    Sodium 140 136 - 145 mmol/L    Potassium 4.3 3.5 - 5.1 mmol/L    Chloride 104 98 - 107 mmol/L    CO2 29 23 - 31 mmol/L    Glucose 75 (L) 82 - 115 mg/dL    Blood Urea Nitrogen 15.4 9.8 - 20.1 mg/dL    Creatinine 0.76 0.55 - 1.02 mg/dL    Calcium 9.4 8.4 - 10.2 mg/dL    Protein Total 7.4 5.8 - 7.6 gm/dL    Albumin 4.1 3.4 - 4.8 g/dL    Globulin 3.3 2.4 - 3.5 gm/dL    Albumin/Globulin Ratio 1.2 1.1 - 2.0 ratio    Bilirubin Total 0.3 <=1.5 mg/dL    ALP 40 40 - 150 unit/L    ALT 16 0 - 55  unit/L    AST 30 5 - 34 unit/L    eGFR >60 mL/min/1.73/m2    Anion Gap 7.0 mEq/L    BUN/Creatinine Ratio 20    CEA    Collection Time: 12/13/24 10:02 AM   Result Value Ref Range    Carcinoembryonic Antigen 20.53 (H) 0.00 - 3.00 ng/mL   Cancer Antigen 27-29    Collection Time: 12/13/24 10:02 AM   Result Value Ref Range    Breast Carcinoma Assoc Ag(CA 27.29) 45.8 (H) <=38.0 U/mL   CBC with Differential    Collection Time: 12/13/24 10:02 AM   Result Value Ref Range    WBC 2.46 (L) 4.50 - 11.50 x10(3)/mcL    RBC 3.79 (L) 4.20 - 5.40 x10(6)/mcL    Hgb 11.9 (L) 12.0 - 16.0 g/dL    Hct 36.1 (L) 37.0 - 47.0 %    MCV 95.3 (H) 80.0 - 94.0 fL    MCH 31.4 (H) 27.0 - 31.0 pg    MCHC 33.0 33.0 - 36.0 g/dL    RDW 14.6 11.5 - 17.0 %    Platelet 325 130 - 400 x10(3)/mcL    MPV 9.2 7.4 - 10.4 fL    Neut % 42.0 %    Lymph % 39.4 %    Mono % 14.6 %    Eos % 2.0 %    Basophil % 1.6 %    Lymph # 0.97 0.6 - 4.6 x10(3)/mcL    Neut # 1.03 (L) 2.1 - 9.2 x10(3)/mcL    Mono # 0.36 0.1 - 1.3 x10(3)/mcL    Eos # 0.05 0 - 0.9 x10(3)/mcL    Baso # 0.04 <=0.2 x10(3)/mcL    IG# 0.01 0 - 0.04 x10(3)/mcL    IG% 0.4 %                          11/15/23   1/02/24   2/22/24   4/26/24   6/26/24   7/19/24   10/08/24   12/03/24  CEA              21.7          14.5        13.1       16.4        22.4         50.1         24.9         20.5  CA 27-29       31.5          29.8        28.0       32.0        45.0         63.8        43.4         45.8      Assessment:   Recurrent metastatic breast cancer with peritoneal carcinomatosis - ER+ 4%, MN neg, Her-2 neg (IHC 0)  Leukopenia secondary to chemotherapy  History of bilateral breast cancer s/p bilateral mastectomies  Chronic constipation    Plan:   Patient has completed 6 cycles of treatment with Eribulin with progressive treatment-related side effects.  Hold further treatment to allow for recovery from her cumulative side effects.  Schedule restaging CT scans of the chest, abdomen and pelvis in approximately 3  weeks.  C after the scans for a follow-up visit and results review.  If no evidence of progressive disease, we discussed additional treatment with an aromatase inhibitor given her low positive ER expression.      JOEL DAVIDSON MD    Other Physicians  Dr. Darrell Swan

## 2024-12-13 ENCOUNTER — LAB VISIT (OUTPATIENT)
Dept: LAB | Facility: HOSPITAL | Age: 69
End: 2024-12-13
Attending: INTERNAL MEDICINE
Payer: MEDICARE

## 2024-12-13 DIAGNOSIS — C78.6 SECONDARY MALIGNANT NEOPLASM OF PERITONEUM: ICD-10-CM

## 2024-12-13 DIAGNOSIS — C50.311 MALIGNANT NEOPLASM OF LOWER-INNER QUADRANT OF RIGHT BREAST OF FEMALE, ESTROGEN RECEPTOR POSITIVE: ICD-10-CM

## 2024-12-13 DIAGNOSIS — Z17.0 MALIGNANT NEOPLASM OF LOWER-INNER QUADRANT OF RIGHT BREAST OF FEMALE, ESTROGEN RECEPTOR POSITIVE: ICD-10-CM

## 2024-12-13 LAB
ALBUMIN SERPL-MCNC: 4.1 G/DL (ref 3.4–4.8)
ALBUMIN/GLOB SERPL: 1.2 RATIO (ref 1.1–2)
ALP SERPL-CCNC: 40 UNIT/L (ref 40–150)
ALT SERPL-CCNC: 16 UNIT/L (ref 0–55)
ANION GAP SERPL CALC-SCNC: 7 MEQ/L
AST SERPL-CCNC: 30 UNIT/L (ref 5–34)
BASOPHILS # BLD AUTO: 0.04 X10(3)/MCL
BASOPHILS NFR BLD AUTO: 1.6 %
BILIRUB SERPL-MCNC: 0.3 MG/DL
BUN SERPL-MCNC: 15.4 MG/DL (ref 9.8–20.1)
CALCIUM SERPL-MCNC: 9.4 MG/DL (ref 8.4–10.2)
CEA SERPL-MCNC: 20.53 NG/ML (ref 0–3)
CHLORIDE SERPL-SCNC: 104 MMOL/L (ref 98–107)
CO2 SERPL-SCNC: 29 MMOL/L (ref 23–31)
CREAT SERPL-MCNC: 0.76 MG/DL (ref 0.55–1.02)
CREAT/UREA NIT SERPL: 20
EOSINOPHIL # BLD AUTO: 0.05 X10(3)/MCL (ref 0–0.9)
EOSINOPHIL NFR BLD AUTO: 2 %
ERYTHROCYTE [DISTWIDTH] IN BLOOD BY AUTOMATED COUNT: 14.6 % (ref 11.5–17)
GFR SERPLBLD CREATININE-BSD FMLA CKD-EPI: >60 ML/MIN/1.73/M2
GLOBULIN SER-MCNC: 3.3 GM/DL (ref 2.4–3.5)
GLUCOSE SERPL-MCNC: 75 MG/DL (ref 82–115)
HCT VFR BLD AUTO: 36.1 % (ref 37–47)
HGB BLD-MCNC: 11.9 G/DL (ref 12–16)
IMM GRANULOCYTES # BLD AUTO: 0.01 X10(3)/MCL (ref 0–0.04)
IMM GRANULOCYTES NFR BLD AUTO: 0.4 %
LYMPHOCYTES # BLD AUTO: 0.97 X10(3)/MCL (ref 0.6–4.6)
LYMPHOCYTES NFR BLD AUTO: 39.4 %
MCH RBC QN AUTO: 31.4 PG (ref 27–31)
MCHC RBC AUTO-ENTMCNC: 33 G/DL (ref 33–36)
MCV RBC AUTO: 95.3 FL (ref 80–94)
MONOCYTES # BLD AUTO: 0.36 X10(3)/MCL (ref 0.1–1.3)
MONOCYTES NFR BLD AUTO: 14.6 %
NEUTROPHILS # BLD AUTO: 1.03 X10(3)/MCL (ref 2.1–9.2)
NEUTROPHILS NFR BLD AUTO: 42 %
PLATELET # BLD AUTO: 325 X10(3)/MCL (ref 130–400)
PMV BLD AUTO: 9.2 FL (ref 7.4–10.4)
POTASSIUM SERPL-SCNC: 4.3 MMOL/L (ref 3.5–5.1)
PROT SERPL-MCNC: 7.4 GM/DL (ref 5.8–7.6)
RBC # BLD AUTO: 3.79 X10(6)/MCL (ref 4.2–5.4)
SODIUM SERPL-SCNC: 140 MMOL/L (ref 136–145)
WBC # BLD AUTO: 2.46 X10(3)/MCL (ref 4.5–11.5)

## 2024-12-13 PROCEDURE — 85025 COMPLETE CBC W/AUTO DIFF WBC: CPT

## 2024-12-13 PROCEDURE — 36415 COLL VENOUS BLD VENIPUNCTURE: CPT

## 2024-12-13 PROCEDURE — 82378 CARCINOEMBRYONIC ANTIGEN: CPT

## 2024-12-13 PROCEDURE — 80053 COMPREHEN METABOLIC PANEL: CPT

## 2024-12-13 PROCEDURE — 86300 IMMUNOASSAY TUMOR CA 15-3: CPT

## 2024-12-16 LAB — CANCER AG27-29 SERPL-ACNC: 45.8 U/ML

## 2024-12-17 ENCOUNTER — OFFICE VISIT (OUTPATIENT)
Dept: HEMATOLOGY/ONCOLOGY | Facility: CLINIC | Age: 69
End: 2024-12-17
Payer: MEDICARE

## 2024-12-17 VITALS
HEART RATE: 57 BPM | DIASTOLIC BLOOD PRESSURE: 61 MMHG | BODY MASS INDEX: 17.55 KG/M2 | OXYGEN SATURATION: 97 % | RESPIRATION RATE: 15 BRPM | WEIGHT: 95.38 LBS | SYSTOLIC BLOOD PRESSURE: 125 MMHG | HEIGHT: 62 IN | TEMPERATURE: 98 F

## 2024-12-17 DIAGNOSIS — C78.6 SECONDARY MALIGNANT NEOPLASM OF PERITONEUM: ICD-10-CM

## 2024-12-17 DIAGNOSIS — Z79.899 DRUG-INDUCED IMMUNODEFICIENCY: ICD-10-CM

## 2024-12-17 DIAGNOSIS — D84.821 DRUG-INDUCED IMMUNODEFICIENCY: ICD-10-CM

## 2024-12-17 DIAGNOSIS — D64.81 ANEMIA DUE TO CHEMOTHERAPY: ICD-10-CM

## 2024-12-17 DIAGNOSIS — Z17.0 MALIGNANT NEOPLASM OF LOWER-INNER QUADRANT OF RIGHT BREAST OF FEMALE, ESTROGEN RECEPTOR POSITIVE: Primary | ICD-10-CM

## 2024-12-17 DIAGNOSIS — T45.1X5A ANEMIA DUE TO CHEMOTHERAPY: ICD-10-CM

## 2024-12-17 DIAGNOSIS — C50.311 MALIGNANT NEOPLASM OF LOWER-INNER QUADRANT OF RIGHT BREAST OF FEMALE, ESTROGEN RECEPTOR POSITIVE: Primary | ICD-10-CM

## 2024-12-17 PROCEDURE — 99215 OFFICE O/P EST HI 40 MIN: CPT | Mod: PBBFAC | Performed by: INTERNAL MEDICINE

## 2024-12-17 PROCEDURE — 99214 OFFICE O/P EST MOD 30 MIN: CPT | Mod: S$PBB,,, | Performed by: INTERNAL MEDICINE

## 2024-12-17 PROCEDURE — 99999 PR PBB SHADOW E&M-EST. PATIENT-LVL V: CPT | Mod: PBBFAC,,, | Performed by: INTERNAL MEDICINE

## 2025-01-02 NOTE — PROGRESS NOTES
Subjective:       Patient ID: Kimberlyn Selby is a 69 y.o. female.    Chief Complaint:  What's the next step?    Diagnosis:  Recurrent metastatic breast cancer with peritoneal carcinomatosis - ER+ 4%, MI 0, HER-2 neg (IHC 0)                         PD-L1 CPS >10, ROLANDA, low TMB, PIK3CA E365K and [id number] mutations                     Stage IA R breast cancer 8/11 (T1b N0 M0), 0.7 cm, GI, ER+/MI-, HER-2 negative                     Stage IIA L breast cancer 1/09 (T1c N1 M0), 1.6 cm, GIII, 1+ LN, ER/MI -, HER-2 negative                     Stage IIIA R breast cancer 1/05 (T2 N2a M0), 1.6 cm, GII, 4+ LN's, ER/MI +, HER-2 negative                     S/p bilateral mastectomies                     Post menopausal s/p hysterectomy                     Declined adjuvant XRT & hormonal therapy with initial breast cancer     Treatment History  Adjuvant TAC x 6 completed 6/05  Adjuvant TC x 4 completed 5/09 --> XRT L breast  Xeloda 8/23-7/24    Current Therapy:  Eribulin s/p 6 cycles (Started 8/7/24)     Clinical History: WF status post right lumpectomy 1/05 for invasive breast cancer with the above characteristics. She completed 6 cycles of adjuvant TAC but declined radiation. She was placed on Arimidex but discontinued treatment due to significant myalgias. She had similar side effects with Femara and also complained of bone aching due to Tamoxifen and stopped therapy 9/05. Due to her hormone receptor status, she was placed on Evista but again was unable to tolerate therapy. She had an abnormal surveillance mammogram 11/08 showing clustered microcalcifications in the upper outer quadrant of the left breast with an associated 1 cm mass by ultrasound. 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 Patient underwent left lumpectomy and axillary dissection 1/15/09. Final pathology as outlined above. Postop course was complicated by cellulitis. She completed 4 cycles of adjuvant TC 5/09. She  eventually consented to adjuvant radiation therapy to the left breast. Surveillance CT PET scan 12/09 showed no abnormal hypermetabolic activity. Mammogram 6/22/11 showed a new suspicious finding in the right breast at the 4:00 position. Ultrasound was suspicious for malignancy. Ultrasound-guided core biopsies revealed invasive ductal carcinoma strongly ER positive at 85%, MN negative and HER-2 negative. CT PET scan 5/11 showed no abnormal hypermetabolic activity to indicate recurrent or metastatic disease. Although patient did have radiation therapy post lumpectomy on the left side, she never had radiation therapy to the right breast. She elected to undergo bilateral mastectomies and prophylactic right oophorectomy 8/16/11. Final pathology showed a 0.7 cm grade 1 infiltrating ductal carcinoma the right breast. Lamar dissection was not done to her previous surgery. She declined BRCA testing. Adjuvant hormonal therapy was recommended with Aromasin but she declined. She underwent a screening colonoscopy 6/5/13 which showed diverticula but no other abnormal findings; 10 year followup was recommended.      She was seen for a surveillance appointment 8/24/21 and did not return for her annual visit in 2022.    She developed symptoms of GE reflux and constipation that she treated with OTC medications without significant improvement.  She denied significant weight loss.  She was referred for a CT of the abdomen and pelvis by GI 6/2/23 ([facility]) that showed a diffuse abnormal enhancing soft tissue density in the retroperitoneum and mesenteric root with nodular intense enhancement.  Findings suspicious for an infiltrative lymphoproliferative disorder.  Mass encased the aorta without stenosis.  IVC appeared mildly narrowed without focal internal filling defect.  There were no additional abnormal findings.  Spleen was unremarkable.   EGD 6/13/23 showed mild chronic reflux changes and gastritis.  Colonoscopy showed a  single polyp which was removed.  Pathology was consistent with metastatic breast cancer.  She underwent a diagnostic laparoscopy 7/12/23 revealing peritoneal carcinomatosis.  Biopsies were positive for metastatic poorly differentiated adenocarcinoma with signet ring morphology consistent with breast primary (LIZ-3 and CK7 positive; CK20 and CDX2 negative).  ER was low positive 4%, NM negative and HER2 negative (IHC 0).    Molecular Testing:  PDL1 CPS >10, ROLANDA, low TMB.  Mutations of BLM, CHEK2, PIK3CA E365K, SMARCA4 and JAK1.  PIK3CA E365K AND [id number] are not approved bio-markers for treatment    CT-PET 7/28/23:  Poorly delineated bulky abdominal lymphadenopathy with low-grade FDG uptake, SUV 2.4.  Small volume ascites.  No definite metastatic disease outside of the abdomen.  MRI Brain 8/2/23:  No evidence of metastatic disease.  Mild chronic microvascular ischemia and atrophy.    She was initiated on treatment with oral Xeloda.  She did not tolerate dose escalation secondary to hand-foot syndrome.  She required dose adjustment to manage her side effects.    CT C/A/P 11/15/23:  Improved confluent retroperitoneal and mesenteric adenopathy.  Improved mesenteric soft tissue density and edema.  CT C/A/P 2/27/24:  Radiology reported no detrimental change from 11/23.  My review shows further decrease in the soft tissue density in the retroperitoneum and mesentery with no new sites of measurable disease.  Chronic constipation.  MRI brain 4/5/24: No acute findings or metastatic disease.  CT C/A/P 7/19/24:  No pulmonary or hepatic lesions.  Moderate-to-large amount of stool diffusely throughout the colon.  Mesenteric soft tissue density stable to slightly decreased.  Numerous small sclerotic skeletal lesions mildly increased in size and density.  CT C/A/P 10/07/24:  Stable osteoblastic skeletal metastases.  No pulmonary or hepatic lesions.  Slight decrease in mesenteric density with resolution of previous pelvic ascites.   Persistent large amount of stool throughout the colon.  CT C/A/P 1/10/25:  Unchanged treated osteoblastic bone metastases.  Lungs and liver clear.  No significant mesentery density or nodularity, no ascites.  Chronic constipation.    Interval History  She returns to the office today by herself for a three-week follow-up visit.  She has completed 6 cycles of palliative chemotherapy with eribulin.  Cycle 6 was completed 12/4/24.  She did have cumulative fatigue and anemia related to her treatment.  She is starting to feel better off of therapy.  She denies any abdominal pain, bloating, cramping or nausea.  She has chronic constipation which is unchanged.  No significant weight loss.  She does not have any bone pain.  Restaging CT scans 1/10/25 showed a favorable response to treatment with no evidence of disease progression.      Review of Systems   Constitutional:  Positive for fatigue. Negative for activity change, appetite change, fever and unexpected weight change.   HENT:  Negative for mouth sores, sore throat and trouble swallowing.    Eyes: Negative.    Respiratory:  Negative for cough and shortness of breath.    Cardiovascular:  Negative for chest pain, palpitations and leg swelling.   Gastrointestinal:  Positive for constipation. Negative for abdominal distention, abdominal pain, diarrhea and nausea.   Genitourinary:  Negative for dysuria, flank pain and frequency.   Musculoskeletal:  Negative for arthralgias and back pain.        No bone pain   Integumentary:  Negative for pallor and rash.   Neurological:  Negative for dizziness, weakness, numbness and headaches.   Hematological:  Negative for adenopathy. Does not bruise/bleed easily.   Psychiatric/Behavioral: Negative.  The patient is not nervous/anxious.        PMHx:  Bilateral breast cancer, arthritis, hypothyroidism, GERD  PSHx:  Bilateral breast lumpectomies, bilateral mastectomies, hysterectomy/BSO, MediPort insertion and removal, uterine suspension,  "diagnostic laparoscopy  SH:  Lifetime nonsmoker, no significant alcohol use.  Lives in [place] with her .  FH:  Her father had kidney cancer, son had appendiceal cancer and MGF had liver cancer.    Objective:        /65 (Patient Position: Sitting)   Pulse (!) 49   Temp 97.6 °F (36.4 °C)   Resp 15   Ht 5' 2" (1.575 m)   Wt 45.2 kg (99 lb 9.6 oz)   BMI 18.22 kg/m²    Physical Exam  Constitutional:       Comments: Thin white female in NAD   HENT:      Head: Normocephalic.      Mouth/Throat:      Mouth: Mucous membranes are moist.      Pharynx: Oropharynx is clear. No posterior oropharyngeal erythema.   Eyes:      General: No scleral icterus.     Extraocular Movements: Extraocular movements intact.      Pupils: Pupils are equal, round, and reactive to light.   Cardiovascular:      Rate and Rhythm: Normal rate and regular rhythm.      Heart sounds: No murmur heard.  Pulmonary:      Comments: Lungs clear to auscultation  Abdominal:      General: Bowel sounds are normal. There is no distension.      Tenderness: There is no abdominal tenderness.      Comments: Abdomen flat, thin.  No palpable masses or organomegaly   Musculoskeletal:         General: No swelling or tenderness. Normal range of motion.      Cervical back: Neck supple. No tenderness.   Lymphadenopathy:      Cervical: No cervical adenopathy.      Upper Body:      Right upper body: No supraclavicular or axillary adenopathy.      Left upper body: No supraclavicular or axillary adenopathy.   Skin:     General: Skin is warm and dry.      Findings: No rash.   Neurological:      General: No focal deficit present.      Mental Status: She is alert and oriented to person, place, and time.      Cranial Nerves: No cranial nerve deficit.      Motor: No weakness.       ECOG SCORE    0 - Fully active-able to carry on all pre-disease performance without restriction          LABORATORY  No results found for this or any previous visit (from the past 2 " weeks).                         11/15/23   1/02/24   2/22/24   4/26/24   6/26/24   7/19/24   10/08/24   12/03/24  CEA              21.7          14.5        13.1       16.4        22.4         50.1         24.9         20.5  CA 27-29       31.5          29.8        28.0       32.0        45.0         63.8        43.4         45.8      Assessment:   Recurrent metastatic breast cancer with peritoneal carcinomatosis - ER+ 4%, IN neg, Her-2 neg (IHC 0)  Leukopenia secondary to chemotherapy  History of bilateral breast cancer s/p bilateral mastectomies  Chronic constipation    Plan:   CT scan images were reviewed today in the office in the presence of the patient.    She has had a favorable response to 6 cycles of eribulin with no radiographic evidence of disease progression.    She will be transitioned to oral exemestane 25 mg daily given her low positive ER expression.    Benefits, risks, toxicities and side effects of exemestane were discussed and reviewed in detail. All questions were answered. Literature regarding the treatment plan and specific drug information was also provided.  RTC in 4 weeks for a follow-up visit to evaluate tolerance of therapy.    Repeat laboratory testing on return.      JOEL DAVIDSON MD    Other Physicians  Dr. Darrell Swan

## 2025-01-15 ENCOUNTER — OFFICE VISIT (OUTPATIENT)
Dept: HEMATOLOGY/ONCOLOGY | Facility: CLINIC | Age: 70
End: 2025-01-15
Payer: MEDICARE

## 2025-01-15 VITALS
HEIGHT: 62 IN | HEART RATE: 49 BPM | SYSTOLIC BLOOD PRESSURE: 122 MMHG | TEMPERATURE: 98 F | BODY MASS INDEX: 18.33 KG/M2 | RESPIRATION RATE: 15 BRPM | WEIGHT: 99.63 LBS | DIASTOLIC BLOOD PRESSURE: 65 MMHG

## 2025-01-15 DIAGNOSIS — D84.821 DRUG-INDUCED IMMUNODEFICIENCY: ICD-10-CM

## 2025-01-15 DIAGNOSIS — C78.6 SECONDARY MALIGNANT NEOPLASM OF PERITONEUM: ICD-10-CM

## 2025-01-15 DIAGNOSIS — Z79.899 DRUG-INDUCED IMMUNODEFICIENCY: ICD-10-CM

## 2025-01-15 DIAGNOSIS — D64.81 ANEMIA DUE TO CHEMOTHERAPY: ICD-10-CM

## 2025-01-15 DIAGNOSIS — C50.311 MALIGNANT NEOPLASM OF LOWER-INNER QUADRANT OF RIGHT BREAST OF FEMALE, ESTROGEN RECEPTOR POSITIVE: Primary | ICD-10-CM

## 2025-01-15 DIAGNOSIS — T45.1X5A ANEMIA DUE TO CHEMOTHERAPY: ICD-10-CM

## 2025-01-15 DIAGNOSIS — K59.00 CONSTIPATION, UNSPECIFIED CONSTIPATION TYPE: ICD-10-CM

## 2025-01-15 DIAGNOSIS — Z17.0 MALIGNANT NEOPLASM OF LOWER-INNER QUADRANT OF RIGHT BREAST OF FEMALE, ESTROGEN RECEPTOR POSITIVE: Primary | ICD-10-CM

## 2025-01-15 PROCEDURE — 99214 OFFICE O/P EST MOD 30 MIN: CPT | Mod: S$PBB,,, | Performed by: INTERNAL MEDICINE

## 2025-01-15 PROCEDURE — 99214 OFFICE O/P EST MOD 30 MIN: CPT | Mod: PBBFAC | Performed by: INTERNAL MEDICINE

## 2025-01-15 PROCEDURE — 99999 PR PBB SHADOW E&M-EST. PATIENT-LVL IV: CPT | Mod: PBBFAC,,, | Performed by: INTERNAL MEDICINE

## 2025-01-15 RX ORDER — EXEMESTANE 25 MG/1
25 TABLET ORAL DAILY
Qty: 90 TABLET | Refills: 1 | Status: SHIPPED | OUTPATIENT
Start: 2025-01-15 | End: 2025-05-15

## 2025-01-15 RX ORDER — LACTULOSE 10 G/15ML
30 SOLUTION ORAL; RECTAL DAILY
Qty: 600 ML | Refills: 3 | Status: SHIPPED | OUTPATIENT
Start: 2025-01-15

## 2025-01-15 RX ORDER — EXEMESTANE 25 MG/1
25 TABLET ORAL DAILY
Qty: 30 TABLET | Refills: 3 | Status: SHIPPED | OUTPATIENT
Start: 2025-01-15 | End: 2025-01-15

## 2025-02-02 NOTE — PROGRESS NOTES
Subjective:       Patient ID: Kimberlyn Selby is a 69 y.o. female.    Chief Complaint:  I decided not to take the medication    Diagnosis:  Recurrent metastatic breast cancer with peritoneal carcinomatosis - ER+ 4%, NM 0, HER-2 neg (IHC 0)                         PD-L1 CPS >10, ROLANDA, low TMB, PIK3CA E365K and [id number] mutations                     Stage IA R breast cancer 8/11 (T1b N0 M0), 0.7 cm, GI, ER+/NM-, HER-2 negative                     Stage IIA L breast cancer 1/09 (T1c N1 M0), 1.6 cm, GIII, 1+ LN, ER/NM -, HER-2 negative                     Stage IIIA R breast cancer 1/05 (T2 N2a M0), 1.6 cm, GII, 4+ LN's, ER/NM +, HER-2 negative                     S/p bilateral mastectomies                     Post menopausal s/p hysterectomy                     Declined adjuvant XRT & hormonal therapy with initial breast cancer     Treatment History  Adjuvant TAC x 6 completed 6/05  Adjuvant TC x 4 completed 5/09 --> XRT L breast  Xeloda 8/23-7/24  Eribulin x 6 (8/24-12/24)    Current Therapy:  Observation     Clinical History: WF status post right lumpectomy 1/05 for invasive breast cancer with the above characteristics. She completed 6 cycles of adjuvant TAC but declined radiation. She was placed on Arimidex but discontinued treatment due to significant myalgias. She had similar side effects with Femara and also complained of bone aching due to Tamoxifen and stopped therapy 9/05. Due to her hormone receptor status, she was placed on Evista but again was unable to tolerate therapy. She had an abnormal surveillance mammogram 11/08 showing clustered microcalcifications in the upper outer quadrant of the left breast with an associated 1 cm mass by ultrasound. 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 Patient underwent left lumpectomy and axillary dissection 1/15/09. Final pathology as outlined above. Postop course was complicated by cellulitis. She completed 4 cycles of adjuvant TC  5/09. She eventually consented to adjuvant radiation therapy to the left breast. Surveillance CT PET scan 12/09 showed no abnormal hypermetabolic activity. Mammogram 6/22/11 showed a new suspicious finding in the right breast at the 4:00 position. Ultrasound was suspicious for malignancy. Ultrasound-guided core biopsies revealed invasive ductal carcinoma strongly ER positive at 85%, KS negative and HER-2 negative. CT PET scan 5/11 showed no abnormal hypermetabolic activity to indicate recurrent or metastatic disease. Although patient did have radiation therapy post lumpectomy on the left side, she never had radiation therapy to the right breast. She elected to undergo bilateral mastectomies and prophylactic right oophorectomy 8/16/11. Final pathology showed a 0.7 cm grade 1 infiltrating ductal carcinoma the right breast. Lamar dissection was not done to her previous surgery. She declined BRCA testing. Adjuvant hormonal therapy was recommended with Aromasin but she declined. She underwent a screening colonoscopy 6/5/13 which showed diverticula but no other abnormal findings; 10 year followup was recommended.      She was seen for a surveillance appointment 8/24/21 and did not return for her annual visit in 2022.    She developed symptoms of GE reflux and constipation that she treated with OTC medications without significant improvement.  She denied significant weight loss.  She was referred for a CT of the abdomen and pelvis by GI 6/2/23 ([facility]) that showed a diffuse abnormal enhancing soft tissue density in the retroperitoneum and mesenteric root with nodular intense enhancement.  Findings suspicious for an infiltrative lymphoproliferative disorder.  Mass encased the aorta without stenosis.  IVC appeared mildly narrowed without focal internal filling defect.  There were no additional abnormal findings.  Spleen was unremarkable.   EGD 6/13/23 showed mild chronic reflux changes and gastritis.  Colonoscopy  showed a single polyp which was removed.  Pathology was consistent with metastatic breast cancer.  She underwent a diagnostic laparoscopy 7/12/23 revealing peritoneal carcinomatosis.  Biopsies were positive for metastatic poorly differentiated adenocarcinoma with signet ring morphology consistent with breast primary (LIZ-3 and CK7 positive; CK20 and CDX2 negative).  ER was low positive 4%, IL negative and HER2 negative (IHC 0).    Molecular Testing:  PDL1 CPS >10, ROLANDA, low TMB.  Mutations of BLM, CHEK2, PIK3CA E365K, SMARCA4 and JAK1.  PIK3CA E365K AND [id number] are not approved bio-markers for treatment    CT-PET 7/28/23:  Poorly delineated bulky abdominal lymphadenopathy with low-grade FDG uptake, SUV 2.4.  Small volume ascites.  No definite metastatic disease outside of the abdomen.  MRI Brain 8/2/23:  No evidence of metastatic disease.  Mild chronic microvascular ischemia and atrophy.    She was initiated on treatment with oral Xeloda.  She did not tolerate dose escalation secondary to hand-foot syndrome.  She required dose adjustment to manage her side effects.    CT C/A/P 11/15/23:  Improved confluent retroperitoneal and mesenteric adenopathy.  Improved mesenteric soft tissue density and edema.  CT C/A/P 2/27/24:  Radiology reported no detrimental change from 11/23.  My review shows further decrease in the soft tissue density in the retroperitoneum and mesentery with no new sites of measurable disease.  Chronic constipation.  MRI brain 4/5/24: No acute findings or metastatic disease.  CT C/A/P 7/19/24:  No pulmonary or hepatic lesions.  Moderate-to-large amount of stool diffusely throughout the colon.  Mesenteric soft tissue density stable to slightly decreased.  Numerous small sclerotic skeletal lesions mildly increased in size and density.  CT C/A/P 10/07/24:  Stable osteoblastic skeletal metastases.  No pulmonary or hepatic lesions.  Slight decrease in mesenteric density with resolution of previous pelvic  "ascites.  Persistent large amount of stool throughout the colon.  CT C/A/P 1/10/25:  Unchanged treated osteoblastic bone metastases.  Lungs and liver clear.  No significant mesentery density or nodularity, no ascites.  Chronic constipation.    After completing 6 cycles of eribulin with a favorable disease response, additional treatment was recommended with exemestane secondary to her low ER positivity.  After considering the risks, benefits and side-effects, she decided not to start the treatment.    Interval History  She returns to clinic today by herself for a 4 week follow-up visit.  This appointment was scheduled to evaluate her tolerance of exemestane.  She feels the medication but then decided not to start taking it.  She is following a holistic diet.  She is taking an OTC product called "parasite cleanse." She feels better since completing her 6 cycles of eribulin.  Her treatment associated cytopenias have recovered.  She reports improvement in her energy level.  She denies abdominal pain, swelling, bloating or nausea.  She has chronic constipation which is stable.      Review of Systems   Constitutional:  Negative for activity change, appetite change, fatigue, fever and unexpected weight change.   HENT:  Negative for mouth sores, sore throat and trouble swallowing.    Eyes: Negative.    Respiratory:  Negative for cough and shortness of breath.    Cardiovascular:  Negative for chest pain, palpitations and leg swelling.   Gastrointestinal:  Positive for constipation. Negative for abdominal distention, abdominal pain, diarrhea and nausea.   Genitourinary:  Negative for dysuria, flank pain and frequency.   Musculoskeletal:  Negative for arthralgias and back pain.        No bone pain   Integumentary:  Negative for pallor and rash.   Neurological:  Negative for dizziness, weakness, numbness and headaches.   Hematological:  Negative for adenopathy. Does not bruise/bleed easily.   Psychiatric/Behavioral: Negative.   " "      PMHx:  Bilateral breast cancer, arthritis, hypothyroidism, GERD  PSHx:  Bilateral breast lumpectomies, bilateral mastectomies, hysterectomy/BSO, MediPort insertion and removal, uterine suspension, diagnostic laparoscopy  SH:  Lifetime nonsmoker, no significant alcohol use.  Lives in [place] with her .  FH:  Her father had kidney cancer, son had appendiceal cancer and MGF had liver cancer.    Objective:        /61 (Patient Position: Sitting)   Pulse (!) 57   Temp 97.8 °F (36.6 °C)   Resp 15   Ht 5' 2" (1.575 m)   Wt 43.7 kg (96 lb 6.4 oz)   SpO2 99%   BMI 17.63 kg/m²    Physical Exam  Constitutional:       Comments: Thin white female in NAD   HENT:      Head: Normocephalic.      Mouth/Throat:      Mouth: Mucous membranes are moist.      Pharynx: Oropharynx is clear. No posterior oropharyngeal erythema.   Eyes:      General: No scleral icterus.     Extraocular Movements: Extraocular movements intact.      Pupils: Pupils are equal, round, and reactive to light.   Cardiovascular:      Rate and Rhythm: Normal rate and regular rhythm.      Heart sounds: No murmur heard.  Pulmonary:      Comments: Lungs clear to auscultation  Abdominal:      General: Bowel sounds are normal. There is no distension.      Tenderness: There is no abdominal tenderness.      Comments: Abdomen flat, thin.  No palpable masses or organomegaly   Musculoskeletal:         General: No swelling or tenderness. Normal range of motion.      Cervical back: Neck supple. No tenderness.   Lymphadenopathy:      Cervical: No cervical adenopathy.      Upper Body:      Right upper body: No supraclavicular or axillary adenopathy.      Left upper body: No supraclavicular or axillary adenopathy.   Skin:     General: Skin is warm and dry.      Findings: No rash.   Neurological:      General: No focal deficit present.      Mental Status: She is alert and oriented to person, place, and time.      Cranial Nerves: No cranial nerve deficit.     "  Motor: No weakness.       ECOG SCORE    0 - Fully active-able to carry on all pre-disease performance without restriction          LABORATORY  Recent Results (from the past 2 weeks)   CBC with Differential    Collection Time: 02/12/25 12:25 PM   Result Value Ref Range    WBC 6.80 4.50 - 11.50 x10(3)/mcL    RBC 3.94 (L) 4.20 - 5.40 x10(6)/mcL    Hgb 12.3 12.0 - 16.0 g/dL    Hct 37.6 37.0 - 47.0 %    MCV 95.4 (H) 80.0 - 94.0 fL    MCH 31.2 (H) 27.0 - 31.0 pg    MCHC 32.7 (L) 33.0 - 36.0 g/dL    RDW 13.8 11.5 - 17.0 %    Platelet 285 130 - 400 x10(3)/mcL    MPV 9.1 7.4 - 10.4 fL    Neut % 66.4 %    Lymph % 20.9 %    Mono % 8.7 %    Eos % 2.9 %    Basophil % 1.0 %    Imm Grans % 0.1 %    Neut # 4.51 2.1 - 9.2 x10(3)/mcL    Lymph # 1.42 0.6 - 4.6 x10(3)/mcL    Mono # 0.59 0.1 - 1.3 x10(3)/mcL    Eos # 0.20 0 - 0.9 x10(3)/mcL    Baso # 0.07 <=0.2 x10(3)/mcL    Imm Gran # 0.01 0.00 - 0.04 x10(3)/mcL                            11/15/23   1/02/24   2/22/24   4/26/24   6/26/24   7/19/24   10/08/24   12/03/24  CEA              21.7          14.5        13.1       16.4        22.4         50.1         24.9         20.5  CA 27-29       31.5          29.8        28.0       32.0        45.0         63.8        43.4         45.8      Assessment:   Recurrent metastatic breast cancer with peritoneal carcinomatosis - ER+ 4%, IN neg, Her-2 neg (IHC 0)  Leukopenia secondary to chemotherapy - resolved  History of bilateral breast cancer s/p bilateral mastectomies  Chronic constipation    Plan:   Patient elected not to pursue hormonal therapy as recommended with exemestane.  She has no signs or symptoms suspicious for disease progression.  She will be scheduled for follow-up CT scans of the chest, abdomen and pelvis in 3 months.    RTC after the scans for a follow-up visit and clinical exam.  She was instructed to call or return sooner for any new symptoms or problems.      JOEL DAVIDSON MD    Other Physicians  Dr. Darrell Cervantes Dr.  Yana Swan

## 2025-02-12 ENCOUNTER — LAB VISIT (OUTPATIENT)
Dept: LAB | Facility: HOSPITAL | Age: 70
End: 2025-02-12
Attending: INTERNAL MEDICINE
Payer: MEDICARE

## 2025-02-12 ENCOUNTER — OFFICE VISIT (OUTPATIENT)
Dept: HEMATOLOGY/ONCOLOGY | Facility: CLINIC | Age: 70
End: 2025-02-12
Payer: MEDICARE

## 2025-02-12 VITALS
HEART RATE: 57 BPM | RESPIRATION RATE: 15 BRPM | BODY MASS INDEX: 17.74 KG/M2 | TEMPERATURE: 98 F | WEIGHT: 96.38 LBS | HEIGHT: 62 IN | SYSTOLIC BLOOD PRESSURE: 111 MMHG | DIASTOLIC BLOOD PRESSURE: 61 MMHG | OXYGEN SATURATION: 99 %

## 2025-02-12 DIAGNOSIS — C50.311 MALIGNANT NEOPLASM OF LOWER-INNER QUADRANT OF RIGHT BREAST OF FEMALE, ESTROGEN RECEPTOR POSITIVE: ICD-10-CM

## 2025-02-12 DIAGNOSIS — C50.311 MALIGNANT NEOPLASM OF LOWER-INNER QUADRANT OF RIGHT BREAST OF FEMALE, ESTROGEN RECEPTOR POSITIVE: Primary | ICD-10-CM

## 2025-02-12 DIAGNOSIS — Z17.0 MALIGNANT NEOPLASM OF LOWER-INNER QUADRANT OF RIGHT BREAST OF FEMALE, ESTROGEN RECEPTOR POSITIVE: ICD-10-CM

## 2025-02-12 DIAGNOSIS — T45.1X5A ANEMIA DUE TO CHEMOTHERAPY: ICD-10-CM

## 2025-02-12 DIAGNOSIS — C78.6 SECONDARY MALIGNANT NEOPLASM OF PERITONEUM: ICD-10-CM

## 2025-02-12 DIAGNOSIS — Z17.0 MALIGNANT NEOPLASM OF LOWER-INNER QUADRANT OF RIGHT BREAST OF FEMALE, ESTROGEN RECEPTOR POSITIVE: Primary | ICD-10-CM

## 2025-02-12 DIAGNOSIS — D64.81 ANEMIA DUE TO CHEMOTHERAPY: ICD-10-CM

## 2025-02-12 LAB
BASOPHILS # BLD AUTO: 0.07 X10(3)/MCL
BASOPHILS NFR BLD AUTO: 1 %
EOSINOPHIL # BLD AUTO: 0.2 X10(3)/MCL (ref 0–0.9)
EOSINOPHIL NFR BLD AUTO: 2.9 %
ERYTHROCYTE [DISTWIDTH] IN BLOOD BY AUTOMATED COUNT: 13.8 % (ref 11.5–17)
HCT VFR BLD AUTO: 37.6 % (ref 37–47)
HGB BLD-MCNC: 12.3 G/DL (ref 12–16)
IMM GRANULOCYTES # BLD AUTO: 0.01 X10(3)/MCL (ref 0–0.04)
IMM GRANULOCYTES NFR BLD AUTO: 0.1 %
LYMPHOCYTES # BLD AUTO: 1.42 X10(3)/MCL (ref 0.6–4.6)
LYMPHOCYTES NFR BLD AUTO: 20.9 %
MCH RBC QN AUTO: 31.2 PG (ref 27–31)
MCHC RBC AUTO-ENTMCNC: 32.7 G/DL (ref 33–36)
MCV RBC AUTO: 95.4 FL (ref 80–94)
MONOCYTES # BLD AUTO: 0.59 X10(3)/MCL (ref 0.1–1.3)
MONOCYTES NFR BLD AUTO: 8.7 %
NEUTROPHILS # BLD AUTO: 4.51 X10(3)/MCL (ref 2.1–9.2)
NEUTROPHILS NFR BLD AUTO: 66.4 %
PLATELET # BLD AUTO: 285 X10(3)/MCL (ref 130–400)
PMV BLD AUTO: 9.1 FL (ref 7.4–10.4)
RBC # BLD AUTO: 3.94 X10(6)/MCL (ref 4.2–5.4)
WBC # BLD AUTO: 6.8 X10(3)/MCL (ref 4.5–11.5)

## 2025-02-12 PROCEDURE — 99999 PR PBB SHADOW E&M-EST. PATIENT-LVL IV: CPT | Mod: PBBFAC,,, | Performed by: INTERNAL MEDICINE

## 2025-02-12 PROCEDURE — 36415 COLL VENOUS BLD VENIPUNCTURE: CPT

## 2025-02-12 PROCEDURE — 99214 OFFICE O/P EST MOD 30 MIN: CPT | Mod: PBBFAC | Performed by: INTERNAL MEDICINE

## 2025-02-12 PROCEDURE — 85025 COMPLETE CBC W/AUTO DIFF WBC: CPT

## 2025-03-12 ENCOUNTER — PATIENT MESSAGE (OUTPATIENT)
Dept: HEMATOLOGY/ONCOLOGY | Facility: CLINIC | Age: 70
End: 2025-03-12
Payer: MEDICARE

## 2025-03-28 ENCOUNTER — PATIENT MESSAGE (OUTPATIENT)
Dept: HEMATOLOGY/ONCOLOGY | Facility: CLINIC | Age: 70
End: 2025-03-28
Payer: MEDICARE

## 2025-04-07 NOTE — PROGRESS NOTES
Subjective:       Patient ID: Kimberlyn Selby is a 69 y.o. female.    Chief Complaint: Abdominal pain    Diagnosis:  Recurrent metastatic breast cancer with peritoneal carcinomatosis - ER+ 4%, AK 0, HER-2 neg (IHC 0)                         PD-L1 CPS >10, ROLANDA, low TMB, PIK3CA E365K and E0315I mutations                     Stage IA R breast cancer 8/11 (T1b N0 M0), 0.7 cm, GI, ER+/AK-, HER-2 negative                     Stage IIA L breast cancer 1/09 (T1c N1 M0), 1.6 cm, GIII, 1+ LN, ER/AK -, HER-2 negative                     Stage IIIA R breast cancer 1/05 (T2 N2a M0), 1.6 cm, GII, 4+ LN's, ER/AK +, HER-2 negative                     S/p bilateral mastectomies                     Post menopausal s/p hysterectomy                     Declined adjuvant XRT & hormonal therapy with initial breast cancer     Treatment History  Adjuvant TAC x 6 completed 6/05  Adjuvant TC x 4 completed 5/09 --> XRT L breast  Xeloda 8/23-7/24  Eribulin x 6 (8/24-12/24)    Current Therapy:  Observation     Clinical History: WF status post right lumpectomy 1/05 for invasive breast cancer with the above characteristics. She completed 6 cycles of adjuvant TAC but declined radiation. She was placed on Arimidex but discontinued treatment due to significant myalgias. She had similar side effects with Femara and also complained of bone aching due to Tamoxifen and stopped therapy 9/05. Due to her hormone receptor status, she was placed on Evista but again was unable to tolerate therapy. She had an abnormal surveillance mammogram 11/08 showing clustered microcalcifications in the upper outer quadrant of the left breast with an associated 1 cm mass by ultrasound. 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 Patient underwent left lumpectomy and axillary dissection 1/15/09. Final pathology as outlined above. Postop course was complicated by cellulitis. She completed 4 cycles of adjuvant TC 5/09. She eventually  consented to adjuvant radiation therapy to the left breast. Surveillance CT PET scan 12/09 showed no abnormal hypermetabolic activity. Mammogram 6/22/11 showed a new suspicious finding in the right breast at the 4:00 position. Ultrasound was suspicious for malignancy. Ultrasound-guided core biopsies revealed invasive ductal carcinoma strongly ER positive at 85%, NV negative and HER-2 negative. CT PET scan 5/11 showed no abnormal hypermetabolic activity to indicate recurrent or metastatic disease. Although patient did have radiation therapy post lumpectomy on the left side, she never had radiation therapy to the right breast. She elected to undergo bilateral mastectomies and prophylactic right oophorectomy 8/16/11. Final pathology showed a 0.7 cm grade 1 infiltrating ductal carcinoma the right breast. Lamar dissection was not done to her previous surgery. She declined BRCA testing. Adjuvant hormonal therapy was recommended with Aromasin but she declined. She underwent a screening colonoscopy 6/5/13 which showed diverticula but no other abnormal findings; 10 year followup was recommended.      She was seen for a surveillance appointment 8/24/21 and did not return for her annual visit in 2022.    She developed symptoms of GE reflux and constipation that she treated with OTC medications without significant improvement.  She denied significant weight loss.  She was referred for a CT of the abdomen and pelvis by GI 6/2/23 ([facility]) that showed a diffuse abnormal enhancing soft tissue density in the retroperitoneum and mesenteric root with nodular intense enhancement.  Findings suspicious for an infiltrative lymphoproliferative disorder.  Mass encased the aorta without stenosis.  IVC appeared mildly narrowed without focal internal filling defect.  There were no additional abnormal findings.  Spleen was unremarkable.   EGD 6/13/23 showed mild chronic reflux changes and gastritis.  Colonoscopy showed a single polyp  which was removed.  Pathology was consistent with metastatic breast cancer.  She underwent a diagnostic laparoscopy 7/12/23 revealing peritoneal carcinomatosis.  Biopsies were positive for metastatic poorly differentiated adenocarcinoma with signet ring morphology consistent with breast primary (LIZ-3 and CK7 positive; CK20 and CDX2 negative).  ER was low positive 4%, FL negative and HER2 negative (IHC 0).    Molecular Testing:  PDL1 CPS >10, ROLANDA, low TMB.  Mutations of BLM, CHEK2, PIK3CA E365K, SMARCA4 and JAK1.  PIK3CA E365K AND R0561q are not approved bio-markers for treatment    CT-PET 7/28/23:  Poorly delineated bulky abdominal lymphadenopathy with low-grade FDG uptake, SUV 2.4.  Small volume ascites.  No definite metastatic disease outside of the abdomen.  MRI Brain 8/2/23:  No evidence of metastatic disease.  Mild chronic microvascular ischemia and atrophy.    She was initiated on treatment with oral Xeloda.  She did not tolerate dose escalation secondary to hand-foot syndrome.  She required dose adjustment to manage her side effects.    CT C/A/P 11/15/23:  Improved confluent retroperitoneal and mesenteric adenopathy.  Improved mesenteric soft tissue density and edema.  CT C/A/P 2/27/24:  Radiology reported no detrimental change from 11/23.  My review shows further decrease in the soft tissue density in the retroperitoneum and mesentery with no new sites of measurable disease.  Chronic constipation.  MRI brain 4/5/24: No acute findings or metastatic disease.  CT C/A/P 7/19/24:  No pulmonary or hepatic lesions.  Moderate-to-large amount of stool diffusely throughout the colon.  Mesenteric soft tissue density stable to slightly decreased.  Numerous small sclerotic skeletal lesions mildly increased in size and density.  CT C/A/P 10/07/24:  Stable osteoblastic skeletal metastases.  No pulmonary or hepatic lesions.  Slight decrease in mesenteric density with resolution of previous pelvic ascites.  Persistent  large amount of stool throughout the colon.  CT C/A/P 1/10/25:  Unchanged treated osteoblastic bone metastases.  Lungs and liver clear.  No significant mesentery density or nodularity, no ascites.  Chronic constipation.    After completing 6 cycles of eribulin with a favorable disease response, additional treatment was recommended with exemestane secondary to her low ER positivity.  After considering the risks, benefits and side-effects, she decided not to start the treatment.    Interval History  Mrs. Selby is here today by herself for an unscheduled visit with complaints of abdominal pain.  She was evaluated by her PCP 3/24/25 for similar symptoms.  Clinical exam was compatible with an umbilical and right inguinal hernia.  She has chronic constipation, which worsens the symptoms associated with the hernias.  She does not have nausea or intractable pain.  She is currently on a treatment break and due to undergo restaging for her recurrent metastatic breast cancer next month.      Review of Systems   Constitutional:  Negative for activity change, appetite change, fatigue, fever and unexpected weight change.   HENT:  Negative for mouth sores, sore throat and trouble swallowing.    Eyes: Negative.  Negative for visual disturbance.   Respiratory:  Negative for cough and shortness of breath.    Cardiovascular:  Negative for chest pain, palpitations and leg swelling.   Gastrointestinal:  Positive for abdominal pain and constipation. Negative for abdominal distention, diarrhea and nausea.   Genitourinary:  Negative for dysuria, flank pain and frequency.   Musculoskeletal:  Negative for arthralgias and back pain.        No bone pain   Integumentary:  Negative for pallor and rash.   Neurological:  Negative for dizziness, weakness, numbness and headaches.   Hematological:  Negative for adenopathy. Does not bruise/bleed easily.   Psychiatric/Behavioral: Negative.  The patient is not nervous/anxious.        PMHx:  Bilateral  "breast cancer, arthritis, hypothyroidism, GERD  PSHx:  Bilateral breast lumpectomies, bilateral mastectomies, hysterectomy/BSO, MediPort insertion and removal, uterine suspension, diagnostic laparoscopy  SH:  Lifetime nonsmoker, no significant alcohol use.  Lives in [place] with her .  FH:  Her father had kidney cancer, son had appendiceal cancer and MGF had liver cancer.    Objective:        /68 (Patient Position: Sitting)   Pulse (!) 51   Temp 97.8 °F (36.6 °C)   Resp 15   Ht 5' 2" (1.575 m)   Wt 42.5 kg (93 lb 11.2 oz)   SpO2 97%   BMI 17.14 kg/m²    Physical Exam  Constitutional:       Comments: Thin white female in NAD   HENT:      Head: Normocephalic.      Mouth/Throat:      Mouth: Mucous membranes are moist.      Pharynx: Oropharynx is clear. No posterior oropharyngeal erythema.   Eyes:      General: No scleral icterus.     Extraocular Movements: Extraocular movements intact.   Cardiovascular:      Rate and Rhythm: Normal rate and regular rhythm.      Heart sounds: No murmur heard.  Pulmonary:      Comments: Lungs clear to auscultation  Abdominal:      General: Bowel sounds are normal.      Comments: Palpable fullness just right of the midline and RLQ, mildly tender   Musculoskeletal:         General: No swelling or tenderness. Normal range of motion.      Cervical back: Neck supple. No tenderness.   Lymphadenopathy:      Cervical: No cervical adenopathy.      Upper Body:      Right upper body: No supraclavicular or axillary adenopathy.      Left upper body: No supraclavicular or axillary adenopathy.   Skin:     General: Skin is warm and dry.      Findings: No rash.   Neurological:      General: No focal deficit present.      Mental Status: She is alert and oriented to person, place, and time.      Cranial Nerves: No cranial nerve deficit.      Motor: No weakness.       ECOG SCORE    1 - Restricted in strenuous activity-ambulatory and able to carry out work of a light nature      "     LABORATORY  No results found for this or any previous visit (from the past 2 weeks).                           11/15/23   1/02/24   2/22/24   4/26/24   6/26/24   7/19/24   10/08/24   12/03/24  CEA              21.7          14.5        13.1       16.4        22.4         50.1         24.9         20.5  CA 27-29       31.5          29.8        28.0       32.0        45.0         63.8        43.4         45.8      Assessment:   Recurrent metastatic breast cancer with peritoneal carcinomatosis - ER+ 4%, CO neg, Her-2 neg (IHC 0)  Leukopenia secondary to chemotherapy - resolved  History of bilateral breast cancer s/p bilateral mastectomies  Chronic constipation  Abdominal pain s/t hernia versus symptoms of disease    Plan:   Patient is experiencing intermittent abdominal pain with areas of fullness/bulging.  Will refer for CT scans for better evaluation.  Regular bowel motility to minimize symptoms.  Avoid straining, heavy lifting and consider belt/compression garment in the interim.  RTC after scans for follow-up.  Patient will call with questions or concerns in the interim.  All questions answered to the satisfaction of the patient.    JON DIXON, FNP-C  [facility] [place]     Other Physicians  Dr. Darrell Swan

## 2025-04-08 ENCOUNTER — OFFICE VISIT (OUTPATIENT)
Dept: HEMATOLOGY/ONCOLOGY | Facility: CLINIC | Age: 70
End: 2025-04-08
Payer: MEDICARE

## 2025-04-08 VITALS
HEIGHT: 62 IN | HEART RATE: 51 BPM | OXYGEN SATURATION: 97 % | RESPIRATION RATE: 15 BRPM | WEIGHT: 93.69 LBS | SYSTOLIC BLOOD PRESSURE: 124 MMHG | BODY MASS INDEX: 17.24 KG/M2 | DIASTOLIC BLOOD PRESSURE: 68 MMHG | TEMPERATURE: 98 F

## 2025-04-08 DIAGNOSIS — C50.311 MALIGNANT NEOPLASM OF LOWER-INNER QUADRANT OF RIGHT BREAST OF FEMALE, ESTROGEN RECEPTOR POSITIVE: Primary | ICD-10-CM

## 2025-04-08 DIAGNOSIS — Z17.0 MALIGNANT NEOPLASM OF LOWER-INNER QUADRANT OF RIGHT BREAST OF FEMALE, ESTROGEN RECEPTOR POSITIVE: Primary | ICD-10-CM

## 2025-04-08 DIAGNOSIS — C78.6 SECONDARY MALIGNANT NEOPLASM OF PERITONEUM: ICD-10-CM

## 2025-04-08 DIAGNOSIS — R10.33 PERIUMBILICAL ABDOMINAL PAIN: ICD-10-CM

## 2025-04-08 PROCEDURE — 99214 OFFICE O/P EST MOD 30 MIN: CPT | Mod: S$PBB,,, | Performed by: NURSE PRACTITIONER

## 2025-04-08 PROCEDURE — 99214 OFFICE O/P EST MOD 30 MIN: CPT | Mod: PBBFAC | Performed by: NURSE PRACTITIONER

## 2025-04-08 PROCEDURE — 99999 PR PBB SHADOW E&M-EST. PATIENT-LVL IV: CPT | Mod: PBBFAC,,, | Performed by: NURSE PRACTITIONER

## 2025-04-16 ENCOUNTER — HOSPITAL ENCOUNTER (OUTPATIENT)
Dept: RADIOLOGY | Facility: HOSPITAL | Age: 70
Discharge: HOME | End: 2025-04-16
Attending: INTERNAL MEDICINE
Payer: MEDICARE

## 2025-04-16 DIAGNOSIS — C50.311 MALIGNANT NEOPLASM OF LOWER-INNER QUADRANT OF RIGHT BREAST OF FEMALE, ESTROGEN RECEPTOR POSITIVE: ICD-10-CM

## 2025-04-16 DIAGNOSIS — C78.6 SECONDARY MALIGNANT NEOPLASM OF PERITONEUM: ICD-10-CM

## 2025-04-16 DIAGNOSIS — Z17.0 MALIGNANT NEOPLASM OF LOWER-INNER QUADRANT OF RIGHT BREAST OF FEMALE, ESTROGEN RECEPTOR POSITIVE: ICD-10-CM

## 2025-04-16 PROCEDURE — 71260 CT THORAX DX C+: CPT | Mod: TC

## 2025-04-16 PROCEDURE — 25500020 PHARM REV CODE 255: Performed by: INTERNAL MEDICINE

## 2025-04-16 RX ORDER — DIATRIZOATE MEGLUMINE AND DIATRIZOATE SODIUM 660; 100 MG/ML; MG/ML
30 SOLUTION ORAL; RECTAL
Status: COMPLETED | OUTPATIENT
Start: 2025-04-16 | End: 2025-04-16

## 2025-04-16 RX ADMIN — DIATRIZOATE MEGLUMINE AND DIATRIZOATE SODIUM 30 ML: 660; 100 LIQUID ORAL; RECTAL at 01:04

## 2025-04-16 RX ADMIN — IOHEXOL 75 ML: 350 INJECTION, SOLUTION INTRAVENOUS at 01:04

## 2025-05-02 ENCOUNTER — PATIENT MESSAGE (OUTPATIENT)
Dept: HEMATOLOGY/ONCOLOGY | Facility: CLINIC | Age: 70
End: 2025-05-02
Payer: MEDICARE

## 2025-05-02 NOTE — PROGRESS NOTES
Subjective:       Patient ID: Kimberlyn Selby is a 69 y.o. female.    Chief Complaint:  Increased abdominal bloating, intermittent nausea, decreased appetite and weight loss    Diagnosis:  Recurrent metastatic breast cancer with peritoneal carcinomatosis - ER+ 4%, IA 0, HER-2 neg (IHC 0)                         PD-L1 CPS >10, ROLANDA, low TMB, PIK3CA E365K and [id number] mutations                     Stage IA R breast cancer 8/11 (T1b N0 M0), 0.7 cm, GI, ER+/IA-, HER-2 negative                     Stage IIA L breast cancer 1/09 (T1c N1 M0), 1.6 cm, GIII, 1+ LN, ER/IA -, HER-2 negative                     Stage IIIA R breast cancer 1/05 (T2 N2a M0), 1.6 cm, GII, 4+ LN's, ER/IA +, HER-2 negative                     S/p bilateral mastectomies                     Post menopausal s/p hysterectomy                     Declined adjuvant XRT & hormonal therapy with initial breast cancer     Treatment History  Adjuvant TAC x 6 completed 6/05  Adjuvant TC x 4 completed 5/09 --> XRT L breast  Xeloda 8/23-7/24  Eribulin x 6 (8/24-12/24)    Current Therapy:  Observation     Clinical History: WF status post right lumpectomy 1/05 for invasive breast cancer with the above characteristics. She completed 6 cycles of adjuvant TAC but declined radiation. She was placed on Arimidex but discontinued treatment due to significant myalgias. She had similar side effects with Femara and also complained of bone aching due to Tamoxifen and stopped therapy 9/05. Due to her hormone receptor status, she was placed on Evista but again was unable to tolerate therapy. She had an abnormal surveillance mammogram 11/08 showing clustered microcalcifications in the upper outer quadrant of the left breast with an associated 1 cm mass by ultrasound. 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 Patient underwent left lumpectomy and axillary dissection 1/15/09. Final pathology as outlined above. Postop course was complicated by  cellulitis. She completed 4 cycles of adjuvant TC 5/09. She eventually consented to adjuvant radiation therapy to the left breast. Surveillance CT PET scan 12/09 showed no abnormal hypermetabolic activity. Mammogram 6/22/11 showed a new suspicious finding in the right breast at the 4:00 position. Ultrasound was suspicious for malignancy. Ultrasound-guided core biopsies revealed invasive ductal carcinoma strongly ER positive at 85%, VA negative and HER-2 negative. CT PET scan 5/11 showed no abnormal hypermetabolic activity to indicate recurrent or metastatic disease. Although patient did have radiation therapy post lumpectomy on the left side, she never had radiation therapy to the right breast. She elected to undergo bilateral mastectomies and prophylactic right oophorectomy 8/16/11. Final pathology showed a 0.7 cm grade 1 infiltrating ductal carcinoma the right breast. Lamar dissection was not done to her previous surgery. She declined BRCA testing. Adjuvant hormonal therapy was recommended with Aromasin but she declined. She underwent a screening colonoscopy 6/5/13 which showed diverticula but no other abnormal findings; 10 year followup was recommended.      She was seen for a surveillance appointment 8/24/21 and did not return for her annual visit in 2022.    She developed symptoms of GE reflux and constipation that she treated with OTC medications without significant improvement.  She denied significant weight loss.  She was referred for a CT of the abdomen and pelvis by GI 6/2/23 ([facility]) that showed a diffuse abnormal enhancing soft tissue density in the retroperitoneum and mesenteric root with nodular intense enhancement.  Findings suspicious for an infiltrative lymphoproliferative disorder.  Mass encased the aorta without stenosis.  IVC appeared mildly narrowed without focal internal filling defect.  There were no additional abnormal findings.  Spleen was unremarkable.   EGD 6/13/23 showed mild  chronic reflux changes and gastritis.  Colonoscopy showed a single polyp which was removed.  Pathology was consistent with metastatic breast cancer.  She underwent a diagnostic laparoscopy 7/12/23 revealing peritoneal carcinomatosis.  Biopsies were positive for metastatic poorly differentiated adenocarcinoma with signet ring morphology consistent with breast primary (LIZ-3 and CK7 positive; CK20 and CDX2 negative).  ER was low positive 4%, CA negative and HER2 negative (IHC 0).    Molecular Testing:  PDL1 CPS >10, ROLANDA, low TMB.  Mutations of BLM, CHEK2, PIK3CA E365K, SMARCA4 and JAK1.  PIK3CA E365K AND J6000r are not approved bio-markers for treatment    CT-PET 7/28/23:  Poorly delineated bulky abdominal lymphadenopathy with low-grade FDG uptake, SUV 2.4.  Small volume ascites.  No definite metastatic disease outside of the abdomen.  MRI Brain 8/2/23:  No evidence of metastatic disease.  Mild chronic microvascular ischemia and atrophy.    She was initiated on treatment with oral Xeloda.  She did not tolerate dose escalation secondary to hand-foot syndrome.  She required dose adjustment to manage her side effects.    CT C/A/P 11/15/23:  Improved confluent retroperitoneal and mesenteric adenopathy.  Improved mesenteric soft tissue density and edema.  CT C/A/P 2/27/24:  Radiology reported no detrimental change from 11/23.  My review shows further decrease in the soft tissue density in the retroperitoneum and mesentery with no new sites of measurable disease.  Chronic constipation.  MRI brain 4/5/24: No acute findings or metastatic disease.  CT C/A/P 7/19/24:  No pulmonary or hepatic lesions.  Moderate-to-large amount of stool diffusely throughout the colon.  Mesenteric soft tissue density stable to slightly decreased.  Numerous small sclerotic skeletal lesions mildly increased in size and density.  CT C/A/P 10/07/24:  Stable osteoblastic skeletal metastases.  No pulmonary or hepatic lesions.  Slight decrease in  mesenteric density with resolution of previous pelvic ascites.  Persistent large amount of stool throughout the colon.  CT C/A/P 1/10/25:  Unchanged treated osteoblastic bone metastases.  Lungs and liver clear.  No significant mesentery density or nodularity, no ascites.  Chronic constipation.    After completing 6 cycles of Eribulin with a favorable disease response, additional treatment was recommended with exemestane secondary to her low ER positivity.  After considering the risks, benefits and side-effects, she decided not to start the treatment.    CT C/A/P 4/16/25 (My review):  Increased poorly defined soft tissue peritoneal and mesenteric thickening.  New small volume ascites.  Unchanged bony sclerotic lesions from 1/10/25.    Interval History  She returns to the office today for a 6 week follow-up visit accompanied by a close friend.  She has been having progressive symptoms of abdominal bloating and a sensation of fullness associated with a decreased appetite and intermittent nausea.  She has had mild weight loss.  She was concerned that her symptoms were related to a possible hernia.  She notes some thickening around her umbilicus.  She does not have discrete abdominal pain.  She has chronic constipation for which she takes daily lactulose.  Laboratory testing shows significant increase in her CEA and CA 27-29 levels compared to previous testing.  CT scans from 4/16/25 were interpreted as stable by Radiology.  However, on my review I see significant increased thickening in the mesentery and peritoneum likely representing disease progression.  She is inquiring if a PET scan would be beneficial in monitoring her disease.  She did not have FDG avid disease on a previous PET scan 7/23.      Review of Systems   Constitutional:  Positive for fatigue and unexpected weight change. Negative for activity change, appetite change and fever.   HENT:  Negative for mouth sores, sore throat and trouble swallowing.   "  Eyes: Negative.    Respiratory:  Negative for cough and shortness of breath.    Cardiovascular:  Negative for chest pain, palpitations and leg swelling.   Gastrointestinal:  Positive for abdominal pain, constipation, nausea and vomiting. Negative for abdominal distention, diarrhea and reflux.        Mild abdominal bloating   Genitourinary:  Negative for dysuria, flank pain and frequency.   Musculoskeletal:  Negative for arthralgias and back pain.        No bone pain   Integumentary:  Negative for pallor and rash.   Neurological:  Negative for dizziness, weakness, numbness and headaches.   Hematological:  Negative for adenopathy. Does not bruise/bleed easily.   Psychiatric/Behavioral: Negative.         PMHx:  Bilateral breast cancer, arthritis, hypothyroidism, GERD  PSHx:  Bilateral breast lumpectomies, bilateral mastectomies, hysterectomy/BSO, MediPort insertion and removal, uterine suspension, diagnostic laparoscopy  SH:  Lifetime nonsmoker, no significant alcohol use.  Lives in [place] with her .  FH:  Her father had kidney cancer, son had appendiceal cancer and MGF had liver cancer.    Objective:        /63   Pulse 61   Temp 98.4 °F (36.9 °C)   Resp 15   Ht 5' 2" (1.575 m)   Wt 43.4 kg (95 lb 9.6 oz)   SpO2 99%   BMI 17.49 kg/m²    Physical Exam  Constitutional:       Comments: Thin white female in NAD   HENT:      Head: Normocephalic.      Mouth/Throat:      Mouth: Mucous membranes are moist.      Pharynx: Oropharynx is clear. No posterior oropharyngeal erythema.   Eyes:      General: No scleral icterus.     Extraocular Movements: Extraocular movements intact.   Cardiovascular:      Rate and Rhythm: Normal rate and regular rhythm.      Heart sounds: No murmur heard.  Pulmonary:      Comments: Lungs clear to auscultation  Abdominal:      Comments: Mild epigastric tenderness and palpable fullness RLQ.  Periumbilical thickening without well-defined nodule.  Bowel sounds decreased.  "   Musculoskeletal:         General: No swelling or tenderness. Normal range of motion.      Cervical back: Neck supple. No tenderness.   Lymphadenopathy:      Cervical: No cervical adenopathy.      Upper Body:      Right upper body: No supraclavicular or axillary adenopathy.      Left upper body: No supraclavicular or axillary adenopathy.   Skin:     General: Skin is warm and dry.      Findings: No rash.   Neurological:      General: No focal deficit present.      Mental Status: She is alert and oriented to person, place, and time.      Cranial Nerves: No cranial nerve deficit.      Motor: No weakness.       ECOG SCORE    1 - Restricted in strenuous activity-ambulatory and able to carry out work of a light nature          LABORATORY  Recent Results (from the past 2 weeks)   Cancer Antigen 27-29    Collection Time: 05/12/25  3:13 PM   Result Value Ref Range    Breast Carcinoma Assoc Ag(CA 27.29) 180 (H) <=38.0 U/mL   CEA    Collection Time: 05/12/25  3:13 PM   Result Value Ref Range    Carcinoembryonic Antigen 230.60 (H) 0.00 - 3.00 ng/mL   Comprehensive Metabolic Panel    Collection Time: 05/12/25  3:13 PM   Result Value Ref Range    Sodium 139 136 - 145 mmol/L    Potassium 4.0 3.5 - 5.1 mmol/L    Chloride 102 98 - 107 mmol/L    CO2 26 23 - 31 mmol/L    Glucose 96 82 - 115 mg/dL    Blood Urea Nitrogen 15.2 9.8 - 20.1 mg/dL    Creatinine 0.82 0.55 - 1.02 mg/dL    Calcium 9.5 8.4 - 10.2 mg/dL    Protein Total 7.9 (H) 5.8 - 7.6 gm/dL    Albumin 4.1 3.4 - 4.8 g/dL    Globulin 3.8 (H) 2.4 - 3.5 gm/dL    Albumin/Globulin Ratio 1.1 1.1 - 2.0 ratio    Bilirubin Total 0.3 <=1.5 mg/dL    ALP 91 40 - 150 unit/L    ALT 13 0 - 55 unit/L    AST 27 11 - 45 unit/L    eGFR >60 mL/min/1.73/m2    Anion Gap 11.0 mEq/L    BUN/Creatinine Ratio 19    CBC with Differential    Collection Time: 05/12/25  3:13 PM   Result Value Ref Range    WBC 6.23 4.50 - 11.50 x10(3)/mcL    RBC 3.64 (L) 4.20 - 5.40 x10(6)/mcL    Hgb 11.7 (L) 12.0 - 16.0  g/dL    Hct 35.2 (L) 37.0 - 47.0 %    MCV 96.7 (H) 80.0 - 94.0 fL    MCH 32.1 (H) 27.0 - 31.0 pg    MCHC 33.2 33.0 - 36.0 g/dL    RDW 12.8 11.5 - 17.0 %    Platelet 257 130 - 400 x10(3)/mcL    MPV 10.2 7.4 - 10.4 fL    Neut % 71.9 %    Lymph % 13.0 %    Mono % 12.0 %    Eos % 1.8 %    Basophil % 1.0 %    Imm Grans % 0.3 %    Neut # 4.48 2.1 - 9.2 x10(3)/mcL    Lymph # 0.81 0.6 - 4.6 x10(3)/mcL    Mono # 0.75 0.1 - 1.3 x10(3)/mcL    Eos # 0.11 0 - 0.9 x10(3)/mcL    Baso # 0.06 <=0.2 x10(3)/mcL    Imm Gran # 0.02 0.00 - 0.04 x10(3)/mcL                           1/02/24   2/22/24   4/26/24   6/26/24   7/19/24   10/08/24   12/03/24   5/12/25  CEA               14.5        13.1       16.4        22.4         50.1         24.9          20.5        230.6  CA 27-29        29.8        28.0       32.0        45.0         63.8        43.4          45.8        180.0      Assessment:   Recurrent metastatic breast cancer with peritoneal carcinomatosis - ER+ 4%, VT neg, Her-2 neg (IHC 0)  History of bilateral breast cancer s/p bilateral mastectomies  Chronic constipation    Plan:   She has evidence of disease progression at this time based on her progressive GI symptoms, elevated tumor markers and CT findings.  Additional treatment was recommended with the antibody-drug conjugate, Datopotamab deruxtecan.  Benefits, risks, toxicities and side effects of this treatment regimen were discussed and reviewed in detail. All questions were answered. Literature regarding the treatment plan and specific drug information was also provided.  She will be scheduled to start treatment next week on 5/21/25 following additional patient education.  We discussed the reasons why CT-PET scan has not been a useful modality for following her disease secondary to minimal glucose uptake of her tumor.  She understands that her disease is incurable and that treatment goals remain palliative.  She will return for a follow-up visit 2 weeks after her 1st cycle  of therapy with repeat laboratory.      JOEL DAVIDSON MD    Other Physicians  Dr. Darrell Swan

## 2025-05-12 ENCOUNTER — LAB VISIT (OUTPATIENT)
Dept: LAB | Facility: HOSPITAL | Age: 70
End: 2025-05-12
Attending: INTERNAL MEDICINE
Payer: MEDICARE

## 2025-05-12 DIAGNOSIS — C78.6 SECONDARY MALIGNANT NEOPLASM OF PERITONEUM: ICD-10-CM

## 2025-05-12 DIAGNOSIS — Z17.0 MALIGNANT NEOPLASM OF LOWER-INNER QUADRANT OF RIGHT BREAST OF FEMALE, ESTROGEN RECEPTOR POSITIVE: ICD-10-CM

## 2025-05-12 DIAGNOSIS — C50.311 MALIGNANT NEOPLASM OF LOWER-INNER QUADRANT OF RIGHT BREAST OF FEMALE, ESTROGEN RECEPTOR POSITIVE: ICD-10-CM

## 2025-05-12 LAB
ALBUMIN SERPL-MCNC: 4.1 G/DL (ref 3.4–4.8)
ALBUMIN/GLOB SERPL: 1.1 RATIO (ref 1.1–2)
ALP SERPL-CCNC: 91 UNIT/L (ref 40–150)
ALT SERPL-CCNC: 13 UNIT/L (ref 0–55)
ANION GAP SERPL CALC-SCNC: 11 MEQ/L
AST SERPL-CCNC: 27 UNIT/L (ref 11–45)
BASOPHILS # BLD AUTO: 0.06 X10(3)/MCL
BASOPHILS NFR BLD AUTO: 1 %
BILIRUB SERPL-MCNC: 0.3 MG/DL
BUN SERPL-MCNC: 15.2 MG/DL (ref 9.8–20.1)
CALCIUM SERPL-MCNC: 9.5 MG/DL (ref 8.4–10.2)
CEA SERPL-MCNC: 230.6 NG/ML (ref 0–3)
CHLORIDE SERPL-SCNC: 102 MMOL/L (ref 98–107)
CO2 SERPL-SCNC: 26 MMOL/L (ref 23–31)
CREAT SERPL-MCNC: 0.82 MG/DL (ref 0.55–1.02)
CREAT/UREA NIT SERPL: 19
EOSINOPHIL # BLD AUTO: 0.11 X10(3)/MCL (ref 0–0.9)
EOSINOPHIL NFR BLD AUTO: 1.8 %
ERYTHROCYTE [DISTWIDTH] IN BLOOD BY AUTOMATED COUNT: 12.8 % (ref 11.5–17)
GFR SERPLBLD CREATININE-BSD FMLA CKD-EPI: >60 ML/MIN/1.73/M2
GLOBULIN SER-MCNC: 3.8 GM/DL (ref 2.4–3.5)
GLUCOSE SERPL-MCNC: 96 MG/DL (ref 82–115)
HCT VFR BLD AUTO: 35.2 % (ref 37–47)
HGB BLD-MCNC: 11.7 G/DL (ref 12–16)
IMM GRANULOCYTES # BLD AUTO: 0.02 X10(3)/MCL (ref 0–0.04)
IMM GRANULOCYTES NFR BLD AUTO: 0.3 %
LYMPHOCYTES # BLD AUTO: 0.81 X10(3)/MCL (ref 0.6–4.6)
LYMPHOCYTES NFR BLD AUTO: 13 %
MCH RBC QN AUTO: 32.1 PG (ref 27–31)
MCHC RBC AUTO-ENTMCNC: 33.2 G/DL (ref 33–36)
MCV RBC AUTO: 96.7 FL (ref 80–94)
MONOCYTES # BLD AUTO: 0.75 X10(3)/MCL (ref 0.1–1.3)
MONOCYTES NFR BLD AUTO: 12 %
NEUTROPHILS # BLD AUTO: 4.48 X10(3)/MCL (ref 2.1–9.2)
NEUTROPHILS NFR BLD AUTO: 71.9 %
PLATELET # BLD AUTO: 257 X10(3)/MCL (ref 130–400)
PMV BLD AUTO: 10.2 FL (ref 7.4–10.4)
POTASSIUM SERPL-SCNC: 4 MMOL/L (ref 3.5–5.1)
PROT SERPL-MCNC: 7.9 GM/DL (ref 5.8–7.6)
RBC # BLD AUTO: 3.64 X10(6)/MCL (ref 4.2–5.4)
SODIUM SERPL-SCNC: 139 MMOL/L (ref 136–145)
WBC # BLD AUTO: 6.23 X10(3)/MCL (ref 4.5–11.5)

## 2025-05-12 PROCEDURE — 85025 COMPLETE CBC W/AUTO DIFF WBC: CPT

## 2025-05-12 PROCEDURE — 80053 COMPREHEN METABOLIC PANEL: CPT

## 2025-05-12 PROCEDURE — 36415 COLL VENOUS BLD VENIPUNCTURE: CPT

## 2025-05-12 PROCEDURE — 86300 IMMUNOASSAY TUMOR CA 15-3: CPT

## 2025-05-12 PROCEDURE — 82378 CARCINOEMBRYONIC ANTIGEN: CPT

## 2025-05-14 ENCOUNTER — OFFICE VISIT (OUTPATIENT)
Dept: HEMATOLOGY/ONCOLOGY | Facility: CLINIC | Age: 70
End: 2025-05-14
Payer: MEDICARE

## 2025-05-14 VITALS
BODY MASS INDEX: 17.6 KG/M2 | HEART RATE: 61 BPM | WEIGHT: 95.63 LBS | TEMPERATURE: 98 F | OXYGEN SATURATION: 99 % | HEIGHT: 62 IN | SYSTOLIC BLOOD PRESSURE: 119 MMHG | RESPIRATION RATE: 15 BRPM | DIASTOLIC BLOOD PRESSURE: 63 MMHG

## 2025-05-14 DIAGNOSIS — Z17.0 MALIGNANT NEOPLASM OF LOWER-INNER QUADRANT OF RIGHT BREAST OF FEMALE, ESTROGEN RECEPTOR POSITIVE: Primary | ICD-10-CM

## 2025-05-14 DIAGNOSIS — C78.6 SECONDARY MALIGNANT NEOPLASM OF PERITONEUM: ICD-10-CM

## 2025-05-14 DIAGNOSIS — C50.311 MALIGNANT NEOPLASM OF LOWER-INNER QUADRANT OF RIGHT BREAST OF FEMALE, ESTROGEN RECEPTOR POSITIVE: Primary | ICD-10-CM

## 2025-05-14 LAB — CANCER AG27-29 SERPL-ACNC: 180 U/ML

## 2025-05-14 PROCEDURE — 99999 PR PBB SHADOW E&M-EST. PATIENT-LVL V: CPT | Mod: PBBFAC,,, | Performed by: INTERNAL MEDICINE

## 2025-05-14 PROCEDURE — 99215 OFFICE O/P EST HI 40 MIN: CPT | Mod: PBBFAC | Performed by: INTERNAL MEDICINE

## 2025-05-14 RX ORDER — REISHI MUSHROOM EXTRACT 188 MG
CAPSULE ORAL
COMMUNITY

## 2025-05-14 RX ORDER — GRAPE SEED OIL 100 %
OIL (ML) MISCELLANEOUS
COMMUNITY

## 2025-05-14 RX ORDER — UBIDECARENONE 30 MG
30 CAPSULE ORAL 3 TIMES DAILY
COMMUNITY

## 2025-05-15 DIAGNOSIS — Z17.0 MALIGNANT NEOPLASM OF LOWER-INNER QUADRANT OF RIGHT BREAST OF FEMALE, ESTROGEN RECEPTOR POSITIVE: Primary | ICD-10-CM

## 2025-05-15 DIAGNOSIS — C78.6 SECONDARY MALIGNANT NEOPLASM OF PERITONEUM: ICD-10-CM

## 2025-05-15 DIAGNOSIS — C50.311 MALIGNANT NEOPLASM OF LOWER-INNER QUADRANT OF RIGHT BREAST OF FEMALE, ESTROGEN RECEPTOR POSITIVE: Primary | ICD-10-CM

## 2025-05-15 RX ORDER — EPINEPHRINE 0.3 MG/.3ML
0.3 INJECTION SUBCUTANEOUS ONCE AS NEEDED
OUTPATIENT
Start: 2025-05-21

## 2025-05-15 RX ORDER — ACETAMINOPHEN 325 MG/1
650 TABLET ORAL
Start: 2025-05-21

## 2025-05-15 RX ORDER — DIPHENHYDRAMINE HYDROCHLORIDE 50 MG/ML
25 INJECTION, SOLUTION INTRAMUSCULAR; INTRAVENOUS
Start: 2025-05-21

## 2025-05-15 RX ORDER — DIPHENHYDRAMINE HYDROCHLORIDE 50 MG/ML
50 INJECTION, SOLUTION INTRAMUSCULAR; INTRAVENOUS ONCE AS NEEDED
OUTPATIENT
Start: 2025-05-21

## 2025-05-15 RX ORDER — PROCHLORPERAZINE MALEATE 5 MG
5 TABLET ORAL EVERY 6 HOURS PRN
Qty: 20 TABLET | Refills: 5 | Status: SHIPPED | OUTPATIENT
Start: 2025-05-15

## 2025-05-15 RX ORDER — PROCHLORPERAZINE EDISYLATE 5 MG/ML
5 INJECTION INTRAMUSCULAR; INTRAVENOUS ONCE AS NEEDED
OUTPATIENT
Start: 2025-05-21

## 2025-05-15 RX ORDER — DEXAMETHASONE 4 MG/1
8 TABLET ORAL DAILY
Qty: 6 TABLET | Refills: 11 | Status: SHIPPED | OUTPATIENT
Start: 2025-05-15

## 2025-05-15 RX ORDER — SODIUM CHLORIDE 0.9 % (FLUSH) 0.9 %
10 SYRINGE (ML) INJECTION
OUTPATIENT
Start: 2025-05-21

## 2025-05-15 RX ORDER — HEPARIN 100 UNIT/ML
500 SYRINGE INTRAVENOUS
OUTPATIENT
Start: 2025-05-21

## 2025-05-15 RX ORDER — DEXAMETHASONE 0.5 MG/5ML
0.5 ELIXIR ORAL 4 TIMES DAILY
Qty: 240 ML | Refills: 5 | Status: SHIPPED | OUTPATIENT
Start: 2025-05-15

## 2025-05-19 ENCOUNTER — CLINICAL SUPPORT (OUTPATIENT)
Dept: HEMATOLOGY/ONCOLOGY | Facility: CLINIC | Age: 70
End: 2025-05-19
Payer: MEDICARE

## 2025-05-19 ENCOUNTER — OFFICE VISIT (OUTPATIENT)
Dept: HEMATOLOGY/ONCOLOGY | Facility: CLINIC | Age: 70
End: 2025-05-19
Payer: MEDICARE

## 2025-05-19 VITALS
HEART RATE: 63 BPM | WEIGHT: 99 LBS | DIASTOLIC BLOOD PRESSURE: 75 MMHG | SYSTOLIC BLOOD PRESSURE: 144 MMHG | BODY MASS INDEX: 18.22 KG/M2 | OXYGEN SATURATION: 100 % | TEMPERATURE: 97 F | HEIGHT: 62 IN

## 2025-05-19 DIAGNOSIS — C50.311 MALIGNANT NEOPLASM OF LOWER-INNER QUADRANT OF RIGHT BREAST OF FEMALE, ESTROGEN RECEPTOR POSITIVE: Primary | ICD-10-CM

## 2025-05-19 DIAGNOSIS — Z17.0 MALIGNANT NEOPLASM OF LOWER-INNER QUADRANT OF RIGHT BREAST OF FEMALE, ESTROGEN RECEPTOR POSITIVE: Primary | ICD-10-CM

## 2025-05-19 DIAGNOSIS — C78.6 SECONDARY MALIGNANT NEOPLASM OF PERITONEUM: ICD-10-CM

## 2025-05-19 PROCEDURE — 99212 OFFICE O/P EST SF 10 MIN: CPT | Mod: PBBFAC

## 2025-05-19 PROCEDURE — 99999 PR PBB SHADOW E&M-EST. PATIENT-LVL I: CPT | Mod: PBBFAC,,,

## 2025-05-19 PROCEDURE — 99211 OFF/OP EST MAY X REQ PHY/QHP: CPT | Mod: PBBFAC,27

## 2025-05-19 PROCEDURE — 99999 PR PBB SHADOW E&M-EST. PATIENT-LVL II: CPT | Mod: PBBFAC,,,

## 2025-05-19 RX ORDER — POLYETHYLENE GLYCOL 400 AND PROPYLENE GLYCOL 4; 3 MG/ML; MG/ML
1 SOLUTION/ DROPS OPHTHALMIC 4 TIMES DAILY
Qty: 20 ML | Refills: 5 | Status: SHIPPED | OUTPATIENT
Start: 2025-05-19

## 2025-05-19 NOTE — PROGRESS NOTES
THERAPY EDUCATION: DATOPOTAMB DERUXTECAN   Subjective:      Patient ID: Kimberlyn Selby is a 69 y.o. female.    Chief Complaint:  Therapy Education     Diagnosis:  Recurrent metastatic breast cancer with peritoneal carcinomatosis - ER+ 4%, CO 0, HER-2 neg (IHC 0)                         PD-L1 CPS >10, ROLANDA, low TMB, PIK3CA E365K and F6696L mutations                     Stage IA R breast cancer 8/11 (T1b N0 M0), 0.7 cm, GI, ER+/CO-, HER-2 negative                     Stage IIA L breast cancer 1/09 (T1c N1 M0), 1.6 cm, GIII, 1+ LN, ER/CO -, HER-2 negative                     Stage IIIA R breast cancer 1/05 (T2 N2a M0), 1.6 cm, GII, 4+ LN's, ER/CO +, HER-2 negative                     S/p bilateral mastectomies                     Post menopausal s/p hysterectomy                     Declined adjuvant XRT & hormonal therapy with initial breast cancer     Treatment History  Adjuvant TAC x 6 completed 6/05  Adjuvant TC x 4 completed 5/09 --> XRT L breast  Xeloda 8/23-7/24  Eribulin x 6 (8/24-12/24)    Current Therapy:  Datopotamb deruxtecan Q3W to start on 5/21/25.     Clinical History: WF status post right lumpectomy 1/05 for invasive breast cancer with the above characteristics. She completed 6 cycles of adjuvant TAC but declined radiation. She was placed on Arimidex but discontinued treatment due to significant myalgias. She had similar side effects with Femara and also complained of bone aching due to Tamoxifen and stopped therapy 9/05. Due to her hormone receptor status, she was placed on Evista but again was unable to tolerate therapy. She had an abnormal surveillance mammogram 11/08 showing clustered microcalcifications in the upper outer quadrant of the left breast with an associated 1 cm mass by ultrasound. 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 Patient underwent left lumpectomy and axillary dissection 1/15/09. Final pathology as outlined above. Postop course was  complicated by cellulitis. She completed 4 cycles of adjuvant TC 5/09. She eventually consented to adjuvant radiation therapy to the left breast. Surveillance CT PET scan 12/09 showed no abnormal hypermetabolic activity. Mammogram 6/22/11 showed a new suspicious finding in the right breast at the 4:00 position. Ultrasound was suspicious for malignancy. Ultrasound-guided core biopsies revealed invasive ductal carcinoma strongly ER positive at 85%, SD negative and HER-2 negative. CT PET scan 5/11 showed no abnormal hypermetabolic activity to indicate recurrent or metastatic disease. Although patient did have radiation therapy post lumpectomy on the left side, she never had radiation therapy to the right breast. She elected to undergo bilateral mastectomies and prophylactic right oophorectomy 8/16/11. Final pathology showed a 0.7 cm grade 1 infiltrating ductal carcinoma the right breast. Lamar dissection was not done to her previous surgery. She declined BRCA testing. Adjuvant hormonal therapy was recommended with Aromasin but she declined. She underwent a screening colonoscopy 6/5/13 which showed diverticula but no other abnormal findings; 10 year followup was recommended.      She was seen for a surveillance appointment 8/24/21 and did not return for her annual visit in 2022.    She developed symptoms of GE reflux and constipation that she treated with OTC medications without significant improvement.  She denied significant weight loss.  She was referred for a CT of the abdomen and pelvis by GI 6/2/23 ([facility]) that showed a diffuse abnormal enhancing soft tissue density in the retroperitoneum and mesenteric root with nodular intense enhancement.  Findings suspicious for an infiltrative lymphoproliferative disorder.  Mass encased the aorta without stenosis.  IVC appeared mildly narrowed without focal internal filling defect.  There were no additional abnormal findings.  Spleen was unremarkable.   EGD 6/13/23  showed mild chronic reflux changes and gastritis.  Colonoscopy showed a single polyp which was removed.  Pathology was consistent with metastatic breast cancer.  She underwent a diagnostic laparoscopy 7/12/23 revealing peritoneal carcinomatosis.  Biopsies were positive for metastatic poorly differentiated adenocarcinoma with signet ring morphology consistent with breast primary (LIZ-3 and CK7 positive; CK20 and CDX2 negative).  ER was low positive 4%, MO negative and HER2 negative (IHC 0).    Molecular Testing:  PDL1 CPS >10, ROLANDA, low TMB.  Mutations of BLM, CHEK2, PIK3CA E365K, SMARCA4 and JAK1.  PIK3CA E365K AND M7973h are not approved bio-markers for treatment    CT-PET 7/28/23:  Poorly delineated bulky abdominal lymphadenopathy with low-grade FDG uptake, SUV 2.4.  Small volume ascites.  No definite metastatic disease outside of the abdomen.  MRI Brain 8/2/23:  No evidence of metastatic disease.  Mild chronic microvascular ischemia and atrophy.    She was initiated on treatment with oral Xeloda.  She did not tolerate dose escalation secondary to hand-foot syndrome.  She required dose adjustment to manage her side effects.    CT C/A/P 11/15/23:  Improved confluent retroperitoneal and mesenteric adenopathy.  Improved mesenteric soft tissue density and edema.  CT C/A/P 2/27/24:  Radiology reported no detrimental change from 11/23.  My review shows further decrease in the soft tissue density in the retroperitoneum and mesentery with no new sites of measurable disease.  Chronic constipation.  MRI brain 4/5/24: No acute findings or metastatic disease.  CT C/A/P 7/19/24:  No pulmonary or hepatic lesions.  Moderate-to-large amount of stool diffusely throughout the colon.  Mesenteric soft tissue density stable to slightly decreased.  Numerous small sclerotic skeletal lesions mildly increased in size and density.  CT C/A/P 10/07/24:  Stable osteoblastic skeletal metastases.  No pulmonary or hepatic lesions.  Slight  decrease in mesenteric density with resolution of previous pelvic ascites.  Persistent large amount of stool throughout the colon.  CT C/A/P 1/10/25:  Unchanged treated osteoblastic bone metastases.  Lungs and liver clear.  No significant mesentery density or nodularity, no ascites.  Chronic constipation.    After completing 6 cycles of Eribulin with a favorable disease response, additional treatment was recommended with exemestane secondary to her low ER positivity.  After considering the risks, benefits and side-effects, she decided not to start the treatment.    CT C/A/P 4/16/25 (My review):  Increased poorly defined soft tissue peritoneal and mesenteric thickening.  New small volume ascites.  Unchanged bony sclerotic lesions from 1/10/25.    Interval History    5/19/25: Mrs. Selby presents to the clinic by herself for therapy education. Patient reports no major complaints at today's visit. Denies fever, chills, SOB, N/V/D, constipation, recent infection, chest pain or unexplained bleeding or bruising.      5/14/25:She returns to the office today for a 6 week follow-up visit accompanied by a close friend.  She has been having progressive symptoms of abdominal bloating and a sensation of fullness associated with a decreased appetite and intermittent nausea.  She has had mild weight loss.  She was concerned that her symptoms were related to a possible hernia.  She notes some thickening around her umbilicus.  She does not have discrete abdominal pain.  She has chronic constipation for which she takes daily lactulose.  Laboratory testing shows significant increase in her CEA and CA 27-29 levels compared to previous testing.  CT scans from 4/16/25 were interpreted as stable by Radiology.  However, on my review I see significant increased thickening in the mesentery and peritoneum likely representing disease progression.  She is inquiring if a PET scan would be beneficial in monitoring her disease.  She did not have FDG  "avid disease on a previous PET scan 7/23.      Review of Systems   Constitutional:  Positive for fatigue and unexpected weight change. Negative for activity change, appetite change and fever.   HENT:  Negative for mouth sores, sore throat and trouble swallowing.    Eyes: Negative.  Negative for visual disturbance.   Respiratory:  Negative for cough and shortness of breath.    Cardiovascular:  Negative for chest pain, palpitations and leg swelling.   Gastrointestinal:  Positive for abdominal pain, constipation, nausea and vomiting. Negative for abdominal distention, diarrhea and reflux.        Mild abdominal bloating   Genitourinary:  Negative for dysuria, flank pain and frequency.   Musculoskeletal:  Negative for arthralgias and back pain.        No bone pain   Integumentary:  Negative for pallor and rash.   Neurological:  Negative for dizziness, weakness, numbness and headaches.   Hematological:  Negative for adenopathy. Does not bruise/bleed easily.   Psychiatric/Behavioral: Negative.         PMHx:  Bilateral breast cancer, arthritis, hypothyroidism, GERD  PSHx:  Bilateral breast lumpectomies, bilateral mastectomies, hysterectomy/BSO, MediPort insertion and removal, uterine suspension, diagnostic laparoscopy  SH:  Lifetime nonsmoker, no significant alcohol use.  Lives in [place] with her .  FH:  Her father had kidney cancer, son had appendiceal cancer and MGF had liver cancer.    Objective:       BP (!) 144/75 (BP Location: Left arm, Patient Position: Sitting)   Pulse 63   Temp 97.1 °F (36.2 °C) (Oral)   Ht 5' 2" (1.575 m)   Wt 44.9 kg (99 lb)   SpO2 100%   BMI 18.11 kg/m²             LABORATORY  Recent Results (from the past 2 weeks)   Cancer Antigen 27-29    Collection Time: 05/12/25  3:13 PM   Result Value Ref Range    Breast Carcinoma Assoc Ag(CA 27.29) 180 (H) <=38.0 U/mL   CEA    Collection Time: 05/12/25  3:13 PM   Result Value Ref Range    Carcinoembryonic Antigen 230.60 (H) 0.00 - 3.00 " ng/mL   Comprehensive Metabolic Panel    Collection Time: 05/12/25  3:13 PM   Result Value Ref Range    Sodium 139 136 - 145 mmol/L    Potassium 4.0 3.5 - 5.1 mmol/L    Chloride 102 98 - 107 mmol/L    CO2 26 23 - 31 mmol/L    Glucose 96 82 - 115 mg/dL    Blood Urea Nitrogen 15.2 9.8 - 20.1 mg/dL    Creatinine 0.82 0.55 - 1.02 mg/dL    Calcium 9.5 8.4 - 10.2 mg/dL    Protein Total 7.9 (H) 5.8 - 7.6 gm/dL    Albumin 4.1 3.4 - 4.8 g/dL    Globulin 3.8 (H) 2.4 - 3.5 gm/dL    Albumin/Globulin Ratio 1.1 1.1 - 2.0 ratio    Bilirubin Total 0.3 <=1.5 mg/dL    ALP 91 40 - 150 unit/L    ALT 13 0 - 55 unit/L    AST 27 11 - 45 unit/L    eGFR >60 mL/min/1.73/m2    Anion Gap 11.0 mEq/L    BUN/Creatinine Ratio 19    CBC with Differential    Collection Time: 05/12/25  3:13 PM   Result Value Ref Range    WBC 6.23 4.50 - 11.50 x10(3)/mcL    RBC 3.64 (L) 4.20 - 5.40 x10(6)/mcL    Hgb 11.7 (L) 12.0 - 16.0 g/dL    Hct 35.2 (L) 37.0 - 47.0 %    MCV 96.7 (H) 80.0 - 94.0 fL    MCH 32.1 (H) 27.0 - 31.0 pg    MCHC 33.2 33.0 - 36.0 g/dL    RDW 12.8 11.5 - 17.0 %    Platelet 257 130 - 400 x10(3)/mcL    MPV 10.2 7.4 - 10.4 fL    Neut % 71.9 %    Lymph % 13.0 %    Mono % 12.0 %    Eos % 1.8 %    Basophil % 1.0 %    Imm Grans % 0.3 %    Neut # 4.48 2.1 - 9.2 x10(3)/mcL    Lymph # 0.81 0.6 - 4.6 x10(3)/mcL    Mono # 0.75 0.1 - 1.3 x10(3)/mcL    Eos # 0.11 0 - 0.9 x10(3)/mcL    Baso # 0.06 <=0.2 x10(3)/mcL    Imm Gran # 0.02 0.00 - 0.04 x10(3)/mcL                           1/02/24   2/22/24   4/26/24   6/26/24   7/19/24   10/08/24   12/03/24   5/12/25  CEA               14.5        13.1       16.4        22.4         50.1         24.9          20.5        230.6  CA 27-29        29.8        28.0       32.0        45.0         63.8        43.4          45.8        180.0      Assessment:   Recurrent metastatic breast cancer with peritoneal carcinomatosis - ER+ 4%, AR neg, Her-2 neg (IHC 0)  History of bilateral breast cancer s/p bilateral  mastectomies  Chronic constipation  Plan:   -Therapy education completed on 5/19/25.  -Plans to start Datopotamab deruxtecan Q3W on 5/21/25. Patient understood that she will receive premedications given 30 minutes prior to each treatment to help prevent nausea.  -Antiemetic regimen schedule given and calendar made for guidance    Dexamethasone- Take 2 tablets by mouth daily on Days 2-4 of each treatment.  -Compazine PRN prescribed for at home with instructions to be taken by mouth every 6 hours as needed for nausea.   -OTC Miralax as needed for constipation   -Emphasized adequate hand-hygiene and limited contact with people who are sick.   -Monitor and notify any bleeding in urine, stool, or sputum.  As well as unusual bleeding or bruising and stomach pain.   - Emphasized hydration with 4 16 oz bottles of water a day.    -Start peg 400-propylene glycol 0.4-0.3 % Drop 1 drop into both eyes 4 (four) times daily. - Both Eyes   -Start Decadron liquid 5 mLs (0.5 mg total) by mouth 4 (four) times daily. to prevent mucositis   -Call clinic if fever >100.4, shakes or chills, shortness of breath, chest pain, uncontrolled vomiting or diarrhea, pain and tingling in the chest or arm, or just not feeling well.    -Plans to RTC on 6/3/25 for same day labs and toxicity check with MD.    DISCUSSION:    1.  A total of 60 minutes were spent in counseling today, in which 100% were face-to-face.  At today's therapy teaching session, we discussed the patient's cancer diagnosis as well as planned therapy regimen, protocol, side effects and toxicities.  A handout of each therapeutic agent in the regimen was provided and reviewed in detail.    2.  The following side effects were discussed but not limited to:    Datopotamab Deruxtecan Injection Side Effects:  Allergic Reactions  Chest Pain  Chills  Constipation  Cough  Feeling Faint  Fever  Hair Loss  Infection  Itching  Nausea  Pain  Rash  Shortness of Breath  Swelling  Vomiting                 a.  Discussed the risk of infection while on therapy related to pancytopenia, specifically a decrease in their white blood cell count.  Instructed to contact our office for temperature >100.4 F, chills, sudden onset cough or shortness of breath, symptoms of a urinary tract infection.                b.  Discussed the risk of anemia. Instructed to contact our office for dizziness, heart palpitations, or extreme or sudden changes in weakness.                c.  Discussed the risk of thrombocytopenia, which increases the risk of bruising or bleeding.  Instructed the patient to contact our office for spontaneous signs of bleeding, including nose bleeds, bleeding from the gums or mouth, blood in sputum, urine or stool and unusual or excessive bruising or rash.                d.  Discussed GI side effects including weight changes, changes in appetite, altered sense of taste, stomatitis, nausea, vomiting, diarrhea, constipation, and heartburn.                e.  Discussed  side effects including painful urination, changes in the amount of urination, possible urine color changes.  Discussed fertility issues and to prevent  pregnancy if of child bearing age.                f.  Discussed neurological side effects including the risk of peripheral neuropathy, either temporary or permanent.                g.  Discussed the potential for skin, hair, and nail changes.       3.  Instructed to contact our office for discussion of medication changes, the addition of vitamin and/or herbal supplementation as they may interact with some chemotherapy agents.    4.  Discussed dietary modifications and the need to maintain adequate caloric intake and proper oral hydration.  Recommended 64 ounces of fluid per day.    5.  Discussed anti-emetic protocol and bowel regimen protocol.    6.  Office contact information given including after hours number.  Discussed there is an oncologist on call 24/7, 365 days including weekends.  Provided  primary nurse's information .    7.  In summary, the patient is in agreement with the plan of care.  Questions appeared to be answered to their satisfaction. Consented the patient to the treatment plan and the patient was educated on the planned duration of the treatment and schedule of the treatment administration. Copy to be scanned into the chart.    All questions answered to the satisfaction of the patient and family.     Follow up appointments given to patient.     ANNAMARIA PIPER  PATIENT EDUCATOR  [facility]

## 2025-05-19 NOTE — PROGRESS NOTES
"Oncology Nutrition Evaluation      Kimberlyn Selby   1955    Oncology Provider:   Daren Atkins MD    Reason for Visit:  Change in Treatment Education    Oncology/Hematology Diagnosis:   Recurrent metastatic breast cancer with peritoneal carcinomatosis - ER+ 4%, WI 0, HER-2 neg (IHC 0)  PD-L1 CPS >10, ROLANDA, low TMB, PIK3CA E365K and I3462O mutations  Stage IA R breast cancer 8/11 (T1b N0 M0), 0.7 cm, GI, ER+/WI-, HER-2 negative  Stage IIA L breast cancer 1/09 (T1c N1 M0), 1.6 cm, GIII, 1+ LN, ER/WI -, HER-2 negative  Stage IIIA R breast cancer 1/05 (T2 N2a M0), 1.6 cm, GII, 4+ LN's, ER/WI +, HER-2 negative  S/p bilateral mastectomies  Post menopausal s/p hysterectomy  Declined adjuvant XRT & hormonal therapy with initial breast cancer    Treatment Plan:  Datopotamab deruxtecan     Treatment History:  Adjuvant TAC x 6 completed 6/05  Adjuvant TC x 4 completed 5/09 --> XRT L breast  Xeloda 8/23-7/24  Eribulin x 6 (8/24-12/24)    Nutrition Recommendations:  1. Continue current diet as tolerated; small frequent meals. Avoid foods that trigger digestive issues    Nutrition Assessment    5/19/25: This is a 69 y.o.female with a medical diagnosis of stg IV breast CA with peritoneal carcinomatosis. She reports slightly decreased appetite but stable wt. She notes some difficulty tolerating certain foods like tomatoes/tomato products, but this is not new for her. She notes two separate episodes of emesis that occurred late in the evening after consuming beef, but otherwise no c/o n/v/c/d.     Nutrition Factors Affecting Intake  decreased appetite    PMHx: EDITH breast CA s/p mastectomies, JONI/BSO, GERD     Allergies: Patient has no known allergies.    Current Medications:  Current Medications[1]    Labs: no new    Anthropometrics    Height:   Ht Readings from Last 1 Encounters:   05/19/25 5' 2" (1.575 m)      Weight:   Wt Readings from Last 1 Encounters:   05/19/25 44.9 kg (99 lb)        Usual Body Weight: 44.9 kg (99 " lb)  % Weight Change: 0    BMI: 18.1 m2  Underweight (<18.5)    Ideal Weight: 50 kg (110 lb)  % Ideal Weight: 90%      Nutrition Diagnosis    No nutrition diagnosis at this time.    Nutrition Risk  low    Nutrition Intervention    Interventions(treatment strategy):  modified composition of meals/snacks      Nutrition Monitoring and Evaluation    Ongoing monitoring not warranted at this time. Please consult RD prn.        Demetria Jacobs, MS, RD, , LDN                                                                                                                                                                                                                                                                                       [1]   Current Outpatient Medications:     ascorbic acid, vitamin C, (VITAMIN C) 100 MG tablet, Take 100 mg by mouth once daily., Disp: , Rfl:     biotin 1 mg Cap, Take by mouth., Disp: , Rfl:     calcium carbonate/vitamin D3 (VITAMIN D-3 ORAL), Take by mouth., Disp: , Rfl:     co-enzyme Q-10 30 mg capsule, Take 30 mg by mouth 3 (three) times daily., Disp: , Rfl:     dexAMETHasone (DECADRON) 0.5 mg/5 mL Elix, Take 5 mLs (0.5 mg total) by mouth 4 (four) times daily. to prevent mucositis., Disp: 240 mL, Rfl: 5    dexAMETHasone (DECADRON) 4 MG Tab, Take 2 tablets (8 mg total) by mouth once daily. Take days 2, 3, and 4 of each cycle., Disp: 6 tablet, Rfl: 11    enzymes,digestive (DIGESTIVE ENZYMES ORAL), Take by mouth., Disp: , Rfl:     grape seed oil, bulk, 100 % Oil, by Misc.(Non-Drug; Combo Route) route., Disp: , Rfl:     Lactobac no.41/Bifidobact no.7 (PROBIOTIC-10 ORAL), Take by mouth., Disp: , Rfl:     lactulose (CHRONULAC) 10 gram/15 mL solution, Take 30 mLs (20 g total) by mouth Daily., Disp: 600 mL, Rfl: 3    omega-3 fatty acids/fish oil (FISH OIL-OMEGA-3 FATTY ACIDS) 300-1,000 mg capsule, Take by mouth once daily., Disp: , Rfl:     ondansetron (ZOFRAN-ODT) 8 MG TbDL, Take 1 tablet (8 mg  total) by mouth every 6 (six) hours as needed (nausea)., Disp: 90 tablet, Rfl: 1    oxybutynin (DITROPAN-XL) 5 MG TR24, Take 1 tablet (5 mg total) by mouth once daily., Disp: 30 tablet, Rfl: 5    pantoprazole (PROTONIX) 40 MG tablet, Take 1 tablet (40 mg total) by mouth once daily., Disp: 90 tablet, Rfl: 1    peg 400-propylene glycol (SYSTANE, PROPYLENE GLYCOL,) 0.4-0.3 % Drop, Place 1 drop into both eyes 4 (four) times daily., Disp: 20 mL, Rfl: 5    prochlorperazine (COMPAZINE) 5 MG tablet, Take 1 tablet (5 mg total) by mouth every 6 (six) hours as needed for Nausea., Disp: 20 tablet, Rfl: 5    reishi mushroom extract 188 mg Cap, Take by mouth., Disp: , Rfl:     sertraline (ZOLOFT) 100 MG tablet, TAKE 1 TABLET EVERY DAY, Disp: 90 tablet, Rfl: 1    thyroid, pork, (ARMOUR THYROID) 15 mg Tab, TAKE ONE TABLET BY MOUTH IN THE MORNING BEFORE BREAKFAST*TO BE TAKEN WITH ARMOUR THYROID 30MG FOR A TOTAL OF 45MG*, Disp: 90 tablet, Rfl: 1    thyroid, pork, (ARMOUR THYROID) 30 mg Tab, TAKE 1 TABLET BEFORE BREAKFAST WITH 15MG FOR 45MG TOTAL DAILY DOSE., Disp: 90 tablet, Rfl: 1    vitamin E 100 UNIT capsule, Take 100 Units by mouth once daily., Disp: , Rfl:

## 2025-05-19 NOTE — NURSING
"Oncology Navigation   Intake  Cancer Type: Breast     Treatment  Current Status: Active       Medical Oncologist: Dr. Daren Atkins  Chemotherapy: Planned  Chemotherapy Regimen: OP BREAST datopotamab deruxtecan Q3W          General Referrals: Beau Bear          Support Systems: Spouse/significant other; Family members  Barriers of Care: Barriers to Care "Assessment completed-no barriers noted"     Acuity  Stage: 2  Systemic Treatment - predicted or initiated: Chemotherapy Regimen with Multiple drugs (+1)  Treatment Tolerability: Has not started treatment yet/treatment fully completed and side effects resolved  ECO  Comorbidities in Medical History: 1  Hospitalization Within the Past Month: 0   Needed: 0  Support: 0  Verbalizes Financial Concerns: 0  Transportation: 0  Mental Health: PHQ Score: 0 (Distress 5/10)  History of noncompliance/frequent no shows and cancellations: 0  Verbalizes the need for more education: 0  Navigation Acuity: 5     Follow Up  3 weeks     Met with patient today following education visit. Patient is not newly diagnosed, but I have not met with her before. Introduced self and role of navigation. Assessed for barriers to care. She denies transportation and financial concerns. She denies need for DME or home health. Her distress score today was 5/10. She states that she is not depressed and leans strongly on her radha. She does feel anxious. She has lots of outside stressors including her children that have caused her distress. We discussed therapy with LCSW. Patient does not wish to meet with LCSW. She likes to journal and pray to deal with stress. She has no furtehr questions or concerns today. She is aware of how to reach navigation should she need to.  "

## 2025-05-21 ENCOUNTER — INFUSION (OUTPATIENT)
Dept: INFUSION THERAPY | Facility: HOSPITAL | Age: 70
End: 2025-05-21
Attending: INTERNAL MEDICINE
Payer: MEDICARE

## 2025-05-21 VITALS — SYSTOLIC BLOOD PRESSURE: 137 MMHG | DIASTOLIC BLOOD PRESSURE: 56 MMHG

## 2025-05-21 DIAGNOSIS — K59.00 CONSTIPATION, UNSPECIFIED CONSTIPATION TYPE: ICD-10-CM

## 2025-05-21 DIAGNOSIS — Z17.0 MALIGNANT NEOPLASM OF LOWER-INNER QUADRANT OF RIGHT BREAST OF FEMALE, ESTROGEN RECEPTOR POSITIVE: Primary | ICD-10-CM

## 2025-05-21 DIAGNOSIS — C50.311 MALIGNANT NEOPLASM OF LOWER-INNER QUADRANT OF RIGHT BREAST OF FEMALE, ESTROGEN RECEPTOR POSITIVE: Primary | ICD-10-CM

## 2025-05-21 DIAGNOSIS — C78.6 SECONDARY MALIGNANT NEOPLASM OF PERITONEUM: ICD-10-CM

## 2025-05-21 PROCEDURE — C9399 UNCLASSIFIED DRUGS OR BIOLOG: HCPCS | Performed by: INTERNAL MEDICINE

## 2025-05-21 PROCEDURE — 25000003 PHARM REV CODE 250: Performed by: INTERNAL MEDICINE

## 2025-05-21 PROCEDURE — 96375 TX/PRO/DX INJ NEW DRUG ADDON: CPT

## 2025-05-21 PROCEDURE — 63600175 PHARM REV CODE 636 W HCPCS: Performed by: INTERNAL MEDICINE

## 2025-05-21 PROCEDURE — 96365 THER/PROPH/DIAG IV INF INIT: CPT

## 2025-05-21 PROCEDURE — 96367 TX/PROPH/DG ADDL SEQ IV INF: CPT

## 2025-05-21 RX ORDER — DIPHENHYDRAMINE HYDROCHLORIDE 50 MG/ML
25 INJECTION, SOLUTION INTRAMUSCULAR; INTRAVENOUS
Status: COMPLETED | OUTPATIENT
Start: 2025-05-21 | End: 2025-05-21

## 2025-05-21 RX ORDER — EPINEPHRINE 0.3 MG/.3ML
0.3 INJECTION SUBCUTANEOUS ONCE AS NEEDED
Status: DISCONTINUED | OUTPATIENT
Start: 2025-05-21 | End: 2025-05-21 | Stop reason: HOSPADM

## 2025-05-21 RX ORDER — ACETAMINOPHEN 325 MG/1
650 TABLET ORAL
Status: COMPLETED | OUTPATIENT
Start: 2025-05-21 | End: 2025-05-21

## 2025-05-21 RX ORDER — SODIUM CHLORIDE 0.9 % (FLUSH) 0.9 %
10 SYRINGE (ML) INJECTION
Status: DISCONTINUED | OUTPATIENT
Start: 2025-05-21 | End: 2025-05-21 | Stop reason: HOSPADM

## 2025-05-21 RX ORDER — LACTULOSE 10 G/15ML
30 SOLUTION ORAL; RECTAL DAILY
Qty: 600 ML | Refills: 3 | Status: SHIPPED | OUTPATIENT
Start: 2025-05-21

## 2025-05-21 RX ORDER — PROCHLORPERAZINE EDISYLATE 5 MG/ML
5 INJECTION INTRAMUSCULAR; INTRAVENOUS ONCE AS NEEDED
Status: DISCONTINUED | OUTPATIENT
Start: 2025-05-21 | End: 2025-05-21 | Stop reason: HOSPADM

## 2025-05-21 RX ORDER — HEPARIN 100 UNIT/ML
500 SYRINGE INTRAVENOUS
Status: DISCONTINUED | OUTPATIENT
Start: 2025-05-21 | End: 2025-05-21 | Stop reason: HOSPADM

## 2025-05-21 RX ORDER — DIPHENHYDRAMINE HYDROCHLORIDE 50 MG/ML
50 INJECTION, SOLUTION INTRAMUSCULAR; INTRAVENOUS ONCE AS NEEDED
Status: DISCONTINUED | OUTPATIENT
Start: 2025-05-21 | End: 2025-05-21 | Stop reason: HOSPADM

## 2025-05-21 RX ADMIN — DIPHENHYDRAMINE HYDROCHLORIDE 25 MG: 50 INJECTION INTRAMUSCULAR; INTRAVENOUS at 11:05

## 2025-05-21 RX ADMIN — SODIUM CHLORIDE 0.25 MG: 9 INJECTION, SOLUTION INTRAVENOUS at 11:05

## 2025-05-21 RX ADMIN — APREPITANT 130 MG: 130 INJECTION, EMULSION INTRAVENOUS at 11:05

## 2025-05-21 RX ADMIN — DATOPOTAMAB DERUXTECAN: 100 INJECTION, POWDER, LYOPHILIZED, FOR SOLUTION INTRAVENOUS at 11:05

## 2025-05-21 RX ADMIN — ACETAMINOPHEN 650 MG: 325 TABLET ORAL at 11:05

## 2025-06-03 ENCOUNTER — OFFICE VISIT (OUTPATIENT)
Dept: HEMATOLOGY/ONCOLOGY | Facility: CLINIC | Age: 70
End: 2025-06-03
Payer: MEDICARE

## 2025-06-03 ENCOUNTER — LAB VISIT (OUTPATIENT)
Dept: LAB | Facility: HOSPITAL | Age: 70
End: 2025-06-03
Attending: INTERNAL MEDICINE
Payer: MEDICARE

## 2025-06-03 VITALS
TEMPERATURE: 98 F | SYSTOLIC BLOOD PRESSURE: 122 MMHG | OXYGEN SATURATION: 99 % | RESPIRATION RATE: 15 BRPM | HEIGHT: 62 IN | BODY MASS INDEX: 17.61 KG/M2 | WEIGHT: 95.69 LBS | HEART RATE: 60 BPM | DIASTOLIC BLOOD PRESSURE: 67 MMHG

## 2025-06-03 DIAGNOSIS — C50.311 MALIGNANT NEOPLASM OF LOWER-INNER QUADRANT OF RIGHT BREAST OF FEMALE, ESTROGEN RECEPTOR POSITIVE: Primary | ICD-10-CM

## 2025-06-03 DIAGNOSIS — Z17.0 MALIGNANT NEOPLASM OF LOWER-INNER QUADRANT OF RIGHT BREAST OF FEMALE, ESTROGEN RECEPTOR POSITIVE: Primary | ICD-10-CM

## 2025-06-03 DIAGNOSIS — C78.6 SECONDARY MALIGNANT NEOPLASM OF PERITONEUM: ICD-10-CM

## 2025-06-03 DIAGNOSIS — Z17.0 MALIGNANT NEOPLASM OF LOWER-INNER QUADRANT OF RIGHT BREAST OF FEMALE, ESTROGEN RECEPTOR POSITIVE: ICD-10-CM

## 2025-06-03 DIAGNOSIS — C50.311 MALIGNANT NEOPLASM OF LOWER-INNER QUADRANT OF RIGHT BREAST OF FEMALE, ESTROGEN RECEPTOR POSITIVE: ICD-10-CM

## 2025-06-03 LAB
ALBUMIN SERPL-MCNC: 3.7 G/DL (ref 3.4–4.8)
ALBUMIN/GLOB SERPL: 1 RATIO (ref 1.1–2)
ALP SERPL-CCNC: 76 UNIT/L (ref 40–150)
ALT SERPL-CCNC: 12 UNIT/L (ref 0–55)
ANION GAP SERPL CALC-SCNC: 11 MEQ/L
AST SERPL-CCNC: 23 UNIT/L (ref 11–45)
BASOPHILS # BLD AUTO: 0.03 X10(3)/MCL
BASOPHILS NFR BLD AUTO: 0.6 %
BILIRUB SERPL-MCNC: 0.3 MG/DL
BUN SERPL-MCNC: 17.3 MG/DL (ref 9.8–20.1)
CALCIUM SERPL-MCNC: 9.3 MG/DL (ref 8.4–10.2)
CHLORIDE SERPL-SCNC: 102 MMOL/L (ref 98–107)
CO2 SERPL-SCNC: 27 MMOL/L (ref 23–31)
CREAT SERPL-MCNC: 0.78 MG/DL (ref 0.55–1.02)
CREAT/UREA NIT SERPL: 22
EOSINOPHIL # BLD AUTO: 0.6 X10(3)/MCL (ref 0–0.9)
EOSINOPHIL NFR BLD AUTO: 12.6 %
ERYTHROCYTE [DISTWIDTH] IN BLOOD BY AUTOMATED COUNT: 13.1 % (ref 11.5–17)
GFR SERPLBLD CREATININE-BSD FMLA CKD-EPI: >60 ML/MIN/1.73/M2
GLOBULIN SER-MCNC: 3.8 GM/DL (ref 2.4–3.5)
GLUCOSE SERPL-MCNC: 88 MG/DL (ref 82–115)
HCT VFR BLD AUTO: 35 % (ref 37–47)
HGB BLD-MCNC: 11.3 G/DL (ref 12–16)
IMM GRANULOCYTES # BLD AUTO: 0.01 X10(3)/MCL (ref 0–0.04)
IMM GRANULOCYTES NFR BLD AUTO: 0.2 %
LYMPHOCYTES # BLD AUTO: 1.09 X10(3)/MCL (ref 0.6–4.6)
LYMPHOCYTES NFR BLD AUTO: 22.8 %
MCH RBC QN AUTO: 31.7 PG (ref 27–31)
MCHC RBC AUTO-ENTMCNC: 32.3 G/DL (ref 33–36)
MCV RBC AUTO: 98.3 FL (ref 80–94)
MONOCYTES # BLD AUTO: 0.64 X10(3)/MCL (ref 0.1–1.3)
MONOCYTES NFR BLD AUTO: 13.4 %
NEUTROPHILS # BLD AUTO: 2.41 X10(3)/MCL (ref 2.1–9.2)
NEUTROPHILS NFR BLD AUTO: 50.4 %
PLATELET # BLD AUTO: 357 X10(3)/MCL (ref 130–400)
PMV BLD AUTO: 8.6 FL (ref 7.4–10.4)
POTASSIUM SERPL-SCNC: 4.2 MMOL/L (ref 3.5–5.1)
PROT SERPL-MCNC: 7.5 GM/DL (ref 5.8–7.6)
RBC # BLD AUTO: 3.56 X10(6)/MCL (ref 4.2–5.4)
SODIUM SERPL-SCNC: 140 MMOL/L (ref 136–145)
WBC # BLD AUTO: 4.78 X10(3)/MCL (ref 4.5–11.5)

## 2025-06-03 PROCEDURE — 36415 COLL VENOUS BLD VENIPUNCTURE: CPT

## 2025-06-03 PROCEDURE — 99215 OFFICE O/P EST HI 40 MIN: CPT | Mod: PBBFAC | Performed by: INTERNAL MEDICINE

## 2025-06-03 PROCEDURE — 80053 COMPREHEN METABOLIC PANEL: CPT

## 2025-06-03 PROCEDURE — 85025 COMPLETE CBC W/AUTO DIFF WBC: CPT

## 2025-06-03 PROCEDURE — 99999 PR PBB SHADOW E&M-EST. PATIENT-LVL V: CPT | Mod: PBBFAC,,, | Performed by: INTERNAL MEDICINE

## 2025-06-08 RX ORDER — DIPHENHYDRAMINE HYDROCHLORIDE 50 MG/ML
25 INJECTION, SOLUTION INTRAMUSCULAR; INTRAVENOUS
Status: CANCELLED
Start: 2025-06-11

## 2025-06-08 RX ORDER — HEPARIN 100 UNIT/ML
500 SYRINGE INTRAVENOUS
Status: CANCELLED | OUTPATIENT
Start: 2025-06-11

## 2025-06-08 RX ORDER — DIPHENHYDRAMINE HYDROCHLORIDE 50 MG/ML
50 INJECTION, SOLUTION INTRAMUSCULAR; INTRAVENOUS ONCE AS NEEDED
Status: CANCELLED | OUTPATIENT
Start: 2025-06-11

## 2025-06-08 RX ORDER — PROCHLORPERAZINE EDISYLATE 5 MG/ML
5 INJECTION INTRAMUSCULAR; INTRAVENOUS ONCE AS NEEDED
Status: CANCELLED | OUTPATIENT
Start: 2025-06-11

## 2025-06-08 RX ORDER — SODIUM CHLORIDE 0.9 % (FLUSH) 0.9 %
10 SYRINGE (ML) INJECTION
Status: CANCELLED | OUTPATIENT
Start: 2025-06-11

## 2025-06-08 RX ORDER — ACETAMINOPHEN 325 MG/1
650 TABLET ORAL
Status: CANCELLED
Start: 2025-06-11

## 2025-06-08 RX ORDER — EPINEPHRINE 0.3 MG/.3ML
0.3 INJECTION SUBCUTANEOUS ONCE AS NEEDED
Status: CANCELLED | OUTPATIENT
Start: 2025-06-11

## 2025-06-11 ENCOUNTER — INFUSION (OUTPATIENT)
Dept: INFUSION THERAPY | Facility: HOSPITAL | Age: 70
End: 2025-06-11
Attending: INTERNAL MEDICINE
Payer: MEDICARE

## 2025-06-11 VITALS — SYSTOLIC BLOOD PRESSURE: 125 MMHG | DIASTOLIC BLOOD PRESSURE: 68 MMHG

## 2025-06-11 DIAGNOSIS — C50.311 MALIGNANT NEOPLASM OF LOWER-INNER QUADRANT OF RIGHT BREAST OF FEMALE, ESTROGEN RECEPTOR POSITIVE: Primary | ICD-10-CM

## 2025-06-11 DIAGNOSIS — Z17.0 MALIGNANT NEOPLASM OF LOWER-INNER QUADRANT OF RIGHT BREAST OF FEMALE, ESTROGEN RECEPTOR POSITIVE: Primary | ICD-10-CM

## 2025-06-11 DIAGNOSIS — C78.6 SECONDARY MALIGNANT NEOPLASM OF PERITONEUM: ICD-10-CM

## 2025-06-11 PROCEDURE — 96375 TX/PRO/DX INJ NEW DRUG ADDON: CPT

## 2025-06-11 PROCEDURE — 63600175 PHARM REV CODE 636 W HCPCS: Performed by: INTERNAL MEDICINE

## 2025-06-11 PROCEDURE — 25000003 PHARM REV CODE 250: Performed by: INTERNAL MEDICINE

## 2025-06-11 PROCEDURE — C9399 UNCLASSIFIED DRUGS OR BIOLOG: HCPCS | Performed by: INTERNAL MEDICINE

## 2025-06-11 PROCEDURE — 96365 THER/PROPH/DIAG IV INF INIT: CPT

## 2025-06-11 RX ORDER — DIPHENHYDRAMINE HYDROCHLORIDE 50 MG/ML
50 INJECTION, SOLUTION INTRAMUSCULAR; INTRAVENOUS ONCE AS NEEDED
Status: DISCONTINUED | OUTPATIENT
Start: 2025-06-11 | End: 2025-06-11 | Stop reason: HOSPADM

## 2025-06-11 RX ORDER — ACETAMINOPHEN 325 MG/1
650 TABLET ORAL
Status: COMPLETED | OUTPATIENT
Start: 2025-06-11 | End: 2025-06-11

## 2025-06-11 RX ORDER — HEPARIN 100 UNIT/ML
500 SYRINGE INTRAVENOUS
Status: DISCONTINUED | OUTPATIENT
Start: 2025-06-11 | End: 2025-06-11 | Stop reason: HOSPADM

## 2025-06-11 RX ORDER — EPINEPHRINE 0.3 MG/.3ML
0.3 INJECTION SUBCUTANEOUS ONCE AS NEEDED
Status: DISCONTINUED | OUTPATIENT
Start: 2025-06-11 | End: 2025-06-11 | Stop reason: HOSPADM

## 2025-06-11 RX ORDER — DIPHENHYDRAMINE HYDROCHLORIDE 50 MG/ML
25 INJECTION, SOLUTION INTRAMUSCULAR; INTRAVENOUS
Status: COMPLETED | OUTPATIENT
Start: 2025-06-11 | End: 2025-06-11

## 2025-06-11 RX ORDER — PROCHLORPERAZINE EDISYLATE 5 MG/ML
5 INJECTION INTRAMUSCULAR; INTRAVENOUS ONCE AS NEEDED
Status: DISCONTINUED | OUTPATIENT
Start: 2025-06-11 | End: 2025-06-11 | Stop reason: HOSPADM

## 2025-06-11 RX ORDER — SODIUM CHLORIDE 0.9 % (FLUSH) 0.9 %
10 SYRINGE (ML) INJECTION
Status: DISCONTINUED | OUTPATIENT
Start: 2025-06-11 | End: 2025-06-11 | Stop reason: HOSPADM

## 2025-06-11 RX ADMIN — ACETAMINOPHEN 650 MG: 325 TABLET ORAL at 10:06

## 2025-06-11 RX ADMIN — APREPITANT 130 MG: 130 INJECTION, EMULSION INTRAVENOUS at 10:06

## 2025-06-11 RX ADMIN — DATOPOTAMAB DERUXTECAN 260.4 MG: 100 INJECTION, POWDER, LYOPHILIZED, FOR SOLUTION INTRAVENOUS at 11:06

## 2025-06-11 RX ADMIN — DEXAMETHASONE SODIUM PHOSPHATE 0.25 MG: 4 INJECTION, SOLUTION INTRA-ARTICULAR; INTRALESIONAL; INTRAMUSCULAR; INTRAVENOUS; SOFT TISSUE at 10:06

## 2025-06-11 RX ADMIN — DIPHENHYDRAMINE HYDROCHLORIDE 25 MG: 50 INJECTION INTRAMUSCULAR; INTRAVENOUS at 10:06

## 2025-06-16 NOTE — PROGRESS NOTES
Subjective:       Patient ID: Kimberlyn Selby is a 69 y.o. female.    Chief Complaint:  Improved abdominal pain and bloating    Diagnosis:  Recurrent metastatic breast cancer with peritoneal carcinomatosis - ER+ 4%, AL 0, HER-2 neg (IHC 0)                         PD-L1 CPS >10, ROLANDA, low TMB, PIK3CA E365K and [id number] mutations                     Stage IA R breast cancer 8/11 (T1b N0 M0), 0.7 cm, GI, ER+/AL-, HER-2 negative                     Stage IIA L breast cancer 1/09 (T1c N1 M0), 1.6 cm, GIII, 1+ LN, ER/AL -, HER-2 negative                     Stage IIIA R breast cancer 1/05 (T2 N2a M0), 1.6 cm, GII, 4+ LN's, ER/AL +, HER-2 negative                     S/p bilateral mastectomies                     Post menopausal s/p hysterectomy                     Declined adjuvant XRT & hormonal therapy with initial breast cancer     Treatment History  Adjuvant TAC x 6 completed 6/05  Adjuvant TC x 4 completed 5/09 --> XRT L breast  Xeloda 8/23-7/24  Eribulin x 6 (8/24-12/24)    Current Therapy:  Datopotamab deruxtecan Q3W s/p 2 cycles (started 5/21/25)     Clinical History: WF status post right lumpectomy 1/05 for invasive breast cancer with the above characteristics. She completed 6 cycles of adjuvant TAC but declined radiation. She was placed on Arimidex but discontinued treatment due to significant myalgias. She had similar side effects with Femara and also complained of bone aching due to Tamoxifen and stopped therapy 9/05. Due to her hormone receptor status, she was placed on Evista but again was unable to tolerate therapy. She had an abnormal surveillance mammogram 11/08 showing clustered microcalcifications in the upper outer quadrant of the left breast with an associated 1 cm mass by ultrasound. 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 Patient underwent left lumpectomy and axillary dissection 1/15/09. Final pathology as outlined above. Postop course was complicated by  cellulitis. She completed 4 cycles of adjuvant TC 5/09. She eventually consented to adjuvant radiation therapy to the left breast. Surveillance CT PET scan 12/09 showed no abnormal hypermetabolic activity. Mammogram 6/22/11 showed a new suspicious finding in the right breast at the 4:00 position. Ultrasound was suspicious for malignancy. Ultrasound-guided core biopsies revealed invasive ductal carcinoma strongly ER positive at 85%, MO negative and HER-2 negative. CT PET scan 5/11 showed no abnormal hypermetabolic activity to indicate recurrent or metastatic disease. Although patient did have radiation therapy post lumpectomy on the left side, she never had radiation therapy to the right breast. She elected to undergo bilateral mastectomies and prophylactic right oophorectomy 8/16/11. Final pathology showed a 0.7 cm grade 1 infiltrating ductal carcinoma the right breast. Lamar dissection was not done to her previous surgery. She declined BRCA testing. Adjuvant hormonal therapy was recommended with Aromasin but she declined. She underwent a screening colonoscopy 6/5/13 which showed diverticula but no other abnormal findings; 10 year followup was recommended.      She was seen for a surveillance appointment 8/24/21 and did not return for her annual visit in 2022.    She developed symptoms of GE reflux and constipation that she treated with OTC medications without significant improvement.  She denied significant weight loss.  She was referred for a CT of the abdomen and pelvis by GI 6/2/23 ([facility]) that showed a diffuse abnormal enhancing soft tissue density in the retroperitoneum and mesenteric root with nodular intense enhancement.  Findings suspicious for an infiltrative lymphoproliferative disorder.  Mass encased the aorta without stenosis.  IVC appeared mildly narrowed without focal internal filling defect.  There were no additional abnormal findings.  Spleen was unremarkable.   EGD 6/13/23 showed mild  chronic reflux changes and gastritis.  Colonoscopy showed a single polyp which was removed.  Pathology was consistent with metastatic breast cancer.  She underwent a diagnostic laparoscopy 7/12/23 revealing peritoneal carcinomatosis.  Biopsies were positive for metastatic poorly differentiated adenocarcinoma with signet ring morphology consistent with breast primary (LIZ-3 and CK7 positive; CK20 and CDX2 negative).  ER was low positive 4%, VT negative and HER2 negative (IHC 0).    Molecular Testing:  PDL1 CPS >10, ROLANDA, low TMB.  Mutations of BLM, CHEK2, PIK3CA E365K, SMARCA4 and JAK1.  PIK3CA E365K AND [id number] are not approved bio-markers for treatment    CT-PET 7/28/23:  Poorly delineated bulky abdominal lymphadenopathy with low-grade FDG uptake, SUV 2.4.  Small volume ascites.  No definite metastatic disease outside of the abdomen.  MRI Brain 8/2/23:  No evidence of metastatic disease.  Mild chronic microvascular ischemia and atrophy.    She was initiated on treatment with oral Xeloda.  She did not tolerate dose escalation secondary to hand-foot syndrome.  She required dose adjustment to manage her side effects.    CT C/A/P 11/15/23:  Improved confluent retroperitoneal and mesenteric adenopathy.  Improved mesenteric soft tissue density and edema.  CT C/A/P 2/27/24:  Radiology reported no detrimental change from 11/23.  My review shows further decrease in the soft tissue density in the retroperitoneum and mesentery with no new sites of measurable disease.  Chronic constipation.  MRI brain 4/5/24: No acute findings or metastatic disease.  CT C/A/P 7/19/24:  No pulmonary or hepatic lesions.  Moderate-to-large amount of stool diffusely throughout the colon.  Mesenteric soft tissue density stable to slightly decreased.  Numerous small sclerotic skeletal lesions mildly increased in size and density.  CT C/A/P 10/07/24:  Stable osteoblastic skeletal metastases.  No pulmonary or hepatic lesions.  Slight decrease in  mesenteric density with resolution of previous pelvic ascites.  Persistent large amount of stool throughout the colon.  CT C/A/P 1/10/25:  Unchanged treated osteoblastic bone metastases.  Lungs and liver clear.  No significant mesentery density or nodularity, no ascites.  Chronic constipation.    After completing 6 cycles of Eribulin with a favorable disease response, additional treatment was recommended with exemestane secondary to her low ER positivity.  After considering the risks, benefits and side-effects, she decided not to start the treatment.    CT C/A/P 4/16/25 (My review):  Increased poorly defined soft tissue peritoneal and mesenteric thickening.  New small volume ascites.  Unchanged bony sclerotic lesions from 1/10/25.    Given her progressive symptoms and imaging findings, as well as significant elevations of her CEA and CA 27-29 levels, additional treatment was recommended with Datopotamab deruxtecan.  She started treatment on 5/21/25.    Interval History  She returns to the office today for a three-week follow-up visit accompanied by a close friend.  She has now completed 2 cycles of therapy with Datopotamab deruxtecan.  She has tolerated treatment well.  She has not had any significant cytopenias.  She reports decreased abdominal pain and bloating.  She is having more regular bowel movements.  Her weight is stable.  Repeat tumor markers drawn today are pending.      Review of Systems   Constitutional:  Positive for fatigue (mild). Negative for activity change, appetite change, fever and unexpected weight change.   HENT:  Negative for mouth sores, sore throat and trouble swallowing.    Eyes: Negative.    Respiratory:  Negative for cough and shortness of breath.    Cardiovascular:  Negative for chest pain, palpitations and leg swelling.   Gastrointestinal:  Negative for abdominal distention, abdominal pain, constipation, diarrhea, nausea and reflux.        Mild abdominal bloating   Genitourinary:   "Negative for dysuria, flank pain and frequency.   Musculoskeletal:  Negative for arthralgias and back pain.        No bone pain   Integumentary:  Negative for pallor and rash.   Neurological:  Negative for dizziness, weakness, numbness and headaches.   Hematological:  Negative for adenopathy. Does not bruise/bleed easily.   Psychiatric/Behavioral: Negative.         PMHx:  Bilateral breast cancer, arthritis, hypothyroidism, GERD  PSHx:  Bilateral breast lumpectomies, bilateral mastectomies, hysterectomy/BSO, MediPort insertion and removal, uterine suspension, diagnostic laparoscopy  SH:  Lifetime nonsmoker, no significant alcohol use.  Lives in [place] with her .  FH:  Her father had kidney cancer, son had appendiceal cancer and MGF had liver cancer.    Objective:        BP (!) 145/77   Pulse 60   Temp 98.1 °F (36.7 °C)   Resp 15   Ht 5' 2" (1.575 m)   Wt 42.3 kg (93 lb 3.2 oz)   BMI 17.05 kg/m²    Physical Exam  Constitutional:       Comments: Thin white female in NAD   HENT:      Head: Normocephalic.      Mouth/Throat:      Mouth: Mucous membranes are moist.      Pharynx: Oropharynx is clear. No posterior oropharyngeal erythema.   Eyes:      General: No scleral icterus.     Extraocular Movements: Extraocular movements intact.   Cardiovascular:      Rate and Rhythm: Normal rate and regular rhythm.      Heart sounds: No murmur heard.  Pulmonary:      Comments: Lungs clear to auscultation  Abdominal:      Comments: Mild epigastric tenderness and palpable fullness RLQ.  Periumbilical thickening without well-defined nodule.  Bowel sounds decreased.    Musculoskeletal:         General: No swelling or tenderness. Normal range of motion.      Cervical back: Neck supple. No tenderness.   Lymphadenopathy:      Cervical: No cervical adenopathy.      Upper Body:      Right upper body: No supraclavicular or axillary adenopathy.      Left upper body: No supraclavicular or axillary adenopathy.   Skin:     General: " Skin is warm and dry.      Findings: No rash.   Neurological:      General: No focal deficit present.      Mental Status: She is alert and oriented to person, place, and time.      Cranial Nerves: No cranial nerve deficit.      Motor: No weakness.       ECOG SCORE    0 - Fully active-able to carry on all pre-disease performance without restriction          LABORATORY  Recent Results (from the past 2 weeks)   CBC with Differential    Collection Time: 06/30/25  8:38 AM   Result Value Ref Range    WBC 4.44 (L) 4.50 - 11.50 x10(3)/mcL    RBC 3.76 (L) 4.20 - 5.40 x10(6)/mcL    Hgb 12.1 12.0 - 16.0 g/dL    Hct 37.1 37.0 - 47.0 %    MCV 98.7 (H) 80.0 - 94.0 fL    MCH 32.2 (H) 27.0 - 31.0 pg    MCHC 32.6 (L) 33.0 - 36.0 g/dL    RDW 13.8 11.5 - 17.0 %    Platelet 323 130 - 400 x10(3)/mcL    MPV 9.5 7.4 - 10.4 fL    Neut % 51.0 %    Lymph % 22.5 %    Mono % 12.6 %    Eos % 12.8 %    Basophil % 0.9 %    Imm Grans % 0.2 %    Neut # 2.26 2.1 - 9.2 x10(3)/mcL    Lymph # 1.00 0.6 - 4.6 x10(3)/mcL    Mono # 0.56 0.1 - 1.3 x10(3)/mcL    Eos # 0.57 0 - 0.9 x10(3)/mcL    Baso # 0.04 <=0.2 x10(3)/mcL    Imm Gran # 0.01 0.00 - 0.04 x10(3)/mcL                           1/02/24   2/22/24   4/26/24   6/26/24   7/19/24   10/08/24   12/03/24   5/12/25  CEA               14.5        13.1       16.4        22.4         50.1         24.9          20.5        230.6  CA 27-29        29.8        28.0       32.0        45.0         63.8        43.4          45.8        180.0      Assessment:   Recurrent metastatic breast cancer with peritoneal carcinomatosis - ER+ 4%, LA neg, Her-2 neg (IHC 0)  History of bilateral breast cancer s/p bilateral mastectomies  Chronic constipation - improved    Plan:   She will proceed with her 3rd cycle of Datopotamab deruxtecan at full dose on 7/02/25.  She is tolerating treatment well with symptomatic improvement.  CEA and CA 7/20/29 levels repeated today.  RTC in 3 weeks for follow-up and ongoing  treatment.      Visit today is associated with current or anticipated ongoing medical care related to the patient's serious/complex condition of metastatic breast cancer, including discussion of treatment plan, prognosis and monitoring for treatment related side-effects.     JOEL DAVIDSON MD    Other Physicians  Dr. Darrell Swan

## 2025-06-25 NOTE — PROGRESS NOTES
"Subjective:       Patient ID: Kimberlyn Selby is a 67 y.o. female.    Chief Complaint:  "I can't gain weight"    Diagnosis:  Recurrent metastatic breast cancer with peritoneal carcinomatosis -ER+ 4%, [place] 0, HER-2 neg (IHC 0)                     Stage IA R breast cancer 8/11 (T1b N0 M0), 0.7 cm, GI, ER+/WV-, HER-2 negative                     Stage IIA L breast cancer 1/09 (T1c N1 M0), 1.6 cm, GIII, 1+ LN, ER/WV -, HER-2 negative                     Stage IIIA R breast cancer 1/05 (T2 N2a M0), 1.6 cm, GII, 4+ LN's, ER/WV +, HER-2 negative                     S/p bilateral mastectomies                     Post menopausal s/p hysterectomy                     Declined adjuvant XRT & hormonal therapy with initial breast cancer                     + COVID vaccinated     Treatment History  Adjuvant TAC x 6 completed 6/05  Adjuvant TC x 4 completed 5/09 --> XRT L breast    Current Therapy:  Xeloda s/p 3 cycles     Clinical History: WF status post right lumpectomy 1/05 for invasive breast cancer with the above characteristics. She completed 6 cycles of adjuvant TAC but declined radiation. She was placed on Arimidex but discontinued treatment due to significant myalgias. She had similar side effects with Femara and also complained of bone aching due to Tamoxifen and stopped therapy 9/05. Due to her hormone receptor status, she was placed on Evista but again was unable to tolerate therapy. She had an abnormal surveillance mammogram 11/08 showing clustered microcalcifications in the upper outer quadrant of the left breast with an associated 1 cm mass by ultrasound. 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 Patient underwent left lumpectomy and axillary dissection 1/15/09. Final pathology as outlined above. Postop course was complicated by cellulitis. She completed 4 cycles of adjuvant TC 5/09. She eventually consented to adjuvant radiation therapy to the left breast. Surveillance CT PET scan " 12/09 showed no abnormal hypermetabolic activity. Mammogram 6/22/11 showed a new suspicious finding in the right breast at the 4:00 position. Ultrasound was suspicious for malignancy. Ultrasound-guided core biopsies revealed invasive ductal carcinoma strongly ER positive at 85%, DE negative and HER-2 negative. CT PET scan 5/11 showed no abnormal hypermetabolic activity to indicate recurrent or metastatic disease. Although patient did have radiation therapy post lumpectomy on the left side, she never had radiation therapy to the right breast. She elected to undergo bilateral mastectomies and prophylactic right oophorectomy 8/16/11. Final pathology showed a 0.7 cm grade 1 infiltrating ductal carcinoma the right breast. Lamar dissection was not done to her previous surgery. She declined BRCA testing. Adjuvant hormonal therapy was recommended with Aromasin but she declined. She underwent a screening colonoscopy 6/5/13 which showed diverticula but no other abnormal findings; 10 year followup was recommended.      She was seen for a surveillance appointment 8/24/21 and did not return for her annual visit in 2022.    She developed symptoms of GE reflux and constipation that she treated with OTC medications without significant improvement.  She denied significant weight loss.  She was referred for a CT of the abdomen and pelvis by GI 6/2/23 ([facility]) that showed a diffuse abnormal enhancing soft tissue density in the retroperitoneum and mesenteric root with nodular intense enhancement.  Findings suspicious for an infiltrative lymphoproliferative disorder.  Mass encased the aorta without stenosis.  IVC appeared mildly narrowed without focal internal filling defect.  There were no additional abnormal findings.  Spleen was unremarkable.   EGD 6/13/23 showed mild chronic reflux changes and gastritis.  Colonoscopy showed a single polyp which was removed.  Pathology was consistent with metastatic breast cancer.  She  underwent a diagnostic laparoscopy 7/12/23 revealing peritoneal carcinomatosis.  Biopsies were positive for metastatic poorly differentiated adenocarcinoma with signet ring morphology consistent with breast primary (LIZ-3 and CK7 positive; CK20 and CDX2 negative).  ER was low positive 4%, NM negative and HER2 negative (IHC 0).    CT-PET 7/28/23:  Poorly delineated bulky abdominal lymphadenopathy with low-grade FDG uptake, SUV 2.4.  Small volume ascites.  No definite metastatic disease outside of the abdomen.  MRI Brain 8/2/23:  No evidence of metastatic disease.  Mild chronic microvascular ischemia and atrophy.    Interval History  Mrs. Selby is here today herself for an on treatment follow-up visit.  She recently returned from a trip to [place] to visit her son.  While she was there, she contacted me with complaints of tenderness and swelling of the fingertips with associated desquamation.  She was cycle 3 day 8 oral Xeloda 500 mg b.i.d..  She was instructed to hold treatment.  Her symptoms improved and she resumed treatment on 10/12/23, after a 7 day break.  She has grade 1 hand-foot syndrome on exam today, primarily desquamation of the fingertips.  She does report intermittent swelling and associated tenderness with increased activity.  She also has intermittent difficulty with fine motor skills.  Her appetite has improved and she is tolerating a wider variety of foods.  She is also having regular bowel movements with laxatives and stool softeners.  Unfortunately she continues to gradually lose weight.    Review of Systems   Constitutional:  Positive for appetite change (improved) and unexpected weight change. Negative for activity change, fatigue and fever.   HENT:  Negative for mouth sores, sore throat and trouble swallowing.    Eyes: Negative.  Negative for visual disturbance.   Respiratory:  Negative for cough and shortness of breath.    Cardiovascular:  Negative for chest pain, palpitations and leg swelling.  "  Gastrointestinal:  Positive for abdominal pain (mild mid abdominal discomfort, improved). Negative for abdominal distention, blood in stool, constipation, diarrhea, nausea and vomiting.   Genitourinary:  Negative for dysuria, frequency and urgency.   Musculoskeletal:  Negative for arthralgias and back pain.   Integumentary:  Positive for rash. Negative for pallor and wound.   Neurological:  Negative for dizziness, weakness, numbness and headaches.   Hematological:  Negative for adenopathy. Does not bruise/bleed easily.   Psychiatric/Behavioral: Negative.  The patient is not nervous/anxious.        PMHx:  Bilateral breast cancer, arthritis, hypothyroidism, GERD  PSHx:  Bilateral breast lumpectomies, bilateral mastectomies, hysterectomy/BSO, MediPort insertion and removal, uterine suspension, diagnostic laparoscopy  SH:  Lifetime nonsmoker, no significant alcohol use.  Lives in [place] with her .  FH:  Her father had kidney cancer, son had appendiceal cancer and MGF had liver cancer.    Objective:        /67   Pulse 65   Temp 98.2 °F (36.8 °C)   Resp 15   Ht 5' 2" (1.575 m)   Wt 39.9 kg (87 lb 14.4 oz)   SpO2 98%   BMI 16.08 kg/m²    Physical Exam  Constitutional:       Appearance: She is not ill-appearing.      Comments: Thin white female in NAD   HENT:      Head: Normocephalic.      Nose: Nose normal.   Eyes:      Conjunctiva/sclera: Conjunctivae normal.   Cardiovascular:      Rate and Rhythm: Normal rate and regular rhythm.   Pulmonary:      Effort: Pulmonary effort is normal.   Chest:      Comments: Well-healed bilateral mastectomy incisions.  No suspicious masses, skin changes or axillary nodes bilaterally.  Abdominal:      General: Bowel sounds are normal.      Tenderness: There is no abdominal tenderness.      Comments: Mildly distended   Musculoskeletal:         General: Normal range of motion.      Cervical back: Normal range of motion.   Skin:     Findings: Rash present.      " Comments: Grade 1 HFS of fingertips.   Neurological:      General: No focal deficit present.      Mental Status: She is alert and oriented to person, place, and time.   Psychiatric:         Mood and Affect: Mood normal.         Behavior: Behavior normal.       ECOG SCORE    1 - Restricted in strenuous activity-ambulatory and able to carry out work of a light nature          LABORATORY     Recent Results (from the past 336 hour(s))   CBC with Differential    Collection Time: 10/24/23  9:07 AM   Result Value Ref Range    WBC 5.80 4.50 - 11.50 x10(3)/mcL    RBC 3.44 (L) 4.20 - 5.40 x10(6)/mcL    Hgb 11.3 (L) 12.0 - 16.0 g/dL    Hct 36.1 (L) 37.0 - 47.0 %    .9 (H) 80.0 - 94.0 fL    MCH 32.8 (H) 27.0 - 31.0 pg    MCHC 31.3 (L) 33.0 - 36.0 g/dL    RDW 18.5 (H) 11.5 - 17.0 %    Platelet 288 130 - 400 x10(3)/mcL    MPV 8.7 7.4 - 10.4 fL    Neut % 63.6 %    Lymph % 22.2 %    Mono % 10.5 %    Eos % 2.6 %    Basophil % 0.9 %    Lymph # 1.29 0.6 - 4.6 x10(3)/mcL    Neut # 3.69 2.1 - 9.2 x10(3)/mcL    Mono # 0.61 0.1 - 1.3 x10(3)/mcL    Eos # 0.15 0 - 0.9 x10(3)/mcL    Baso # 0.05 <=0.2 x10(3)/mcL    IG# 0.01 0 - 0.04 x10(3)/mcL    IG% 0.2 %         Assessment:   Recurrent metastatic breast cancer with peritoneal carcinomatosis - ER+ 4%, [place] neg, Her-2 neg (IHC 0)  History of bilateral breast cancer s/p bilateral mastectomies  Grade 1 hand and foot syndrome    Plan:   Continue oral Xeloda 500 mg BID, but change to 7 days on and 7 days off s/t hand and foot syndrome.  Continue daily bowel regimen, frequent small meals.  Await CMP and tumor markers, which are pending.  RTC 3-4 weeks for follow-up with restaging CT C/A/P and full lab, including tumor markers prior to visit.  Patient in agreement.  All questions answered.    JON Schroeder, FNP-C     Other Physicians  Dr. Darrell Swan     4 (moderate pain)

## 2025-06-30 ENCOUNTER — OFFICE VISIT (OUTPATIENT)
Dept: HEMATOLOGY/ONCOLOGY | Facility: CLINIC | Age: 70
End: 2025-06-30
Payer: MEDICARE

## 2025-06-30 ENCOUNTER — LAB VISIT (OUTPATIENT)
Dept: LAB | Facility: HOSPITAL | Age: 70
End: 2025-06-30
Attending: INTERNAL MEDICINE
Payer: MEDICARE

## 2025-06-30 VITALS
TEMPERATURE: 98 F | DIASTOLIC BLOOD PRESSURE: 77 MMHG | SYSTOLIC BLOOD PRESSURE: 145 MMHG | RESPIRATION RATE: 15 BRPM | HEIGHT: 62 IN | WEIGHT: 93.19 LBS | HEART RATE: 60 BPM | BODY MASS INDEX: 17.15 KG/M2

## 2025-06-30 DIAGNOSIS — E55.9 VITAMIN D DEFICIENCY: ICD-10-CM

## 2025-06-30 DIAGNOSIS — C50.311 MALIGNANT NEOPLASM OF LOWER-INNER QUADRANT OF RIGHT BREAST OF FEMALE, ESTROGEN RECEPTOR POSITIVE: ICD-10-CM

## 2025-06-30 DIAGNOSIS — C78.6 SECONDARY MALIGNANT NEOPLASM OF PERITONEUM: ICD-10-CM

## 2025-06-30 DIAGNOSIS — C50.311 MALIGNANT NEOPLASM OF LOWER-INNER QUADRANT OF RIGHT BREAST OF FEMALE, ESTROGEN RECEPTOR POSITIVE: Primary | ICD-10-CM

## 2025-06-30 DIAGNOSIS — Z17.0 MALIGNANT NEOPLASM OF LOWER-INNER QUADRANT OF RIGHT BREAST OF FEMALE, ESTROGEN RECEPTOR POSITIVE: Primary | ICD-10-CM

## 2025-06-30 DIAGNOSIS — E04.2 MULTINODULAR GOITER: ICD-10-CM

## 2025-06-30 DIAGNOSIS — Z17.0 MALIGNANT NEOPLASM OF LOWER-INNER QUADRANT OF RIGHT BREAST OF FEMALE, ESTROGEN RECEPTOR POSITIVE: ICD-10-CM

## 2025-06-30 DIAGNOSIS — E78.5 HYPERLIPIDEMIA, UNSPECIFIED HYPERLIPIDEMIA TYPE: ICD-10-CM

## 2025-06-30 DIAGNOSIS — E03.9 HYPOTHYROIDISM, UNSPECIFIED TYPE: ICD-10-CM

## 2025-06-30 LAB
25(OH)D3+25(OH)D2 SERPL-MCNC: 105 NG/ML (ref 30–80)
ALBUMIN SERPL-MCNC: 3.8 G/DL (ref 3.4–4.8)
ALBUMIN/GLOB SERPL: 1 RATIO (ref 1.1–2)
ALP SERPL-CCNC: 60 UNIT/L (ref 40–150)
ALT SERPL-CCNC: 19 UNIT/L (ref 0–55)
ANION GAP SERPL CALC-SCNC: 5 MEQ/L
AST SERPL-CCNC: 27 UNIT/L (ref 11–45)
BASOPHILS # BLD AUTO: 0.04 X10(3)/MCL
BASOPHILS NFR BLD AUTO: 0.9 %
BILIRUB SERPL-MCNC: 0.3 MG/DL
BUN SERPL-MCNC: 15.8 MG/DL (ref 9.8–20.1)
CALCIUM SERPL-MCNC: 9.4 MG/DL (ref 8.4–10.2)
CEA SERPL-MCNC: 69.84 NG/ML (ref 0–3)
CHLORIDE SERPL-SCNC: 104 MMOL/L (ref 98–107)
CHOLEST SERPL-MCNC: 258 MG/DL
CHOLEST/HDLC SERPL: 3 {RATIO} (ref 0–5)
CO2 SERPL-SCNC: 32 MMOL/L (ref 23–31)
CREAT SERPL-MCNC: 0.71 MG/DL (ref 0.55–1.02)
CREAT/UREA NIT SERPL: 22
EOSINOPHIL # BLD AUTO: 0.57 X10(3)/MCL (ref 0–0.9)
EOSINOPHIL NFR BLD AUTO: 12.8 %
ERYTHROCYTE [DISTWIDTH] IN BLOOD BY AUTOMATED COUNT: 13.8 % (ref 11.5–17)
GFR SERPLBLD CREATININE-BSD FMLA CKD-EPI: >60 ML/MIN/1.73/M2
GLOBULIN SER-MCNC: 3.8 GM/DL (ref 2.4–3.5)
GLUCOSE SERPL-MCNC: 81 MG/DL (ref 82–115)
HCT VFR BLD AUTO: 37.1 % (ref 37–47)
HDLC SERPL-MCNC: 78 MG/DL (ref 35–60)
HGB BLD-MCNC: 12.1 G/DL (ref 12–16)
IMM GRANULOCYTES # BLD AUTO: 0.01 X10(3)/MCL (ref 0–0.04)
IMM GRANULOCYTES NFR BLD AUTO: 0.2 %
LDLC SERPL CALC-MCNC: 169 MG/DL (ref 50–140)
LYMPHOCYTES # BLD AUTO: 1 X10(3)/MCL (ref 0.6–4.6)
LYMPHOCYTES NFR BLD AUTO: 22.5 %
MCH RBC QN AUTO: 32.2 PG (ref 27–31)
MCHC RBC AUTO-ENTMCNC: 32.6 G/DL (ref 33–36)
MCV RBC AUTO: 98.7 FL (ref 80–94)
MONOCYTES # BLD AUTO: 0.56 X10(3)/MCL (ref 0.1–1.3)
MONOCYTES NFR BLD AUTO: 12.6 %
NEUTROPHILS # BLD AUTO: 2.26 X10(3)/MCL (ref 2.1–9.2)
NEUTROPHILS NFR BLD AUTO: 51 %
PLATELET # BLD AUTO: 323 X10(3)/MCL (ref 130–400)
PMV BLD AUTO: 9.5 FL (ref 7.4–10.4)
POTASSIUM SERPL-SCNC: 3.9 MMOL/L (ref 3.5–5.1)
PROT SERPL-MCNC: 7.6 GM/DL (ref 5.8–7.6)
RBC # BLD AUTO: 3.76 X10(6)/MCL (ref 4.2–5.4)
SODIUM SERPL-SCNC: 141 MMOL/L (ref 136–145)
T4 FREE SERPL-MCNC: 0.9 NG/DL (ref 0.7–1.48)
TRIGL SERPL-MCNC: 55 MG/DL (ref 37–140)
TSH SERPL-ACNC: 0.89 UIU/ML (ref 0.35–4.94)
VLDLC SERPL CALC-MCNC: 11 MG/DL
WBC # BLD AUTO: 4.44 X10(3)/MCL (ref 4.5–11.5)

## 2025-06-30 PROCEDURE — 36415 COLL VENOUS BLD VENIPUNCTURE: CPT

## 2025-06-30 PROCEDURE — 86300 IMMUNOASSAY TUMOR CA 15-3: CPT

## 2025-06-30 PROCEDURE — 82306 VITAMIN D 25 HYDROXY: CPT

## 2025-06-30 PROCEDURE — 84439 ASSAY OF FREE THYROXINE: CPT

## 2025-06-30 PROCEDURE — 85025 COMPLETE CBC W/AUTO DIFF WBC: CPT

## 2025-06-30 PROCEDURE — 80053 COMPREHEN METABOLIC PANEL: CPT

## 2025-06-30 PROCEDURE — 99215 OFFICE O/P EST HI 40 MIN: CPT | Mod: PBBFAC | Performed by: INTERNAL MEDICINE

## 2025-06-30 PROCEDURE — 80061 LIPID PANEL: CPT

## 2025-06-30 PROCEDURE — 99999 PR PBB SHADOW E&M-EST. PATIENT-LVL V: CPT | Mod: PBBFAC,,, | Performed by: INTERNAL MEDICINE

## 2025-06-30 PROCEDURE — 82378 CARCINOEMBRYONIC ANTIGEN: CPT

## 2025-06-30 PROCEDURE — 84443 ASSAY THYROID STIM HORMONE: CPT

## 2025-06-30 RX ORDER — ACETAMINOPHEN 325 MG/1
650 TABLET ORAL
Status: CANCELLED
Start: 2025-07-02

## 2025-06-30 RX ORDER — DIPHENHYDRAMINE HYDROCHLORIDE 50 MG/ML
25 INJECTION, SOLUTION INTRAMUSCULAR; INTRAVENOUS
Status: CANCELLED
Start: 2025-07-02

## 2025-06-30 RX ORDER — SODIUM CHLORIDE 0.9 % (FLUSH) 0.9 %
10 SYRINGE (ML) INJECTION
Status: CANCELLED | OUTPATIENT
Start: 2025-07-02

## 2025-06-30 RX ORDER — DIPHENHYDRAMINE HYDROCHLORIDE 50 MG/ML
50 INJECTION, SOLUTION INTRAMUSCULAR; INTRAVENOUS ONCE AS NEEDED
Status: CANCELLED | OUTPATIENT
Start: 2025-07-02

## 2025-06-30 RX ORDER — HEPARIN 100 UNIT/ML
500 SYRINGE INTRAVENOUS
Status: CANCELLED | OUTPATIENT
Start: 2025-07-02

## 2025-06-30 RX ORDER — PROCHLORPERAZINE EDISYLATE 5 MG/ML
5 INJECTION INTRAMUSCULAR; INTRAVENOUS ONCE AS NEEDED
Status: CANCELLED | OUTPATIENT
Start: 2025-07-02

## 2025-06-30 RX ORDER — EPINEPHRINE 0.3 MG/.3ML
0.3 INJECTION SUBCUTANEOUS ONCE AS NEEDED
Status: CANCELLED | OUTPATIENT
Start: 2025-07-02

## 2025-07-02 ENCOUNTER — INFUSION (OUTPATIENT)
Dept: INFUSION THERAPY | Facility: HOSPITAL | Age: 70
End: 2025-07-02
Attending: INTERNAL MEDICINE
Payer: MEDICARE

## 2025-07-02 VITALS
DIASTOLIC BLOOD PRESSURE: 69 MMHG | BODY MASS INDEX: 17.06 KG/M2 | WEIGHT: 93.25 LBS | SYSTOLIC BLOOD PRESSURE: 137 MMHG | HEART RATE: 59 BPM | OXYGEN SATURATION: 99 % | TEMPERATURE: 98 F

## 2025-07-02 DIAGNOSIS — Z17.0 MALIGNANT NEOPLASM OF LOWER-INNER QUADRANT OF RIGHT BREAST OF FEMALE, ESTROGEN RECEPTOR POSITIVE: Primary | ICD-10-CM

## 2025-07-02 DIAGNOSIS — C78.6 SECONDARY MALIGNANT NEOPLASM OF PERITONEUM: ICD-10-CM

## 2025-07-02 DIAGNOSIS — C50.311 MALIGNANT NEOPLASM OF LOWER-INNER QUADRANT OF RIGHT BREAST OF FEMALE, ESTROGEN RECEPTOR POSITIVE: Primary | ICD-10-CM

## 2025-07-02 LAB — CANCER AG27-29 SERPL-ACNC: 116 U/ML

## 2025-07-02 PROCEDURE — 63600175 PHARM REV CODE 636 W HCPCS: Mod: JZ,TB | Performed by: INTERNAL MEDICINE

## 2025-07-02 PROCEDURE — 96375 TX/PRO/DX INJ NEW DRUG ADDON: CPT

## 2025-07-02 PROCEDURE — 96367 TX/PROPH/DG ADDL SEQ IV INF: CPT

## 2025-07-02 PROCEDURE — 96365 THER/PROPH/DIAG IV INF INIT: CPT

## 2025-07-02 PROCEDURE — 25000003 PHARM REV CODE 250: Performed by: INTERNAL MEDICINE

## 2025-07-02 RX ORDER — DIPHENHYDRAMINE HYDROCHLORIDE 50 MG/ML
25 INJECTION, SOLUTION INTRAMUSCULAR; INTRAVENOUS
Status: COMPLETED | OUTPATIENT
Start: 2025-07-02 | End: 2025-07-02

## 2025-07-02 RX ORDER — SODIUM CHLORIDE 0.9 % (FLUSH) 0.9 %
10 SYRINGE (ML) INJECTION
Status: DISCONTINUED | OUTPATIENT
Start: 2025-07-02 | End: 2025-07-02 | Stop reason: HOSPADM

## 2025-07-02 RX ORDER — EPINEPHRINE 0.3 MG/.3ML
0.3 INJECTION SUBCUTANEOUS ONCE AS NEEDED
Status: DISCONTINUED | OUTPATIENT
Start: 2025-07-02 | End: 2025-07-02 | Stop reason: HOSPADM

## 2025-07-02 RX ORDER — ACETAMINOPHEN 325 MG/1
650 TABLET ORAL
Status: COMPLETED | OUTPATIENT
Start: 2025-07-02 | End: 2025-07-02

## 2025-07-02 RX ORDER — HEPARIN 100 UNIT/ML
500 SYRINGE INTRAVENOUS
Status: DISCONTINUED | OUTPATIENT
Start: 2025-07-02 | End: 2025-07-02 | Stop reason: HOSPADM

## 2025-07-02 RX ORDER — PROCHLORPERAZINE EDISYLATE 5 MG/ML
5 INJECTION INTRAMUSCULAR; INTRAVENOUS ONCE AS NEEDED
Status: DISCONTINUED | OUTPATIENT
Start: 2025-07-02 | End: 2025-07-02 | Stop reason: HOSPADM

## 2025-07-02 RX ORDER — DIPHENHYDRAMINE HYDROCHLORIDE 50 MG/ML
50 INJECTION, SOLUTION INTRAMUSCULAR; INTRAVENOUS ONCE AS NEEDED
Status: DISCONTINUED | OUTPATIENT
Start: 2025-07-02 | End: 2025-07-02 | Stop reason: HOSPADM

## 2025-07-02 RX ADMIN — DEXTROSE MONOHYDRATE: 50 INJECTION, SOLUTION INTRAVENOUS at 11:07

## 2025-07-02 RX ADMIN — APREPITANT 130 MG: 130 INJECTION, EMULSION INTRAVENOUS at 11:07

## 2025-07-02 RX ADMIN — ACETAMINOPHEN 650 MG: 325 TABLET ORAL at 11:07

## 2025-07-02 RX ADMIN — DEXAMETHASONE SODIUM PHOSPHATE 0.25 MG: 4 INJECTION, SOLUTION INTRA-ARTICULAR; INTRALESIONAL; INTRAMUSCULAR; INTRAVENOUS; SOFT TISSUE at 11:07

## 2025-07-02 RX ADMIN — DIPHENHYDRAMINE HYDROCHLORIDE 25 MG: 50 INJECTION INTRAMUSCULAR; INTRAVENOUS at 11:07

## 2025-07-22 ENCOUNTER — OFFICE VISIT (OUTPATIENT)
Dept: HEMATOLOGY/ONCOLOGY | Facility: CLINIC | Age: 70
End: 2025-07-22
Payer: MEDICARE

## 2025-07-22 ENCOUNTER — LAB VISIT (OUTPATIENT)
Dept: LAB | Facility: HOSPITAL | Age: 70
End: 2025-07-22
Attending: NURSE PRACTITIONER
Payer: MEDICARE

## 2025-07-22 VITALS
SYSTOLIC BLOOD PRESSURE: 117 MMHG | BODY MASS INDEX: 17.37 KG/M2 | HEIGHT: 62 IN | HEART RATE: 64 BPM | DIASTOLIC BLOOD PRESSURE: 65 MMHG | WEIGHT: 94.38 LBS | OXYGEN SATURATION: 98 % | TEMPERATURE: 98 F | RESPIRATION RATE: 15 BRPM

## 2025-07-22 DIAGNOSIS — D84.821 DRUG-INDUCED IMMUNODEFICIENCY: ICD-10-CM

## 2025-07-22 DIAGNOSIS — C78.6 SECONDARY MALIGNANT NEOPLASM OF PERITONEUM: ICD-10-CM

## 2025-07-22 DIAGNOSIS — Z17.0 MALIGNANT NEOPLASM OF LOWER-INNER QUADRANT OF RIGHT BREAST OF FEMALE, ESTROGEN RECEPTOR POSITIVE: Primary | ICD-10-CM

## 2025-07-22 DIAGNOSIS — C50.311 MALIGNANT NEOPLASM OF LOWER-INNER QUADRANT OF RIGHT BREAST OF FEMALE, ESTROGEN RECEPTOR POSITIVE: ICD-10-CM

## 2025-07-22 DIAGNOSIS — C50.311 MALIGNANT NEOPLASM OF LOWER-INNER QUADRANT OF RIGHT BREAST OF FEMALE, ESTROGEN RECEPTOR POSITIVE: Primary | ICD-10-CM

## 2025-07-22 DIAGNOSIS — Z17.0 MALIGNANT NEOPLASM OF LOWER-INNER QUADRANT OF RIGHT BREAST OF FEMALE, ESTROGEN RECEPTOR POSITIVE: ICD-10-CM

## 2025-07-22 DIAGNOSIS — Z79.899 DRUG-INDUCED IMMUNODEFICIENCY: ICD-10-CM

## 2025-07-22 LAB
ALBUMIN SERPL-MCNC: 3.6 G/DL (ref 3.4–4.8)
ALBUMIN/GLOB SERPL: 1 RATIO (ref 1.1–2)
ALP SERPL-CCNC: 52 UNIT/L (ref 40–150)
ALT SERPL-CCNC: 13 UNIT/L (ref 0–55)
ANION GAP SERPL CALC-SCNC: 11 MEQ/L
AST SERPL-CCNC: 22 UNIT/L (ref 11–45)
BASOPHILS # BLD AUTO: 0.06 X10(3)/MCL
BASOPHILS NFR BLD AUTO: 1.1 %
BILIRUB SERPL-MCNC: 0.2 MG/DL
BUN SERPL-MCNC: 18.1 MG/DL (ref 9.8–20.1)
CALCIUM SERPL-MCNC: 9.4 MG/DL (ref 8.4–10.2)
CEA SERPL-MCNC: 38.66 NG/ML (ref 0–3)
CHLORIDE SERPL-SCNC: 101 MMOL/L (ref 98–107)
CO2 SERPL-SCNC: 30 MMOL/L (ref 23–31)
CREAT SERPL-MCNC: 0.8 MG/DL (ref 0.55–1.02)
CREAT/UREA NIT SERPL: 23
EOSINOPHIL # BLD AUTO: 0.19 X10(3)/MCL (ref 0–0.9)
EOSINOPHIL NFR BLD AUTO: 3.5 %
ERYTHROCYTE [DISTWIDTH] IN BLOOD BY AUTOMATED COUNT: 14.5 % (ref 11.5–17)
GFR SERPLBLD CREATININE-BSD FMLA CKD-EPI: >60 ML/MIN/1.73/M2
GLOBULIN SER-MCNC: 3.6 GM/DL (ref 2.4–3.5)
GLUCOSE SERPL-MCNC: 85 MG/DL (ref 82–115)
HCT VFR BLD AUTO: 33.7 % (ref 37–47)
HGB BLD-MCNC: 11.1 G/DL (ref 12–16)
IMM GRANULOCYTES # BLD AUTO: 0.01 X10(3)/MCL (ref 0–0.04)
IMM GRANULOCYTES NFR BLD AUTO: 0.2 %
LYMPHOCYTES # BLD AUTO: 1.51 X10(3)/MCL (ref 0.6–4.6)
LYMPHOCYTES NFR BLD AUTO: 28.1 %
MCH RBC QN AUTO: 32.4 PG (ref 27–31)
MCHC RBC AUTO-ENTMCNC: 32.9 G/DL (ref 33–36)
MCV RBC AUTO: 98.3 FL (ref 80–94)
MONOCYTES # BLD AUTO: 0.6 X10(3)/MCL (ref 0.1–1.3)
MONOCYTES NFR BLD AUTO: 11.2 %
NEUTROPHILS # BLD AUTO: 3.01 X10(3)/MCL (ref 2.1–9.2)
NEUTROPHILS NFR BLD AUTO: 55.9 %
PLATELET # BLD AUTO: 281 X10(3)/MCL (ref 130–400)
PMV BLD AUTO: 9.2 FL (ref 7.4–10.4)
POTASSIUM SERPL-SCNC: 4.2 MMOL/L (ref 3.5–5.1)
PROT SERPL-MCNC: 7.2 GM/DL (ref 5.8–7.6)
RBC # BLD AUTO: 3.43 X10(6)/MCL (ref 4.2–5.4)
SODIUM SERPL-SCNC: 142 MMOL/L (ref 136–145)
WBC # BLD AUTO: 5.38 X10(3)/MCL (ref 4.5–11.5)

## 2025-07-22 PROCEDURE — 80053 COMPREHEN METABOLIC PANEL: CPT

## 2025-07-22 PROCEDURE — 86300 IMMUNOASSAY TUMOR CA 15-3: CPT

## 2025-07-22 PROCEDURE — 82378 CARCINOEMBRYONIC ANTIGEN: CPT

## 2025-07-22 PROCEDURE — 99215 OFFICE O/P EST HI 40 MIN: CPT | Mod: PBBFAC | Performed by: NURSE PRACTITIONER

## 2025-07-22 PROCEDURE — 36415 COLL VENOUS BLD VENIPUNCTURE: CPT

## 2025-07-22 PROCEDURE — 99999 PR PBB SHADOW E&M-EST. PATIENT-LVL V: CPT | Mod: PBBFAC,,, | Performed by: NURSE PRACTITIONER

## 2025-07-22 PROCEDURE — 85025 COMPLETE CBC W/AUTO DIFF WBC: CPT

## 2025-07-22 RX ORDER — SODIUM CHLORIDE 0.9 % (FLUSH) 0.9 %
10 SYRINGE (ML) INJECTION
Status: CANCELLED | OUTPATIENT
Start: 2025-07-23

## 2025-07-22 RX ORDER — ACETAMINOPHEN 325 MG/1
650 TABLET ORAL
Status: CANCELLED
Start: 2025-07-23

## 2025-07-22 RX ORDER — EPINEPHRINE 0.3 MG/.3ML
0.3 INJECTION SUBCUTANEOUS ONCE AS NEEDED
Status: CANCELLED | OUTPATIENT
Start: 2025-07-23

## 2025-07-22 RX ORDER — DIPHENHYDRAMINE HYDROCHLORIDE 50 MG/ML
25 INJECTION, SOLUTION INTRAMUSCULAR; INTRAVENOUS
Status: CANCELLED
Start: 2025-07-23

## 2025-07-22 RX ORDER — PROCHLORPERAZINE EDISYLATE 5 MG/ML
5 INJECTION INTRAMUSCULAR; INTRAVENOUS ONCE AS NEEDED
Status: CANCELLED | OUTPATIENT
Start: 2025-07-23

## 2025-07-22 RX ORDER — HEPARIN 100 UNIT/ML
500 SYRINGE INTRAVENOUS
Status: CANCELLED | OUTPATIENT
Start: 2025-07-23

## 2025-07-22 RX ORDER — DIPHENHYDRAMINE HYDROCHLORIDE 50 MG/ML
50 INJECTION, SOLUTION INTRAMUSCULAR; INTRAVENOUS ONCE AS NEEDED
Status: CANCELLED | OUTPATIENT
Start: 2025-07-23

## 2025-07-22 NOTE — PROGRESS NOTES
"Subjective:       Patient ID: Kimberlyn Selby is a 69 y.o. female.    Chief Complaint:  "I would like to gain weight"    Diagnosis:  Recurrent metastatic breast cancer with peritoneal carcinomatosis - ER+ 4%, NC 0, HER-2 neg (IHC 0)                         PD-L1 CPS >10, ROLANDA, low TMB, PIK3CA E365K and S7514T mutations                     Stage IA R breast cancer 8/11 (T1b N0 M0), 0.7 cm, GI, ER+/NC-, HER-2 negative                     Stage IIA L breast cancer 1/09 (T1c N1 M0), 1.6 cm, GIII, 1+ LN, ER/NC -, HER-2 negative                     Stage IIIA R breast cancer 1/05 (T2 N2a M0), 1.6 cm, GII, 4+ LN's, ER/NC +, HER-2 negative                     S/p bilateral mastectomies                     Post menopausal s/p hysterectomy                     Declined adjuvant XRT & hormonal therapy with initial breast cancer     Treatment History  Adjuvant TAC x 6 completed 6/05  Adjuvant TC x 4 completed 5/09 --> XRT L breast  Xeloda 8/23-7/24  Eribulin x 6 (8/24-12/24)    Current Therapy:  Datopotamab deruxtecan Q3W s/p 3 cycles (started 5/21/25)     Clinical History: WF status post right lumpectomy 1/05 for invasive breast cancer with the above characteristics. She completed 6 cycles of adjuvant TAC but declined radiation. She was placed on Arimidex but discontinued treatment due to significant myalgias. She had similar side effects with Femara and also complained of bone aching due to Tamoxifen and stopped therapy 9/05. Due to her hormone receptor status, she was placed on Evista but again was unable to tolerate therapy. She had an abnormal surveillance mammogram 11/08 showing clustered microcalcifications in the upper outer quadrant of the left breast with an associated 1 cm mass by ultrasound. 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 Patient underwent left lumpectomy and axillary dissection 1/15/09. Final pathology as outlined above. Postop course was complicated by cellulitis. " She completed 4 cycles of adjuvant TC 5/09. She eventually consented to adjuvant radiation therapy to the left breast. Surveillance CT PET scan 12/09 showed no abnormal hypermetabolic activity. Mammogram 6/22/11 showed a new suspicious finding in the right breast at the 4:00 position. Ultrasound was suspicious for malignancy. Ultrasound-guided core biopsies revealed invasive ductal carcinoma strongly ER positive at 85%, ND negative and HER-2 negative. CT PET scan 5/11 showed no abnormal hypermetabolic activity to indicate recurrent or metastatic disease. Although patient did have radiation therapy post lumpectomy on the left side, she never had radiation therapy to the right breast. She elected to undergo bilateral mastectomies and prophylactic right oophorectomy 8/16/11. Final pathology showed a 0.7 cm grade 1 infiltrating ductal carcinoma the right breast. Lamar dissection was not done to her previous surgery. She declined BRCA testing. Adjuvant hormonal therapy was recommended with Aromasin but she declined. She underwent a screening colonoscopy 6/5/13 which showed diverticula but no other abnormal findings; 10 year followup was recommended.      She was seen for a surveillance appointment 8/24/21 and did not return for her annual visit in 2022.    She developed symptoms of GE reflux and constipation that she treated with OTC medications without significant improvement.  She denied significant weight loss.  She was referred for a CT of the abdomen and pelvis by GI 6/2/23 ([facility]) that showed a diffuse abnormal enhancing soft tissue density in the retroperitoneum and mesenteric root with nodular intense enhancement.  Findings suspicious for an infiltrative lymphoproliferative disorder.  Mass encased the aorta without stenosis.  IVC appeared mildly narrowed without focal internal filling defect.  There were no additional abnormal findings.  Spleen was unremarkable.   EGD 6/13/23 showed mild chronic reflux  changes and gastritis.  Colonoscopy showed a single polyp which was removed.  Pathology was consistent with metastatic breast cancer.  She underwent a diagnostic laparoscopy 7/12/23 revealing peritoneal carcinomatosis.  Biopsies were positive for metastatic poorly differentiated adenocarcinoma with signet ring morphology consistent with breast primary (LIZ-3 and CK7 positive; CK20 and CDX2 negative).  ER was low positive 4%, NH negative and HER2 negative (IHC 0).    Molecular Testing:  PDL1 CPS >10, ROLANDA, low TMB.  Mutations of BLM, CHEK2, PIK3CA E365K, SMARCA4 and JAK1.  PIK3CA E365K AND R2141y are not approved bio-markers for treatment    CT-PET 7/28/23:  Poorly delineated bulky abdominal lymphadenopathy with low-grade FDG uptake, SUV 2.4.  Small volume ascites.  No definite metastatic disease outside of the abdomen.  MRI Brain 8/2/23:  No evidence of metastatic disease.  Mild chronic microvascular ischemia and atrophy.    She was initiated on treatment with oral Xeloda.  She did not tolerate dose escalation secondary to hand-foot syndrome.  She required dose adjustment to manage her side effects.    CT C/A/P 11/15/23:  Improved confluent retroperitoneal and mesenteric adenopathy.  Improved mesenteric soft tissue density and edema.  CT C/A/P 2/27/24:  Radiology reported no detrimental change from 11/23.  My review shows further decrease in the soft tissue density in the retroperitoneum and mesentery with no new sites of measurable disease.  Chronic constipation.  MRI brain 4/5/24: No acute findings or metastatic disease.  CT C/A/P 7/19/24:  No pulmonary or hepatic lesions.  Moderate-to-large amount of stool diffusely throughout the colon.  Mesenteric soft tissue density stable to slightly decreased.  Numerous small sclerotic skeletal lesions mildly increased in size and density.  CT C/A/P 10/07/24:  Stable osteoblastic skeletal metastases.  No pulmonary or hepatic lesions.  Slight decrease in mesenteric density  with resolution of previous pelvic ascites.  Persistent large amount of stool throughout the colon.  CT C/A/P 1/10/25:  Unchanged treated osteoblastic bone metastases.  Lungs and liver clear.  No significant mesentery density or nodularity, no ascites.  Chronic constipation.    After completing 6 cycles of Eribulin with a favorable disease response, additional treatment was recommended with exemestane secondary to her low ER positivity.  After considering the risks, benefits and side-effects, she decided not to start the treatment.    CT C/A/P 4/16/25 (My review):  Increased poorly defined soft tissue peritoneal and mesenteric thickening.  New small volume ascites.  Unchanged bony sclerotic lesions from 1/10/25.    Given her progressive symptoms and imaging findings, as well as significant elevations of her CEA and CA 27-29 levels, additional treatment was recommended with Datopotamab deruxtecan.  She started treatment on 5/21/25.    Interval History  She returns to the office today for a three-week follow-up visit accompanied by a close friend.  She recently returned from vacation, which was very enjoyable to her.  She has completed 3 cycles of treatment with Datopotamab deruxtecan.  She has not had any significant cytopenias, nausea or vomiting.  She had more difficulty with her bowels during her trip.  She is taking stool softeners as needed.  She is frustrated with her inability to gain weight.  We discussed a referral to the apothecary as THC products may help with appetite and rest.  Laboratory in the office today shows stable mild anemia.  CEA and CA 27-29 show interval decrease in response to treatment.  Results discussed today with the patient.      Review of Systems   Constitutional:  Positive for activity change and fatigue (mild). Negative for appetite change, fever and unexpected weight change.   HENT:  Negative for mouth sores, sore throat and trouble swallowing.    Eyes: Negative.  Negative for visual  "disturbance.   Respiratory:  Negative for cough and shortness of breath.    Cardiovascular:  Negative for chest pain, palpitations and leg swelling.   Gastrointestinal:  Positive for constipation. Negative for abdominal distention, abdominal pain, diarrhea, nausea and reflux.        Mild abdominal bloating   Genitourinary:  Negative for dysuria, flank pain and frequency.   Musculoskeletal:  Negative for arthralgias and back pain.        No bone pain   Integumentary:  Negative for pallor and rash.   Neurological:  Negative for dizziness, weakness, numbness and headaches.   Hematological:  Negative for adenopathy. Does not bruise/bleed easily.   Psychiatric/Behavioral: Negative.  The patient is not nervous/anxious.        PMHx:  Bilateral breast cancer, arthritis, hypothyroidism, GERD  PSHx:  Bilateral breast lumpectomies, bilateral mastectomies, hysterectomy/BSO, MediPort insertion and removal, uterine suspension, diagnostic laparoscopy  SH:  Lifetime nonsmoker, no significant alcohol use.  Lives in [place] with her .  FH:  Her father had kidney cancer, son had appendiceal cancer and MGF had liver cancer.    Objective:        /65   Pulse 64   Temp 97.6 °F (36.4 °C)   Resp 15   Ht 5' 2" (1.575 m)   Wt 42.8 kg (94 lb 6.4 oz)   SpO2 98%   BMI 17.27 kg/m²    Physical Exam  Constitutional:       Comments: Thin white female in NAD   HENT:      Head: Normocephalic.      Comments: Bitemporal wasting     Mouth/Throat:      Mouth: Mucous membranes are moist.      Pharynx: Oropharynx is clear. No posterior oropharyngeal erythema.   Eyes:      General: No scleral icterus.     Extraocular Movements: Extraocular movements intact.      Conjunctiva/sclera: Conjunctivae normal.   Cardiovascular:      Rate and Rhythm: Normal rate and regular rhythm.      Heart sounds: No murmur heard.  Pulmonary:      Effort: Pulmonary effort is normal.      Comments: Lungs clear to auscultation  Abdominal:      Comments: Mild " epigastric tenderness and palpable fullness RLQ, decreased.  Periumbilical thickening without well-defined nodule.  Bowel sounds decreased.    Musculoskeletal:         General: No swelling or tenderness. Normal range of motion.      Cervical back: Normal range of motion and neck supple. No tenderness.   Lymphadenopathy:      Cervical: No cervical adenopathy.      Upper Body:      Right upper body: No supraclavicular or axillary adenopathy.      Left upper body: No supraclavicular or axillary adenopathy.   Skin:     General: Skin is warm and dry.      Findings: No rash.   Neurological:      General: No focal deficit present.      Mental Status: She is alert and oriented to person, place, and time.      Cranial Nerves: No cranial nerve deficit.      Motor: No weakness.       ECOG SCORE    1 - Restricted in strenuous activity-ambulatory and able to carry out work of a light nature          LABORATORY  Recent Results (from the past 2 weeks)   CBC with Differential    Collection Time: 07/22/25  1:46 PM   Result Value Ref Range    WBC 5.38 4.50 - 11.50 x10(3)/mcL    RBC 3.43 (L) 4.20 - 5.40 x10(6)/mcL    Hgb 11.1 (L) 12.0 - 16.0 g/dL    Hct 33.7 (L) 37.0 - 47.0 %    MCV 98.3 (H) 80.0 - 94.0 fL    MCH 32.4 (H) 27.0 - 31.0 pg    MCHC 32.9 (L) 33.0 - 36.0 g/dL    RDW 14.5 11.5 - 17.0 %    Platelet 281 130 - 400 x10(3)/mcL    MPV 9.2 7.4 - 10.4 fL    Neut % 55.9 %    Lymph % 28.1 %    Mono % 11.2 %    Eos % 3.5 %    Basophil % 1.1 %    Imm Grans % 0.2 %    Neut # 3.01 2.1 - 9.2 x10(3)/mcL    Lymph # 1.51 0.6 - 4.6 x10(3)/mcL    Mono # 0.60 0.1 - 1.3 x10(3)/mcL    Eos # 0.19 0 - 0.9 x10(3)/mcL    Baso # 0.06 <=0.2 x10(3)/mcL    Imm Gran # 0.01 0.00 - 0.04 x10(3)/mcL                             1/02/24   2/22/24   4/26/24   6/26/24   7/19/24   10/08/24   12/03/24   5/12/25  6/30/25  CEA               14.5        13.1       16.4        22.4         50.1         24.9          20.5        230.6       69  CA 27-29        29.8         28.0       32.0        45.0         63.8        43.4          45.8        180.0      116      Assessment:   Recurrent metastatic breast cancer with peritoneal carcinomatosis - ER+ 4%, CA neg, Her-2 neg (IHC 0)  History of bilateral breast cancer s/p bilateral mastectomies  Chronic constipation - improved    Plan:   Clinical and laboratory findings are consistent with a response to treatment.  Patient will proceed with her 4th cycle of Datopotamab deruxtecan at full dose tomorrow.  She will self refer to the Apothecary to discuss medical marijuana.  RTC in 3 weeks for follow-up with CBC, CMP, CEA and CA 27-29 level.  Will schedule restaging scans at return visit.  All questions answered to the satisfaction of the patient.  She is in agreement with the treatment plan.    JON DIXON, FNP-C  [facility] [place]     Visit today is associated with current or anticipated ongoing medical care related to the patient's serious/complex condition of metastatic breast cancer, including discussion of treatment plan, prognosis and monitoring for treatment related side-effects.       Other Physicians  Dr. Darrell Swan

## 2025-07-23 ENCOUNTER — INFUSION (OUTPATIENT)
Dept: INFUSION THERAPY | Facility: HOSPITAL | Age: 70
End: 2025-07-23
Attending: INTERNAL MEDICINE
Payer: MEDICARE

## 2025-07-23 VITALS
TEMPERATURE: 98 F | RESPIRATION RATE: 16 BRPM | SYSTOLIC BLOOD PRESSURE: 109 MMHG | HEIGHT: 62 IN | OXYGEN SATURATION: 100 % | DIASTOLIC BLOOD PRESSURE: 58 MMHG | HEART RATE: 57 BPM | BODY MASS INDEX: 17.37 KG/M2 | WEIGHT: 94.38 LBS

## 2025-07-23 DIAGNOSIS — C78.6 SECONDARY MALIGNANT NEOPLASM OF PERITONEUM: ICD-10-CM

## 2025-07-23 DIAGNOSIS — C50.311 MALIGNANT NEOPLASM OF LOWER-INNER QUADRANT OF RIGHT BREAST OF FEMALE, ESTROGEN RECEPTOR POSITIVE: Primary | ICD-10-CM

## 2025-07-23 DIAGNOSIS — Z17.0 MALIGNANT NEOPLASM OF LOWER-INNER QUADRANT OF RIGHT BREAST OF FEMALE, ESTROGEN RECEPTOR POSITIVE: Primary | ICD-10-CM

## 2025-07-23 PROCEDURE — 96365 THER/PROPH/DIAG IV INF INIT: CPT

## 2025-07-23 PROCEDURE — 96375 TX/PRO/DX INJ NEW DRUG ADDON: CPT

## 2025-07-23 PROCEDURE — 63600175 PHARM REV CODE 636 W HCPCS: Performed by: NURSE PRACTITIONER

## 2025-07-23 PROCEDURE — 96367 TX/PROPH/DG ADDL SEQ IV INF: CPT

## 2025-07-23 PROCEDURE — 25000003 PHARM REV CODE 250: Performed by: NURSE PRACTITIONER

## 2025-07-23 RX ORDER — SODIUM CHLORIDE 0.9 % (FLUSH) 0.9 %
10 SYRINGE (ML) INJECTION
Status: DISCONTINUED | OUTPATIENT
Start: 2025-07-23 | End: 2025-07-23 | Stop reason: HOSPADM

## 2025-07-23 RX ORDER — HEPARIN 100 UNIT/ML
500 SYRINGE INTRAVENOUS
Status: DISCONTINUED | OUTPATIENT
Start: 2025-07-23 | End: 2025-07-23 | Stop reason: HOSPADM

## 2025-07-23 RX ORDER — ACETAMINOPHEN 325 MG/1
650 TABLET ORAL
Status: COMPLETED | OUTPATIENT
Start: 2025-07-23 | End: 2025-07-23

## 2025-07-23 RX ORDER — EPINEPHRINE 0.3 MG/.3ML
0.3 INJECTION SUBCUTANEOUS ONCE AS NEEDED
Status: DISCONTINUED | OUTPATIENT
Start: 2025-07-23 | End: 2025-07-23 | Stop reason: HOSPADM

## 2025-07-23 RX ORDER — DIPHENHYDRAMINE HYDROCHLORIDE 50 MG/ML
25 INJECTION, SOLUTION INTRAMUSCULAR; INTRAVENOUS
Status: COMPLETED | OUTPATIENT
Start: 2025-07-23 | End: 2025-07-23

## 2025-07-23 RX ORDER — PROCHLORPERAZINE EDISYLATE 5 MG/ML
5 INJECTION INTRAMUSCULAR; INTRAVENOUS ONCE AS NEEDED
Status: DISCONTINUED | OUTPATIENT
Start: 2025-07-23 | End: 2025-07-23 | Stop reason: HOSPADM

## 2025-07-23 RX ORDER — DIPHENHYDRAMINE HYDROCHLORIDE 50 MG/ML
50 INJECTION, SOLUTION INTRAMUSCULAR; INTRAVENOUS ONCE AS NEEDED
Status: DISCONTINUED | OUTPATIENT
Start: 2025-07-23 | End: 2025-07-23 | Stop reason: HOSPADM

## 2025-07-23 RX ADMIN — DIPHENHYDRAMINE HYDROCHLORIDE 25 MG: 50 INJECTION INTRAMUSCULAR; INTRAVENOUS at 02:07

## 2025-07-23 RX ADMIN — DEXAMETHASONE SODIUM PHOSPHATE 0.25 MG: 4 INJECTION, SOLUTION INTRA-ARTICULAR; INTRALESIONAL; INTRAMUSCULAR; INTRAVENOUS; SOFT TISSUE at 02:07

## 2025-07-23 RX ADMIN — ACETAMINOPHEN 650 MG: 325 TABLET ORAL at 02:07

## 2025-07-23 RX ADMIN — DEXTROSE MONOHYDRATE: 50 INJECTION, SOLUTION INTRAVENOUS at 03:07

## 2025-07-23 RX ADMIN — APREPITANT 130 MG: 130 INJECTION, EMULSION INTRAVENOUS at 02:07

## 2025-07-28 LAB — CANCER AG27-29 SERPL-ACNC: 72.9 U/ML

## 2025-08-12 ENCOUNTER — TELEPHONE (OUTPATIENT)
Dept: HEMATOLOGY/ONCOLOGY | Facility: CLINIC | Age: 70
End: 2025-08-12
Payer: MEDICARE

## 2025-08-18 ENCOUNTER — OFFICE VISIT (OUTPATIENT)
Dept: HEMATOLOGY/ONCOLOGY | Facility: CLINIC | Age: 70
End: 2025-08-18
Payer: MEDICARE

## 2025-08-18 ENCOUNTER — LAB VISIT (OUTPATIENT)
Dept: LAB | Facility: HOSPITAL | Age: 70
End: 2025-08-18
Attending: INTERNAL MEDICINE
Payer: MEDICARE

## 2025-08-18 VITALS
OXYGEN SATURATION: 98 % | BODY MASS INDEX: 17.51 KG/M2 | WEIGHT: 95.13 LBS | DIASTOLIC BLOOD PRESSURE: 61 MMHG | TEMPERATURE: 98 F | HEART RATE: 57 BPM | SYSTOLIC BLOOD PRESSURE: 121 MMHG | RESPIRATION RATE: 15 BRPM | HEIGHT: 62 IN

## 2025-08-18 DIAGNOSIS — D84.821 DRUG-INDUCED IMMUNODEFICIENCY: ICD-10-CM

## 2025-08-18 DIAGNOSIS — Z17.0 MALIGNANT NEOPLASM OF LOWER-INNER QUADRANT OF RIGHT BREAST OF FEMALE, ESTROGEN RECEPTOR POSITIVE: Primary | ICD-10-CM

## 2025-08-18 DIAGNOSIS — C78.6 SECONDARY MALIGNANT NEOPLASM OF PERITONEUM: ICD-10-CM

## 2025-08-18 DIAGNOSIS — C50.311 MALIGNANT NEOPLASM OF LOWER-INNER QUADRANT OF RIGHT BREAST OF FEMALE, ESTROGEN RECEPTOR POSITIVE: Primary | ICD-10-CM

## 2025-08-18 DIAGNOSIS — Z79.899 DRUG-INDUCED IMMUNODEFICIENCY: ICD-10-CM

## 2025-08-18 DIAGNOSIS — Z17.0 MALIGNANT NEOPLASM OF LOWER-INNER QUADRANT OF RIGHT BREAST OF FEMALE, ESTROGEN RECEPTOR POSITIVE: ICD-10-CM

## 2025-08-18 DIAGNOSIS — C50.311 MALIGNANT NEOPLASM OF LOWER-INNER QUADRANT OF RIGHT BREAST OF FEMALE, ESTROGEN RECEPTOR POSITIVE: ICD-10-CM

## 2025-08-18 DIAGNOSIS — K59.00 CONSTIPATION, UNSPECIFIED CONSTIPATION TYPE: ICD-10-CM

## 2025-08-18 LAB
ALBUMIN SERPL-MCNC: 3.6 G/DL (ref 3.4–4.8)
ALBUMIN/GLOB SERPL: 1.1 RATIO (ref 1.1–2)
ALP SERPL-CCNC: 48 UNIT/L (ref 40–150)
ALT SERPL-CCNC: 16 UNIT/L (ref 0–55)
ANION GAP SERPL CALC-SCNC: 8 MEQ/L
AST SERPL-CCNC: 26 UNIT/L (ref 11–45)
BASOPHILS # BLD AUTO: 0.04 X10(3)/MCL
BASOPHILS NFR BLD AUTO: 0.5 %
BILIRUB SERPL-MCNC: 0.2 MG/DL
BUN SERPL-MCNC: 22.5 MG/DL (ref 9.8–20.1)
CALCIUM SERPL-MCNC: 9.4 MG/DL (ref 8.4–10.2)
CEA SERPL-MCNC: 20.35 NG/ML (ref 0–3)
CHLORIDE SERPL-SCNC: 102 MMOL/L (ref 98–107)
CO2 SERPL-SCNC: 29 MMOL/L (ref 23–31)
CREAT SERPL-MCNC: 0.72 MG/DL (ref 0.55–1.02)
CREAT/UREA NIT SERPL: 31
EOSINOPHIL # BLD AUTO: 0.17 X10(3)/MCL (ref 0–0.9)
EOSINOPHIL NFR BLD AUTO: 2.3 %
ERYTHROCYTE [DISTWIDTH] IN BLOOD BY AUTOMATED COUNT: 14.6 % (ref 11.5–17)
GFR SERPLBLD CREATININE-BSD FMLA CKD-EPI: >60 ML/MIN/1.73/M2
GLOBULIN SER-MCNC: 3.3 GM/DL (ref 2.4–3.5)
GLUCOSE SERPL-MCNC: 91 MG/DL (ref 82–115)
HCT VFR BLD AUTO: 32.7 % (ref 37–47)
HGB BLD-MCNC: 11 G/DL (ref 12–16)
IMM GRANULOCYTES # BLD AUTO: 0.01 X10(3)/MCL (ref 0–0.04)
IMM GRANULOCYTES NFR BLD AUTO: 0.1 %
LYMPHOCYTES # BLD AUTO: 1.73 X10(3)/MCL (ref 0.6–4.6)
LYMPHOCYTES NFR BLD AUTO: 23.2 %
MCH RBC QN AUTO: 32.4 PG (ref 27–31)
MCHC RBC AUTO-ENTMCNC: 33.6 G/DL (ref 33–36)
MCV RBC AUTO: 96.5 FL (ref 80–94)
MONOCYTES # BLD AUTO: 0.78 X10(3)/MCL (ref 0.1–1.3)
MONOCYTES NFR BLD AUTO: 10.5 %
NEUTROPHILS # BLD AUTO: 4.72 X10(3)/MCL (ref 2.1–9.2)
NEUTROPHILS NFR BLD AUTO: 63.4 %
PLATELET # BLD AUTO: 247 X10(3)/MCL (ref 130–400)
PMV BLD AUTO: 8.6 FL (ref 7.4–10.4)
POTASSIUM SERPL-SCNC: 3.9 MMOL/L (ref 3.5–5.1)
PROT SERPL-MCNC: 6.9 GM/DL (ref 5.8–7.6)
RBC # BLD AUTO: 3.39 X10(6)/MCL (ref 4.2–5.4)
SODIUM SERPL-SCNC: 139 MMOL/L (ref 136–145)
WBC # BLD AUTO: 7.45 X10(3)/MCL (ref 4.5–11.5)

## 2025-08-18 PROCEDURE — 80053 COMPREHEN METABOLIC PANEL: CPT

## 2025-08-18 PROCEDURE — 82378 CARCINOEMBRYONIC ANTIGEN: CPT

## 2025-08-18 PROCEDURE — 86300 IMMUNOASSAY TUMOR CA 15-3: CPT

## 2025-08-18 PROCEDURE — 36415 COLL VENOUS BLD VENIPUNCTURE: CPT

## 2025-08-18 PROCEDURE — 99215 OFFICE O/P EST HI 40 MIN: CPT | Mod: S$PBB,,, | Performed by: INTERNAL MEDICINE

## 2025-08-18 PROCEDURE — 99999 PR PBB SHADOW E&M-EST. PATIENT-LVL IV: CPT | Mod: PBBFAC,,, | Performed by: INTERNAL MEDICINE

## 2025-08-18 PROCEDURE — 99214 OFFICE O/P EST MOD 30 MIN: CPT | Mod: PBBFAC | Performed by: INTERNAL MEDICINE

## 2025-08-18 PROCEDURE — 85025 COMPLETE CBC W/AUTO DIFF WBC: CPT

## 2025-08-18 RX ORDER — DIPHENHYDRAMINE HYDROCHLORIDE 50 MG/ML
50 INJECTION, SOLUTION INTRAMUSCULAR; INTRAVENOUS ONCE AS NEEDED
Status: CANCELLED | OUTPATIENT
Start: 2025-08-18 | End: 2037-01-14

## 2025-08-18 RX ORDER — EPINEPHRINE 0.3 MG/.3ML
0.3 INJECTION SUBCUTANEOUS ONCE AS NEEDED
Status: CANCELLED | OUTPATIENT
Start: 2025-08-18 | End: 2037-01-14

## 2025-08-18 RX ORDER — SODIUM CHLORIDE 0.9 % (FLUSH) 0.9 %
10 SYRINGE (ML) INJECTION
Status: CANCELLED | OUTPATIENT
Start: 2025-08-18

## 2025-08-18 RX ORDER — DIPHENHYDRAMINE HYDROCHLORIDE 50 MG/ML
25 INJECTION, SOLUTION INTRAMUSCULAR; INTRAVENOUS
Status: CANCELLED | OUTPATIENT
Start: 2025-08-18 | End: 2025-08-18

## 2025-08-18 RX ORDER — PROCHLORPERAZINE EDISYLATE 5 MG/ML
5 INJECTION INTRAMUSCULAR; INTRAVENOUS ONCE AS NEEDED
Status: CANCELLED | OUTPATIENT
Start: 2025-08-18

## 2025-08-18 RX ORDER — ACETAMINOPHEN 325 MG/1
650 TABLET ORAL
Status: CANCELLED | OUTPATIENT
Start: 2025-08-18 | End: 2025-08-18

## 2025-08-18 RX ORDER — HEPARIN 100 UNIT/ML
500 SYRINGE INTRAVENOUS
Status: CANCELLED | OUTPATIENT
Start: 2025-08-18

## 2025-08-19 ENCOUNTER — INFUSION (OUTPATIENT)
Dept: INFUSION THERAPY | Facility: HOSPITAL | Age: 70
End: 2025-08-19
Attending: INTERNAL MEDICINE
Payer: MEDICARE

## 2025-08-19 VITALS
RESPIRATION RATE: 18 BRPM | BODY MASS INDEX: 17.51 KG/M2 | HEART RATE: 59 BPM | TEMPERATURE: 98 F | SYSTOLIC BLOOD PRESSURE: 127 MMHG | OXYGEN SATURATION: 100 % | WEIGHT: 95.13 LBS | HEIGHT: 62 IN | DIASTOLIC BLOOD PRESSURE: 67 MMHG

## 2025-08-19 DIAGNOSIS — C78.6 SECONDARY MALIGNANT NEOPLASM OF PERITONEUM: ICD-10-CM

## 2025-08-19 DIAGNOSIS — Z17.0 MALIGNANT NEOPLASM OF LOWER-INNER QUADRANT OF RIGHT BREAST OF FEMALE, ESTROGEN RECEPTOR POSITIVE: Primary | ICD-10-CM

## 2025-08-19 DIAGNOSIS — C50.311 MALIGNANT NEOPLASM OF LOWER-INNER QUADRANT OF RIGHT BREAST OF FEMALE, ESTROGEN RECEPTOR POSITIVE: Primary | ICD-10-CM

## 2025-08-19 PROCEDURE — 25000003 PHARM REV CODE 250: Performed by: INTERNAL MEDICINE

## 2025-08-19 PROCEDURE — 63600175 PHARM REV CODE 636 W HCPCS: Mod: JZ,TB | Performed by: INTERNAL MEDICINE

## 2025-08-19 PROCEDURE — 96375 TX/PRO/DX INJ NEW DRUG ADDON: CPT

## 2025-08-19 PROCEDURE — 96367 TX/PROPH/DG ADDL SEQ IV INF: CPT

## 2025-08-19 PROCEDURE — 96413 CHEMO IV INFUSION 1 HR: CPT

## 2025-08-19 RX ORDER — PROCHLORPERAZINE EDISYLATE 5 MG/ML
5 INJECTION INTRAMUSCULAR; INTRAVENOUS ONCE AS NEEDED
Status: DISCONTINUED | OUTPATIENT
Start: 2025-08-19 | End: 2025-08-19 | Stop reason: HOSPADM

## 2025-08-19 RX ORDER — DIPHENHYDRAMINE HYDROCHLORIDE 50 MG/ML
50 INJECTION, SOLUTION INTRAMUSCULAR; INTRAVENOUS ONCE AS NEEDED
Status: DISCONTINUED | OUTPATIENT
Start: 2025-08-19 | End: 2025-08-19 | Stop reason: HOSPADM

## 2025-08-19 RX ORDER — EPINEPHRINE 0.3 MG/.3ML
0.3 INJECTION SUBCUTANEOUS ONCE AS NEEDED
Status: DISCONTINUED | OUTPATIENT
Start: 2025-08-19 | End: 2025-08-19 | Stop reason: HOSPADM

## 2025-08-19 RX ORDER — ACETAMINOPHEN 325 MG/1
650 TABLET ORAL
Status: COMPLETED | OUTPATIENT
Start: 2025-08-19 | End: 2025-08-19

## 2025-08-19 RX ORDER — SODIUM CHLORIDE 0.9 % (FLUSH) 0.9 %
10 SYRINGE (ML) INJECTION
Status: DISCONTINUED | OUTPATIENT
Start: 2025-08-19 | End: 2025-08-19 | Stop reason: HOSPADM

## 2025-08-19 RX ORDER — DIPHENHYDRAMINE HYDROCHLORIDE 50 MG/ML
25 INJECTION, SOLUTION INTRAMUSCULAR; INTRAVENOUS
Status: COMPLETED | OUTPATIENT
Start: 2025-08-19 | End: 2025-08-19

## 2025-08-19 RX ORDER — HEPARIN 100 UNIT/ML
500 SYRINGE INTRAVENOUS
Status: DISCONTINUED | OUTPATIENT
Start: 2025-08-19 | End: 2025-08-19 | Stop reason: HOSPADM

## 2025-08-19 RX ADMIN — APREPITANT 130 MG: 130 INJECTION, EMULSION INTRAVENOUS at 02:08

## 2025-08-19 RX ADMIN — DEXAMETHASONE SODIUM PHOSPHATE 0.25 MG: 4 INJECTION, SOLUTION INTRA-ARTICULAR; INTRALESIONAL; INTRAMUSCULAR; INTRAVENOUS; SOFT TISSUE at 02:08

## 2025-08-19 RX ADMIN — DIPHENHYDRAMINE HYDROCHLORIDE 25 MG: 50 INJECTION INTRAMUSCULAR; INTRAVENOUS at 02:08

## 2025-08-19 RX ADMIN — ACETAMINOPHEN 650 MG: 325 TABLET ORAL at 02:08

## 2025-08-19 RX ADMIN — DEXTROSE MONOHYDRATE: 50 INJECTION, SOLUTION INTRAVENOUS at 02:08

## 2025-08-20 LAB — CANCER AG27-29 SERPL-ACNC: 48.1 U/ML

## 2025-09-03 ENCOUNTER — TELEPHONE (OUTPATIENT)
Dept: HEMATOLOGY/ONCOLOGY | Facility: CLINIC | Age: 70
End: 2025-09-03
Payer: MEDICARE

## (undated) DEVICE — GLOVE PROTEXIS LTX MICRO  7

## (undated) DEVICE — SHEARS HARMONIC CRVD TIP 36CM

## (undated) DEVICE — GLOVE PROTEXIS BLUE LATEX 7

## (undated) DEVICE — SYS HYDRO-SURG IRR SMOKE EVAC

## (undated) DEVICE — DRESSING TELFA N ADH 3X8IN

## (undated) DEVICE — IRRIGATOR SUCTION W/TIP

## (undated) DEVICE — CANNULA ENDOPATH XCEL 5X100MM

## (undated) DEVICE — APPLICATOR STRL COT 2INNR 6IN

## (undated) DEVICE — ELECTRODE MEGADYNE L-WIRE 33CM

## (undated) DEVICE — NDL HYPO REG 25G X 1 1/2

## (undated) DEVICE — GLOVE PROTEXIS HYDROGEL SZ7

## (undated) DEVICE — SOL IRRI STRL WATER 1000ML

## (undated) DEVICE — NDL SYR 10ML 18X1.5 LL BLUNT

## (undated) DEVICE — BLANKET SNUGGLE WARM ADLT SM

## (undated) DEVICE — ADHESIVE DERMABOND ADVANCED

## (undated) DEVICE — GLOVE PROTEXIS HYDROGEL SZ6.5

## (undated) DEVICE — SCISSOR CURVED ENDOPATH 5MM

## (undated) DEVICE — TROCAR ENDO Z THREAD KII 5X100

## (undated) DEVICE — GLOVE PROTEXIS PI SYN SURG 6.5

## (undated) DEVICE — SUT MCRYL PLUS 4-0 PS2 27IN

## (undated) DEVICE — CLIP HEMO-LOK ML

## (undated) DEVICE — Device

## (undated) DEVICE — POUCH INSTRUMENT ADH 10X18IN

## (undated) DEVICE — SOL NORMAL USPCA 0.9%

## (undated) DEVICE — TROCAR ENDOPATH XCEL 5X100MM